# Patient Record
Sex: FEMALE | Race: BLACK OR AFRICAN AMERICAN | NOT HISPANIC OR LATINO | Employment: UNEMPLOYED | URBAN - METROPOLITAN AREA
[De-identification: names, ages, dates, MRNs, and addresses within clinical notes are randomized per-mention and may not be internally consistent; named-entity substitution may affect disease eponyms.]

---
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
## 2020-01-15 ENCOUNTER — COMMUNICATION - HEALTHEAST (OUTPATIENT)
Dept: FAMILY MEDICINE | Facility: CLINIC | Age: 20
End: 2020-01-15

## 2020-01-17 ENCOUNTER — COMMUNICATION - HEALTHEAST (OUTPATIENT)
Dept: FAMILY MEDICINE | Facility: CLINIC | Age: 20
End: 2020-01-17

## 2020-01-17 ENCOUNTER — OFFICE VISIT - HEALTHEAST (OUTPATIENT)
Dept: FAMILY MEDICINE | Facility: CLINIC | Age: 20
End: 2020-01-17

## 2020-01-17 DIAGNOSIS — Z09 HOSPITAL DISCHARGE FOLLOW-UP: ICD-10-CM

## 2020-01-17 DIAGNOSIS — R00.0 TACHYCARDIA: ICD-10-CM

## 2020-01-17 DIAGNOSIS — M53.3 SACRAL PAIN: ICD-10-CM

## 2020-01-17 DIAGNOSIS — E66.01 CLASS 3 SEVERE OBESITY DUE TO EXCESS CALORIES WITHOUT SERIOUS COMORBIDITY WITH BODY MASS INDEX (BMI) OF 40.0 TO 44.9 IN ADULT (H): ICD-10-CM

## 2020-01-17 DIAGNOSIS — K21.9 GERD (GASTROESOPHAGEAL REFLUX DISEASE): ICD-10-CM

## 2020-01-17 DIAGNOSIS — E66.813 CLASS 3 SEVERE OBESITY DUE TO EXCESS CALORIES WITHOUT SERIOUS COMORBIDITY WITH BODY MASS INDEX (BMI) OF 40.0 TO 44.9 IN ADULT (H): ICD-10-CM

## 2020-01-17 DIAGNOSIS — J02.9 SORE THROAT: ICD-10-CM

## 2020-01-17 LAB — DEPRECATED S PYO AG THROAT QL EIA: NORMAL

## 2020-01-17 ASSESSMENT — PATIENT HEALTH QUESTIONNAIRE - PHQ9: SUM OF ALL RESPONSES TO PHQ QUESTIONS 1-9: 9

## 2020-01-18 LAB — GROUP A STREP BY PCR: NORMAL

## 2020-01-19 ENCOUNTER — COMMUNICATION - HEALTHEAST (OUTPATIENT)
Dept: FAMILY MEDICINE | Facility: CLINIC | Age: 20
End: 2020-01-19

## 2020-01-19 DIAGNOSIS — K21.9 GERD (GASTROESOPHAGEAL REFLUX DISEASE): ICD-10-CM

## 2020-01-31 ENCOUNTER — OFFICE VISIT - HEALTHEAST (OUTPATIENT)
Dept: FAMILY MEDICINE | Facility: CLINIC | Age: 20
End: 2020-01-31

## 2020-01-31 ENCOUNTER — HOSPITAL ENCOUNTER (OUTPATIENT)
Dept: CT IMAGING | Facility: HOSPITAL | Age: 20
Discharge: HOME | End: 2020-01-31

## 2020-01-31 DIAGNOSIS — R00.0 TACHYCARDIA: ICD-10-CM

## 2020-01-31 DIAGNOSIS — R94.31 ELECTROCARDIOGRAM SHOWING T WAVE ABNORMALITIES: ICD-10-CM

## 2020-01-31 DIAGNOSIS — R06.02 SOB (SHORTNESS OF BREATH): ICD-10-CM

## 2020-01-31 DIAGNOSIS — F32.5 MAJOR DEPRESSIVE DISORDER WITH SINGLE EPISODE, IN FULL REMISSION (H): ICD-10-CM

## 2020-01-31 LAB
ATRIAL RATE - MUSE: 94 BPM
DIASTOLIC BLOOD PRESSURE - MUSE: NORMAL
INTERPRETATION ECG - MUSE: NORMAL
P AXIS - MUSE: 43 DEGREES
PR INTERVAL - MUSE: 122 MS
QRS DURATION - MUSE: 78 MS
QT - MUSE: 348 MS
QTC - MUSE: 435 MS
R AXIS - MUSE: 30 DEGREES
SYSTOLIC BLOOD PRESSURE - MUSE: NORMAL
T AXIS - MUSE: -2 DEGREES
TSH SERPL DL<=0.005 MIU/L-ACNC: 1.14 UIU/ML (ref 0.3–5)
VENTRICULAR RATE- MUSE: 94 BPM

## 2020-02-03 ENCOUNTER — COMMUNICATION - HEALTHEAST (OUTPATIENT)
Dept: FAMILY MEDICINE | Facility: CLINIC | Age: 20
End: 2020-02-03

## 2020-02-03 DIAGNOSIS — R00.0 TACHYCARDIA: ICD-10-CM

## 2020-02-07 ASSESSMENT — PATIENT HEALTH QUESTIONNAIRE - PHQ9: SUM OF ALL RESPONSES TO PHQ QUESTIONS 1-9: 17

## 2020-03-06 ENCOUNTER — OFFICE VISIT - HEALTHEAST (OUTPATIENT)
Dept: CARDIOLOGY | Facility: CLINIC | Age: 20
End: 2020-03-06

## 2020-03-06 DIAGNOSIS — Z72.0 TOBACCO USE: ICD-10-CM

## 2020-03-06 DIAGNOSIS — E66.01 MORBID OBESITY (H): ICD-10-CM

## 2020-03-06 DIAGNOSIS — R00.0 SINUS TACHYCARDIA: ICD-10-CM

## 2020-03-06 ASSESSMENT — MIFFLIN-ST. JEOR: SCORE: 1673.54

## 2020-03-29 NOTE — PROGRESS NOTES
[facility] Sleep Study Request  Sleep disruption and daytime sleepiness with BMI 44   No observed apneas or snoring  Extreme sinus tachyardia 140 with normal TSH    [facility] Sleep Study Request  Home Sleep Study Ordered during suspension of polysomnography  Ordering preference:  Preference for disposable WatchPAT One study - has patient watched and accepted video?   [email or web address]  Noxturnal HST if not yet available or not comfortable with WatchPAT technology  Followup by any provider    Ambrocio Nelson MD    Physician    Specialty:  Cardiology    Progress Notes    Signed    Encounter Date:  3/6/2020                Signed         Expand All  Collapse All       []Hide copied text    []Hover for details     CARDIOLOGY CLINIC CONSULT NOTE      Assessment/Plan:   1.  Sinus tachycardia.  Likely combination of physical deconditioning, mild dehydration, untreated obstructive sleep apnea.  Effexor could be contributing, consider switching to alternate agent.  2.  Suspected untreated obstructive sleep apnea.  Will schedule split-night sleep study and sleep consultation.  3.  Chest wall pain.  Reassured.  No further evaluation warranted      History of Present Illness:      It is my pleasure to see Yolanda Vee at the [facility] clinic for evaluation of sinus tachycardia.     Yolanda Vee is a 19 y.o. female with a past medical history of morbid obesity, tobacco abuse.  She has been on Effexor as an antidepressant for a couple of years.  She has chronic daytime sleepiness and frequent nocturnal awakenings, though she has not been told she snores.  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  This can bring on left chest pain.  Consider falling down some steps earlier this year, she has noticed that her heart races at times.  In the emergency room her initial heart rate was 140, slowing into the 90s.  She notes  that her heart races for a minute or 2, up to an hour once a week generally this occurs when she is walking up steps, or smoking.  She is also noticed heart racing at night.     She reports significant weight gain, though the amount is uncertain.  She believes that she has gained 100 pounds in the last year.  she also believes that she has lost 80 pounds in the last couple of months with walking.  She recalls a weight of 300 pounds earlier in the year.     She tries to keep her self hydrated by drinking Gatorade.  She has 1 cup of coffee daily and smokes up to a pack of cigarettes daily.  She has tapered her cigarette use back to 1-1/2 cigarettes daily     Past Medical History:          Patient Active Problem List   Diagnosis     Vitamin D deficiency     Mild single current episode of major depressive disorder (H)     Chronic nasal congestion     Tobacco use     Chronic idiopathic constipation     Sinus tachycardia     Morbid obesity (H)        Past Surgical History:   History reviewed. No pertinent surgical history.     Family History:            Family History   Problem Relation Age of Onset     Acute Myocardial Infarction Neg Hx       Family history reviewed and is not pertinent to the chief complaint or presenting problem     Social History:    reports that she has been smoking. She has never used smokeless tobacco. She reports that she does not drink alcohol or use drugs.     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     Sleep: Poorly restorative, frequent nocturnal awakenings, with daytime sleepiness.  No prior sleep apnea evaluation     Meds:      Current Medications          Current Outpatient Medications   Medication Sig Dispense Refill     aluminum-magnesium hydroxide 200-200 mg/5 mL suspension Take 5 mL by mouth every 6 (six) hours as needed for indigestion. 100 mL 3     omeprazole (PRILOSEC) 20 MG capsule TAKE 1 CAPSULE(20 MG) BY MOUTH DAILY BEFORE BREAKFAST 30 capsule 11      "salicylic acid-lactic acid (COMPOUND W) 17 % external solution Apply to wart every 3 days until resolved. 14 mL 3     fluticasone propionate (FLONASE ALLERGY RELIEF) 50 mcg/actuation nasal spray 2 sprays into each nostril daily. 16 g 12     venlafaxine (EFFEXOR-XR) 150 MG 24 hr capsule Take 1 capsule (150 mg total) by mouth daily. 90 capsule 3      No current facility-administered medications for this visit.            Allergies:   Vicodin [hydrocodone-acetaminophen]     Review of Systems:      General: Weight Gain  Eyes: Visual Distubance  Ears/Nose/Throat: WNL  Lungs: Shortness of Breath, Wheezing  Heart: Shortness of Breath with activity, Chest Pain, Irregular Heartbeat  Stomach: Constipation, Heartburn  Bladder: WNL  Muscle/Joints: WNL  Skin: WNL  Nervous System: Daytime Sleepiness  Mental Health: WNL     Blood: WNL           Objective:      Physical Exam  220 lb (99.8 kg)  4' 11\" (1.499 m)  Body mass index is 44.43 kg/m .  /70 (Patient Site: Left Arm, Patient Position: Sitting, Cuff Size: Adult Regular)   Pulse 98   Resp 18   Ht 4' 11\" (1.499 m)   Wt 220 lb (99.8 kg)   BMI 44.43 kg/m    Resting heart rate 84 beats a minute supine, 96 bpm standing  Blood pressure 128/64 supine, 126/72 standing        General Appearance : Awake, Alert, No acute distress  HEENT: No Scleral icterus; the mucous membranes were pink and moist.  Conjunctivae not injected  Neck:  No cervical bruits, jugular venous distention, or thyromegaly . Stout  Chest: The spine was straight. Chest wall symmetric.  Local left pectoral pressure reproduces chest pain   lungs: Respirations unlabored; the lungs are clear to auscultation.  No wheezing   Cardiovascular: Nonpalpable point of maximal impulse.  Auscultation reveals normal first and second heart sounds with no murmurs, rubs, or gallops.  Carotid, radial, and dorsalis pedal pulses are intact and symmetric.     Abdomen: Obese.  No organomegaly, masses, bruits, or tenderness. Bowels " sounds are present  Extremities: No edema  Skin: No xanthelasma. Warm, Dry.  Musculoskeletal: No tenderness.  Neurologic: Alert and oriented ×3. Speech is fluent.        EKG (personally reviewed):  31 January 2020: Sinus rhythm 94 bpm.  Borderline criteria for LVH.  Nonspecific T wave changes.     Cardiac Imaging Studies:  CTA chest PE run 1/31/2020:  HEART: Cardiac chambers within normal limits. No pericardial effusion.  1.  No evidence for pulmonary embolism. Negative CTA examination of the chest.      Lab Review         Lab Results   Component Value Date      01/14/2020     K 4.5 01/14/2020      (H) 01/14/2020     CO2 19 (L) 01/14/2020     BUN 5 (L) 01/14/2020     CREATININE 0.68 01/14/2020     CALCIUM 8.7 01/14/2020           Lab Results   Component Value Date     WBC 15.2 (H) 01/14/2020     WBC 9.0 02/24/2015     HGB 13.1 01/14/2020     HCT 42.3 01/14/2020     MCV 89 01/14/2020      01/14/2020     No results found for: CHOL, TRIG, HDL, LDLCALC  No results found for: TROPONINI  No results found for: BNP        Lab Results   Component Value Date     TSH 1.14 01/31/2020        Ambrocio Nelson MD [facility]

## 2020-04-23 ENCOUNTER — OFFICE VISIT - HEALTHEAST (OUTPATIENT)
Dept: FAMILY MEDICINE | Facility: CLINIC | Age: 20
End: 2020-04-23

## 2020-04-23 DIAGNOSIS — Z20.822 SUSPECTED COVID-19 VIRUS INFECTION: ICD-10-CM

## 2020-04-23 DIAGNOSIS — R06.2 WHEEZING: ICD-10-CM

## 2020-04-30 ENCOUNTER — OFFICE VISIT - HEALTHEAST (OUTPATIENT)
Dept: FAMILY MEDICINE | Facility: CLINIC | Age: 20
End: 2020-04-30

## 2020-04-30 ENCOUNTER — COMMUNICATION - HEALTHEAST (OUTPATIENT)
Dept: FAMILY MEDICINE | Facility: CLINIC | Age: 20
End: 2020-04-30

## 2020-04-30 DIAGNOSIS — R05.9 COUGH: ICD-10-CM

## 2020-04-30 DIAGNOSIS — R19.7 DIARRHEA, UNSPECIFIED TYPE: ICD-10-CM

## 2020-04-30 DIAGNOSIS — R11.2 NON-INTRACTABLE VOMITING WITH NAUSEA, UNSPECIFIED VOMITING TYPE: ICD-10-CM

## 2020-05-04 ENCOUNTER — OFFICE VISIT - HEALTHEAST (OUTPATIENT)
Dept: FAMILY MEDICINE | Facility: CLINIC | Age: 20
End: 2020-05-04

## 2020-05-04 ENCOUNTER — COMMUNICATION - HEALTHEAST (OUTPATIENT)
Dept: FAMILY MEDICINE | Facility: CLINIC | Age: 20
End: 2020-05-04

## 2020-05-04 DIAGNOSIS — R19.7 DIARRHEA, UNSPECIFIED TYPE: ICD-10-CM

## 2020-05-18 ENCOUNTER — COMMUNICATION - HEALTHEAST (OUTPATIENT)
Dept: FAMILY MEDICINE | Facility: CLINIC | Age: 20
End: 2020-05-18

## 2020-05-18 DIAGNOSIS — R09.81 CHRONIC NASAL CONGESTION: ICD-10-CM

## 2020-09-23 ENCOUNTER — OFFICE VISIT - HEALTHEAST (OUTPATIENT)
Dept: FAMILY MEDICINE | Facility: CLINIC | Age: 20
End: 2020-09-23

## 2020-09-23 DIAGNOSIS — R20.2 PARESTHESIA OF BOTH HANDS: ICD-10-CM

## 2020-09-23 DIAGNOSIS — M25.562 ACUTE PAIN OF LEFT KNEE: ICD-10-CM

## 2020-09-23 DIAGNOSIS — F32.0 MILD SINGLE CURRENT EPISODE OF MAJOR DEPRESSIVE DISORDER (H): ICD-10-CM

## 2020-09-23 DIAGNOSIS — E55.9 VITAMIN D DEFICIENCY: ICD-10-CM

## 2020-09-23 LAB
ERYTHROCYTE [DISTWIDTH] IN BLOOD BY AUTOMATED COUNT: 13.4 % (ref 11–14.5)
FASTING STATUS PATIENT QL REPORTED: YES
GLUCOSE BLD-MCNC: 90 MG/DL (ref 70–125)
HCT VFR BLD AUTO: 40.7 % (ref 35–47)
HGB BLD-MCNC: 13.3 G/DL (ref 12–16)
MCH RBC QN AUTO: 28.1 PG (ref 27–34)
MCHC RBC AUTO-ENTMCNC: 32.6 G/DL (ref 32–36)
MCV RBC AUTO: 86 FL (ref 80–100)
PLATELET # BLD AUTO: 323 THOU/UL (ref 140–440)
PMV BLD AUTO: 8.5 FL (ref 7–10)
RBC # BLD AUTO: 4.71 MILL/UL (ref 3.8–5.4)
TSH SERPL DL<=0.005 MIU/L-ACNC: 3.22 UIU/ML (ref 0.3–5)
VIT B12 SERPL-MCNC: 312 PG/ML (ref 213–816)
WBC: 13.7 THOU/UL (ref 4–11)

## 2020-09-23 ASSESSMENT — PATIENT HEALTH QUESTIONNAIRE - PHQ9: SUM OF ALL RESPONSES TO PHQ QUESTIONS 1-9: 12

## 2020-09-23 ASSESSMENT — MIFFLIN-ST. JEOR: SCORE: 1655.4

## 2020-09-24 LAB — 25(OH)D3 SERPL-MCNC: 13.8 NG/ML (ref 30–80)

## 2020-09-30 ENCOUNTER — COMMUNICATION - HEALTHEAST (OUTPATIENT)
Dept: FAMILY MEDICINE | Facility: CLINIC | Age: 20
End: 2020-09-30

## 2020-09-30 ENCOUNTER — RECORDS - HEALTHEAST (OUTPATIENT)
Dept: ADMINISTRATIVE | Facility: OTHER | Age: 20
End: 2020-09-30

## 2020-09-30 DIAGNOSIS — E55.9 VITAMIN D DEFICIENCY: ICD-10-CM

## 2020-10-10 ENCOUNTER — COMMUNICATION - HEALTHEAST (OUTPATIENT)
Dept: FAMILY MEDICINE | Facility: CLINIC | Age: 20
End: 2020-10-10

## 2020-10-10 DIAGNOSIS — F32.5 MAJOR DEPRESSIVE DISORDER WITH SINGLE EPISODE, IN FULL REMISSION (H): ICD-10-CM

## 2020-10-10 DIAGNOSIS — E55.9 VITAMIN D DEFICIENCY: ICD-10-CM

## 2020-10-23 ENCOUNTER — COMMUNICATION - HEALTHEAST (OUTPATIENT)
Dept: FAMILY MEDICINE | Facility: CLINIC | Age: 20
End: 2020-10-23

## 2020-10-23 ENCOUNTER — RECORDS - HEALTHEAST (OUTPATIENT)
Dept: GENERAL RADIOLOGY | Facility: CLINIC | Age: 20
End: 2020-10-23

## 2020-10-23 ENCOUNTER — OFFICE VISIT - HEALTHEAST (OUTPATIENT)
Dept: FAMILY MEDICINE | Facility: CLINIC | Age: 20
End: 2020-10-23

## 2020-10-23 DIAGNOSIS — M79.672 LEFT FOOT PAIN: ICD-10-CM

## 2020-10-23 DIAGNOSIS — J45.40 MODERATE PERSISTENT ASTHMA, UNSPECIFIED WHETHER COMPLICATED: ICD-10-CM

## 2020-10-23 DIAGNOSIS — F33.1 MODERATE EPISODE OF RECURRENT MAJOR DEPRESSIVE DISORDER (H): ICD-10-CM

## 2020-10-23 DIAGNOSIS — Z72.0 TOBACCO USE: ICD-10-CM

## 2020-10-23 DIAGNOSIS — R00.0 TACHYCARDIA: ICD-10-CM

## 2020-10-23 DIAGNOSIS — M79.672 PAIN IN LEFT FOOT: ICD-10-CM

## 2020-10-23 ASSESSMENT — PATIENT HEALTH QUESTIONNAIRE - PHQ9: SUM OF ALL RESPONSES TO PHQ QUESTIONS 1-9: 10

## 2020-10-26 ENCOUNTER — COMMUNICATION - HEALTHEAST (OUTPATIENT)
Dept: FAMILY MEDICINE | Facility: CLINIC | Age: 20
End: 2020-10-26

## 2020-10-27 ENCOUNTER — COMMUNICATION - HEALTHEAST (OUTPATIENT)
Dept: FAMILY MEDICINE | Facility: CLINIC | Age: 20
End: 2020-10-27

## 2020-10-27 ENCOUNTER — OFFICE VISIT - HEALTHEAST (OUTPATIENT)
Dept: FAMILY MEDICINE | Facility: CLINIC | Age: 20
End: 2020-10-27

## 2020-10-27 DIAGNOSIS — M79.672 LEFT FOOT PAIN: ICD-10-CM

## 2020-11-06 ENCOUNTER — OFFICE VISIT - HEALTHEAST (OUTPATIENT)
Dept: FAMILY MEDICINE | Facility: CLINIC | Age: 20
End: 2020-11-06

## 2020-11-06 DIAGNOSIS — R61 NIGHT SWEATS: ICD-10-CM

## 2020-11-06 DIAGNOSIS — E55.9 VITAMIN D DEFICIENCY: ICD-10-CM

## 2020-11-06 DIAGNOSIS — F33.1 MODERATE EPISODE OF RECURRENT MAJOR DEPRESSIVE DISORDER (H): ICD-10-CM

## 2020-11-06 DIAGNOSIS — F51.5 NIGHTMARES: ICD-10-CM

## 2020-11-06 LAB
ALBUMIN SERPL-MCNC: 3.6 G/DL (ref 3.5–5)
ALBUMIN UR-MCNC: NEGATIVE MG/DL
ALP SERPL-CCNC: 107 U/L (ref 45–120)
ALT SERPL W P-5'-P-CCNC: 16 U/L (ref 0–45)
ANION GAP SERPL CALCULATED.3IONS-SCNC: 9 MMOL/L (ref 5–18)
APPEARANCE UR: CLEAR
AST SERPL W P-5'-P-CCNC: 13 U/L (ref 0–40)
BACTERIA #/AREA URNS HPF: ABNORMAL HPF
BASOPHILS # BLD AUTO: 0.1 THOU/UL (ref 0–0.2)
BASOPHILS NFR BLD AUTO: 1 % (ref 0–2)
BILIRUB SERPL-MCNC: 0.2 MG/DL (ref 0–1)
BILIRUB UR QL STRIP: NEGATIVE
BUN SERPL-MCNC: 8 MG/DL (ref 8–22)
CALCIUM SERPL-MCNC: 9.2 MG/DL (ref 8.5–10.5)
CHLORIDE BLD-SCNC: 109 MMOL/L (ref 98–107)
CO2 SERPL-SCNC: 23 MMOL/L (ref 22–31)
COLOR UR AUTO: YELLOW
CREAT SERPL-MCNC: 0.69 MG/DL (ref 0.6–1.1)
EOSINOPHIL # BLD AUTO: 0.5 THOU/UL (ref 0–0.4)
EOSINOPHIL NFR BLD AUTO: 4 % (ref 0–6)
ERYTHROCYTE [DISTWIDTH] IN BLOOD BY AUTOMATED COUNT: 13.9 % (ref 11–14.5)
GFR SERPL CREATININE-BSD FRML MDRD: >60 ML/MIN/1.73M2
GLUCOSE BLD-MCNC: 83 MG/DL (ref 70–125)
GLUCOSE UR STRIP-MCNC: NEGATIVE MG/DL
HCT VFR BLD AUTO: 40.4 % (ref 35–47)
HGB BLD-MCNC: 13.7 G/DL (ref 12–16)
HGB UR QL STRIP: ABNORMAL
HIV 1+2 AB+HIV1 P24 AG SERPL QL IA: NEGATIVE
KETONES UR STRIP-MCNC: NEGATIVE MG/DL
LEUKOCYTE ESTERASE UR QL STRIP: ABNORMAL
LYMPHOCYTES # BLD AUTO: 3.1 THOU/UL (ref 0.8–4.4)
LYMPHOCYTES NFR BLD AUTO: 22 % (ref 20–40)
MCH RBC QN AUTO: 29.3 PG (ref 27–34)
MCHC RBC AUTO-ENTMCNC: 33.9 G/DL (ref 32–36)
MCV RBC AUTO: 86 FL (ref 80–100)
MONOCYTES # BLD AUTO: 1 THOU/UL (ref 0–0.9)
MONOCYTES NFR BLD AUTO: 7 % (ref 2–10)
MUCOUS THREADS #/AREA URNS LPF: ABNORMAL LPF
NEUTROPHILS # BLD AUTO: 9.5 THOU/UL (ref 2–7.7)
NEUTROPHILS NFR BLD AUTO: 67 % (ref 50–70)
NITRATE UR QL: NEGATIVE
PH UR STRIP: 5.5 [PH] (ref 5–8)
PLATELET # BLD AUTO: 354 THOU/UL (ref 140–440)
PMV BLD AUTO: 8.1 FL (ref 7–10)
POTASSIUM BLD-SCNC: 4.6 MMOL/L (ref 3.5–5)
PROT SERPL-MCNC: 7.1 G/DL (ref 6–8)
RBC # BLD AUTO: 4.68 MILL/UL (ref 3.8–5.4)
RBC #/AREA URNS AUTO: ABNORMAL HPF
SODIUM SERPL-SCNC: 141 MMOL/L (ref 136–145)
SP GR UR STRIP: 1.02 (ref 1–1.03)
SQUAMOUS #/AREA URNS AUTO: ABNORMAL LPF
TSH SERPL DL<=0.005 MIU/L-ACNC: 1.74 UIU/ML (ref 0.3–5)
UROBILINOGEN UR STRIP-ACNC: ABNORMAL
WBC #/AREA URNS AUTO: ABNORMAL HPF
WBC: 14.1 THOU/UL (ref 4–11)

## 2020-11-08 LAB — BACTERIA SPEC CULT: NORMAL

## 2020-11-09 LAB — 25(OH)D3 SERPL-MCNC: 12.1 NG/ML (ref 30–80)

## 2020-11-12 ENCOUNTER — COMMUNICATION - HEALTHEAST (OUTPATIENT)
Dept: FAMILY MEDICINE | Facility: CLINIC | Age: 20
End: 2020-11-12

## 2020-11-12 ENCOUNTER — TELEPHONE (OUTPATIENT)
Dept: PSYCHIATRY | Facility: CLINIC | Age: 20
End: 2020-11-12

## 2020-11-12 NOTE — TELEPHONE ENCOUNTER
PSYCHIATRY CLINIC PHONE INTAKE     SERVICES REQUESTED / INTERESTED IN          Med Management    Presenting Problem and Brief History                              What would you like to be seen for? (brief description):  Patient's PCP is prescribing venlafaxine 150mg but patient feels it is not very helpful. Primary concerns are bad nightmares and seeing things that are not there. This has been happening for about a month. She typically sees things out of the corner of her eye and when waking up from nightmares she will see something that is not really there. Patient works full time at a group home and says it is hard to be at work because of this and because one of the clients talks a lot about SI which makes the patient think about it. She sees things sometimes/daily, and sees her grandparents spirits when she thinks about them.   Have you received a mental health diagnosis? Yes   Which one (s): Depression, Anxiety  Is there any history of developmental delay?  No   Are you currently seeing a mental health provider?  Yes            Who / month last seen:  Therapy at [facility] in [place], last seen about 2 weeks ago  Do you have mental health records elsewhere?  Yes  Will you sign a release so we can obtain them?  Yes    Have you ever been hospitalized for psychiatric reasons?  No  Describe:      Do you have current thoughts of self-harm?  Yes  - no plan  Do you currently have thoughts of harming others?  No       Substance Use History     Do you have any history of alcohol / illicit drug use?  Yes  Describe:  Uses alcohol sometimes, about 5-6 shots at a time. Patient feels it is a problem and she talks to her therapist about it.  Have you ever received treatment for this?  No    Describe:       Social History     Who is the patient's a guardian?  No    Name / number:   Have you had an ACT team in last 12 months?  No  Describe:    Do you have any current or past legal issues?  No  Describe:    OK to leave a  detailed voicemail?  Yes    Medical/ Surgical History                                   Patient Active Problem List   Diagnosis     CN (constipation)          Medications             No current outpatient medications on file.         DISPOSITION      Completed phone screen with patient. Scheduled Navigate screen with Neville Childs on 11/17/20.    Delia Lira,

## 2020-11-17 ENCOUNTER — VIRTUAL VISIT (OUTPATIENT)
Dept: PSYCHIATRY | Facility: CLINIC | Age: 20
End: 2020-11-17
Payer: COMMERCIAL

## 2020-11-17 ENCOUNTER — COMMUNICATION - HEALTHEAST (OUTPATIENT)
Dept: SCHEDULING | Facility: CLINIC | Age: 20
End: 2020-11-17

## 2020-11-17 ENCOUNTER — CARE COORDINATION (OUTPATIENT)
Dept: PSYCHIATRY | Facility: CLINIC | Age: 20
End: 2020-11-17

## 2020-11-17 DIAGNOSIS — F43.10 PTSD (POST-TRAUMATIC STRESS DISORDER): ICD-10-CM

## 2020-11-17 DIAGNOSIS — F33.3 SEVERE RECURRENT MAJOR DEPRESSIVE DISORDER WITH PSYCHOTIC FEATURES (H): Primary | ICD-10-CM
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
## 2020-11-18 NOTE — PROGRESS NOTES
T/C with ABA Brar, at [facility]. Discussed NAVIGATE screening for first-episode psychosis, subsequent referral to [facility], and referral to young adult DBT waitlist. Highlighted concerns about pt's excessive ETOH consumption, hx of SIB and other impulsive behaviors, and sleep issues (w/nightmares). Writer and Maris discussed the possibility of a psychiatry referral until pt is picked up by [facility]. Maris agreed to look into referral.

## 2020-11-20 ENCOUNTER — TELEPHONE (OUTPATIENT)
Dept: PSYCHIATRY | Facility: CLINIC | Age: 20
End: 2020-11-20

## 2020-11-24 ENCOUNTER — OFFICE VISIT - HEALTHEAST (OUTPATIENT)
Dept: FAMILY MEDICINE | Facility: CLINIC | Age: 20
End: 2020-11-24

## 2020-11-24 DIAGNOSIS — F43.10 PTSD (POST-TRAUMATIC STRESS DISORDER): ICD-10-CM

## 2020-11-24 DIAGNOSIS — F51.5 NIGHTMARES: ICD-10-CM

## 2020-11-24 DIAGNOSIS — D72.829 LEUKOCYTOSIS, UNSPECIFIED TYPE: ICD-10-CM

## 2020-11-24 DIAGNOSIS — R05.9 COUGH: ICD-10-CM

## 2020-11-24 DIAGNOSIS — K21.9 GASTROESOPHAGEAL REFLUX DISEASE WITHOUT ESOPHAGITIS: ICD-10-CM

## 2020-11-24 DIAGNOSIS — R50.9 FEVER, UNSPECIFIED FEVER CAUSE: ICD-10-CM

## 2020-11-24 DIAGNOSIS — F33.1 MODERATE EPISODE OF RECURRENT MAJOR DEPRESSIVE DISORDER (H): ICD-10-CM

## 2020-11-24 ASSESSMENT — PATIENT HEALTH QUESTIONNAIRE - PHQ9: SUM OF ALL RESPONSES TO PHQ QUESTIONS 1-9: 11

## 2020-11-25 ENCOUNTER — AMBULATORY - HEALTHEAST (OUTPATIENT)
Dept: FAMILY MEDICINE | Facility: CLINIC | Age: 20
End: 2020-11-25

## 2020-11-25 DIAGNOSIS — R50.9 FEVER, UNSPECIFIED FEVER CAUSE: ICD-10-CM

## 2020-11-25 DIAGNOSIS — R05.9 COUGH: ICD-10-CM

## 2020-11-28 ENCOUNTER — COMMUNICATION - HEALTHEAST (OUTPATIENT)
Dept: SCHEDULING | Facility: CLINIC | Age: 20
End: 2020-11-28

## 2020-11-30 ENCOUNTER — AMBULATORY - HEALTHEAST (OUTPATIENT)
Dept: LAB | Facility: CLINIC | Age: 20
End: 2020-11-30

## 2020-11-30 DIAGNOSIS — D72.829 LEUKOCYTOSIS, UNSPECIFIED TYPE: ICD-10-CM

## 2020-12-01 ENCOUNTER — OFFICE VISIT - HEALTHEAST (OUTPATIENT)
Dept: FAMILY MEDICINE | Facility: CLINIC | Age: 20
End: 2020-12-01

## 2020-12-01 DIAGNOSIS — F51.5 NIGHTMARES: ICD-10-CM

## 2020-12-01 DIAGNOSIS — F41.9 ANXIETY: ICD-10-CM

## 2020-12-01 DIAGNOSIS — D72.829 LEUKOCYTOSIS, UNSPECIFIED TYPE: ICD-10-CM

## 2020-12-01 LAB
BASOPHILS # BLD AUTO: 0.1 THOU/UL (ref 0–0.2)
BASOPHILS NFR BLD AUTO: 1 % (ref 0–2)
EOSINOPHIL # BLD AUTO: 0.4 THOU/UL (ref 0–0.4)
EOSINOPHIL NFR BLD AUTO: 3 % (ref 0–6)
ERYTHROCYTE [DISTWIDTH] IN BLOOD BY AUTOMATED COUNT: 15.1 % (ref 11–14.5)
HCT VFR BLD AUTO: 40.4 % (ref 35–47)
HGB BLD-MCNC: 12.7 G/DL (ref 12–16)
IMM GRANULOCYTES # BLD: 0.1 THOU/UL
IMM GRANULOCYTES NFR BLD: 1 %
LAB AP CHARGES (HE HISTORICAL CONVERSION): NORMAL
LYMPHOCYTES # BLD AUTO: 3.7 THOU/UL (ref 0.8–4.4)
LYMPHOCYTES NFR BLD AUTO: 24 % (ref 20–40)
MCH RBC QN AUTO: 27.5 PG (ref 27–34)
MCHC RBC AUTO-ENTMCNC: 31.4 G/DL (ref 32–36)
MCV RBC AUTO: 88 FL (ref 80–100)
MONOCYTES # BLD AUTO: 1 THOU/UL (ref 0–0.9)
MONOCYTES NFR BLD AUTO: 7 % (ref 2–10)
NEUTROPHILS # BLD AUTO: 10 THOU/UL (ref 2–7.7)
NEUTROPHILS NFR BLD AUTO: 66 % (ref 50–70)
PATH REPORT.COMMENTS IMP SPEC: NORMAL
PATH REPORT.COMMENTS IMP SPEC: NORMAL
PATH REPORT.FINAL DX SPEC: NORMAL
PATH REPORT.MICROSCOPIC SPEC OTHER STN: ABNORMAL
PATH REPORT.MICROSCOPIC SPEC OTHER STN: NORMAL
PATH REPORT.RELEVANT HX SPEC: NORMAL
PLATELET # BLD AUTO: 334 THOU/UL (ref 140–440)
PMV BLD AUTO: 10.9 FL (ref 8.5–12.5)
RBC # BLD AUTO: 4.61 MILL/UL (ref 3.8–5.4)
WBC: 15.2 THOU/UL (ref 4–11)

## 2020-12-01 ASSESSMENT — ANXIETY QUESTIONNAIRES
2. NOT BEING ABLE TO STOP OR CONTROL WORRYING: NOT AT ALL
GAD7 TOTAL SCORE: 8
1. FEELING NERVOUS, ANXIOUS, OR ON EDGE: NEARLY EVERY DAY
4. TROUBLE RELAXING: NOT AT ALL
5. BEING SO RESTLESS THAT IT IS HARD TO SIT STILL: NOT AT ALL
7. FEELING AFRAID AS IF SOMETHING AWFUL MIGHT HAPPEN: NOT AT ALL
6. BECOMING EASILY ANNOYED OR IRRITABLE: NEARLY EVERY DAY
3. WORRYING TOO MUCH ABOUT DIFFERENT THINGS: MORE THAN HALF THE DAYS

## 2020-12-01 ASSESSMENT — PATIENT HEALTH QUESTIONNAIRE - PHQ9: SUM OF ALL RESPONSES TO PHQ QUESTIONS 1-9: 17

## 2020-12-03 ENCOUNTER — TELEPHONE (OUTPATIENT)
Dept: PSYCHIATRY | Facility: CLINIC | Age: 20
End: 2020-12-03
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
## 2020-12-04 ENCOUNTER — COMMUNICATION - HEALTHEAST (OUTPATIENT)
Dept: FAMILY MEDICINE | Facility: CLINIC | Age: 20
End: 2020-12-04

## 2020-12-06 ENCOUNTER — HEALTH MAINTENANCE LETTER (OUTPATIENT)
Age: 20
End: 2020-12-06

## 2020-12-08 ENCOUNTER — AMBULATORY - HEALTHEAST (OUTPATIENT)
Dept: FAMILY MEDICINE | Facility: CLINIC | Age: 20
End: 2020-12-08

## 2020-12-08 ENCOUNTER — COMMUNICATION - HEALTHEAST (OUTPATIENT)
Dept: ADMINISTRATIVE | Facility: HOSPITAL | Age: 20
End: 2020-12-08

## 2020-12-08 DIAGNOSIS — D72.828 NEUTROPHILIA: ICD-10-CM

## 2020-12-09 ENCOUNTER — COMMUNICATION - HEALTHEAST (OUTPATIENT)
Dept: ADMINISTRATIVE | Facility: HOSPITAL | Age: 20
End: 2020-12-09

## 2020-12-29 ENCOUNTER — COMMUNICATION - HEALTHEAST (OUTPATIENT)
Dept: FAMILY MEDICINE | Facility: CLINIC | Age: 20
End: 2020-12-29

## 2020-12-29 DIAGNOSIS — F51.5 NIGHTMARES: ICD-10-CM

## 2020-12-29 DIAGNOSIS — F43.10 PTSD (POST-TRAUMATIC STRESS DISORDER): ICD-10-CM

## 2021-01-04 ENCOUNTER — OFFICE VISIT - HEALTHEAST (OUTPATIENT)
Dept: ONCOLOGY | Facility: CLINIC | Age: 21
End: 2021-01-04

## 2021-01-04 DIAGNOSIS — R23.3 EASY BRUISING: ICD-10-CM

## 2021-01-04 DIAGNOSIS — Z72.0 TOBACCO USE: ICD-10-CM

## 2021-01-04 DIAGNOSIS — D72.828 NEUTROPHILIA: ICD-10-CM

## 2021-01-04 DIAGNOSIS — D50.9 HYPOCHROMIC ANEMIA: ICD-10-CM

## 2021-01-04 LAB
APTT PPP: 33 SECONDS (ref 24–37)
BASOPHILS # BLD AUTO: 0.1 THOU/UL (ref 0–0.2)
BASOPHILS NFR BLD AUTO: 1 % (ref 0–2)
EOSINOPHIL # BLD AUTO: 0.5 THOU/UL (ref 0–0.4)
EOSINOPHIL NFR BLD AUTO: 4 % (ref 0–6)
ERYTHROCYTE [DISTWIDTH] IN BLOOD BY AUTOMATED COUNT: 15.1 % (ref 11–14.5)
FERRITIN SERPL-MCNC: 36 NG/ML (ref 10–130)
FIBRINOGEN PPP-MCNC: 473 MG/DL (ref 170–410)
HCT VFR BLD AUTO: 41.5 % (ref 35–47)
HGB BLD-MCNC: 13.4 G/DL (ref 12–16)
IMM GRANULOCYTES # BLD: 0.1 THOU/UL
IMM GRANULOCYTES NFR BLD: 1 %
INR PPP: 1.1 (ref 0.9–1.1)
IRON SATN MFR SERPL: 34 % (ref 20–50)
IRON SERPL-MCNC: 110 UG/DL (ref 42–175)
LYMPHOCYTES # BLD AUTO: 3.2 THOU/UL (ref 0.8–4.4)
LYMPHOCYTES NFR BLD AUTO: 25 % (ref 20–40)
MCH RBC QN AUTO: 28.2 PG (ref 27–34)
MCHC RBC AUTO-ENTMCNC: 32.3 G/DL (ref 32–36)
MCV RBC AUTO: 87 FL (ref 80–100)
MONOCYTES # BLD AUTO: 1 THOU/UL (ref 0–0.9)
MONOCYTES NFR BLD AUTO: 8 % (ref 2–10)
NEUTROPHILS # BLD AUTO: 8 THOU/UL (ref 2–7.7)
NEUTROPHILS NFR BLD AUTO: 63 % (ref 50–70)
PLATELET # BLD AUTO: 337 THOU/UL (ref 140–440)
PMV BLD AUTO: 10.7 FL (ref 8.5–12.5)
RBC # BLD AUTO: 4.76 MILL/UL (ref 3.8–5.4)
TIBC SERPL-MCNC: 319 UG/DL (ref 313–563)
TRANSFERRIN SERPL-MCNC: 255 MG/DL (ref 212–360)
WBC: 12.8 THOU/UL (ref 4–11)

## 2021-01-08 LAB
MISCELLANEOUS TEST DEPT. - HE HISTORICAL: NORMAL
PERFORMING LAB: NORMAL
SPECIMEN STATUS: NORMAL
TEST NAME: NORMAL

## 2021-01-19 ENCOUNTER — OFFICE VISIT - HEALTHEAST (OUTPATIENT)
Dept: BEHAVIORAL HEALTH | Facility: CLINIC | Age: 21
End: 2021-01-19

## 2021-01-19 ENCOUNTER — OFFICE VISIT - HEALTHEAST (OUTPATIENT)
Dept: ONCOLOGY | Facility: CLINIC | Age: 21
End: 2021-01-19

## 2021-01-19 DIAGNOSIS — F51.5 NIGHTMARES: ICD-10-CM

## 2021-01-19 DIAGNOSIS — D50.9 HYPOCHROMIC ANEMIA: ICD-10-CM

## 2021-01-19 DIAGNOSIS — D72.828 NEUTROPHILIA: ICD-10-CM

## 2021-01-19 DIAGNOSIS — F43.10 PTSD (POST-TRAUMATIC STRESS DISORDER): ICD-10-CM

## 2021-01-19 DIAGNOSIS — F33.1 MODERATE EPISODE OF RECURRENT MAJOR DEPRESSIVE DISORDER (H): ICD-10-CM

## 2021-01-19 DIAGNOSIS — R23.3 EASY BRUISING: ICD-10-CM

## 2021-01-19 ASSESSMENT — ANXIETY QUESTIONNAIRES
GAD7 TOTAL SCORE: 10
2. NOT BEING ABLE TO STOP OR CONTROL WORRYING: MORE THAN HALF THE DAYS
3. WORRYING TOO MUCH ABOUT DIFFERENT THINGS: NEARLY EVERY DAY
IF YOU CHECKED OFF ANY PROBLEMS ON THIS QUESTIONNAIRE, HOW DIFFICULT HAVE THESE PROBLEMS MADE IT FOR YOU TO DO YOUR WORK, TAKE CARE OF THINGS AT HOME, OR GET ALONG WITH OTHER PEOPLE: SOMEWHAT DIFFICULT
1. FEELING NERVOUS, ANXIOUS, OR ON EDGE: NOT AT ALL
7. FEELING AFRAID AS IF SOMETHING AWFUL MIGHT HAPPEN: NOT AT ALL
6. BECOMING EASILY ANNOYED OR IRRITABLE: NEARLY EVERY DAY
5. BEING SO RESTLESS THAT IT IS HARD TO SIT STILL: MORE THAN HALF THE DAYS
4. TROUBLE RELAXING: NOT AT ALL

## 2021-01-19 ASSESSMENT — PATIENT HEALTH QUESTIONNAIRE - PHQ9: SUM OF ALL RESPONSES TO PHQ QUESTIONS 1-9: 17

## 2021-01-25 ENCOUNTER — VIRTUAL VISIT (OUTPATIENT)
Dept: PSYCHIATRY | Facility: CLINIC | Age: 21
End: 2021-01-25
Attending: SOCIAL WORKER
Payer: COMMERCIAL

## 2021-01-25 DIAGNOSIS — F33.3 SEVERE RECURRENT MAJOR DEPRESSIVE DISORDER WITH PSYCHOTIC FEATURES (H): Primary | ICD-10-CM

## 2021-01-25 PROCEDURE — 90791 PSYCH DIAGNOSTIC EVALUATION: CPT | Mod: HN
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
## 2021-01-28 ENCOUNTER — VIRTUAL VISIT (OUTPATIENT)
Dept: PSYCHIATRY | Facility: CLINIC | Age: 21
End: 2021-01-28
Attending: PSYCHIATRY & NEUROLOGY
Payer: COMMERCIAL

## 2021-01-28 DIAGNOSIS — F43.10 PTSD (POST-TRAUMATIC STRESS DISORDER): ICD-10-CM

## 2021-01-28 DIAGNOSIS — F33.1 MODERATE EPISODE OF RECURRENT MAJOR DEPRESSIVE DISORDER (H): Primary | ICD-10-CM

## 2021-01-28 PROCEDURE — 90792 PSYCH DIAG EVAL W/MED SRVCS: CPT | Mod: GC | Performed by: STUDENT IN AN ORGANIZED HEALTH CARE EDUCATION/TRAINING PROGRAM

## 2021-01-28 ASSESSMENT — PAIN SCALES - GENERAL: PAINLEVEL: NO PAIN (0)

## 2021-01-28 NOTE — PATIENT INSTRUCTIONS
**For crisis resources, please see the information at the end of this document**     Patient Education      Thank you for coming to the [facility] [place] [facility].    Lab Testing:  If you had lab testing today and your results are reassuring or normal they will be mailed to you or sent through [employer] within 7 days. If the lab tests need quick action we will call you with the results. The phone number we will call with results is # [phone] (home) . If this is not the best number please call our clinic and change the number.    Medication Refills:  If you need any refills please call your pharmacy and they will contact us. Our fax number for refills is [phone]. Please allow three business for refill processing. If you need to  your refill at a new pharmacy, please contact the new pharmacy directly. The new pharmacy will help you get your medications transferred.     Scheduling:  If you have any concerns about today's visit or wish to schedule another appointment please call our office during normal business hours [phone] (8-5:00 M-F)    Contact Us:  Please call [phone] during business hours (8-5:00 M-F).  If after clinic hours, or on the weekend, please call  [phone].    Financial Assistance [phone]  [facility] Billing [phone]  Central Billing Office, [facility]: [phone]  [place] Billing [phone]  Medical Records [phone]  [place] Patient Bill of Rights [email or web address]       MENTAL HEALTH CRISIS NUMBERS:  For a medical emergency please call  911 or go to the nearest ER.     [facility]:   [facility] -[phone]   Crisis Residence [facility] -[phone]   Walk-In Counseling Center [facility] -[phone]   COPE 24/7 [place] Mobile Team -[phone] (adults)/[phone] (child)  CHILD: [facility] needs assessment  team - [phone]      [facility]:   [facility] - [phone]   Walk-in counseling [facility] - [phone]   Walk-in counseling [facility] - [phone]   Crisis Residence [facility] - [phone]  Urgent Care Adult Mental [facility]-[phone] mobile unit/ 24/7 crisis line    National Crisis Numbers:   National Suicide Prevention Lifeline: 4-513-482-TALK (626-259-9775)  Poison Control Center - 2-831-427-0465  [employer]/resources for a list of additional resources (SOS)  Trans Lifeline a hotline for transgender people 1-524.799.5848  The [place] [employer] a hotline for LGBT youth 5-834-537-2388  Crisis Text Line: For any crisis 24/7   To: 080050  see www.crisistextline.org  - IF MAKING A CALL FEELS TOO HARD, send a text!         Again thank you for choosing [facility] M[facility] HE[facility] & ADDICTION [facility] and please let us know how we can best partner with you to improve you and your family's health.    You may be receiving a survey regarding this appointment. We would love to have your feedback, both positive and negative. The survey is done by an external company, so your answers are anonymous.
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
## 2021-01-28 NOTE — PROGRESS NOTES
"VIDEO VISIT  Yolanda Vee is a 20 year old patient who is being evaluated via a billable video visit.      The patient has been notified of following:   \"This video visit will be conducted via a call between you and your physician/provider. We have found that certain health care needs can be provided without the need for an in-person physical exam. This service lets us provide the care you need with a video conversation. If a prescription is necessary we can send it directly to your pharmacy. If lab work is needed we can place an order for that and you can then stop by our lab to have the test done at a later time. Insurers are generally covering virtual visits as they would in-office visits so billing should not be different than normal.  If for some reason you do get billed incorrectly, you should contact the billing office to correct it and that number is in the AVS .    Video Conference to be completed via:  Charli    Patient has given verbal consent for video visit?:  Yes    Patient would prefer that any video invitations be sent by: Text to cell phone: [phone]       How would patient like to obtain AVS?:  BookBub    AVS SmartPhrase [PsychAVS] has been placed in 'Patient Instructions':  Yes    "

## 2021-02-01 ENCOUNTER — OFFICE VISIT - HEALTHEAST (OUTPATIENT)
Dept: FAMILY MEDICINE | Facility: CLINIC | Age: 21
End: 2021-02-01

## 2021-02-01 DIAGNOSIS — K21.9 GASTROESOPHAGEAL REFLUX DISEASE WITHOUT ESOPHAGITIS: ICD-10-CM

## 2021-02-01 DIAGNOSIS — Z72.0 TOBACCO USE: ICD-10-CM

## 2021-02-01 DIAGNOSIS — F10.10 ALCOHOL ABUSE: ICD-10-CM

## 2021-02-01 DIAGNOSIS — F41.9 ANXIETY: ICD-10-CM

## 2021-02-01 DIAGNOSIS — F32.1 MODERATE MAJOR DEPRESSION (H): ICD-10-CM

## 2021-02-01 DIAGNOSIS — F43.10 PTSD (POST-TRAUMATIC STRESS DISORDER): ICD-10-CM

## 2021-02-01 DIAGNOSIS — D72.829 LEUKOCYTOSIS, UNSPECIFIED TYPE: ICD-10-CM

## 2021-02-01 ASSESSMENT — ANXIETY QUESTIONNAIRES
3. WORRYING TOO MUCH ABOUT DIFFERENT THINGS: SEVERAL DAYS
1. FEELING NERVOUS, ANXIOUS, OR ON EDGE: MORE THAN HALF THE DAYS
4. TROUBLE RELAXING: NEARLY EVERY DAY
GAD7 TOTAL SCORE: 11
IF YOU CHECKED OFF ANY PROBLEMS ON THIS QUESTIONNAIRE, HOW DIFFICULT HAVE THESE PROBLEMS MADE IT FOR YOU TO DO YOUR WORK, TAKE CARE OF THINGS AT HOME, OR GET ALONG WITH OTHER PEOPLE: SOMEWHAT DIFFICULT
5. BEING SO RESTLESS THAT IT IS HARD TO SIT STILL: SEVERAL DAYS
2. NOT BEING ABLE TO STOP OR CONTROL WORRYING: SEVERAL DAYS
7. FEELING AFRAID AS IF SOMETHING AWFUL MIGHT HAPPEN: NOT AT ALL
6. BECOMING EASILY ANNOYED OR IRRITABLE: NEARLY EVERY DAY

## 2021-02-01 ASSESSMENT — MIFFLIN-ST. JEOR: SCORE: 1733.42

## 2021-02-01 ASSESSMENT — PATIENT HEALTH QUESTIONNAIRE - PHQ9: SUM OF ALL RESPONSES TO PHQ QUESTIONS 1-9: 16

## 2021-02-16 ENCOUNTER — COMMUNICATION - HEALTHEAST (OUTPATIENT)
Dept: BEHAVIORAL HEALTH | Facility: CLINIC | Age: 21
End: 2021-02-16
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
## 2021-02-18 ENCOUNTER — TELEPHONE (OUTPATIENT)
Dept: PSYCHIATRY | Facility: CLINIC | Age: 21
End: 2021-02-18

## 2021-02-18 ENCOUNTER — VIRTUAL VISIT (OUTPATIENT)
Dept: PSYCHIATRY | Facility: CLINIC | Age: 21
End: 2021-02-18
Attending: PSYCHIATRY & NEUROLOGY
Payer: COMMERCIAL

## 2021-02-18 DIAGNOSIS — F19.10 SUBSTANCE ABUSE (H): Primary | ICD-10-CM

## 2021-02-18 DIAGNOSIS — F33.1 MODERATE EPISODE OF RECURRENT MAJOR DEPRESSIVE DISORDER (H): ICD-10-CM

## 2021-02-18 PROCEDURE — 99215 OFFICE O/P EST HI 40 MIN: CPT | Mod: GT | Performed by: STUDENT IN AN ORGANIZED HEALTH CARE EDUCATION/TRAINING PROGRAM

## 2021-02-18 NOTE — TELEPHONE ENCOUNTER
On 2/18/2021, at 1028, writer called patient at mobile to confirm Virtual Visit. Writer unable to make contact with patient. Writer left detailed voice message for callback. [phone], left as call back number. VICTOR HUGO Perez, EMT

## 2021-02-18 NOTE — PROGRESS NOTES
First Episode Psychosis   Strengths Program  Explanation of Findings Visit  [facility]    Patient Name:  Yolanda Vee  /Age:  2000 (20 year old)  Initial Diagnosis(es):  296.34 (F33.3) Major Depressive Disorder, Recurrent Episode, With psychotic features _  309.81 (F43.10) Posttraumatic Stress Disorder (includes Posttraumatic Stress Disorder for Children 6 Years and Younger)  Without dissociative symptoms  Patient Active Problem List    Diagnosis Date Noted     CN (constipation) 2009     Priority: Medium     Diagnostic Assessment Completed: 21; please see in chart review for details    Video- Visit Details  Type of service:  video visit for consultation & recommendations  Time of service:    Date:  21     Video Start Time:  12PM       Video End Time:  1PM    Reason for video visit:  Services only offered telehealth  Originating Site (patient location):  [facility]   Location- Patient's home  Distant Site (provider location):  Remote location  Mode of Communication:  Video Conference via [employer]  Consent:  Patient has given verbal consent for video visit?: Yes      Type of contact:   Explanation of Findings    Individuals Present:    Client: Yes  Prescriber(s): Quynh Smith MD and Jami Rose MD   & Family Clinician: KARTIK Almodovar  Other: Lisha Larose NP    Total number of people who participated in contact: 5, which includes the clinical team    Interventions:  >[place] announced at beginning of session  >Review of each team member's role   >Review of diagnosis, symptoms to substantiate the diagnosis, and affected level of functioning  >Interpretation and explanation of results of psychological testing  >Review of medications  >Review of goals and associated strengths, barriers, objectives, and interventions  >Review of safety risks  (ie SI and HI) and characteristics and interventions to mitigate risk  >Advice given as to how  interpersonal supports can best assist the patient's recovery  >Explored aspects of family history/dynamics  >Explored pt/family understanding of, and adjustment to psychosis / illness  >Review of frequency of appointments and anticipated length of treatment  >Illicit client/family feedback    Assessment:  The above named individuals met for the purposes of reviewing the findings of the diagnostic assessment, Psychological & Cognitive testing, and Psychiatric consultation recently completed.     Per Psychiatric consultation: Yolanda Vee is a 20 year old year old female. Today, Kem reports drinking 7 beers/day for 2 weeks. She is interested in treatment. She does not take her medications when drinking, so has not taken Zoloft recently. She thinks that it may help with anxiety and depression and has not caused her side effects. Her mood has been flip flopping. She had passive suicidal ideation on 2/11, the 11th anniversary of her grandmother's death but has not had suicidal ideation since. Kem reports auditory hallucinations of someone in her house. She endorses low energy, insomnia, nightmares every night, social anxiety, and hypervigilance. Denies paranoia and visual hallucinations. She does not think prazosin has improved her nightmares. She has been vaping marijuana daily for the past 2 weeks which is an increase for her. Informed Kem of diagnostic impressions corresponding with diagnoses of major depressive disorder with psychotic features, PTSD, alcohol use disorder, cannabis use disorder.     Psychosocial considerations: Kem has notable strengths, including empathy and kindness to others. Due to these strengths, I think Kem has the potential to find mental well-being.  Psychosocial factors impacting treatment include limited social support, mental health symptoms, occupational / vocational stress and relationship stress.  Kem  has evidence of functional impairment including difficulty with  "social-strangers, money management and self-care routines. More specifically, she socially withdraws and keeps to herself.  Goal is to increase their functioning detailed above and assist Kem to make progress towards their goals.  Kem identified the following factors that will help them succeed in recovery include motivation and work ethic. Things that may interfere with their success include lacks coping skills.     Mental Status Exam:   Alertness: alert  and oriented  Attention Span and Concentration:  Normal  Attitude:  cooperative   Appearance: adequately groomed  Behavior/Demeanor: cooperative, pleasant and calm, with good eye contact   Speech: normal and regular rate and rhythm  Language: intact and no problems. Preferred language identified as English.  Psychomotor Behavior:  no evidence of tardive dyskinesia, dystonia, or tics  Mood: \"flip flopping\"  Affect: restricted.   Associations:  no loose associations  Thought Process:  logical, linear and goal oriented  Thought Content:  no evidence of suicidal ideation or homicidal ideation  Perception:  Reports auditory hallucinations;  Denies visual hallucinations  Insight: good  Judgment: good  Cognition: does  appear grossly intact; formal cognitive testing was not done    Recommendations:  Informed Yolanda that she does seem appropriate for brief consultation with the First Episode of Psychosis, Strengths Program. She is not appropriate for ongoing care within the program as she does not have a primary psychotic disorder. Writer has provided verbal and/or written information about [facility]'s First Episode of Psychosis- Strengths Program, including review of recommended services.    For Kem, it is recommended that she have several months of consultation within the Strengths Program to assist in her recovery.  Follow-up appointments outlined below.  Additional community support recommendations include Rule 25 assessment for chemical dependency " treatment and DBT referral.  Considerations to improve cognitive functioning discussed and include CD treatment. Yolanda's response was good; she is engaged in treatment planning. The primary recommendation is for chemical dependency treatment as this impacts her mood and her ability to take medication. Will coordinate with the psychiatric NP at [facility] who Kem sees regarding recommendation to increase sertraline to target depression and PTSD. If no benefit has been achieved at maximum dose, would recommend another trial of an selective serotonin reuptake inhibitor given Kem's limited trials.     Plan:  Yolanda was agreeable to beginning the below mentioned services.  Follow-up appointments have been arranged for the appropriate providers to initiate services.    Medications: (none prescribed, managed by psychiatric NP at [facility])  -Continue sertraline 50mg daily  -Continue prazosin 5mg at bedtime     Labs: none  Rating scales: PHQ in the future    Referrals:  -Rule 25 assessment  -Referral for DBT  -Dual Diagnosis Day Treatment at [facility]    Follow-up:  -medication management visit in 2 weeks  -Follow-up with EDMOND Almodovar for coordination of referral to CD treatment.    Patient Safety Assessment:  Kem denies any suicidal ideation today. She has risk factors for suicide: recent substance abuse, poor sleep and mood disorder with psychosis/paranoia. She has the following Protective Factors: Positive coping skills and Positive social support, stable housing, denies prior suicide attempts, history of seeking help when needed. Based on these factors, it was determined that close psychiatric follow-up is appropriate and Kem was determined to not be an imminent risk to herself or others.     Treatment Risk Statement:  The patient understands the risks, benefits, adverse effects and alternatives. Agrees to treatment with the capacity to do so. No medical contraindications to treatment. Agrees to call  clinic for any problems. The patient understands to call 911 or go to the nearest ED if life threatening or urgent symptoms occur.      Please call or EPIC message for questions or concerns related to the above information.     Quynh Smith MD  Psychiatry PGY-4    Patient staffed with Dr. Rose who will sign this note. Strengths Supervisor is Dr. Rose.     Attestation:  I have reviewed this note, and was present during the entirety of this visit.  I agree with the plan as documented.  AMARIS Almodovar, [facility],  April 28, 2021

## 2021-02-18 NOTE — Clinical Note
Tommy Rider,    I apologize for the serious delay in sending this to you. Please let me know if you'd prefer for me to send this to Jami to sign. I wasn't sure if you had parts you wanted to add/change/delete first. Just let me know.    Thanks,  Quynh
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
## 2021-02-25 ENCOUNTER — OFFICE VISIT - HEALTHEAST (OUTPATIENT)
Dept: BEHAVIORAL HEALTH | Facility: CLINIC | Age: 21
End: 2021-02-25

## 2021-02-25 DIAGNOSIS — F12.90 MARIJUANA USE: ICD-10-CM

## 2021-02-25 DIAGNOSIS — F51.5 NIGHTMARES: ICD-10-CM

## 2021-02-25 DIAGNOSIS — F10.10 MILD ALCOHOL USE DISORDER: ICD-10-CM

## 2021-02-25 DIAGNOSIS — F43.10 PTSD (POST-TRAUMATIC STRESS DISORDER): ICD-10-CM

## 2021-02-25 DIAGNOSIS — F17.200 NICOTINE USE DISORDER: ICD-10-CM

## 2021-02-25 DIAGNOSIS — F33.1 MODERATE EPISODE OF RECURRENT MAJOR DEPRESSIVE DISORDER (H): ICD-10-CM

## 2021-02-25 ASSESSMENT — ANXIETY QUESTIONNAIRES
5. BEING SO RESTLESS THAT IT IS HARD TO SIT STILL: NOT AT ALL
1. FEELING NERVOUS, ANXIOUS, OR ON EDGE: NOT AT ALL
7. FEELING AFRAID AS IF SOMETHING AWFUL MIGHT HAPPEN: NOT AT ALL
GAD7 TOTAL SCORE: 8
2. NOT BEING ABLE TO STOP OR CONTROL WORRYING: NOT AT ALL
IF YOU CHECKED OFF ANY PROBLEMS ON THIS QUESTIONNAIRE, HOW DIFFICULT HAVE THESE PROBLEMS MADE IT FOR YOU TO DO YOUR WORK, TAKE CARE OF THINGS AT HOME, OR GET ALONG WITH OTHER PEOPLE: SOMEWHAT DIFFICULT
3. WORRYING TOO MUCH ABOUT DIFFERENT THINGS: MORE THAN HALF THE DAYS
4. TROUBLE RELAXING: NEARLY EVERY DAY
6. BECOMING EASILY ANNOYED OR IRRITABLE: NEARLY EVERY DAY

## 2021-02-25 ASSESSMENT — PATIENT HEALTH QUESTIONNAIRE - PHQ9: SUM OF ALL RESPONSES TO PHQ QUESTIONS 1-9: 16

## 2021-03-01 ENCOUNTER — TELEPHONE (OUTPATIENT)
Dept: PSYCHIATRY | Facility: CLINIC | Age: 21
End: 2021-03-01

## 2021-03-01 NOTE — TELEPHONE ENCOUNTER
First Episode Psychosis   Strengths Program    Incoming/Outgoing Call  [facility]    Outgoing call to: Yolanda [phone]     Reason for Call:  Pt did not log on to our scheduled visit.      Response/Plan:  Left message   Will also send a [employer] message for follow-up and request that she contact the clinic to reschedule if she'd like assistance to set up the resources we discussed.       Will route to patient's current psychiatric provider(s) as an FYI.   EPIC message with any questions or concerns.    AMARIS Almodovar, [facility] Medical CenterSW  [phone]

## 2021-03-04 ENCOUNTER — VIRTUAL VISIT (OUTPATIENT)
Dept: PSYCHIATRY | Facility: CLINIC | Age: 21
End: 2021-03-04
Attending: PSYCHIATRY & NEUROLOGY
Payer: COMMERCIAL

## 2021-03-04 ENCOUNTER — TELEPHONE (OUTPATIENT)
Dept: PSYCHIATRY | Facility: CLINIC | Age: 21
End: 2021-03-04

## 2021-03-04 DIAGNOSIS — F33.1 MODERATE EPISODE OF RECURRENT MAJOR DEPRESSIVE DISORDER (H): Primary | ICD-10-CM

## 2021-03-04 DIAGNOSIS — F43.10 PTSD (POST-TRAUMATIC STRESS DISORDER): ICD-10-CM

## 2021-03-04 PROCEDURE — 99214 OFFICE O/P EST MOD 30 MIN: CPT | Mod: GC | Performed by: STUDENT IN AN ORGANIZED HEALTH CARE EDUCATION/TRAINING PROGRAM

## 2021-03-04 ASSESSMENT — PAIN SCALES - GENERAL: PAINLEVEL: NO PAIN (0)
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
## 2021-03-04 NOTE — PROGRESS NOTES
"VIDEO VISIT  Yolanda Vee is a 20 year old patient who is being evaluated via a billable video visit.      The patient has been notified of following:   \"This video visit will be conducted via a call between you and your physician/provider. We have found that certain health care needs can be provided without the need for an in-person physical exam. This service lets us provide the care you need with a video conversation. If a prescription is necessary we can send it directly to your pharmacy. If lab work is needed we can place an order for that and you can then stop by our lab to have the test done at a later time. Insurers are generally covering virtual visits as they would in-office visits so billing should not be different than normal.  If for some reason you do get billed incorrectly, you should contact the billing office to correct it and that number is in the AVS .    Video Conference to be completed via:  Charli    Patient has given verbal consent for video visit?:  Yes    Patient would prefer that any video invitations be sent by: Send to e-mail at: [email or web address]      How would patient like to obtain AVS?:  "[employer]"    AVS SmartPhrase [PsychAVS] has been placed in 'Patient Instructions':  Yes    "

## 2021-03-04 NOTE — TELEPHONE ENCOUNTER
On 3/4/2021, at 1142, writer called patient at mobile to confirm Virtual Visit. Writer unable to make contact with patient. Writer left detailed voice message for callback. [phone], left as call back number. VICTOR HUGO Perez, EMT

## 2021-03-04 NOTE — PATIENT INSTRUCTIONS
**For crisis resources, please see the information at the end of this document**     Patient Education      Thank you for coming to the [facility].    Lab Testing:  If you had lab testing today and your results are reassuring or normal they will be mailed to you or sent through [employer] within 7 days. If the lab tests need quick action we will call you with the results. The phone number we will call with results is # [phone] (home) . If this is not the best number please call our clinic and change the number.    Medication Refills:  If you need any refills please call your pharmacy and they will contact us. Our fax number for refills is [phone]. Please allow three business for refill processing. If you need to  your refill at a new pharmacy, please contact the new pharmacy directly. The new pharmacy will help you get your medications transferred.     Scheduling:  If you have any concerns about today's visit or wish to schedule another appointment please call our office during normal business hours [phone] (8-5:00 M-F)    Contact Us:  Please call [phone] during business hours (8-5:00 M-F).  If after clinic hours, or on the weekend, please call  [phone].    Financial Assistance [phone]  [facility] Billing [phone]  Central Billing Office, [facility]: [phone]  [facility] Billing [phone]  Medical Records [phone]  [facility] Patient Bill of Rights [email or web address]       MENTAL HEALTH CRISIS NUMBERS:  For a medical emergency please call  911 or go to the nearest ER.     [facility]:   [facility] -[phone]   Crisis Residence [facility] Residence -[phone]   Walk-In Counseling Center [facility] -[phone]   COPE 24/7 [place] Mobile Team -[phone] (adults)/[phone] (child)  CHILD: [facility] needs assessment  team - [phone]      [facility]:   [facility] - [phone]   Walk-in counseling [facility] - [phone]   Walk-in counseling [facility] - [phone]   Crisis Residence [facility]idence - [phone]  Urgent Care Adult [facility]-[phone] mobile unit/ 24/7 crisis line    National Crisis Numbers:   National Suicide Prevention Lifeline: 0-305-212-TALK (123-355-6104)  Poison Control Center - 4-393-962-7312  [employer]/resources for a list of additional resources (SOS)  Trans Lifeline a hotline for transgender people 1-725.552.5300  [employer] a hotline for LGBT youth 9-534-021-2928  Crisis Text Line: For any crisis 24/7   To: 882960  see www.crisistextline.org  - IF MAKING A CALL FEELS TOO HARD, send a text!         Again thank you for choosing [facility] MENTAL HEALTH & ADDICTION [facility] and please let us know how we can best partner with you to improve you and your family's health.    You may be receiving a survey regarding this appointment. We would love to have your feedback, both positive and negative. The survey is done by an external company, so your answers are anonymous.

## 2021-03-05 ENCOUNTER — DOCUMENTATION ONLY (OUTPATIENT)
Dept: PSYCHIATRY | Facility: CLINIC | Age: 21
End: 2021-03-05

## 2021-03-05 DIAGNOSIS — F19.10 SUBSTANCE ABUSE (H): ICD-10-CM

## 2021-03-05 DIAGNOSIS — F33.3 SEVERE RECURRENT MAJOR DEPRESSIVE DISORDER WITH PSYCHOTIC FEATURES (H): Primary | ICD-10-CM

## 2021-03-05 DIAGNOSIS — F43.10 PTSD (POST-TRAUMATIC STRESS DISORDER): ICD-10-CM

## 2021-03-05 NOTE — PROGRESS NOTES
Patient forgot about our visit this morning.  We spoke briefly via phone to reschedule our visit.  She confirms that she has not heard back from  for a CD assessment. Writer will place the referral again.      We are meeting to follow up on 3/10 @ 2pm.      [facility] message with any questions or concerns.    AMARIS Almodovar, [facility]  [phone]

## 2021-03-10 ENCOUNTER — VIRTUAL VISIT (OUTPATIENT)
Dept: PSYCHIATRY | Facility: CLINIC | Age: 21
End: 2021-03-10
Attending: SOCIAL WORKER
Payer: COMMERCIAL

## 2021-03-10 DIAGNOSIS — Z71.89 COUNSELING AND COORDINATION OF CARE: Primary | ICD-10-CM

## 2021-03-10 PROCEDURE — 99207 PR NON-BILLABLE SERV PER CHARTING: CPT | Performed by: SOCIAL WORKER

## 2021-03-10 NOTE — PROGRESS NOTES
First Episode of Psychosis Program   Social Work Visit   [facility]    Patient Name:  Yolanda Vee  /Age:  2000 (20 year old)    Video- Visit Details  Type of service:  video visit for social work support  Time of service:    Date:  03/10/2021    Video Start Time:  2:13 PM        Video End Time:  2:30 PM    Reason for video visit:  Patient unable to travel due to Covid-19  Originating Site (patient location):  [facility]   Location- Patient's home  Distant Site (provider location):  Remote location  Mode of Communication:  Video Conference via JumpIn  Consent:  Patient has given verbal consent for video visit?: Yes       Presenting SW Need(s)/ Reason for visit:  Patient is in need of a chemical dependency assessment for MI/CD treatment.  Patient is motivated for additional treatment and support.      Also to confirm if they received PALOMO's in the mail and if they are able to mail those back.     Collaborated With:    -[facility] intake to schedule MI/CD assessment    Intervention:  Met with the patient today via JumpIn. She had forgotten about our visit, thought logged in once sent a text link.      Together, via JumpIn, we called [facility] intake to schedule a MI/CD assessment for treatment recommendations on Friday at 1pm.      Writer problem solved with Kem to set reminders on her phone and write the appointment down in a calendar for this appointment.  She worries that her daily drinking makes her forgetful.  She continues to be motivated to stop drinking and wanting help.      Plan:  Provided my contact information and request to message me via Shopparity if needed.    MI/CD assessment with Allen on Friday 3/12 @ 1pm.    Will route to patient's psychiatric provider(s) as an FYI.   EPIC message with any questions or concerns.    AMARIS Almodovar, LICSW  [phone]      This is a non-billable encounter as it was solely for the purposes of outreach and/or care coordination.

## 2021-03-12 ENCOUNTER — HOSPITAL ENCOUNTER (OUTPATIENT)
Dept: BEHAVIORAL HEALTH | Facility: CLINIC | Age: 21
Discharge: HOME | End: 2021-03-12
Attending: SOCIAL WORKER | Admitting: SOCIAL WORKER
Payer: COMMERCIAL

## 2021-03-12 VITALS — WEIGHT: 223 LBS | BODY MASS INDEX: 44.96 KG/M2 | HEIGHT: 59 IN

## 2021-03-12 PROCEDURE — H0001 ALCOHOL AND/OR DRUG ASSESS: HCPCS | Mod: 95 | Performed by: COUNSELOR

## 2021-03-12 RX ORDER — IPRATROPIUM BROMIDE AND ALBUTEROL SULFATE 2.5; .5 MG/3ML; MG/3ML
3 SOLUTION RESPIRATORY (INHALATION) EVERY 6 HOURS PRN
COMMUNITY
Start: 2020-10-23 | End: 2024-06-06

## 2021-03-12 RX ORDER — SERTRALINE HYDROCHLORIDE 100 MG/1
100 TABLET, FILM COATED ORAL
COMMUNITY
Start: 2021-02-25 | End: 2021-04-26

## 2021-03-12 RX ORDER — ALBUTEROL SULFATE 90 UG/1
POWDER, METERED RESPIRATORY (INHALATION) PRN
COMMUNITY
Start: 2021-03-01 | End: 2022-01-27

## 2021-03-12 RX ORDER — PRAZOSIN HYDROCHLORIDE 5 MG/1
CAPSULE ORAL AT BEDTIME
COMMUNITY
Start: 2021-02-25 | End: 2022-01-27

## 2021-03-12 ASSESSMENT — COLUMBIA-SUICIDE SEVERITY RATING SCALE - C-SSRS
TOTAL  NUMBER OF ABORTED OR SELF INTERRUPTED ATTEMPTS PAST 3 MONTHS: NO
1. IN THE PAST MONTH, HAVE YOU WISHED YOU WERE DEAD OR WISHED YOU COULD GO TO SLEEP AND NOT WAKE UP?: NO
5. HAVE YOU STARTED TO WORK OUT OR WORKED OUT THE DETAILS OF HOW TO KILL YOURSELF? DO YOU INTEND TO CARRY OUT THIS PLAN?: NO
2. HAVE YOU ACTUALLY HAD ANY THOUGHTS OF KILLING YOURSELF LIFETIME?: NO
ATTEMPT LIFETIME: NO
4. HAVE YOU HAD THESE THOUGHTS AND HAD SOME INTENTION OF ACTING ON THEM?: NO
ATTEMPT PAST THREE MONTHS: NO
1. IN THE PAST MONTH, HAVE YOU WISHED YOU WERE DEAD OR WISHED YOU COULD GO TO SLEEP AND NOT WAKE UP?: NO
TOTAL  NUMBER OF INTERRUPTED ATTEMPTS PAST 3 MONTHS: NO
2. HAVE YOU ACTUALLY HAD ANY THOUGHTS OF KILLING YOURSELF?: NO
4. HAVE YOU HAD THESE THOUGHTS AND HAD SOME INTENTION OF ACTING ON THEM?: NO
TOTAL  NUMBER OF ABORTED OR SELF INTERRUPTED ATTEMPTS PAST LIFETIME: NO
6. HAVE YOU EVER DONE ANYTHING, STARTED TO DO ANYTHING, OR PREPARED TO DO ANYTHING TO END YOUR LIFE?: NO
3. HAVE YOU BEEN THINKING ABOUT HOW YOU MIGHT KILL YOURSELF?: NO
TOTAL  NUMBER OF INTERRUPTED ATTEMPTS LIFETIME: NO
6. HAVE YOU EVER DONE ANYTHING, STARTED TO DO ANYTHING, OR PREPARED TO DO ANYTHING TO END YOUR LIFE?: NO
5. HAVE YOU STARTED TO WORK OUT OR WORKED OUT THE DETAILS OF HOW TO KILL YOURSELF? DO YOU INTEND TO CARRY OUT THIS PLAN?: NO

## 2021-03-12 ASSESSMENT — MIFFLIN-ST. JEOR: SCORE: 1687.15

## 2021-03-12 ASSESSMENT — PAIN SCALES - GENERAL: PAINLEVEL: NO PAIN (0)
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
## 2021-03-16 ENCOUNTER — TELEPHONE (OUTPATIENT)
Dept: PSYCHIATRY | Facility: CLINIC | Age: 21
End: 2021-03-16

## 2021-03-16 NOTE — TELEPHONE ENCOUNTER
On 3/10/2021 the patient signed an PAL[facility]O authorizing medical records to be released from [facility] to [facility] for the purpose of continuing care. I faxed the request to our medical records department at [phone]. I sent the original to PALOMO to scanning and held original until scanning is confirmed.  Ebonie Infante CMA    On 3/10/2021 the patient signed a PHI authorizing person to person communication with Toya Vee for medical information and  items.  I sent this document to scanning on 3/16/2021 and kept a copy in Psychiatry until scanning is confirmed. Kyra Infante CMA

## 2021-03-29 ENCOUNTER — TELEPHONE (OUTPATIENT)
Dept: PSYCHIATRY | Facility: CLINIC | Age: 21
End: 2021-03-29

## 2021-03-29 ENCOUNTER — VIRTUAL VISIT (OUTPATIENT)
Dept: PSYCHIATRY | Facility: CLINIC | Age: 21
End: 2021-03-29
Attending: PSYCHIATRY & NEUROLOGY
Payer: COMMERCIAL

## 2021-03-29 ENCOUNTER — COMMUNICATION - HEALTHEAST (OUTPATIENT)
Dept: BEHAVIORAL HEALTH | Facility: CLINIC | Age: 21
End: 2021-03-29

## 2021-03-29 DIAGNOSIS — F33.1 MODERATE EPISODE OF RECURRENT MAJOR DEPRESSIVE DISORDER (H): ICD-10-CM

## 2021-03-29 DIAGNOSIS — F43.10 PTSD (POST-TRAUMATIC STRESS DISORDER): Primary | ICD-10-CM

## 2021-03-29 DIAGNOSIS — F10.20 ALCOHOL USE DISORDER, MODERATE, DEPENDENCE (H): ICD-10-CM

## 2021-03-29 PROCEDURE — 99214 OFFICE O/P EST MOD 30 MIN: CPT | Mod: GT | Performed by: STUDENT IN AN ORGANIZED HEALTH CARE EDUCATION/TRAINING PROGRAM

## 2021-03-29 ASSESSMENT — PAIN SCALES - GENERAL: PAINLEVEL: NO PAIN (0)

## 2021-03-29 NOTE — PATIENT INSTRUCTIONS
**For crisis resources, please see the information at the end of this document**     Patient Education      Thank you for coming to the [facility] MENTAL HEALTH & ADDICTION [place] [facility].    Lab Testing:  If you had lab testing today and your results are reassuring or normal they will be mailed to you or sent through "[employer] within 7 days. If the lab tests need quick action we will call you with the results. The phone number we will call with results is # [phone] (home) . If this is not the best number please call our clinic and change the number.    Medication Refills:  If you need any refills please call your pharmacy and they will contact us. Our fax number for refills is [phone]. Please allow three business for refill processing. If you need to  your refill at a new pharmacy, please contact the new pharmacy directly. The new pharmacy will help you get your medications transferred.     Scheduling:  If you have any concerns about today's visit or wish to schedule another appointment please call our office during normal business hours [phone] (8-5:00 M-F)    Contact Us:  Please call [phone] during business hours (8-5:00 M-F).  If after clinic hours, or on the weekend, please call  [phone].    Financial Assistance [phone]  "[employer]"th Billing [phone]  Central Billing Office, [facility]: [phone]  [facility] Billing [phone]  Medical Records [phone]  [facility] Patient Bill of Rights [email or web address]       MENTAL HEALTH CRISIS NUMBERS:  For a medical emergency please call  911 or go to the nearest ER.     [facility]:   [facility] -[phone]   Crisis Residence [facility] Residence -[phone]   Walk-In Counseling Center \[facility]\"" -[phone]   COPE 24/7 [place] Mobile Team -[phone] (adults)/[phone] (child)  CHILD: [facility] needs assessment  team - [phone]      [facility]:   [facility] - [phone]   Walk-in counseling [facility] - [phone]   Walk-in counseling [facility] - [phone]   Crisis Residence [facility] Any [facility] Residence - [phone]  Urgent Care Adult Mental [facility]-[phone] mobile unit/ 24/7 crisis line    National Crisis Numbers:   National Suicide Prevention Lifeline: 8-369-967-TALK (127-152-7321)  Poison Control Center - 9-438-135-7064  [facility]/resources for a list of additional resources (SOS)  Trans Lifeline a hotline for transgender people 1-327.930.1426  The [facility] Project a hotline for LGBT youth 1-622-069-5152  Crisis Text Line: For any crisis 24/7   To: 003036  see www.crisistextline.org  - IF MAKING A CALL FEELS TOO HARD, send a text!         Again thank you for choosing [facility] MENTAL HEALTH & ADDICTION [facility] and please let us know how we can best partner with you to improve you and your family's health.    You may be receiving a survey regarding this appointment. We would love to have your feedback, both positive and negative. The survey is done by an external company, so your answers are anonymous.

## 2021-03-29 NOTE — PROGRESS NOTES
"VIDEO VISIT  Yolanda Vee is a 20 year old patient who is being evaluated via a billable video visit.      The patient has been notified of following:   \"This video visit will be conducted via a call between you and your physician/provider. We have found that certain health care needs can be provided without the need for an in-person physical exam. This service lets us provide the care you need with a video conversation. If a prescription is necessary we can send it directly to your pharmacy. If lab work is needed we can place an order for that and you can then stop by our lab to have the test done at a later time. Insurers are generally covering virtual visits as they would in-office visits so billing should not be different than normal.  If for some reason you do get billed incorrectly, you should contact the billing office to correct it and that number is in the AVS .    Video Conference to be completed via:  Charli    Patient has given verbal consent for video visit?:  Yes    Patient would prefer that any video invitations be sent by: Send to e-mail at: [email or web address]      How would patient like to obtain AVS?:  [employer]    AVS SmartPhrase [PsychAVS] has been placed in 'Patient Instructions':  Yes    "
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
## 2021-03-30 NOTE — CONFIDENTIAL NOTE
Brief Psychiatry Telephone Note    Phone call time: 12PM    S:  Called patient's psychiatric nurse practitioner at [facility], Stephanie Lynne NP. Initially called behavioral health number [phone] and was transferred to behavioral health nurse's line, [phone]. Spoke with nursing who stated that providers were working remotely, so couldn't be contacted directly, but she could send her a message. Conveyed that the FEP agrees with her treatment of an selective serotonin reuptake inhibitor for Kem as well as prazosin. Also discussed our recommendation for intensive outpatient MICD treatment which Kem is about to start. Conveyed that the FEP does not intend to follow Kem long-term.     A/P: Kem Vee is a 19yo F with PMH of MDD with psychotic features, PTSD, alcohol use disorder, and cannabis use disorder who is being followed by psychiatry at [facility] and is being seen briefly by FEP for consultation. See above recommendations relayed to nursing to convey to RAMOS Bradshaw MD  Psychiatry Resident

## 2021-04-06 ENCOUNTER — OFFICE VISIT - HEALTHEAST (OUTPATIENT)
Dept: FAMILY MEDICINE | Facility: CLINIC | Age: 21
End: 2021-04-06

## 2021-04-06 DIAGNOSIS — K92.0 HEMATEMESIS WITH NAUSEA: ICD-10-CM

## 2021-04-06 DIAGNOSIS — F10.29 ALCOHOL DEPENDENCE WITH UNSPECIFIED ALCOHOL-INDUCED DISORDER (H): ICD-10-CM

## 2021-04-06 DIAGNOSIS — R10.9 ABDOMINAL CRAMPING: ICD-10-CM

## 2021-04-06 DIAGNOSIS — K92.1 MELENA: ICD-10-CM

## 2021-04-06 LAB
ALBUMIN UR-MCNC: NEGATIVE G/DL
APPEARANCE UR: CLEAR
BACTERIA #/AREA URNS HPF: ABNORMAL /[HPF]
BILIRUB UR QL STRIP: NEGATIVE
COLOR UR AUTO: YELLOW
ERYTHROCYTE [DISTWIDTH] IN BLOOD BY AUTOMATED COUNT: 14.3 % (ref 11–14.5)
GLUCOSE UR STRIP-MCNC: NEGATIVE MG/DL
HCT VFR BLD AUTO: 42 % (ref 35–47)
HGB BLD-MCNC: 13.4 G/DL (ref 12–16)
HGB UR QL STRIP: NEGATIVE
KETONES UR STRIP-MCNC: NEGATIVE MG/DL
LEUKOCYTE ESTERASE UR QL STRIP: ABNORMAL
MCH RBC QN AUTO: 27.3 PG (ref 27–34)
MCHC RBC AUTO-ENTMCNC: 31.9 G/DL (ref 32–36)
MCV RBC AUTO: 86 FL (ref 80–100)
NITRATE UR QL: NEGATIVE
PH UR STRIP: 7 [PH] (ref 5–8)
PLATELET # BLD AUTO: 353 THOU/UL (ref 140–440)
PMV BLD AUTO: 9.7 FL (ref 7–10)
RBC # BLD AUTO: 4.9 MILL/UL (ref 3.8–5.4)
RBC #/AREA URNS AUTO: ABNORMAL HPF
SP GR UR STRIP: 1.02 (ref 1–1.03)
SQUAMOUS #/AREA URNS AUTO: ABNORMAL LPF
UROBILINOGEN UR STRIP-ACNC: ABNORMAL
WBC #/AREA URNS AUTO: ABNORMAL HPF
WBC: 14.9 THOU/UL (ref 4–11)

## 2021-04-06 ASSESSMENT — MIFFLIN-ST. JEOR: SCORE: 1721.17

## 2021-04-07 LAB — BACTERIA SPEC CULT: NORMAL

## 2021-04-14 ENCOUNTER — OFFICE VISIT - HEALTHEAST (OUTPATIENT)
Dept: ADDICTION MEDICINE | Facility: CLINIC | Age: 21
End: 2021-04-14

## 2021-04-14 DIAGNOSIS — F12.10 CANNABIS ABUSE: ICD-10-CM

## 2021-04-14 DIAGNOSIS — F17.200 NICOTINE USE DISORDER: ICD-10-CM

## 2021-04-14 DIAGNOSIS — F10.20 ALCOHOL USE DISORDER, SEVERE, DEPENDENCE (H): ICD-10-CM

## 2021-04-15 ENCOUNTER — COMMUNICATION - HEALTHEAST (OUTPATIENT)
Dept: SCHEDULING | Facility: CLINIC | Age: 21
End: 2021-04-15

## 2021-04-16 ENCOUNTER — OFFICE VISIT - HEALTHEAST (OUTPATIENT)
Dept: FAMILY MEDICINE | Facility: CLINIC | Age: 21
End: 2021-04-16

## 2021-04-16 DIAGNOSIS — B37.0 THRUSH: ICD-10-CM

## 2021-04-16 ASSESSMENT — MIFFLIN-ST. JEOR: SCORE: 1708.98

## 2021-04-20 ENCOUNTER — COMMUNICATION - HEALTHEAST (OUTPATIENT)
Dept: ADDICTION MEDICINE | Facility: CLINIC | Age: 21
End: 2021-04-20

## 2021-04-23 ENCOUNTER — TELEPHONE (OUTPATIENT)
Dept: PSYCHIATRY | Facility: CLINIC | Age: 21
End: 2021-04-23

## 2021-04-23 ENCOUNTER — COMMUNICATION - HEALTHEAST (OUTPATIENT)
Dept: BEHAVIORAL HEALTH | Facility: CLINIC | Age: 21
End: 2021-04-23

## 2021-04-23 NOTE — CONFIDENTIAL NOTE
Brief Psychiatry Telephone Note    Phone call time: 4:15PM     S:  Called patient's psychiatric nurse practitioner at [facility], Stephanie Lynne NP. Called behavioral health number [phone]. Informed staff that I would like a call back from Stephanie Lynne NP to coordinate Kem's care. Provided by contact information. Conveyed that the FEP does not intend to follow Kem long-term.      A/P: Kem Vee is a 21yo F with PMH of MDD with psychotic features, PTSD, alcohol use disorder, and cannabis use disorder who is being followed by psychiatry at [facility] and is being seen briefly by FEP for consultation.     -Requested return call  -Will have clinic send over PALOMO to [facility] psychiatry clinic at fax [phone]     Quynh Smith MD  Psychiatry Resident

## 2021-04-26 ENCOUNTER — OFFICE VISIT - HEALTHEAST (OUTPATIENT)
Dept: FAMILY MEDICINE | Facility: CLINIC | Age: 21
End: 2021-04-26

## 2021-04-26 ENCOUNTER — RECORDS - HEALTHEAST (OUTPATIENT)
Dept: GENERAL RADIOLOGY | Facility: CLINIC | Age: 21
End: 2021-04-26

## 2021-04-26 ENCOUNTER — COMMUNICATION - HEALTHEAST (OUTPATIENT)
Dept: ADDICTION MEDICINE | Facility: CLINIC | Age: 21
End: 2021-04-26

## 2021-04-26 ENCOUNTER — TELEPHONE (OUTPATIENT)
Dept: PSYCHIATRY | Facility: CLINIC | Age: 21
End: 2021-04-26

## 2021-04-26 ENCOUNTER — VIRTUAL VISIT (OUTPATIENT)
Dept: PSYCHIATRY | Facility: CLINIC | Age: 21
End: 2021-04-26
Attending: PSYCHIATRY & NEUROLOGY
Payer: COMMERCIAL

## 2021-04-26 DIAGNOSIS — K92.0 HEMATEMESIS WITH NAUSEA: ICD-10-CM

## 2021-04-26 DIAGNOSIS — S60.012A CONTUSION OF LEFT THUMB WITHOUT DAMAGE TO NAIL, INITIAL ENCOUNTER: ICD-10-CM

## 2021-04-26 DIAGNOSIS — F33.1 MODERATE EPISODE OF RECURRENT MAJOR DEPRESSIVE DISORDER (H): Primary | ICD-10-CM

## 2021-04-26 DIAGNOSIS — F43.10 PTSD (POST-TRAUMATIC STRESS DISORDER): ICD-10-CM

## 2021-04-26 DIAGNOSIS — K21.9 GASTROESOPHAGEAL REFLUX DISEASE WITHOUT ESOPHAGITIS: ICD-10-CM

## 2021-04-26 DIAGNOSIS — F10.29 ALCOHOL DEPENDENCE WITH UNSPECIFIED ALCOHOL-INDUCED DISORDER (H): ICD-10-CM

## 2021-04-26 DIAGNOSIS — B37.0 THRUSH: ICD-10-CM

## 2021-04-26 DIAGNOSIS — E66.01 MORBID OBESITY (H): ICD-10-CM

## 2021-04-26 PROCEDURE — 2014F MENTAL STATUS ASSESS: CPT | Mod: GT | Performed by: STUDENT IN AN ORGANIZED HEALTH CARE EDUCATION/TRAINING PROGRAM

## 2021-04-26 PROCEDURE — 99214 OFFICE O/P EST MOD 30 MIN: CPT | Mod: GT | Performed by: STUDENT IN AN ORGANIZED HEALTH CARE EDUCATION/TRAINING PROGRAM

## 2021-04-26 RX ORDER — FLUCONAZOLE 100 MG/1
TABLET ORAL
COMMUNITY
Start: 2021-04-26 | End: 2021-09-15

## 2021-04-26 RX ORDER — ESCITALOPRAM OXALATE 10 MG/1
10 TABLET ORAL DAILY
Qty: 30 TABLET | Refills: 1 | Status: SHIPPED | OUTPATIENT
Start: 2021-04-26 | End: 2021-11-09 | Stop reason: ALTCHOICE

## 2021-04-26 RX ORDER — ACAMPROSATE CALCIUM 333 MG/1
TABLET, DELAYED RELEASE ORAL
COMMUNITY
Start: 2021-02-25 | End: 2022-01-27

## 2021-04-26 ASSESSMENT — PATIENT HEALTH QUESTIONNAIRE - PHQ9: SUM OF ALL RESPONSES TO PHQ QUESTIONS 1-9: 14

## 2021-04-26 ASSESSMENT — PAIN SCALES - GENERAL: PAINLEVEL: NO PAIN (0)

## 2021-04-26 ASSESSMENT — MIFFLIN-ST. JEOR: SCORE: 1720.26

## 2021-04-26 NOTE — PROGRESS NOTES
"VIDEO VISIT  Yolanda Vee is a 20 year old patient who is being evaluated via a billable video visit.      The patient has been notified of following:   \"This video visit will be conducted via a call between you and your physician/provider. We have found that certain health care needs can be provided without the need for an in-person physical exam. This service lets us provide the care you need with a video conversation. If a prescription is necessary we can send it directly to your pharmacy. If lab work is needed we can place an order for that and you can then stop by our lab to have the test done at a later time. Insurers are generally covering virtual visits as they would in-office visits so billing should not be different than normal.  If for some reason you do get billed incorrectly, you should contact the billing office to correct it and that number is in the AVS .    Video Conference to be completed via:  Charli    Patient has given verbal consent for video visit?:  Yes    Patient would prefer that any video invitations be sent by: Send to e-mail at: [email or web address]      How would patient like to obtain AVS?:  [employer]    AVS SmartPhrase [PsychAVS] has been placed in 'Patient Instructions':  Yes    "

## 2021-04-26 NOTE — PATIENT INSTRUCTIONS
Kem,    It was good to work with you.     -start lexapro 10mg daily  -Call [facility] to schedule a follow-up with Stephanie Lynne NP at [phone]  -Consider [facility] for chemical dependency treatment.    Dr. Luis Villar        **For crisis resources, please see the information at the end of this document**     Patient Education      Thank you for coming to the [facility] MENTAL HEALTH & ADDICTION [place] [facility].    Lab Testing:  If you had lab testing today and your results are reassuring or normal they will be mailed to you or sent through [employer] within 7 days. If the lab tests need quick action we will call you with the results. The phone number we will call with results is # [phone] (home) . If this is not the best number please call our clinic and change the number.    Medication Refills:  If you need any refills please call your pharmacy and they will contact us. Our fax number for refills is [phone]. Please allow three business for refill processing. If you need to  your refill at a new pharmacy, please contact the new pharmacy directly. The new pharmacy will help you get your medications transferred.     Scheduling:  If you have any concerns about today's visit or wish to schedule another appointment please call our office during normal business hours [phone] (8-5:00 M-F)    Contact Us:  Please call [phone] during business hours (8-5:00 M-F).  If after clinic hours, or on the weekend, please call  [phone].    Financial Assistance [phone]  [facility] Billing [phone]  Central Billing Office, [facility]: [phone]  [facility] Billing [phone]  Medical Records [phone]  [facility] Patient Bill of Rights [email or web address]       MENTAL HEALTH CRISIS NUMBERS:  For a medical emergency please call  911 or go to the nearest ER.     [facility]:   [facility]  [facility] -[phone]   Crisis Residence [facility]idence -[phone]   Walk-In Counseling Center [facility] -[phone]   COPE 24/7 [place] Mobile Team -[phone] (adults)/[phone] (child)  CHILD: [facility] needs assessment team - [phone]      [facility]:   [facility] - [phone]   Walk-in counseling [facility] - [phone]   Walk-in counseling [facility] - [phone]   Crisis Residence [facility] Any [facility] Residence - [phone]  Urgent Care Adult Mental Offfzn-[phone] mobile unit/ 24/7 crisis line    National Crisis Numbers:   National Suicide Prevention Lifeline: 9-149-297-TALK (432-077-4610)  Poison Control Center - 1-224.518.8840  [employer]/resources for a list of additional resources (SOS)  Trans Lifeline a hotline for transgender people 1-637.598.7075  The [facility] Project a hotline for LGBT youth 1-501.387.9035  Crisis Text Line: For any crisis 24/7   To: 214518  see www.crisistextline.org  - IF MAKING A CALL FEELS TOO HARD, send a text!         Again thank you for choosing [facility] MENTAL HEALTH & ADDICTION [facility] and please let us know how we can best partner with you to improve you and your family's health.    You may be receiving a survey regarding this appointment. We would love to have your feedback, both positive and negative. The survey is done by an external company, so your answers are anonymous.
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
## 2021-04-26 NOTE — CONFIDENTIAL NOTE
Brief Psychiatry Telephone Note    Phone call time: 12:10PM    S:  Received return phone call from patient's psychiatric provider, Stephanie Lynne NP, at [facility] [place]. Discussed that today will be Kem's last visit with Strengths and discussed recommendations for CD treatment, DBT going forward, changing selective serotonin reuptake inhibitor due to her increased SI with Zoloft, considering starting Naltrexone, and support of continuing prazosin. Stephanie Lynne NP agreed with these recommendations. We discussed consideration of starting Lexapro at Kem's visit today that she could then continue prescribing at their subsequent visits. Stephanie Lynne NP asked if I could convey to Kem that she will be leaving in the middle of June for a new job as well to provide the clinic number to Kem, [phone], for Kem to schedule a follow-up with her.      A/P: Kem Vee is a 21yo F with PMH of MDD with psychotic features, PTSD, alcohol use disorder, and cannabis use disorder who is being followed by psychiatry at [facility] and is being seen briefly by FEP for consultation. Coordinated care with patient's outpatient psychiatric provider.     -Will see Kem for a final FEP visit today  -Consider starting Lexapro  -Kem to follow-up with [facility] psychiatry      Quynh Smith MD  Psychiatry Resident

## 2021-04-28 ENCOUNTER — TRANSCRIBE ORDERS (OUTPATIENT)
Dept: FAMILY MEDICINE | Facility: CLINIC | Age: 21
End: 2021-04-28

## 2021-04-28 DIAGNOSIS — K92.0 HEMATEMESIS WITH NAUSEA: Primary | ICD-10-CM

## 2021-04-30 NOTE — TELEPHONE ENCOUNTER
REFERRAL INFORMATION:    Referring Provider:  Litzy Corona     Referring Clinic:  [facility]     Reason for Visit/Diagnosis: Hematemesis, Nausea, Abdominal pain      FUTURE VISIT INFORMATION:    Appointment Date: 5/12/2021    Appointment Time: 2 PM      NOTES STATUS DETAILS   OFFICE NOTE from Referring Provider Care Everywhere 4/26/2021, 9/6/17 Office visit with Dr. Corona      OFFICE NOTE from Other Specialist Care Everywhere 4/6/2021, 4/30/2020 Office visit with Maris Bhandari CNP ([facility])     5/4/2020 Office visit with Kriss Isaacs CNP ([facility])           HOSPITAL DISCHARGE SUMMARY/  ED VISITS Internal 4/6/2021t, 7/5/2020 ([facility])    OPERATIVE REPORT N/A    MEDICATION LIST Internal         ENDOSCOPY  N/A    COLONOSCOPY N/A    ERCP N/A    EUS N/A    STOOL TESTING Care Everywhere    PERTINENT LABS Internal/ Care Everywhere    PATHOLOGY REPORTS (RELATED) N/A    IMAGING (CT, MRI, EGD, MRCP, Small Bowel Follow Through/SBT, MR/CT Enterography) Care Everywhere [facility]:  - CT Abdomen Pelvis: 4/6/2021 4/30/2021 11:17am Fax request sent to [facility] for image. -Norbert

## 2021-05-04 ENCOUNTER — COMMUNICATION - HEALTHEAST (OUTPATIENT)
Dept: BEHAVIORAL HEALTH | Facility: CLINIC | Age: 21
End: 2021-05-04

## 2021-05-06 ENCOUNTER — OFFICE VISIT - HEALTHEAST (OUTPATIENT)
Dept: BEHAVIORAL HEALTH | Facility: CLINIC | Age: 21
End: 2021-05-06

## 2021-05-06 DIAGNOSIS — F43.10 PTSD (POST-TRAUMATIC STRESS DISORDER): ICD-10-CM

## 2021-05-06 DIAGNOSIS — F51.5 NIGHTMARES: ICD-10-CM

## 2021-05-06 DIAGNOSIS — F33.1 MODERATE EPISODE OF RECURRENT MAJOR DEPRESSIVE DISORDER (H): ICD-10-CM

## 2021-05-06 DIAGNOSIS — F12.90 MARIJUANA USE: ICD-10-CM

## 2021-05-06 DIAGNOSIS — F10.20 SEVERE ALCOHOL USE DISORDER (H): ICD-10-CM

## 2021-05-06 DIAGNOSIS — F17.200 NICOTINE USE DISORDER: ICD-10-CM

## 2021-05-06 ASSESSMENT — ANXIETY QUESTIONNAIRES
IF YOU CHECKED OFF ANY PROBLEMS ON THIS QUESTIONNAIRE, HOW DIFFICULT HAVE THESE PROBLEMS MADE IT FOR YOU TO DO YOUR WORK, TAKE CARE OF THINGS AT HOME, OR GET ALONG WITH OTHER PEOPLE: SOMEWHAT DIFFICULT
GAD7 TOTAL SCORE: 3
7. FEELING AFRAID AS IF SOMETHING AWFUL MIGHT HAPPEN: NOT AT ALL
3. WORRYING TOO MUCH ABOUT DIFFERENT THINGS: NOT AT ALL
6. BECOMING EASILY ANNOYED OR IRRITABLE: NEARLY EVERY DAY
5. BEING SO RESTLESS THAT IT IS HARD TO SIT STILL: NOT AT ALL
2. NOT BEING ABLE TO STOP OR CONTROL WORRYING: NOT AT ALL
4. TROUBLE RELAXING: NOT AT ALL
1. FEELING NERVOUS, ANXIOUS, OR ON EDGE: NOT AT ALL

## 2021-05-06 ASSESSMENT — PATIENT HEALTH QUESTIONNAIRE - PHQ9: SUM OF ALL RESPONSES TO PHQ QUESTIONS 1-9: 18

## 2021-05-12 ENCOUNTER — VIRTUAL VISIT (OUTPATIENT)
Dept: GASTROENTEROLOGY | Facility: CLINIC | Age: 21
End: 2021-05-12
Attending: FAMILY MEDICINE
Payer: COMMERCIAL

## 2021-05-12 ENCOUNTER — PRE VISIT (OUTPATIENT)
Dept: GASTROENTEROLOGY | Facility: CLINIC | Age: 21
End: 2021-05-12

## 2021-05-12 VITALS — WEIGHT: 230 LBS | HEIGHT: 59 IN | BODY MASS INDEX: 46.37 KG/M2

## 2021-05-12 DIAGNOSIS — K92.0 HEMATEMESIS WITH NAUSEA: ICD-10-CM

## 2021-05-12 DIAGNOSIS — K92.1 HEMATOCHEZIA: Primary | ICD-10-CM

## 2021-05-12 PROCEDURE — 99204 OFFICE O/P NEW MOD 45 MIN: CPT | Mod: GT | Performed by: INTERNAL MEDICINE

## 2021-05-12 ASSESSMENT — ENCOUNTER SYMPTOMS
ABDOMINAL PAIN: 1
CONSTIPATION: 1
NAUSEA: 1
HEARTBURN: 1
WEIGHT LOSS: 1
VOMITING: 1
BLOOD IN STOOL: 1

## 2021-05-12 ASSESSMENT — MIFFLIN-ST. JEOR: SCORE: 1718.9

## 2021-05-12 NOTE — NURSING NOTE
"Chief Complaint   Patient presents with     New Patient       Vitals:    05/12/21 1339   Weight: 104.3 kg (230 lb)   Height: 1.499 m (4' 11\")       Body mass index is 46.45 kg/m .    Kandis Morales CMA    "
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
## 2021-05-13 ENCOUNTER — HOSPITAL ENCOUNTER (OUTPATIENT)
Facility: CLINIC | Age: 21
End: 2021-05-13
Attending: INTERNAL MEDICINE | Admitting: INTERNAL MEDICINE
Payer: COMMERCIAL

## 2021-05-13 ENCOUNTER — TELEPHONE (OUTPATIENT)
Dept: GASTROENTEROLOGY | Facility: CLINIC | Age: 21
End: 2021-05-13

## 2021-05-13 NOTE — TELEPHONE ENCOUNTER
Screening Questions  1. What insurance is in the chart?     2.  Ordering/Referring Provider: Vijay    3. BMI 44.4    4. Are you on daily home oxygen? no    5. Do you have a history of difficult airway? no    6. Have you had a heart, lung, or liver transplant? no    7. Are you currently on dialysis? no    8. Have you had a stroke or Transient ischemic atttack (TIA) within 6 months? no    9. In the past 6 months, have you had any heart related issues including cardiomyopathy or heart attack?         If yes, did it require cardiac stenting or other implantable device?no    10. Do you have any implantable devices in your body (pacemaker, defib, LVAD)? no    11. Do you take nitroglycerin? If yes, how often? no    12. Are you currently taking any blood thinners?no    13. Are you a diabetic? no    14. (Females) Are you currently pregnant? no  If yes, how many weeks?    15. Have you had a procedure in the past that was difficult to tolerate with conscious sedation? Any allergies to Fentanyl or Versed no    16. Are you taking any scheduled prescription narcotics more than once daily? YES     17. Do you have any chemical dependencies such as alcohol, street drugs, or methadone? yes    18. Do you have any history of post-traumatic stress syndrome or mental health issues? YES     19. Do you transfer independently? yes    20.  Do you have any issues with constipation? yes    21. Preferred Pharmacy for Pre Prescription on chart    Scheduling Details    Procedure Scheduled: EGD and Colon  Provider/Surgeon: Vijay   Date of Procedure: 6/3/2021  Location[place] [facility]  Caller (Please ask for phone number if not scheduled by patient): Kem Vee      Sedation Type: MAC  Conscious Sedation- Needs  for 6 hours after the procedure  MAC/General-Needs  for 24 hours after procedure    Pre-op Required at [facility] and OR for  MAC sedation:   (if yes advise patient they will need a pre-op prior to procedure)      Is patient on blood  thinners? -no (If yes- inform patient to follow up with PCP or provider for follow up instructions)     Informed patient they will need an adult  yes  Cannot take any type of public or medical transportation alone    Informed Patient of COVID Test Requirement yes and kinza    Confirmed Nurse will call to complete assessment yes    Additional comments: no

## 2021-05-16 ENCOUNTER — HOSPITAL ENCOUNTER (EMERGENCY)
Dept: EMERGENCY MEDICINE | Facility: CLINIC | Age: 21
Discharge: HOME | End: 2021-05-16
Attending: EMERGENCY MEDICINE
Payer: COMMERCIAL

## 2021-05-16 DIAGNOSIS — N93.9 VAGINAL BLEEDING: ICD-10-CM

## 2021-05-16 DIAGNOSIS — R10.84 ABDOMINAL PAIN, GENERALIZED: ICD-10-CM

## 2021-05-16 LAB
ALBUMIN SERPL-MCNC: 3.9 G/DL (ref 3.5–5)
ALP SERPL-CCNC: 94 U/L (ref 45–120)
ALT SERPL W P-5'-P-CCNC: 18 U/L (ref 0–45)
ANION GAP SERPL CALCULATED.3IONS-SCNC: 14 MMOL/L (ref 5–18)
APTT PPP: 34 SECONDS (ref 24–37)
AST SERPL W P-5'-P-CCNC: 16 U/L (ref 0–40)
BASOPHILS # BLD AUTO: 0.1 THOU/UL (ref 0–0.2)
BASOPHILS NFR BLD AUTO: 0 % (ref 0–2)
BILIRUB SERPL-MCNC: 0.2 MG/DL (ref 0–1)
BUN SERPL-MCNC: 7 MG/DL (ref 8–22)
CALCIUM SERPL-MCNC: 8.9 MG/DL (ref 8.5–10.5)
CHLORIDE BLD-SCNC: 109 MMOL/L (ref 98–107)
CO2 SERPL-SCNC: 21 MMOL/L (ref 22–31)
CREAT SERPL-MCNC: 0.81 MG/DL (ref 0.6–1.1)
EOSINOPHIL # BLD AUTO: 0.4 THOU/UL (ref 0–0.4)
EOSINOPHIL NFR BLD AUTO: 3 % (ref 0–6)
ERYTHROCYTE [DISTWIDTH] IN BLOOD BY AUTOMATED COUNT: 15.5 % (ref 11–14.5)
ETHANOL SERPL-MCNC: 124 MG/DL
GFR SERPL CREATININE-BSD FRML MDRD: >60 ML/MIN/1.73M2
GLUCOSE BLD-MCNC: 86 MG/DL (ref 70–125)
HCG UR QL: NEGATIVE
HCT VFR BLD AUTO: 44.6 % (ref 35–47)
HGB BLD-MCNC: 14.2 G/DL (ref 12–16)
IMM GRANULOCYTES # BLD: 0.1 THOU/UL
IMM GRANULOCYTES NFR BLD: 0 %
INR PPP: 1.04 (ref 0.9–1.1)
LIPASE SERPL-CCNC: 13 U/L (ref 0–52)
LYMPHOCYTES # BLD AUTO: 4.1 THOU/UL (ref 0.8–4.4)
LYMPHOCYTES NFR BLD AUTO: 25 % (ref 20–40)
MCH RBC QN AUTO: 27.2 PG (ref 27–34)
MCHC RBC AUTO-ENTMCNC: 31.8 G/DL (ref 32–36)
MCV RBC AUTO: 85 FL (ref 80–100)
MONOCYTES # BLD AUTO: 1.1 THOU/UL (ref 0–0.9)
MONOCYTES NFR BLD AUTO: 7 % (ref 2–10)
NEUTROPHILS # BLD AUTO: 10.6 THOU/UL (ref 2–7.7)
NEUTROPHILS NFR BLD AUTO: 65 % (ref 50–70)
PLATELET # BLD AUTO: 357 THOU/UL (ref 140–440)
PMV BLD AUTO: 10.3 FL (ref 8.5–12.5)
POTASSIUM BLD-SCNC: 3.5 MMOL/L (ref 3.5–5)
PROT SERPL-MCNC: 7.9 G/DL (ref 6–8)
RBC # BLD AUTO: 5.22 MILL/UL (ref 3.8–5.4)
SODIUM SERPL-SCNC: 144 MMOL/L (ref 136–145)
WBC: 16.4 THOU/UL (ref 4–11)

## 2021-05-16 ASSESSMENT — MIFFLIN-ST. JEOR: SCORE: 1673.54

## 2021-05-18 DIAGNOSIS — Z11.59 ENCOUNTER FOR SCREENING FOR OTHER VIRAL DISEASES: ICD-10-CM

## 2021-05-24 ENCOUNTER — RECORDS - HEALTHEAST (OUTPATIENT)
Dept: ADMINISTRATIVE | Facility: CLINIC | Age: 21
End: 2021-05-24

## 2021-05-26 ENCOUNTER — RECORDS - HEALTHEAST (OUTPATIENT)
Dept: ADMINISTRATIVE | Facility: CLINIC | Age: 21
End: 2021-05-26

## 2021-05-26 ASSESSMENT — PATIENT HEALTH QUESTIONNAIRE - PHQ9
SUM OF ALL RESPONSES TO PHQ QUESTIONS 1-9: 12
SUM OF ALL RESPONSES TO PHQ QUESTIONS 1-9: 17

## 2021-05-27 VITALS — BODY MASS INDEX: 44.35 KG/M2 | HEIGHT: 59 IN | WEIGHT: 220 LBS

## 2021-05-27 VITALS — TEMPERATURE: 98.1 F

## 2021-05-27 ASSESSMENT — PATIENT HEALTH QUESTIONNAIRE - PHQ9
SUM OF ALL RESPONSES TO PHQ QUESTIONS 1-9: 14
SUM OF ALL RESPONSES TO PHQ QUESTIONS 1-9: 17
SUM OF ALL RESPONSES TO PHQ QUESTIONS 1-9: 17
SUM OF ALL RESPONSES TO PHQ QUESTIONS 1-9: 16
SUM OF ALL RESPONSES TO PHQ QUESTIONS 1-9: 10
SUM OF ALL RESPONSES TO PHQ QUESTIONS 1-9: 16
SUM OF ALL RESPONSES TO PHQ QUESTIONS 1-9: 9
SUM OF ALL RESPONSES TO PHQ QUESTIONS 1-9: 18
SUM OF ALL RESPONSES TO PHQ QUESTIONS 1-9: 11

## 2021-05-27 NOTE — PROGRESS NOTES
Assessment/Plan:     1. Acute cystitis with hematuria  Symptoms resolved.  She will complete course of antibiotic.  Notify with recurrence of symptoms or onset of additional symptoms.    2. Viral upper respiratory tract infection  She will continue symptom medic treatment to monitor, she would not like this addressed today.    3. Mild single current episode of major depressive disorder (H)  She is feeling overall things are stable.  She is establishing with a new therapist and will see how things with go go with that before making any additional changes or follow-up.    Will await gonorrhea chlamydia screening today by urine.    There are no Patient Instructions on file for this visit.     Return in about 3 months (around 6/27/2019) for Depression.      Subjective:      Yolanda Vee is a 18 y.o. female presented to clinic today for follow-up of emergency room visit on 3/23/2019.  She had described 4 days of dysuria and hematuria without any fevers, chills, or back pain.  Urinalysis was positive for blood and leukocyte esterase.  She was discharged on Macrodantin twice daily for 7 days.  She is been compliant but is missed a dose yesterday and again today.  Denies any further dysuria, hematuria, or back pain.  She has had some chills but attributes that to onset of URI a couple days ago, was actually unable to attend school all week due to URI symptoms with nasal congestion and cough.  She is not interested in having this evaluated today.  History of depression, remains compliant with her medications.  Is in the process of scheduling an intake appointment with a therapist.  Please see PHQ 9 from today's visit.  Is agreeable to chlamydia screening today.  Current Outpatient Medications   Medication Sig Dispense Refill     aluminum-magnesium hydroxide 200-200 mg/5 mL suspension Take 5 mL by mouth every 6 (six) hours as needed for indigestion. 100 mL 3     nitrofurantoin, macrocrystal-monohydrate, (MACROBID)  100 MG capsule Take 1 capsule (100 mg total) by mouth 2 (two) times a day for 7 days. 13 capsule 0     omeprazole (PRILOSEC) 20 MG capsule TAKE 1 CAPSULE(20 MG) BY MOUTH DAILY BEFORE BREAKFAST 30 capsule 11     polyethylene glycol (GLYCOLAX) 17 gram/dose powder TAKE 1 CAPFUL MIXED IN 8 OUNCES OF FLUID ONCE DAILY UNTIL SOFT STOOLS 238 g 0     salicylic acid-lactic acid (COMPOUND W) 17 % external solution Apply to wart every 3 days until resolved. 14 mL 3     venlafaxine (EFFEXOR-XR) 150 MG 24 hr capsule Take 1 capsule (150 mg total) by mouth daily. 90 capsule 3     No current facility-administered medications for this visit.        Past Medical History, Family History, and Social History reviewed.  No past medical history on file.  No past surgical history on file.  Vicodin [hydrocodone-acetaminophen]  No family history on file.  Social History     Socioeconomic History     Marital status: Single     Spouse name: Not on file     Number of children: Not on file     Years of education: Not on file     Highest education level: Not on file   Occupational History     Not on file   Social Needs     Financial resource strain: Not on file     Food insecurity:     Worry: Not on file     Inability: Not on file     Transportation needs:     Medical: Not on file     Non-medical: Not on file   Tobacco Use     Smoking status: Current Every Day Smoker     Smokeless tobacco: Never Used   Substance and Sexual Activity     Alcohol use: No     Drug use: No     Sexual activity: Not on file   Lifestyle     Physical activity:     Days per week: Not on file     Minutes per session: Not on file     Stress: Not on file   Relationships     Social connections:     Talks on phone: Not on file     Gets together: Not on file     Attends [employer] service: Not on file     Active member of club or organization: Not on file     Attends meetings of clubs or organizations: Not on file     Relationship status: Not on file     Intimate partner violence:  "    Fear of current or ex partner: Not on file     Emotionally abused: Not on file     Physically abused: Not on file     Forced sexual activity: Not on file   Other Topics Concern     Not on file   Social History Narrative     Not on file         Review of systems is as stated in HPI, and the remainder of the 10 system review is otherwise negative.    Objective:     Vitals:    03/27/19 1129   BP: 102/60   Pulse: 84   Resp: 20   Temp: 98  F (36.7  C)   TempSrc: Oral   Weight: 197 lb (89.4 kg)   Height: 5' 1\" (1.549 m)    Body mass index is 37.22 kg/m .    Alert female.  Mucous memories moist.  Heart with regular rate and rhythm.  Lungs clear.  Abdomen is soft and nontender, no suprapubic tenderness.  No CVA tenderness.      This note has been dictated using voice recognition software. Any grammatical or context distortions are unintentional and inherent to the the software.   "

## 2021-05-28 ENCOUNTER — COMMUNICATION - HEALTHEAST (OUTPATIENT)
Dept: BEHAVIORAL HEALTH | Facility: CLINIC | Age: 21
End: 2021-05-28

## 2021-05-28 ENCOUNTER — TELEPHONE (OUTPATIENT)
Dept: GASTROENTEROLOGY | Facility: CLINIC | Age: 21
End: 2021-05-28

## 2021-05-28 ASSESSMENT — ASTHMA QUESTIONNAIRES
ACT_TOTALSCORE: 15
ACT_TOTALSCORE: 10
ACT_TOTALSCORE: 14
ACT_TOTALSCORE: 14

## 2021-05-28 ASSESSMENT — ANXIETY QUESTIONNAIRES
GAD7 TOTAL SCORE: 11
GAD7 TOTAL SCORE: 8
GAD7 TOTAL SCORE: 10
GAD7 TOTAL SCORE: 8
GAD7 TOTAL SCORE: 3

## 2021-05-28 NOTE — TELEPHONE ENCOUNTER
Kem needs to move her EGD and colonoscopy to the [facility] due to high BMI and other previous medical diagnosis per Kem Trinidad.     Left message for her to call back and get this scheduled.     PT WILL NEEDED A PRE-OP APT BEFORE HAND.

## 2021-05-28 NOTE — PROGRESS NOTES
"  Assessment/Plan:     1. Chronic idiopathic constipation  Due to importance of healthy diet including lots of fruits and vegetables, high-fiber, adequate hydration, and limitation of refined carbohydrates.  Encouraged daily MiraLAX adjusting dose as needed.  Notify with inadequate effect.  - polyethylene glycol (GLYCOLAX) 17 gram/dose powder; Mix 1 capful in 8 ounces of fluid once daily until soft stools, then adjust dose as needed to achieve one soft bowel movement daily.  Dispense: 238 g; Refill: 3    2. Viral upper respiratory tract infection with cough  Reviewed nature of condition.  Reviewed role of tobacco use and causing persistence of upper respiratory infections.  No signs of secondary bacterial infection.  Reviewed some somatic treatments.  Advised initiation of fluticasone nasal spray.    3. Chronic nasal congestion  Will initiate fluticasone nasal spray.  Notify with inadequate effect.  - fluticasone propionate (FLONASE ALLERGY RELIEF) 50 mcg/actuation nasal spray; 2 sprays into each nostril daily.  Dispense: 16 g; Refill: 12    4. Tobacco use  Advised cessation.  She is not interested at this time.      Patient Instructions   Quit smoking.  This is likely the reason for your lingering respiratory infection.  No signs of bacterial infection.  Let me know if you would like assistance with quitting smoking.    Begin fluticasone parens of Flonase) nasal spray, 2 sprays each nostril once daily, to help manage your ongoing nasal congestion.    Begin MiraLAX powder 1 capful mixed in fluid once daily until a daily soft bowel movement, then you may adjust dose as needed to maintain a daily soft bowel movement.  I recommend that you take this daily instead of just as needed.       Return in about 3 months (around 8/16/2019) for Depression.      Subjective:      Yolanda Vee is a 18 y.o. female presented to clinic today for evaluation of a few concerns.  First states \"I always feel sick\" in that daily she " is not able to breathe through her left nostril and for the past 2 weeks she has been ill.  Onset initially with runny nose then development of sore throat and a raspy cough that is nonproductive.  Intermittent chills, subjective fevers but normal temperatures when checked.  Appetite reduced.  More fatigued than usual.  Frequent sneezing.  Clear nasal drainage.  Some sinus pressure at the bridge of her nose, noting that chronically her left nostril is always plugged.  She is been using DayQuil and NyQuil without improvement.  Smoking, varies from 1/3 to 1/4 pack/day, reduce now that she is sick.  Not interested in quitting.    Ongoing issues with chronic constipation and abdominal discomfort.  Admits that her diet is poor, minimal fruits and vegetables, mostly meat with intermittent macaroni and cheese.  Bowel movements sometimes are occurring every 2 weeks.  No blood in her stools.  Using MiraLAX as needed, perhaps once every 2 weeks, with some effect the next day but with return of symptoms.  She had a normal colonoscopy and upper endoscopy with only mild esophagitis March 2018.    Current Outpatient Medications   Medication Sig Dispense Refill     aluminum-magnesium hydroxide 200-200 mg/5 mL suspension Take 5 mL by mouth every 6 (six) hours as needed for indigestion. 100 mL 3     omeprazole (PRILOSEC) 20 MG capsule TAKE 1 CAPSULE(20 MG) BY MOUTH DAILY BEFORE BREAKFAST 30 capsule 11     polyethylene glycol (GLYCOLAX) 17 gram/dose powder Mix 1 capful in 8 ounces of fluid once daily until soft stools, then adjust dose as needed to achieve one soft bowel movement daily. 238 g 3     salicylic acid-lactic acid (COMPOUND W) 17 % external solution Apply to wart every 3 days until resolved. 14 mL 3     venlafaxine (EFFEXOR-XR) 150 MG 24 hr capsule Take 1 capsule (150 mg total) by mouth daily. 90 capsule 3     fluticasone propionate (FLONASE ALLERGY RELIEF) 50 mcg/actuation nasal spray 2 sprays into each nostril daily. 16  g 12     No current facility-administered medications for this visit.        Past Medical History, Family History, and Social History reviewed.  History reviewed. No pertinent past medical history.  History reviewed. No pertinent surgical history.  Vicodin [hydrocodone-acetaminophen]  History reviewed. No pertinent family history.  Social History     Socioeconomic History     Marital status: Single     Spouse name: Not on file     Number of children: Not on file     Years of education: Not on file     Highest education level: Not on file   Occupational History     Not on file   Social Needs     Financial resource strain: Not on file     Food insecurity:     Worry: Not on file     Inability: Not on file     Transportation needs:     Medical: Not on file     Non-medical: Not on file   Tobacco Use     Smoking status: Current Every Day Smoker     Smokeless tobacco: Never Used   Substance and Sexual Activity     Alcohol use: No     Drug use: No     Sexual activity: Not on file   Lifestyle     Physical activity:     Days per week: Not on file     Minutes per session: Not on file     Stress: Not on file   Relationships     Social connections:     Talks on phone: Not on file     Gets together: Not on file     Attends Quaker service: Not on file     Active member of club or organization: Not on file     Attends meetings of clubs or organizations: Not on file     Relationship status: Not on file     Intimate partner violence:     Fear of current or ex partner: Not on file     Emotionally abused: Not on file     Physically abused: Not on file     Forced sexual activity: Not on file   Other Topics Concern     Not on file   Social History Narrative     Not on file         Review of systems is as stated in HPI, and the remainder of the 10 system review is otherwise negative.    Objective:     Vitals:    05/16/19 1507   BP: 118/64   Pulse: 98   Resp: 20   Temp: 98.1  F (36.7  C)   TempSrc: Oral   Weight: 198 lb (89.8 kg)  "  Height: 5' 1\" (1.549 m)   HC: 99 cm (38.98\")    Body mass index is 37.41 kg/m .    Alert female.  Pupils are equal, round, reactive to light.  Tympanic memories pearly and translucent.  No sinus tenderness.  Nasal mucosa is with mild erythema pallor and clear drainage.  Oropharynx clear.  Neck supple without lymphadenopathy.  Heart with regular rate and rhythm.  Lungs clear.  Abdomen soft.      This note has been dictated using voice recognition software. Any grammatical or context distortions are unintentional and inherent to the the software.   "

## 2021-05-28 NOTE — PATIENT INSTRUCTIONS - HE
Quit smoking.  This is likely the reason for your lingering respiratory infection.  No signs of bacterial infection.  Let me know if you would like assistance with quitting smoking.    Begin fluticasone parens of Flonase) nasal spray, 2 sprays each nostril once daily, to help manage your ongoing nasal congestion.    Begin MiraLAX powder 1 capful mixed in fluid once daily until a daily soft bowel movement, then you may adjust dose as needed to maintain a daily soft bowel movement.  I recommend that you take this daily instead of just as needed.

## 2021-05-30 VITALS — WEIGHT: 156 LBS | HEIGHT: 61 IN | BODY MASS INDEX: 29.45 KG/M2

## 2021-05-31 VITALS — WEIGHT: 155.2 LBS | HEIGHT: 60 IN | BODY MASS INDEX: 30.47 KG/M2

## 2021-05-31 VITALS — WEIGHT: 162.5 LBS | BODY MASS INDEX: 30.68 KG/M2 | HEIGHT: 61 IN

## 2021-05-31 VITALS — BODY MASS INDEX: 30.94 KG/M2 | WEIGHT: 158.4 LBS

## 2021-05-31 VITALS — WEIGHT: 161 LBS | HEIGHT: 60 IN | BODY MASS INDEX: 31.61 KG/M2

## 2021-05-31 VITALS — BODY MASS INDEX: 31.56 KG/M2 | WEIGHT: 161.6 LBS

## 2021-05-31 VITALS — WEIGHT: 163.5 LBS | BODY MASS INDEX: 32.1 KG/M2 | HEIGHT: 60 IN

## 2021-06-01 ENCOUNTER — TELEPHONE (OUTPATIENT)
Dept: GASTROENTEROLOGY | Facility: CLINIC | Age: 21
End: 2021-06-01

## 2021-06-01 DIAGNOSIS — Z11.59 ENCOUNTER FOR SCREENING FOR OTHER VIRAL DISEASES: ICD-10-CM

## 2021-06-01 DIAGNOSIS — K59.00 CONSTIPATION, UNSPECIFIED CONSTIPATION TYPE: Primary | ICD-10-CM

## 2021-06-01 LAB
SARS-COV-2 RNA RESP QL NAA+PROBE: NORMAL
SPECIMEN SOURCE: NORMAL

## 2021-06-01 PROCEDURE — U0003 INFECTIOUS AGENT DETECTION BY NUCLEIC ACID (DNA OR RNA); SEVERE ACUTE RESPIRATORY SYNDROME CORONAVIRUS 2 (SARS-COV-2) (CORONAVIRUS DISEASE [COVID-19]), AMPLIFIED PROBE TECHNIQUE, MAKING USE OF HIGH THROUGHPUT TECHNOLOGIES AS DESCRIBED BY CMS-2020-01-R: HCPCS | Performed by: INTERNAL MEDICINE

## 2021-06-01 PROCEDURE — U0005 INFEC AGEN DETEC AMPLI PROBE: HCPCS | Performed by: INTERNAL MEDICINE

## 2021-06-01 RX ORDER — BISACODYL 5 MG/1
TABLET, DELAYED RELEASE ORAL
Qty: 4 TABLET | Refills: 0 | Status: SHIPPED | OUTPATIENT
Start: 2021-06-01 | End: 2021-09-27

## 2021-06-01 NOTE — TELEPHONE ENCOUNTER
Writer reviewed pre-assessment questions with patient prior to upcoming EGD and colonoscopy on 6.3.2021.  COVID test scheduled for 6.1.2021.    Pre-op 6.2.2021 at PAC clinic.    Reviewed golytely prep instructions with patient with a clear liquid diet the full day prior to procedure d/t constipation noted.  Noting no nuts, seeds, or popcorn prior to procedure.     Designated  policy reviewed with patient.     Patient verbalized understanding.  No further questions or concerns.    Yolanda Trinidad RN

## 2021-06-01 NOTE — TELEPHONE ENCOUNTER
FUTURE VISIT INFORMATION      SURGERY INFORMATION:    Date: 6/3/21    Location:  GI    Surgeon:  Guru Gerardo Prado MD    Anesthesia Type:  MAC    Procedure: ESOPHAGOGASTRODUODENOSCOPY (EGD) COLONOSCOPY    RECORDS REQUESTED FROM:       Primary Care Provider: Litzy Corona- [facility]    Most recent EKG+ Tracin/15/20- [facility]

## 2021-06-02 ENCOUNTER — PRE VISIT (OUTPATIENT)
Dept: SURGERY | Facility: CLINIC | Age: 21
End: 2021-06-02

## 2021-06-02 ENCOUNTER — OFFICE VISIT (OUTPATIENT)
Dept: SURGERY | Facility: CLINIC | Age: 21
End: 2021-06-02
Payer: COMMERCIAL

## 2021-06-02 ENCOUNTER — RECORDS - HEALTHEAST (OUTPATIENT)
Dept: ADMINISTRATIVE | Facility: OTHER | Age: 21
End: 2021-06-02

## 2021-06-02 ENCOUNTER — ANESTHESIA EVENT (OUTPATIENT)
Dept: GASTROENTEROLOGY | Facility: CLINIC | Age: 21
End: 2021-06-02
Payer: COMMERCIAL

## 2021-06-02 VITALS — HEIGHT: 61 IN | BODY MASS INDEX: 37.38 KG/M2 | WEIGHT: 198 LBS

## 2021-06-02 VITALS
RESPIRATION RATE: 20 BRPM | HEIGHT: 59 IN | BODY MASS INDEX: 45.36 KG/M2 | WEIGHT: 225 LBS | TEMPERATURE: 98 F | HEART RATE: 78 BPM | OXYGEN SATURATION: 97 % | DIASTOLIC BLOOD PRESSURE: 81 MMHG | SYSTOLIC BLOOD PRESSURE: 115 MMHG

## 2021-06-02 VITALS — BODY MASS INDEX: 37.19 KG/M2 | WEIGHT: 197 LBS | HEIGHT: 61 IN

## 2021-06-02 DIAGNOSIS — Z01.818 PRE-OP EXAMINATION: Primary | ICD-10-CM

## 2021-06-02 LAB
LABORATORY COMMENT REPORT: NORMAL
SARS-COV-2 RNA RESP QL NAA+PROBE: NEGATIVE
SPECIMEN SOURCE: NORMAL

## 2021-06-02 PROCEDURE — 99203 OFFICE O/P NEW LOW 30 MIN: CPT | Performed by: NURSE PRACTITIONER

## 2021-06-02 RX ORDER — MULTIVIT-MIN/IRON/FOLIC ACID/K 18-600-40
1000 CAPSULE ORAL EVERY MORNING
COMMUNITY

## 2021-06-02 ASSESSMENT — PAIN SCALES - GENERAL: PAINLEVEL: EXTREME PAIN (8)

## 2021-06-02 ASSESSMENT — MIFFLIN-ST. JEOR: SCORE: 1696.22

## 2021-06-02 NOTE — H&P
Pre-Operative H & P     CC:  Preoperative exam to assess for increased cardiopulmonary risk while undergoing surgery and anesthesia.    Date of Encounter: 6/2/2021  Primary Care Physician:  Litzy Corona  Yolanda Vee is a 20 year old female who presents for pre-operative H & P in preparation for ESOPHAGOGASTRODUODENOSCOPY (EGD) and colonoscopy with Guru Gerardo Prado MD on 6/3/2021 at [facility] under MAC.       Ms. Vee was seen in gastroenterology consultation on 5/12/2021 with Dr. Linares for evaluation of hematemesis.  Ms. Vee has a history of excessive alcohol intake and recent thrush after a tongue piercing who presented to the ED on 4/6/2021 with complaints of hematemesis.  Labs and Abd CT scan were unremarkable.  Through the evaluation, she was diagnosed with acute alcoholic gastritis without hemorrhage, Caitlin-Spivey tear and unspecified nausea and vomiting.  She was started on pantoprazole and encouraged to abstain from alcohol.  She saw Dr. Linares in consultation on 5/12/2021, she reported that she did not believe the PPI was helping her symptoms.  Dr. Linares recommended the above procedure for further evaluation.   Ms. Vee presents to PAC and would like to proceed.      Diagnosis     Hematemesis with nausea      History is obtained from the patient and electronic health record.     Past Medical History  Past Medical History:   Diagnosis Date     Anxiety      Constipation      Depression      Obese      PTSD (post-traumatic stress disorder)      Uncomplicated asthma        Past Surgical History  History reviewed. No pertinent surgical history.    Hx of Blood transfusions/reactions: denies      Hx of abnormal bleeding or anti-platelet use: denies    Menstrual history: No LMP recorded. Patient has had an injection.    Steroid use in the last year: denies     Personal or FH with difficulty with Anesthesia:  denies    Prior to Admission Medications  Current  Outpatient Medications   Medication Sig Dispense Refill     escitalopram (LEXAPRO) 10 MG tablet Take 1 tablet (10 mg) by mouth daily For refills, please make an appointment with [facility] psychiatry clinic at [phone] (Patient taking differently: Take 10 mg by mouth every morning For refills, please make an appointment with [facility] psychiatry clinic at [phone]) 30 tablet 1     ipratropium - albuterol 0.5 mg/2.5 mg/3 mL (DUONEB) 0.5-2.5 (3) MG/3ML neb solution Inhale 3 mLs into the lungs every 6 hours        omeprazole (PRILOSEC) 20 MG DR capsule Take 20 mg by mouth as needed        prazosin (MINIPRESS) 5 MG capsule At Bedtime        PROAIR RESPICLICK 108 (90 Base) MCG/ACT inhaler as needed        Vitamin D, Cholecalciferol, 25 MCG (1000 UT) TABS Take by mouth every morning       acamprosate (CAMPRAL) 333 MG EC tablet        bisacodyl (DULCOLAX) 5 MG EC tablet Take 2 tablets by mouth at bedtime 2 days prior to procedure.  Take 2 tablets by mouth at 3pm day prior to procedure. 4 tablet 0     fluconazole (DIFLUCAN) 100 MG tablet        polyethylene glycol (GOLYTELY) 236 g suspension Take as directed in patient instructions.  One day before exam fill jug with powder and one gallon of water.  At 6:00pm drink 8oz glass of mixture every 15 min until the jug is half empty.  Store the rest in the refrigerator.  Day of exam: 6 hours prior to exam drink the other half of mixture. 4000 mL 0       Allergies  Allergies   Allergen Reactions     Hydrocodone-Acetaminophen Nausea       Social History  Social History     Socioeconomic History     Marital status: Single     Spouse name: Not on file     Number of children: Not on file     Years of education: Not on file     Highest education level: Not on file   Occupational History     Not on file   Social Needs     Financial resource strain: Not on file     Food insecurity     Worry: Not on file     Inability: Not on file     Transportation needs     Medical: Not on  file     Non-medical: Not on file   Tobacco Use     Smoking status: Current Every Day Smoker     Packs/day: 1.00     Years: 5.00     Pack years: 5.00     Types: Cigarettes     Smokeless tobacco: Never Used     Tobacco comment: parents smoke inside home.   Substance and Sexual Activity     Alcohol use: Yes     Drug use: Yes     Types: Marijuana     Sexual activity: Never   Lifestyle     Physical activity     Days per week: Not on file     Minutes per session: Not on file     Stress: Not on file   Relationships     Social connections     Talks on phone: Not on file     Gets together: Not on file     Attends Quaker service: Not on file     Active member of club or organization: Not on file     Attends meetings of clubs or organizations: Not on file     Relationship status: Not on file     Intimate partner violence     Fear of current or ex partner: Not on file     Emotionally abused: Not on file     Physically abused: Not on file     Forced sexual activity: Not on file   Other Topics Concern     Parent/sibling w/ CABG, MI or angioplasty before 65F 55M? Not Asked   Social History Narrative     Not on file       Family History  Family History   Problem Relation Age of Onset     Heart Disease Mother      Substance Abuse Mother      Substance Abuse Father      Bipolar Disorder Brother      Depression Brother      Anxiety Disorder Brother      Asthma Brother      Hypertension Maternal Grandmother      Arthritis Maternal Grandmother      Heart Disease Maternal Grandmother      Heart Disease Maternal Uncle      Heart Disease Maternal Uncle        ROS/MED HX  ENT/Pulmonary: Comment: Currently using her proair everytime after she has a cigarette.  She smokes a pack a day leading to 20 + Puffs from her Proair daily.  No maintenance inhaler.  Has not used her Duonebulizer for over a month.     (+) asthma Treatment: Inhaler daily,      Neurologic:     (+) migraines (Everyday - takes tylenol that just takes the edge off. ),    "  Cardiovascular:    (-) taking anticoagulants/antiplatelets   METS/Exercise Tolerance: >4 METS Comment: Lifts weights.    Hematologic:  - neg hematologic  ROS     Musculoskeletal:  - neg musculoskeletal ROS     GI/Hepatic: Comment: Hematemesis with nausea.  Last episode a couple days ago.       Renal/Genitourinary:  - neg Renal ROS     Endo:     (+) Obesity,     Psychiatric/Substance Use:     (+) psychiatric history (Follows with psychiatry once a month.  Sees therapist every Wednesday.  Reports being stable.) anxiety, depression and other (comment) (PTSD) alcohol abuse (Alcohol dependence.  typically will have three + drinks.  this week has not had a drink.  ) Recreational drug usage: Cannabis (Last used marijuana last weekend. ).    Infectious Disease:  - neg infectious disease ROS     Malignancy:  - neg malignancy ROS     Other:      (+) LMP: On Depo so does not get menses. , ,            Temp: 98  F (36.7  C) Temp src: Oral BP: 115/81 Pulse: 78   Resp: 20 SpO2: 97 %         225 lbs 0 oz  4' 11\"[PT REPORTED[   Body mass index is 45.44 kg/m .       Physical Exam  Constitutional: Awake, alert, cooperative, no apparent distress, and appears stated age.  Eyes: Pupils equal, round and reactive to light, extra ocular muscles intact, sclera clear, conjunctiva normal. Eye brow piercing.  HENT: Normocephalic, oral pharynx with moist mucus membranes, good dentition, MP III, and small mouth. No goiter appreciated. Ear piercings.   Respiratory: Clear to auscultation bilaterally, no crackles or wheezing.  Cardiovascular: Regular rate and rhythm, normal S1 and S2, and no murmur noted.  Carotids +2, no bruits. No edema. Palpable pulses to radial  DP and PT arteries.   GI: Normal bowel sounds, soft and non-tender. Unable to adequately assess for hepatosplenomegaly given obese abdomen. No superficial masses noted.   Lymph/Hematologic: No cervical lymphadenopathy and no supraclavicular lymphadenopathy.  Genitourinary:  " deferred  Skin: Warm and dry.  Multiple tattoos.     Musculoskeletal: Full ROM of neck. There is no redness, warmth, or swelling of the joints. Gross motor strength is normal.    Neurologic: Awake, alert, oriented to name, place and time. Cranial nerves II-XII are grossly intact. Gait is normal.   Neuropsychiatric: Calm, cooperative. Normal affect.     Labs: (personally reviewed)  Care Everywhere  5/16/2021  WBC 16.4  Hgb 14.2  Plts 357  Na+ 144  K+ 3.5  Cr 0.81    Procedures     ECG 7/15/2020     Normal sinus rhythm     Nonspecific T wave abnormality     Abnormal ECG     When compared with ECG of 31-JAN-2020 11:17,     No significant change was found     Outside records reviewed from: Care Everywhere    ASSESSMENT and PLAN  Yolanda Vee is a 20 year old female scheduled to undergo ESOPHAGOGASTRODUODENOSCOPY (EGD) and colonoscopy with Guru Gerardo Prado MD on 6/3/2021 at [facility] under MAC.      She has the following specific operative considerations:   - ELIO # of risks 2/8 = low  - VTE risk:  0.5%  - Risk of PONV score = 1.  If > 2, anti-emetic intervention recommended.      #  Cardiology   - Denies known coronary artery disease.  Denies cardiac symptoms. METS:  >4. RCRI : No serious cardiac risks.  0.4 % risk of major adverse cardiac event.         #  Pulmonary   - tobacco dependence smoking 1 PPD.  Encouraged smoking cessation and to not smoke within 24 hours of surgery  - Marijuana use, last smoked this past weekend.  - Asthma, using Proair consistently after having a cigarette.  Using Proair multiple times a day but not at night.  Tobacco smoke is trigger.  It not on maintenance inhaler (no inhaled steroids).  Again - encouraged smoking cessation.  Instructed to see PCP to get on a maintenance inhaler for better control of asthma.  Rarely uses duonebulizer.  LS clear on exam. Patient instructed to use inhaler as prescribed.  Consider administration of a bronchodilator in the perioperative  setting, if needed.      #  GI   - Hematemesis with nausea with above procedure planned.  H/O alcohol dependence with last drink ~one week ago.     #  Endocrine     -  Obesity, BMI 45.44     #  Neuro   - Mood disorder, follows with psychiatry monthly, therapist weekly and is on Lexapro.  Stable.  - PTSD with nightmares, take prozosin at bedtime for nightmares.     #  HEENT    - Small mouth    #  ID  - COVID-19 testing per surgeon's office    #   Anesthesia considerations   -  Refer to PAC assessment in anesthesia records      Arrival time, NPO, shower and medication instructions provided by [facility] nursing staff .    Patient is an acceptable candidate for the proposed procedure.  No further diagnostic evaluation is needed.       ABA Dixon CNP  Preoperative Assessment Center  [facility]  Phone: [phone]  Fax: [phone]

## 2021-06-02 NOTE — ANESTHESIA PREPROCEDURE EVALUATION
Anesthesia Pre-Procedure Evaluation    Patient: Yolanda Vee   MRN: [id number] : 2000        Preoperative Diagnosis: * No surgery found *   Procedure :      No past medical history on file.   No past surgical history on file.   Allergies   Allergen Reactions     Hydrocodone-Acetaminophen Nausea      Social History     Tobacco Use     Smoking status: Current Every Day Smoker     Types: Cigarettes     Smokeless tobacco: Never Used     Tobacco comment: parents smoke inside home.   Substance Use Topics     Alcohol use: No      Wt Readings from Last 1 Encounters:   21 102.1 kg (225 lb)        Anesthesia Evaluation   Pt has had prior anesthetic. Type: MAC.    No history of anesthetic complications       ROS/MED HX  ENT/Pulmonary: Comment: Currently using her proair everytime after she has a cigarette.  She smokes a pack a day leading to 20 + Puffs from her Proair daily.  No maintenance inhaler.  Has not used her Duonebulizer for over a month.     (+) asthma Treatment: Inhaler daily,      Neurologic:     (+) migraines (Everyday - takes tylenol that just takes the edge off. ),     Cardiovascular:    (-) taking anticoagulants/antiplatelets   METS/Exercise Tolerance: >4 METS Comment: Lifts weights.    Hematologic:  - neg hematologic  ROS     Musculoskeletal:  - neg musculoskeletal ROS     GI/Hepatic: Comment: Hematemesis with nausea.  Last episode a couple days ago.       Renal/Genitourinary:  - neg Renal ROS     Endo:     (+) Obesity,     Psychiatric/Substance Use:     (+) psychiatric history (Follows with psychiatry once a month.  Sees therapist every Wednesday.  Reports being stable.) anxiety, depression and other (comment) (PTSD) alcohol abuse (Alcohol dependence.  typically will have three + drinks.  this week has not had a drink.  ) Recreational drug usage: Cannabis (Last used marijuana last weekend. ).    Infectious Disease:  - neg infectious disease ROS     Malignancy:  - neg malignancy ROS      Other:      (+) LMP: On Depo so does not get menses. , ,         Physical Exam    Airway      Comment: Small mouth    Mallampati: III   TM distance: > 3 FB   Neck ROM: full   Mouth opening: < 3 cm    Respiratory Devices and Support         Dental  no notable dental history         Cardiovascular   cardiovascular exam normal       Rhythm and rate: regular and normal     Pulmonary   pulmonary exam normal        breath sounds clear to auscultation           OUTSIDE LABS:  CBC:   Lab Results   Component Value Date    WBC 9.9 06/21/2013    HGB 11.8 06/21/2013    HCT 37.1 06/21/2013     06/21/2013     BMP:   Lab Results   Component Value Date     06/21/2013    POTASSIUM 3.5 06/21/2013    CHLORIDE 108 06/21/2013    CO2 25 06/21/2013    BUN 9 06/21/2013    CR 0.66 06/21/2013     (H) 06/21/2013     COAGS: No results found for: PTT, INR, FIBR  POC:   Lab Results   Component Value Date    HCG Negative 06/21/2013     HEPATIC:   Lab Results   Component Value Date    ALBUMIN 3.7 (L) 06/21/2013    PROTTOTAL 6.8 06/21/2013    ALT 20 06/21/2013    AST 22 06/21/2013    ALKPHOS 125 06/21/2013    BILITOTAL 0.5 06/21/2013     OTHER:   Lab Results   Component Value Date    HIMANSHU 8.7 06/21/2013    LIPASE 44 06/21/2013       Anesthesia Plan    ASA Status:  2      Anesthesia Type: MAC.     - Reason for MAC: chronic cardiopulmonary disease, straight local not clinically adequate, immobility needed   Induction: Propofol.   Maintenance: TIVA.        Consents    Anesthesia Plan(s) and associated risks, benefits, and realistic alternatives discussed. Questions answered and patient/representative(s) expressed understanding.     - Discussed with:  Patient      - Extended Intubation/Ventilatory Support Discussed: No.      - Patient is DNR/DNI Status: No    Use of blood products discussed: No .     Postoperative Care       PONV prophylaxis: Ondansetron (or other 5HT-3)     Comments:              PAC Discussion and  Assessment    ASA Classification: 1  ASC OK for Surgery: [facility].  Anesthetic techniques and relevant risks discussed: MAC with GA as backup                  PAC Resident/NP Anesthesia Assessment: Yolanda Vee is a 20-year-old female scheduled for ESOPHAGOGASTRODUODENOSCOPY (EGD) and colonoscopy with Guru Gerardo Prado MD on 6/3/2021 at [facility] under MAC.           Ms. Vee was seen in gastroenterology consultation on 5/12/2021 with Dr. Linares for evaluation of hematemesis.  Ms. Vee has a history of excessive alcohol intake and recent thrush after a tongue piercing who presented to the ED on 4/6/2021 with complaints of hematemesis.  Labs and Abd CT scan were unremarkable.  Through the evaluation, she was diagnosed with acute alcoholic gastritis without hemorrhage, Caitlin-Spivey tear and unspecified nausea and vomiting.  She was started on pantoprazole and encouraged to abstain from alcohol.  She saw Dr. Linares in consultation on 5/12/2021, she reported that she did not believe the PPI was helping her symptoms.  Dr. Linares recommended the above procedure for further evaluation.   Ms. Vee presents to PAC and would like to proceed.      Diagnosis     Hematemesis with nausea     Procedures     ECG 7/15/2020     Normal sinus rhythm     Nonspecific T wave abnormality     Abnormal ECG     When compared with ECG of 31-JAN-2020 11:17,     No significant change was found     She has the following specific operative considerations:   - ELIO # of risks 2/8 = low  - VTE risk:  0.5%  - Risk of PONV score = 1.  If > 2, anti-emetic intervention recommended.      #  Cardiology   - Denies known coronary artery disease.  Denies cardiac symptoms. METS:  >4. RCRI : No serious cardiac risks.  0.4 % risk of major adverse cardiac event.           #  Pulmonary   - tobacco dependence smoking 1 PPD.  Encouraged smoking cessation and to not smoke within 24 hours of surgery  - Marijuana use, last smoked this past  weekend.  - Asthma, using Proair consistently after having a cigarette.  Using Proair multiple times a day but not at night.  Tobacco smoke is trigger.  It not on maintenance inhaler (no inhaled steroids).  Again - encouraged smoking cessation.  Instructed to see PCP to get on a maintenance inhaler for better control of asthma.  Rarely uses duonebulizer.  LS clear on exam. Patient instructed to use inhaler as prescribed.  Consider administration of a bronchodilator in the perioperative setting, if needed.        #  GI   - Hematemesis with nausea with above procedure planned.  H/O alcohol dependence with last drink ~one week ago.     #  Endocrine     -  Obesity, BMI 45.44     #  Neuro   - Mood disorder, take medications as prescribed   - PTSD with nightmares, take prozosin at bedtime for nightmares.     #  HEENT    - Small mouth    #  ID  - COVID-19 testing per surgeon's office    #   Anesthesia considerations   -  Refer to PAC assessment in anesthesia records      Arrival time, NPO, shower and medication instructions provided by nursing staff today.    Patient is an acceptable candidate for the proposed procedure.  No further diagnostic evaluation is needed.       **For further details of assessment, testing, and physical exam please see H and P completed on same date.      Reviewed and Signed by PAC Mid-Level Provider/Resident  Mid-Level Provider/Resident: Marcelina TRONCOSO CNP  Date: 6/2/2021                                 ABA Dixon CNP

## 2021-06-03 ENCOUNTER — HOSPITAL ENCOUNTER (OUTPATIENT)
Facility: CLINIC | Age: 21
Discharge: HOME | End: 2021-06-03
Attending: INTERNAL MEDICINE | Admitting: INTERNAL MEDICINE
Payer: COMMERCIAL

## 2021-06-03 ENCOUNTER — ANESTHESIA (OUTPATIENT)
Dept: GASTROENTEROLOGY | Facility: CLINIC | Age: 21
End: 2021-06-03
Payer: COMMERCIAL

## 2021-06-03 VITALS
RESPIRATION RATE: 20 BRPM | HEART RATE: 104 BPM | SYSTOLIC BLOOD PRESSURE: 110 MMHG | DIASTOLIC BLOOD PRESSURE: 75 MMHG | OXYGEN SATURATION: 100 % | TEMPERATURE: 98 F | WEIGHT: 221.56 LBS | BODY MASS INDEX: 44.67 KG/M2 | HEIGHT: 59 IN

## 2021-06-03 LAB
COLONOSCOPY: NORMAL
GLUCOSE BLDC GLUCOMTR-MCNC: 95 MG/DL (ref 70–99)
HCG UR QL: NEGATIVE
UPPER GI ENDOSCOPY: NORMAL

## 2021-06-03 PROCEDURE — 250N000013 HC RX MED GY IP 250 OP 250 PS 637: Performed by: INTERNAL MEDICINE

## 2021-06-03 PROCEDURE — 81025 URINE PREGNANCY TEST: CPT | Performed by: ANESTHESIOLOGY

## 2021-06-03 PROCEDURE — 82962 GLUCOSE BLOOD TEST: CPT

## 2021-06-03 PROCEDURE — 43235 EGD DIAGNOSTIC BRUSH WASH: CPT | Performed by: INTERNAL MEDICINE

## 2021-06-03 PROCEDURE — 250N000009 HC RX 250: Performed by: NURSE ANESTHETIST, CERTIFIED REGISTERED

## 2021-06-03 PROCEDURE — 258N000003 HC RX IP 258 OP 636: Performed by: NURSE ANESTHETIST, CERTIFIED REGISTERED

## 2021-06-03 PROCEDURE — 45378 DIAGNOSTIC COLONOSCOPY: CPT | Performed by: INTERNAL MEDICINE

## 2021-06-03 PROCEDURE — 370N000017 HC ANESTHESIA TECHNICAL FEE, PER MIN: Performed by: INTERNAL MEDICINE

## 2021-06-03 PROCEDURE — 250N000011 HC RX IP 250 OP 636: Performed by: NURSE ANESTHETIST, CERTIFIED REGISTERED

## 2021-06-03 RX ORDER — NALOXONE HYDROCHLORIDE 0.4 MG/ML
0.2 INJECTION, SOLUTION INTRAMUSCULAR; INTRAVENOUS; SUBCUTANEOUS
Status: DISCONTINUED | OUTPATIENT
Start: 2021-06-03 | End: 2021-06-03 | Stop reason: HOSPADM

## 2021-06-03 RX ORDER — SODIUM CHLORIDE 9 MG/ML
INJECTION, SOLUTION INTRAVENOUS CONTINUOUS PRN
Status: DISCONTINUED | OUTPATIENT
Start: 2021-06-03 | End: 2021-06-03

## 2021-06-03 RX ORDER — NALOXONE HYDROCHLORIDE 0.4 MG/ML
0.4 INJECTION, SOLUTION INTRAMUSCULAR; INTRAVENOUS; SUBCUTANEOUS
Status: DISCONTINUED | OUTPATIENT
Start: 2021-06-03 | End: 2021-06-03 | Stop reason: HOSPADM

## 2021-06-03 RX ORDER — PROPOFOL 10 MG/ML
INJECTION, EMULSION INTRAVENOUS PRN
Status: DISCONTINUED | OUTPATIENT
Start: 2021-06-03 | End: 2021-06-03

## 2021-06-03 RX ORDER — ONDANSETRON 4 MG/1
4 TABLET, ORALLY DISINTEGRATING ORAL EVERY 30 MIN PRN
Status: DISCONTINUED | OUTPATIENT
Start: 2021-06-03 | End: 2021-06-03 | Stop reason: HOSPADM

## 2021-06-03 RX ORDER — SIMETHICONE 40MG/0.6ML
SUSPENSION, DROPS(FINAL DOSAGE FORM)(ML) ORAL PRN
Status: COMPLETED | OUTPATIENT
Start: 2021-06-03 | End: 2021-06-03

## 2021-06-03 RX ORDER — SODIUM CHLORIDE, SODIUM LACTATE, POTASSIUM CHLORIDE, CALCIUM CHLORIDE 600; 310; 30; 20 MG/100ML; MG/100ML; MG/100ML; MG/100ML
INJECTION, SOLUTION INTRAVENOUS CONTINUOUS
Status: DISCONTINUED | OUTPATIENT
Start: 2021-06-03 | End: 2021-06-03 | Stop reason: HOSPADM

## 2021-06-03 RX ORDER — PROPOFOL 10 MG/ML
INJECTION, EMULSION INTRAVENOUS CONTINUOUS PRN
Status: DISCONTINUED | OUTPATIENT
Start: 2021-06-03 | End: 2021-06-03

## 2021-06-03 RX ORDER — MEPERIDINE HYDROCHLORIDE 25 MG/ML
12.5 INJECTION INTRAMUSCULAR; INTRAVENOUS; SUBCUTANEOUS
Status: DISCONTINUED | OUTPATIENT
Start: 2021-06-03 | End: 2021-06-03 | Stop reason: HOSPADM

## 2021-06-03 RX ORDER — ONDANSETRON 2 MG/ML
4 INJECTION INTRAMUSCULAR; INTRAVENOUS EVERY 30 MIN PRN
Status: DISCONTINUED | OUTPATIENT
Start: 2021-06-03 | End: 2021-06-03 | Stop reason: HOSPADM

## 2021-06-03 RX ORDER — LIDOCAINE 40 MG/G
CREAM TOPICAL
Status: DISCONTINUED | OUTPATIENT
Start: 2021-06-03 | End: 2021-06-03 | Stop reason: HOSPADM

## 2021-06-03 RX ORDER — LIDOCAINE HYDROCHLORIDE 20 MG/ML
INJECTION, SOLUTION INFILTRATION; PERINEURAL PRN
Status: DISCONTINUED | OUTPATIENT
Start: 2021-06-03 | End: 2021-06-03

## 2021-06-03 RX ADMIN — LIDOCAINE HYDROCHLORIDE 60 MG: 20 INJECTION, SOLUTION INFILTRATION; PERINEURAL at 14:52

## 2021-06-03 RX ADMIN — PROPOFOL 50 MG: 10 INJECTION, EMULSION INTRAVENOUS at 14:55

## 2021-06-03 RX ADMIN — PROPOFOL 100 MG: 10 INJECTION, EMULSION INTRAVENOUS at 14:52

## 2021-06-03 RX ADMIN — SODIUM CHLORIDE: 9 INJECTION, SOLUTION INTRAVENOUS at 14:44

## 2021-06-03 RX ADMIN — SIMETHICONE 133 MG: 63.3; 3.7 SOLUTION/ DROPS ORAL at 14:45

## 2021-06-03 RX ADMIN — PROPOFOL 150 MCG/KG/MIN: 10 INJECTION, EMULSION INTRAVENOUS at 14:53

## 2021-06-03 RX ADMIN — TOPICAL ANESTHETIC 1 EACH: 200 SPRAY DENTAL; PERIODONTAL at 14:50

## 2021-06-03 RX ADMIN — MIDAZOLAM 2 MG: 1 INJECTION INTRAMUSCULAR; INTRAVENOUS at 14:55

## 2021-06-03 ASSESSMENT — MIFFLIN-ST. JEOR: SCORE: 1680.88

## 2021-06-03 NOTE — OR NURSING
Procedure: EGD and colonoscopy without intervention   Sedation: Conscious sedation / Monitored anesthesia care  Specimens: NA    Patient tolerated procedure well. Patient stable on transfer to .

## 2021-06-03 NOTE — ANESTHESIA CARE TRANSFER NOTE
Patient: Yolanda Vee    Procedure(s):  ESOPHAGOGASTRODUODENOSCOPY (EGD)  COLONOSCOPY    Diagnosis: Hematemesis with nausea [K92.0]  Diagnosis Additional Information: No value filed.    Anesthesia Type:   MAC     Note:    Oropharynx: oropharynx clear of all foreign objects  Level of Consciousness: awake  Oxygen Supplementation: room air    Independent Airway: airway patency satisfactory and stable  Dentition: dentition unchanged  Vital Signs Stable: post-procedure vital signs reviewed and stable  Report to RN Given: handoff report given  Patient transferred to: PACU    Handoff Report: Identifed the Patient, Identified the Reponsible Provider, Reviewed the pertinent medical history, Discussed the surgical course, Reviewed Intra-OP anesthesia mangement and issues during anesthesia, Set expectations for post-procedure period and Allowed opportunity for questions and acknowledgement of understanding      Vitals: (Last set prior to Anesthesia Care Transfer)  CRNA VITALS  6/3/2021 1448 - 6/3/2021 1523      6/3/2021             Pulse:  94    SpO2:  100 %        Electronically Signed By: ABA Patterson CRNA  Concepcion 3, 2021  3:23 PM

## 2021-06-03 NOTE — DISCHARGE INSTRUCTIONS
[facility]   Adult Discharge Orders & Instructions     For 24 hours after surgery    1. Get plenty of rest.  A responsible adult must stay with you for at least 24 hours after you leave the hospital.   2. Do not drive or use heavy equipment.  If you have weakness or tingling, don't drive or use heavy equipment until this feeling goes away.  3. Do not drink alcohol.  4. Avoid strenuous or risky activities.  Ask for help when climbing stairs.   5. You may feel lightheaded.  IF so, sit for a few minutes before standing.  Have someone help you get up.   6. If you have nausea (feel sick to your stomach): Drink only clear liquids such as apple juice, ginger ale, broth or 7-Up.  Rest may also help.  Be sure to drink enough fluids.  Move to a regular diet as you feel able.  7. You may have a slight fever. Call the doctor if your fever is over 100 F (37.7 C) (taken under the tongue) or lasts longer than 24 hours.  8. You may have a dry mouth, a sore throat, muscle aches or trouble sleeping.  These should go away after 24 hours.  9. Do not make important or legal decisions.   Call your doctor for any of the following:  To contact a doctor, call ________________________________________

## 2021-06-04 ENCOUNTER — NURSE TRIAGE (OUTPATIENT)
Dept: NURSING | Facility: CLINIC | Age: 21
End: 2021-06-04

## 2021-06-04 ENCOUNTER — OFFICE VISIT - HEALTHEAST (OUTPATIENT)
Dept: BEHAVIORAL HEALTH | Facility: CLINIC | Age: 21
End: 2021-06-04

## 2021-06-04 VITALS
OXYGEN SATURATION: 98 % | WEIGHT: 220 LBS | HEART RATE: 130 BPM | BODY MASS INDEX: 44.43 KG/M2 | DIASTOLIC BLOOD PRESSURE: 88 MMHG | SYSTOLIC BLOOD PRESSURE: 122 MMHG

## 2021-06-04 VITALS
WEIGHT: 222 LBS | SYSTOLIC BLOOD PRESSURE: 120 MMHG | DIASTOLIC BLOOD PRESSURE: 70 MMHG | BODY MASS INDEX: 44.84 KG/M2 | HEART RATE: 101 BPM | OXYGEN SATURATION: 98 %

## 2021-06-04 VITALS
WEIGHT: 216 LBS | RESPIRATION RATE: 20 BRPM | HEIGHT: 59 IN | TEMPERATURE: 98 F | BODY MASS INDEX: 43.55 KG/M2 | DIASTOLIC BLOOD PRESSURE: 80 MMHG | SYSTOLIC BLOOD PRESSURE: 124 MMHG | HEART RATE: 88 BPM

## 2021-06-04 VITALS
OXYGEN SATURATION: 98 % | DIASTOLIC BLOOD PRESSURE: 88 MMHG | WEIGHT: 221.2 LBS | HEART RATE: 122 BPM | SYSTOLIC BLOOD PRESSURE: 130 MMHG | BODY MASS INDEX: 44.68 KG/M2

## 2021-06-04 VITALS
HEART RATE: 105 BPM | BODY MASS INDEX: 44.27 KG/M2 | WEIGHT: 219.2 LBS | SYSTOLIC BLOOD PRESSURE: 110 MMHG | DIASTOLIC BLOOD PRESSURE: 64 MMHG | OXYGEN SATURATION: 95 %

## 2021-06-04 VITALS
SYSTOLIC BLOOD PRESSURE: 110 MMHG | BODY MASS INDEX: 44.35 KG/M2 | WEIGHT: 220 LBS | DIASTOLIC BLOOD PRESSURE: 70 MMHG | HEART RATE: 98 BPM | RESPIRATION RATE: 18 BRPM | HEIGHT: 59 IN

## 2021-06-04 DIAGNOSIS — F10.20 SEVERE ALCOHOL USE DISORDER (H): ICD-10-CM

## 2021-06-04 DIAGNOSIS — F43.10 PTSD (POST-TRAUMATIC STRESS DISORDER): ICD-10-CM

## 2021-06-04 DIAGNOSIS — F33.1 MODERATE EPISODE OF RECURRENT MAJOR DEPRESSIVE DISORDER (H): ICD-10-CM

## 2021-06-04 DIAGNOSIS — F51.5 NIGHTMARES: ICD-10-CM

## 2021-06-04 ASSESSMENT — ANXIETY QUESTIONNAIRES
6. BECOMING EASILY ANNOYED OR IRRITABLE: NEARLY EVERY DAY
7. FEELING AFRAID AS IF SOMETHING AWFUL MIGHT HAPPEN: NOT AT ALL
IF YOU CHECKED OFF ANY PROBLEMS ON THIS QUESTIONNAIRE, HOW DIFFICULT HAVE THESE PROBLEMS MADE IT FOR YOU TO DO YOUR WORK, TAKE CARE OF THINGS AT HOME, OR GET ALONG WITH OTHER PEOPLE: NOT DIFFICULT AT ALL
5. BEING SO RESTLESS THAT IT IS HARD TO SIT STILL: NOT AT ALL
3. WORRYING TOO MUCH ABOUT DIFFERENT THINGS: MORE THAN HALF THE DAYS
GAD7 TOTAL SCORE: 8
2. NOT BEING ABLE TO STOP OR CONTROL WORRYING: SEVERAL DAYS
1. FEELING NERVOUS, ANXIOUS, OR ON EDGE: MORE THAN HALF THE DAYS
4. TROUBLE RELAXING: NOT AT ALL

## 2021-06-04 ASSESSMENT — PATIENT HEALTH QUESTIONNAIRE - PHQ9: SUM OF ALL RESPONSES TO PHQ QUESTIONS 1-9: 15

## 2021-06-04 NOTE — TELEPHONE ENCOUNTER
"Caller is one day post EGD; states that she  Has pain  In collarbone to neck with movement and palpation.  Has  Gurgling sounds in stomach;   States seh  Had a small ememsis after drinking a  \" Red Bull\"   Calelr states they told her she had esophagitis and to not drink alcohol any more   Caller advised that neck may be sore from position during procedure; Advised to apply ice to are for 2 days and then may use heat   Advised that  gurgling in stomach  Is due to  Empty GI tract an air that was used during procedure that will pass; expect to pass gas.   Advised that  She shouls avoid irritating foods and fluids besides alcohol and that  Red Bull may be irritating to  Her   Advised to eat  Bureau diet an for several days and  Drink extrafluids   Advised to call ba ck  for any new or or worseninsg symptoms   Caller undertands and will comply   Margaux Al RN  FNA         Reason for Disposition    [1] MILD-MODERATE pain AND [2] comes and goes (cramps)    Neck pain  or stiffness    Additional Information    Negative: Shock suspected (e.g., cold/pale/clammy skin, too weak to stand, low BP, rapid pulse)    Negative: Difficult to awaken or acting confused (e.g., disoriented, slurred speech)    Negative: Passed out (i.e., lost consciousness, collapsed and was not responding)    Negative: Sounds like a life-threatening emergency to the triager    Negative: Chest pain    Negative: Pain is mainly in upper abdomen  (if needed ask: \"is it mainly above the belly button?\")    Negative: Followed an abdomen (stomach) injury    Negative: [1] Abdominal pain AND [2] pregnant < 20 weeks    Negative: [1] Abdominal pain AND [2] pregnant > 20 weeks    Negative: [1] Abdominal pain AND [2] postpartum (from 0 to 6 weeks after delivery)    Negative: [1] SEVERE pain (e.g., excruciating) AND [2] present > 1 hour    Negative: [1] SEVERE pain AND [2] age > 60    Negative: [1] Vomiting AND [2] contains red blood or black (\"coffee ground\") " material  (Exception: few red streaks in vomit that only happened once)    Negative: Blood in bowel movements   (Exception: blood on surface of BM with constipation)    Negative: Black or tarry bowel movements  (Exception: chronic-unchanged  black-grey bowel movements AND is taking iron pills or Pepto-bismol)    Negative: Patient sounds very sick or weak to the triager    Negative: [1] MILD-MODERATE pain AND [2] constant AND [3] present > 2 hours    Negative: [1] Vomiting AND [2] abdomen looks much more swollen than usual    Negative: [1] Vomiting AND [2] contains bile (green color)    Negative: White of the eyes have turned yellow (i.e., jaundice)    Negative: Fever > 103 F (39.4 C)    Negative: [1] Fever > 101 F (38.3 C) AND [2] age > 60    Negative: [1] Fever > 100.0 F (37.8 C) AND [2] bedridden (e.g., nursing home patient, CVA, chronic illness, recovering from surgery)    Negative: [1] Fever > 100.0 F (37.8 C) AND [2] diabetes mellitus or weak immune system (e.g., HIV positive, cancer chemo, splenectomy, organ transplant, chronic steroids)    Negative: [1] SEVERE pain AND [2] present < 1 hour    Negative: [1] MODERATE pain (e.g., interferes with normal activities) AND [2] pain comes and goes (cramps) AND [3] present > 24 hours  (Exception: pain with Vomiting or Diarrhea - see that Guideline)    Negative: [1] MILD pain (e.g., does not interfere with normal activities) AND [2] pain comes and goes (cramps) AND [3] present > 48 hours    Negative: Age > 60 years    Negative: Pregnancy suspected (e.g., missed last menstrual period)    Negative: Unusual vaginal discharge (e.g., bad smelling, yellow, green, or foamy-white)    Negative: Blood in urine (red, pink, or tea-colored)    Negative: Abdominal pain is a chronic symptom (recurrent or ongoing AND present > 4 weeks)    Negative: Pain with sexual intercourse (dyspareunia)    Negative: Shock suspected (e.g., cold/pale/clammy skin, too weak to stand, low BP, rapid  "pulse)    Negative: Difficult to awaken or acting confused (e.g., disoriented, slurred speech)    Negative: [1] Similar pain previously AND [2] it was from \"heart attack\"    Negative: [1] Similar pain previously AND [2] it was from \"angina\" AND [3] not relieved by nitroglycerin    Negative: Sounds like a life-threatening emergency to the triager    Negative: Followed a neck injury (e.g., MVA, sports, impact or collision)    Negative: Chest pain    Negative: Lymph node in the neck is swollen or painful to the touch    Negative: Sore throat is main symptom    Negative: Difficulty breathing or unusual sweating (e.g., sweating without exertion)    Negative: [1] Stiff neck (can't put chin to chest) AND [2] headache    Negative: [1] Stiff neck (can't put chin to chest) AND [2] fever    Negative: Weakness of an arm or hand    Negative: Problems with bowel or bladder control    Negative: Head is twisting to one side (or ask \"is it turning against your will?\")    Negative: Patient sounds very sick or weak to the triager    Negative: [1] SEVERE neck pain (e.g., excruciating, unable to do any normal activities) AND [2] not improved after 2 hours of pain medicine    Negative: [1] Fever > 100.0 F (37.8 C) AND [2] IVDA (intravenous drug abuse)    Negative: [1] Fever > 100.0 F (37.8 C) AND [2] diabetes mellitus or weak immune system (e.g., HIV positive, cancer chemo, splenectomy, organ transplant, chronic steroids)    Negative: Numbness in an arm or hand (i.e., loss of sensation)    Negative: Tenderness or swelling of front of neck over windpipe    Negative: Rash in same area as pain (may be described as \"small blisters\")    Negative: High-risk adult (e.g., history of cancer, HIV, or IV drug abuse)    Negative: [1] MODERATE neck pain (e.g., interferes with normal activities AND [2] present > 3 days    Negative: Neck pain present > 2 weeks    Negative: Pain shoots (radiates) into arm or hand    Negative: Neck pain is a chronic " symptom (recurrent or ongoing AND present > 4 weeks)    Negative: [1] Age > 50 AND [2] no history of prior similar neck pain    Protocols used: ABDOMINAL PAIN - FEMALE-A-AH, NECK PAIN OR [id number]

## 2021-06-05 VITALS
BODY MASS INDEX: 46.07 KG/M2 | HEART RATE: 104 BPM | TEMPERATURE: 98 F | SYSTOLIC BLOOD PRESSURE: 140 MMHG | OXYGEN SATURATION: 99 % | WEIGHT: 228.1 LBS | DIASTOLIC BLOOD PRESSURE: 86 MMHG

## 2021-06-05 VITALS
OXYGEN SATURATION: 96 % | HEART RATE: 121 BPM | BODY MASS INDEX: 43.87 KG/M2 | HEIGHT: 60 IN | SYSTOLIC BLOOD PRESSURE: 127 MMHG | WEIGHT: 223.44 LBS | TEMPERATURE: 98.2 F | DIASTOLIC BLOOD PRESSURE: 74 MMHG

## 2021-06-05 VITALS
WEIGHT: 230.5 LBS | HEART RATE: 80 BPM | DIASTOLIC BLOOD PRESSURE: 82 MMHG | SYSTOLIC BLOOD PRESSURE: 136 MMHG | BODY MASS INDEX: 46.47 KG/M2 | HEIGHT: 59 IN | TEMPERATURE: 97.5 F | RESPIRATION RATE: 20 BRPM

## 2021-06-05 VITALS
WEIGHT: 226.8 LBS | SYSTOLIC BLOOD PRESSURE: 126 MMHG | DIASTOLIC BLOOD PRESSURE: 74 MMHG | HEIGHT: 60 IN | RESPIRATION RATE: 16 BRPM | TEMPERATURE: 98.2 F | BODY MASS INDEX: 44.52 KG/M2 | OXYGEN SATURATION: 98 % | HEART RATE: 94 BPM

## 2021-06-05 VITALS
RESPIRATION RATE: 16 BRPM | TEMPERATURE: 98.3 F | HEIGHT: 59 IN | DIASTOLIC BLOOD PRESSURE: 70 MMHG | BODY MASS INDEX: 47.01 KG/M2 | SYSTOLIC BLOOD PRESSURE: 124 MMHG | OXYGEN SATURATION: 99 % | HEART RATE: 99 BPM | WEIGHT: 233.2 LBS

## 2021-06-05 NOTE — TELEPHONE ENCOUNTER
Please see below message and advise  I pended a letter for the chiropractor.       Per 1/31/2020 OV note:   1. Tachycardia  2. Dyspnea  3. T wave abnormality  Patient is mildly tachycardic today at 101 bpm.  EKG was performed and is normal sinus rhythm. She does have T wave abnormality.  We discussed that patient is relatively low risk for a more serious etiology given her age.  We reviewed options including referral versus further cardiac testing.  Patient is an every day smoker. Due to recent tachycardia along with dyspnea and smoking history will obtain a CTA to rule out for pulmonary embolism.  If results are negative, will consider referral to cardiology for further work-up.  Will check thyroid cascade to rule out other potential underlying etiology of tachycardia.  - Electrocardiogram Perform and Read  - Thyroid Cascade  - Stress Test Graded; Future          Per chest PE run:   IMPRESSION:   1.  No evidence for pulmonary embolism. Negative CTA examination of the chest.

## 2021-06-05 NOTE — TELEPHONE ENCOUNTER
----- Message from Lizette Hallman CMA sent at 1/17/2020  1:07 PM CST -----    ----- Message -----  From: Maris Bhandari CNP  Sent: 1/17/2020  12:55 PM CST  To: Maris Bhandari Care Team Pool    Please notify patient that her strep test came back negative.  Please let us know if symptoms worsen or fail to improve.  I would like patient to return to clinic within 2 weeks to check heart rate given somewhat elevation in heart rate today.

## 2021-06-05 NOTE — TELEPHONE ENCOUNTER
Spoke with patients mother and notified her of the below message.    Patient will need a letter indicating she cannot participate in sports.   Patient will need a letter for chiropractor indicating she needs to avoid chiropractic care at this time.      See letters pended under the communications tab.  Patient mother will  in clinic once complete.

## 2021-06-05 NOTE — TELEPHONE ENCOUNTER
Reason contacted:  Medication question  Information relayed:  Below message  Additional questions:  No  Further follow-up needed:  No  Okay to leave a detailed message:  No

## 2021-06-05 NOTE — TELEPHONE ENCOUNTER
Patient is due for a follow-up appointment 2 days from yesterday. It does not appear she has an appointment scheduled yet.    Would you prefer to have patient follow-up in clinic and have a work note provided at the time of her visit?

## 2021-06-05 NOTE — TELEPHONE ENCOUNTER
Yes, she should schedule a follow-up visit and receive a letter at that time.  She may see 1 of my partners as I do not have availability.

## 2021-06-05 NOTE — TELEPHONE ENCOUNTER
TESHA - I spoke with patient and patients mother when they arrived to  the letters.  They wanted you to be aware that patient approximately 1 year ago was Vaping THC and wonders if this could be the cause of tachycardia etc.      They are going to discuss with cardiology.

## 2021-06-05 NOTE — PROGRESS NOTES
Assessment/Plan:    1. Tachycardia  2. Dyspnea  3. T wave abnormality  Patient is mildly tachycardic today at 101 bpm.  EKG was performed and is normal sinus rhythm. She does have T wave abnormality.  We discussed that patient is relatively low risk for a more serious etiology given her age.  We reviewed options including referral versus further cardiac testing.  Patient is an every day smoker. Due to recent tachycardia along with dyspnea and smoking history will obtain a CTA to rule out for pulmonary embolism.  If results are negative, will consider referral to cardiology for further work-up.  Will check thyroid cascade to rule out other potential underlying etiology of tachycardia.  - Electrocardiogram Perform and Read  - Thyroid Cascade  - Stress Test Graded; Future    3. Major depressive disorder with single episode, in full remission (H)  Patient has history of depression with worsening symptoms recently.  PHQ 9 score of 17 today.  She wishes to restart venlafaxine.  Recommend that she start with half a tablet (75 mg) for 1 week then increase to full tablet (150 mg) once daily.  Follow-up in 4 weeks to reassess symptoms, sooner with any new concerns.  - venlafaxine (EFFEXOR-XR) 150 MG 24 hr capsule; Take 1 capsule (150 mg total) by mouth daily.  Dispense: 90 capsule; Refill: 3    The following are part of a depression follow up plan for the patient:  mental health screening assessment, mental anne marie screening education and mental health treatment education  The following high BMI interventions were performed this visit: encouragement to exercise and lifestyle education regarding diet    Subjective:    Yolanda Vee is a 19 year old female seen today for follow up.  She is accompanied by her mom today.  Elvirae was seen in clinic couple of weeks ago for hospital discharge follow-up visit following a fall that prompted her to be evaluated in the ED.  At that time, her heart rate was elevated to 120 and she was  "advised to return to clinic to recheck heart rate.  Elevated heart rate at that time was attributed to pain as well as nervousness.  Her heart rate today has come down to 101.  She has overall been asymptomatic, denies any chest pain, feeling of heart racing or heart palpitations.  Patient does report feeling shortness of breath with exertion, particularly when going up and down the stairs.  She does acknowledge that she is overweight and this could be a likely cause of that as well.  She does not have history of cardiovascular conditions or thyroid disorder.  No thyroid history in family members either.  Denies any pain, dizziness or lightheadedness.  Patient is an every day smoker.    Patient has history of depression which has been in remission for the most part.  She is not currently on any medication but was on venlafaxine 150 mg previously.  She reports having worsening depression recently and is hesitant to see this around her mom today.  She is interested in restarting medication.  She reports feeling \"different\" while taking the venlafaxine but does recall it working well for depression and therefore would like to restart this.  Patient denies suicidal homicidal lesions currently but does recall having thoughts of hurting herself in the past.  No plan in place. Review of systems is as stated in HPI, and the remainder of the 10 system review is otherwise unremarkable.    Past Medical History, Family History, and Social History reviewed.    History reviewed. No pertinent surgical history.     No family history on file.     History reviewed. No pertinent past medical history.     Social History     Tobacco Use     Smoking status: Current Every Day Smoker     Smokeless tobacco: Never Used   Substance Use Topics     Alcohol use: No     Drug use: No        Current Outpatient Medications   Medication Sig Dispense Refill     aluminum-magnesium hydroxide 200-200 mg/5 mL suspension Take 5 mL by mouth every 6 (six) " hours as needed for indigestion. 100 mL 3     fluticasone propionate (FLONASE ALLERGY RELIEF) 50 mcg/actuation nasal spray 2 sprays into each nostril daily. 16 g 12     omeprazole (PRILOSEC) 20 MG capsule TAKE 1 CAPSULE(20 MG) BY MOUTH DAILY BEFORE BREAKFAST 30 capsule 11     polyethylene glycol (GLYCOLAX) 17 gram/dose powder Mix 1 capful in 8 ounces of fluid once daily until soft stools, then adjust dose as needed to achieve one soft bowel movement daily. 238 g 3     salicylic acid-lactic acid (COMPOUND W) 17 % external solution Apply to wart every 3 days until resolved. 14 mL 3     venlafaxine (EFFEXOR-XR) 150 MG 24 hr capsule Take 1 capsule (150 mg total) by mouth daily. 90 capsule 3     No current facility-administered medications for this visit.           Objective:    Vitals:    01/31/20 1036   BP: 120/70   Patient Site: Right Arm   Patient Position: Sitting   Cuff Size: Adult Large   Pulse: (!) 101   SpO2: 98%   Weight: 222 lb (100.7 kg)      Body mass index is 44.84 kg/m .      General Appearance:  Alert, overweight female, cooperative, no distress, appears stated age   HEENT:  Normal.  No acute findings.   Neck: Supple, symmetrical, no adenopathy.   Lungs:   Clear to auscultation bilaterally, respirations unlabored.    Heart:  Regular rate and rhythm, S1, S2 normal, no murmur, rub or gallop   Abdomen:   Soft, non-tender, positive bowel sounds, no masses, no organomegaly   Extremities: Extremities normal.  No edema   Skin: Warm, dry.  Skin color, texture, turgor normal, no rashes or lesions   Neurologic: Grossly intact.           This note has been dictated using voice recognition software. Any grammatical or context distortions are unintentional and inherent to the use of this software.

## 2021-06-05 NOTE — TELEPHONE ENCOUNTER
Reason contacted:  Appointment   Information relayed:  Appointment scheduled on 1/17/2020 at 11:20 am with Maris Bhandari CNP.   Additional questions:  No  Further follow-up needed:  No  Okay to leave a detailed message:  No

## 2021-06-05 NOTE — TELEPHONE ENCOUNTER
Question following Office Visit  When did you see your provider: 1/31/20  What is your question: Patient had a normal imaging report in regards to CTA Chest PE Run.      1)  Is patient ok to play in sports at this time?      2)  Is patient ok to go to chiropractor for back adjustments?  If so, she will need a letter for this.    3)  Patient is still having symptoms of pain behind her left breast.    What is the next step for patient?    Okay to leave a detailed message: Yes, call patient's Mom,Toya at [phone].

## 2021-06-05 NOTE — TELEPHONE ENCOUNTER
Patients mother states patient was prescribed venlafaxine at her visit with Maris.  She is wondering if patient can take this medication despite being tachycardic with t wave abnormality?

## 2021-06-05 NOTE — PROGRESS NOTES
Assessment/Plan:    1. Hospital discharge follow-up  2. Sacral pain  Hospital discharge follow-up visit completed today.  Reviewed pertinent summaries, imaging and labs with the patient today.  No evidence of sacral fracture or abnormality.  I recommend that she continue with alternating extreme Tylenol and ibuprofen along with application of ice and heat.  Will provide work note for patient to remain out of work the next 2 days.  She should be able to return to work next week.    3. Sore throat  Rapid strep is negative today.  Recommend supportive cares.  Follow-up if symptoms worsen or fail to improve.  - Rapid Strep A Screen- Throat Swab    4. GERD (gastroesophageal reflux disease)  Stable with use of omeprazole.  Refill provided for omeprazole today.  - omeprazole (PRILOSEC) 20 MG capsule; TAKE 1 CAPSULE(20 MG) BY MOUTH DAILY BEFORE BREAKFAST  Dispense: 30 capsule; Refill: 11    5. Class 3 severe obesity due to excess calories without serious comorbidity with body mass index (BMI) of 40.0 to 44.9 in adult (H)  Discussed patient's weight today and I encouraged her to make healthy lifestyle changes in regards to diet and exercise.  Recommend that she cut down on fast food intake and decrease soda use.  Also recommend that she implement exercise routine.  We will continue to monitor weight.    6. Tachycardia  Tachycardia of 122, coming down to 108.  I suspect nerves and discomfort may be contributing.  There is no concerning findings on exam or other identifiable causes today.  We will continue to monitor.  Recommend follow-up within 2 weeks to recheck heart rate.    Subjective:    Yolanda Vee is a 19 year old female seen today for follow-up to recent ED visit.  She is accompanied by her mom today.  Patient presented to [facility] emergency department on 1/14/2020 after falling.  Patient fell the evening prior at a friend's house.  She fell down several stairs.  Did not recall if she lost any  consciousness.  Patient did report well alone in the room that she was intoxicated at the time of the fall.  Therefore a head CT was obtained and was negative.  Her main concern was in regards to sacral pain.  An x-ray of her sacrum was also negative for any acute abnormality.  Patient had not urinated yet that day and therefore a urinalysis and labs were obtained.  Labs were normal.  This was most likely related to dehydration secondary to intoxication.  No sign of further issues in this regard.  She continues to have pain in the sacral region.  She is alternating ibuprofen 800 mg with extra strength Tylenol 1000 mg throughout the day.  This helps take the edge off.  She is also applying heat and ice.  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  She is requesting a work note for the next 2 days.    She has had symptoms of a sore throat for the last couple of days.  No known exposure to strep however she has been at school with several ill contacts.  She would like to be tested today.  Patient is also requesting a refill of omeprazole.    Patient's mom is concerned about her weight.  She would like to know if patient should be changing her diet/exercise routine at all and what the healthy weight is for her.  She tends to drink a lot of soda and does eat fast food routinely.  She does not feel that she snacks often.  No regular exercise regimen.  Review of systems is as stated in HPI, and the remainder of the 10 system review is otherwise unremarkable.    Past Medical History, Family History, and Social History reviewed.    No past surgical history on file.     No family history on file.     No past medical history on file.     Social History     Tobacco Use     Smoking status: Current Every Day Smoker     Smokeless tobacco: Never Used   Substance Use Topics     Alcohol use: No     Drug use: No        Current Outpatient Medications    Medication Sig Dispense Refill     aluminum-magnesium hydroxide 200-200 mg/5 mL suspension Take 5 mL by mouth every 6 (six) hours as needed for indigestion. 100 mL 3     fluticasone propionate (FLONASE ALLERGY RELIEF) 50 mcg/actuation nasal spray 2 sprays into each nostril daily. 16 g 12     omeprazole (PRILOSEC) 20 MG capsule TAKE 1 CAPSULE(20 MG) BY MOUTH DAILY BEFORE BREAKFAST 30 capsule 11     polyethylene glycol (GLYCOLAX) 17 gram/dose powder Mix 1 capful in 8 ounces of fluid once daily until soft stools, then adjust dose as needed to achieve one soft bowel movement daily. 238 g 3     salicylic acid-lactic acid (COMPOUND W) 17 % external solution Apply to wart every 3 days until resolved. 14 mL 3     venlafaxine (EFFEXOR-XR) 150 MG 24 hr capsule Take 1 capsule (150 mg total) by mouth daily. 90 capsule 3     No current facility-administered medications for this visit.           Objective:    Vitals:    01/17/20 1115   BP: 130/88   Patient Site: Right Arm   Patient Position: Sitting   Cuff Size: Adult Large   Pulse: (!) 122   SpO2: 98%   Weight: 221 lb 3.2 oz (100.3 kg)      Body mass index is 44.68 kg/m .      General Appearance:  Alert, cooperative, no distress, appears stated age   HEENT:   Posterior oropharynx with mild erythema.  HEENT exam is otherwise normal.  No acute findings.   Neck: Supple, symmetrical, no adenopathy.   Lungs:   Clear to auscultation bilaterally, respirations unlabored.     Heart:  Regular rate and rhythm, S1, S2 normal, no murmur, rub or gallop   Sacrum:  Patient reports discomfort with sitting in certain movements.   Skin: Warm, dry.  Skin color, texture, turgor normal, no rashes or lesions           This note has been dictated using voice recognition software. Any grammatical or context distortions are unintentional and inherent to the use of this software.

## 2021-06-05 NOTE — TELEPHONE ENCOUNTER
The next up is for patient to see cardiologist for further evaluation of her elevated heart rate.  In the meantime, I recommend that she avoid exercise, participation in sports, and chiropractic care until this can be evaluated further.  Order placed.  VJ

## 2021-06-05 NOTE — TELEPHONE ENCOUNTER
Who is requesting the letter?  Patient's mother   Why is the letter needed? 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 Letter needs to say that patient is excuse for Friday - Sunday. Caller stated that patient is needing this letter by Friday   How would you like this letter returned?  letter   Okay to leave a detailed message? Yes  [phone]

## 2021-06-06 NOTE — PATIENT INSTRUCTIONS - HE
1. Your thyroid tests were normal in January.  2. Quitting smoking may help to prevent your heart from racing, among many other benefits for you in the long-term.  Try nicotine gum to help you get off the last few cigarettes.  3. Make sure that you maintain adequate hydration.  Mix Gatorade on ice or add water to keep yourself well-hydrated.  4. Effexor can cause a rapid heart rate, consider discussing with your providers whether an alternate agent may be better for you.  5. Your fatigue and many symptoms could be related to obstructive sleep apnea.  We will schedule a split-night sleep study and sleep consultation.  6. Try to get your heart rate up for 20 to 30 minutes continuously with activities on a daily basis.  Pushing a wheelchair counts!  7. The chest wall pain likely relates to the heavy lifting and is muscular in origin.  It is not coming from your heart.

## 2021-06-07 NOTE — PROGRESS NOTES
"Yolanda Vee is a 19 y.o. female who is being evaluated via a billable telephone visit.      The patient has been notified of following:     \"This telephone visit will be conducted via a call between you and your physician/provider. We have found that certain health care needs can be provided without the need for a physical exam.  This service lets us provide the care you need with a short phone conversation.  If a prescription is necessary we can send it directly to your pharmacy.  If lab work is needed we can place an order for that and you can then stop by our lab to have the test done at a later time.    Telephone visits are billed at different rates depending on your insurance coverage. During this emergency period, for some insurers they may be billed the same as an in-person visit.  Please reach out to your insurance provider with any questions.    If during the course of the call the physician/provider feels a telephone visit is not appropriate, you will not be charged for this service.\"    Patient has given verbal consent to a Telephone visit? Yes    Patient would like to receive their AVS by AVS Preference: Mail a copy.    Additional provider notes:     Yolanda Vee is a 19 y.o. female who is being evaluated via a billable telephone visit. Patient verbally consented to the video service today YES.        HPI: The patient is seen today via virtual phone visit regarding illness since Sunday. She has had sore throat since Sunday. She reports it is hard to swallow but she is able to swallow. She has not had any diarrhea yet today. She had 3 episodes yesterday. She reports some wheezing and she is taking her albuterol which is helpful. She has felt febrile but hasn't had fever when she has checked it. She does have cough and drainage. No one else at home has been ill. 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 The patient has been working during her " illness this week.    I have reviewed and updated the patient's Past Medical History, Social History, Family History and Medication List.    ALLERGIES  Vicodin [hydrocodone-acetaminophen]      Assessment/Plan:  Problem List Items Addressed This Visit     None      Visit Diagnoses     Suspected Covid-19 Virus Infection    -  Primary    Wheezing        Relevant Medications    albuterol sulfate 90 mcg/actuation AePB      Discussed with patient that this may represent COVID-19 or another viral illness. At this point she does not have any alarm symptoms. Good hydration and tylenol as needed recommended. COVID-19 specific isolation recommended. I emphasized that she can't return to work until all her symptoms are cleared, it has been at least 3 days since she has had fever, and at least 7 days since the start of her illness. Letter to this effect was sent. Her mother works at the same group home. She is asymptomatic at this time. I have instructed her to call her employee health and report that her daughter is ill and ask about their current recommendations for the mother's return to work given her potential exposure.     Patient Instructions   Albuterol 2 puffs every 4 hours as needed for wheezing.    You should go into the ER for evaluation if you are having increasing shortness of breath that isn't relieved by your albuterol.    Regardless of if you have been tested or not:  Patient who have symptoms (cough, fever, or shortness of breath), need to isolate for 7 days from when symptoms started AND 72 hours after fever resolves (without fever reducing medications) AND improvement of respiratory symptoms (whichever is longer).      Isolate yourself at home (in own room/own bathroom if possible)    Do Not allow any visitors    Do Not go to work or school    Do Not go to Taoist,  centers, shopping, or other public places.    Do Not shake hands.    Avoid close and intimate contact with others (hugging,  kissing).    Follow CDC recommendations for household cleaning of frequently touched services.     After the initial 7 days, continue to isolate yourself from household members as much as possible. To continue decrease the risk of community spread and exposure, you and any members of your household should limit activities in public for 14 days after starting home isolation.     You can reference the following CDC link for helpful home isolation/care tips:  https://www.cdc.gov/coronavirus/2019-ncov/downloads/10Things.pdf    Protect Others:    Cover Your Mouth and Nose with a mask, disposable tissue or wash cloth to avoid spreading germs to others.    Wash your hands and face frequently with soap and water    Call Back If: Breathing difficulty develops or you become worse.    For more information about COVID19 and options for caring for yourself at home, please visit the CDC website at https://www.cdc.gov/coronavirus/2019-ncov/about/steps-when-sick.html  For more options for care at [facility], please visit our website at [email or web address]    For more information, please use the [place] Department of Health COVID-19 Website: https://www.health.[facility].[place]/diseases/coronavirus/index.html  [place] Department of Health ([facility]) COVID-19 Hotlines (Interpreters available):      Health questions: Phone Number: [phone] or 1-630.699.2828 and Hours: 7 a.m. to 7 p.m.    Schools and  questions: Phone Number: [phone] or 1-302.227.5282 and Hours 7 a.m. to 7 p.m.         Phone call duration:  9 minutes    Johanne Salomon MD

## 2021-06-07 NOTE — PROGRESS NOTES
"Yolanda Vee is a 19 y.o. female who is being evaluated via a billable video visit.      The patient has been notified of following:     \"This video visit will be conducted via a call between you and your physician/provider. We have found that certain health care needs can be provided without the need for an in-person physical exam.  This service lets us provide the care you need with a video conversation.  If a prescription is necessary we can send it directly to your pharmacy.  If lab work is needed we can place an order for that and you can then stop by our lab to have the test done at a later time.    Video visits are billed at different rates depending on your insurance coverage. Please reach out to your insurance provider with any questions.    If during the course of the call the physician/provider feels a video visit is not appropriate, you will not be charged for this service.\"    Patient has given verbal consent to a Video visit? Yes    Patient would like to receive their AVS by AVS Preference: Mail a copy.    Patient would like the video invitation sent by: Text to cell phone: [phone]    Will anyone else be joining your video visit? No        Video Start Time: 9:05 AM     Additional provider notes:  Assessment and Plan:     1. Diarrhea, unspecified type       Suspect patient had viral gastroenteritis.  Vomiting has improved.  She continues to experience diarrhea.  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  I provided a note excusing patient from work today and tomorrow.  Discussed symptomatic treatment including the importance of good hydration.  She is to follow-up if symptoms persist or worsen.    Subjective:     Yolanda is a 19 y.o. female presenting for a virtual visit.  Patient was seen and initially on 4/30/2020 with concerns of vomiting and diarrhea.  Patient's vomiting improved after 24 hours.  She presents today complaining of abdominal cramping and ongoing diarrhea.  She is having 3-4 " bouts of softer stool daily.  She denies any blood or mucus in the stool.  No fever has been present.  She has developed rhinorrhea, but denies postnasal drainage, cough, headache, shortness of breath, chest tightness, wheezing.  She has been eating less, but drinking well.  She denies loss of sense of smell or taste.  She has not tried any over-the-counter products for her symptoms.  She does work in a group home.    Review of Systems: A complete 14 point review of systems was obtained and is negative or as stated in the history of present illness.    Social History     Socioeconomic History     Marital status: Single     Spouse name: Not on file     Number of children: Not on file     Years of education: Not on file     Highest education level: Not on file   Occupational History     Not on file   Social Needs     Financial resource strain: Not on file     Food insecurity     Worry: Not on file     Inability: Not on file     Transportation needs     Medical: Not on file     Non-medical: Not on file   Tobacco Use     Smoking status: Current Every Day Smoker     Smokeless tobacco: Never Used   Substance and Sexual Activity     Alcohol use: No     Drug use: No     Sexual activity: Not on file   Lifestyle     Physical activity     Days per week: Not on file     Minutes per session: Not on file     Stress: Not on file   Relationships     Social connections     Talks on phone: Not on file     Gets together: Not on file     Attends Worship service: Not on file     Active member of club or organization: Not on file     Attends meetings of clubs or organizations: Not on file     Relationship status: Not on file     Intimate partner violence     Fear of current or ex partner: Not on file     Emotionally abused: Not on file     Physically abused: Not on file     Forced sexual activity: Not on file   Other Topics Concern     Not on file   Social History Narrative     Not on file       Active Ambulatory Problems     Diagnosis  Date Noted     Vitamin D deficiency 02/24/2015     Mild single current episode of major depressive disorder (H) 03/27/2019     Chronic nasal congestion 05/16/2019     Tobacco use 05/16/2019     Chronic idiopathic constipation 05/16/2019     Sinus tachycardia 03/06/2020     Morbid obesity (H) 03/06/2020     Resolved Ambulatory Problems     Diagnosis Date Noted     Anorexia 02/24/2015     Leukocytosis 02/24/2015     Fainting 02/24/2015     Sacral pain 05/05/2016     No Additional Past Medical History       Family History   Problem Relation Age of Onset     Acute Myocardial Infarction Neg Hx        Objective:     There were no vitals taken for this visit.       GENERAL: healthy, alert and no distress  EYES: Eyes grossly normal to inspection, conjunctivae and sclerae normal  HENT: normal cephalic/atraumatic.  External ears, nose and mouth without ulcers or lesions.  NECK: no asymmetry, masses or scars  RESP: no audible wheeze, cough, or visible cyanosis.  No visible retractions or increased work of breathing.  Able to speak fully in complete sentences.  MS: no gross musculoskeletal defects noted.  Normal range of motion.  NEURO: Cranial nerves grossly intact, mentation intact and speech normal  PSYCH: mentation appears normal, affect normal/bright, judgement and insight intact, normal speech and appearance well-groomed      Video-Visit Details    Type of service:  Video Visit    Video End Time (time video stopped): 9:11 AM   Originating Location (pt. Location): Home    Distant Location (provider location):  [facility]/OB     Platform used for Video Visit: Ronak Isaacs CNP

## 2021-06-07 NOTE — PATIENT INSTRUCTIONS - HE
Albuterol 2 puffs every 4 hours as needed for wheezing.    You should go into the ER for evaluation if you are having increasing shortness of breath that isn't relieved by your albuterol.    Regardless of if you have been tested or not:  Patient who have symptoms (cough, fever, or shortness of breath), need to isolate for 7 days from when symptoms started AND 72 hours after fever resolves (without fever reducing medications) AND improvement of respiratory symptoms (whichever is longer).      Isolate yourself at home (in own room/own bathroom if possible)    Do Not allow any visitors    Do Not go to work or school    Do Not go to Mu-ism,  centers, shopping, or other public places.    Do Not shake hands.    Avoid close and intimate contact with others (hugging, kissing).    Follow CDC recommendations for household cleaning of frequently touched services.     After the initial 7 days, continue to isolate yourself from household members as much as possible. To continue decrease the risk of community spread and exposure, you and any members of your household should limit activities in public for 14 days after starting home isolation.     You can reference the following CDC link for helpful home isolation/care tips:  https://www.cdc.gov/coronavirus/2019-ncov/downloads/10Things.pdf    Protect Others:    Cover Your Mouth and Nose with a mask, disposable tissue or wash cloth to avoid spreading germs to others.    Wash your hands and face frequently with soap and water    Call Back If: Breathing difficulty develops or you become worse.    For more information about COVID19 and options for caring for yourself at home, please visit the CDC website at https://www.cdc.gov/coronavirus/2019-ncov/about/steps-when-sick.html  For more options for care at [facility], please visit our website at [email or web address]    For more information, please use the [facility]  COVID-19 Website: https://[email or web address]/coronavirus/index.html  [place] Department of Health ([facility]) COVID-19 Hotlines (Interpreters available):      Health questions: Phone Number: [phone] or [phone] and Hours: 7 a.m. to 7 p.m.    Schools and  questions: Phone Number: [phone] or [phone] and Hours 7 a.m. to 7 p.m.

## 2021-06-07 NOTE — PROGRESS NOTES
"Yolanda Vee is a 19 y.o. female who is being evaluated via a billable video visit.      The patient has been notified of following:     \"This video visit will be conducted via a call between you and your physician/provider. We have found that certain health care needs can be provided without the need for an in-person physical exam.  This service lets us provide the care you need with a video conversation.  If a prescription is necessary we can send it directly to your pharmacy.  If lab work is needed we can place an order for that and you can then stop by our lab to have the test done at a later time.    Video visits are billed at different rates depending on your insurance coverage. Please reach out to your insurance provider with any questions.    If during the course of the call the physician/provider feels a video visit is not appropriate, you will not be charged for this service.\"    Patient has given verbal consent to a Video visit? Yes    Patient would like to receive their AVS by AVS Preference: Mail a copy.    Patient would like the video invitation sent by: Text to cell phone: [phone]    Will anyone else be joining your video visit? No        Video Start Time: 10:59 AM     Additional provider notes:   Assessment and Plan:     1. Non-intractable vomiting with nausea, unspecified vomiting type     2. Diarrhea, unspecified type     3. Cough       Differentials include viral gastroenteritis, food poisoning, and COVID-19.  Discussed symptomatic treatment including importance of rehydration.  When she is ready to eat, I suggest she consume a bland diet and avoid dairy. Provided note excusing patient from work today and tomorrow.  She does not work on Sunday.  I will schedule a follow-up appointment on Monday to see if she continues to have symptoms.  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  We will decide when she is able to return to work then.  She is to present to the emergency room with any concerns regarding dehydration or abdominal pain.  She is content with the plan.    Subjective:     Yolanda is a 19 y.o. female presenting for a video visit.  Patient states last night, she woke up with lower abdominal cramping.  She then developed diarrhea and vomiting.  She believes she has had 6 bouts of diarrhea and vomiting throughout the night.  She states she last vomited an hour ago.  She has had chills, but has not taken her temperature.  Patient has been laying on her left side because this provides relief.  She denies any specific abdominal pain.  She did not eat any new foods or takeout last night.  No one else around her is ill.  Last week, she was seen virtually for a sore throat and cough.  The sore throat has improved, but the cough continues to be nonproductive.  She does smoke.  She works in a group home.  She has not tried taking any over-the-counter products for symptoms.  Patient has started drinking fluids.    Review of Systems: A complete 14 point review of systems was obtained and is negative or as stated in the history of present illness.    Social History     Socioeconomic History     Marital status: Single     Spouse name: Not on file     Number of children: Not on file     Years of education: Not on file     Highest education level: Not on file   Occupational History     Not on file   Social Needs     Financial resource strain: Not on file     Food insecurity     Worry: Not on file     Inability: Not on file     Transportation needs     Medical: Not on file     Non-medical: Not on file   Tobacco Use     Smoking status: Current Every Day Smoker     Smokeless tobacco: Never Used   Substance and Sexual Activity     Alcohol use: No     Drug use: No     Sexual activity: Not on file   Lifestyle     Physical activity     Days per week: Not on file     Minutes per session:  Not on file     Stress: Not on file   Relationships     Social connections     Talks on phone: Not on file     Gets together: Not on file     Attends Confucianism service: Not on file     Active member of club or organization: Not on file     Attends meetings of clubs or organizations: Not on file     Relationship status: Not on file     Intimate partner violence     Fear of current or ex partner: Not on file     Emotionally abused: Not on file     Physically abused: Not on file     Forced sexual activity: Not on file   Other Topics Concern     Not on file   Social History Narrative     Not on file       Active Ambulatory Problems     Diagnosis Date Noted     Vitamin D deficiency 02/24/2015     Mild single current episode of major depressive disorder (H) 03/27/2019     Chronic nasal congestion 05/16/2019     Tobacco use 05/16/2019     Chronic idiopathic constipation 05/16/2019     Sinus tachycardia 03/06/2020     Morbid obesity (H) 03/06/2020     Resolved Ambulatory Problems     Diagnosis Date Noted     Anorexia 02/24/2015     Leukocytosis 02/24/2015     Fainting 02/24/2015     Sacral pain 05/05/2016     No Additional Past Medical History       Family History   Problem Relation Age of Onset     Acute Myocardial Infarction Neg Hx        Objective:     Temp 98.1  F (36.7  C) (Oral)       GENERAL: healthy, alert and no distress  EYES: Eyes grossly normal to inspection, conjunctivae and sclerae normal  HENT: normal cephalic/atraumatic.  External ears, nose and mouth without ulcers or lesions.  NECK: no asymmetry, masses or scars  RESP: no audible wheeze, cough, or visible cyanosis.  No visible retractions or increased work of breathing.  Able to speak fully in complete sentences.  MS: no gross musculoskeletal defects noted.  Normal range of motion.  SKIN: no suspicious lesions or rashes  NEURO: mentation intact and speech normal  PSYCH: mentation appears normal, affect normal/bright, judgement and insight intact, normal  speech and appearance well-groomed  ABDOMEN:  She points to her bilateral lower abdomen as an area where she is experiencing some abdominal cramping.       Video-Visit Details    Type of service:  Video Visit    Video End Time (time video stopped): 11:06  AM   Originating Location (pt. Location): Home    Distant Location (provider location):  [facility]IC[facility]/OB     Platform used for Video Visit: Ronak Isaacs CNP

## 2021-06-10 DIAGNOSIS — K21.00 GASTROESOPHAGEAL REFLUX DISEASE WITH ESOPHAGITIS WITHOUT HEMORRHAGE: Primary | ICD-10-CM

## 2021-06-10 RX ORDER — PANTOPRAZOLE SODIUM 40 MG/1
40 TABLET, DELAYED RELEASE ORAL DAILY
Qty: 31 TABLET | Refills: 3 | Status: SHIPPED | OUTPATIENT
Start: 2021-06-10 | End: 2022-01-27

## 2021-06-10 NOTE — PROGRESS NOTES
Assessment:     Yolanda was seen today for depression and plantar warts.    Diagnoses and all orders for this visit:    Severe single current episode of major depressive disorder, without psychotic features  -     venlafaxine (EFFEXOR XR) 37.5 MG 24 hr capsule; Take 1 capsule (37.5 mg total) by mouth daily.    Plantar wart of left foot        Plan:     1. Severe single current episode of major depressive disorder, without psychotic features  After reviewing various antidepressive meds we decide to use venlafaxine as this is the medicine that patient's mother responded to the best.  Other option reviewed was fluoxetine.  Both have warnings in the pediatric population and this is reviewed with patient that her mother  She will follow-up in 1 month's time for recheck.  Risks and benefits and side effects reviewed.  - venlafaxine (EFFEXOR XR) 37.5 MG 24 hr capsule; Take 1 capsule (37.5 mg total) by mouth daily.  Dispense: 30 capsule; Refill: 2    2. Plantar wart of left foot  Cryotherapy with liquid nitrogen given      This is a 25 minute visit with greater than 50% of the time spent counseling regarding nature of antidepressant and our goals for treatment.  Pediatric risks as noted reviewed also.      Subjective:      Jose Martin is a 16 y.o. female presenting to my clinic for evaluation of depression.  Yolanda comes in with her mother who is been a patient of mine through bariatric surgery.  Her PHQ 9 shows today a score of 16/27.  She says she has had depression dating back to ninth grade.  She does recall anything particular that has led to the depression.  Her mother describes her as withdrawn, not with many friends, very isolated, sometimes fidgety.  Patient states that she does not feel interested in doing things and has difficulty in school.    She lives with her mother and her parents  when she was 4 years old.  She has 2 older full siblings.  She does not see her dad or her half siblings by  her dad's 2 marriages.  She is not doing well in school and is needing to sit take summer school.    Patient states she is willing to take medicines and be responsible for medicines.  She is just hoping to start to feel better.  I believe mother brought her in to see me at this point because mother has formed considerable trust in my practice and in medicine in general as she is done so well now having lost 150 pounds with gastric bypass.    Mother was treated for depression previously and started with Zoloft and cetirizine.  Both of which caused her to have side effect reactions.  We moved onto venlafaxine and patient was on it for about a year and a half.  She has been off the medicine since the gastric bypass is done well.  Because of the genetic factors affecting anti-depression effectiveness we give lots of credence to mother side effects and the fact that venlafaxine was tolerated well.      Current Outpatient Prescriptions on File Prior to Visit   Medication Sig Dispense Refill     loratadine (CLARITIN) 10 mg tablet Take 1 tablet (10 mg total) by mouth daily. 30 tablet 2     No current facility-administered medications on file prior to visit.      No Known Allergies  History reviewed. No pertinent past medical history.  History reviewed. No pertinent surgical history.  Social History     Social History     Marital status: Single     Spouse name: N/A     Number of children: N/A     Years of education: N/A     Occupational History     Not on file.     Social History Main Topics     Smoking status: Never Smoker     Smokeless tobacco: Never Used     Alcohol use Not on file     Drug use: Not on file     Sexual activity: Not on file     Other Topics Concern     Not on file     Social History Narrative     History reviewed. No pertinent family history.    ROS:  I have performed a 10 point ROS.  All pertinent positives and negatives are found in the HPI.  All others are negative.    No suicidal ideation/a BMI in  "obesity range    Objective:     Physical Exam:  BP 98/60  Pulse 70  Ht 5' 1\" (1.549 m)  Wt 156 lb (70.8 kg)  HC 18 cm (7.09\")  Breastfeeding? No  BMI 29.48 kg/m2  General Appearance: Alert, cooperative, no distress, appears stated age  Head: Normocephalic, without obvious abnormality, atraumatic  Eyes: PERRL, conjunctiva/corneas clear, EOM's intacte  Throat: Lips, mucosa, and tongue normal; teeth and gums normal  Neck: Supple, symmetrical, trachea midline, no adenopathy;  thyroid: not enlarged, symmetric, no tenderness/mass/nodules; no carotid bruit or JVD  Lungs: Clear to auscultation bilaterally, respirations unlabored  Heart: Regular rate and rhythm, S1 and S2 normal, no murmur, rub, or gallop,     Abdomen: Soft, non-tender, bowel sounds active all four quadrants,  no masses, no organomegaly  Extremity.  Left heel plantars wart treated with cryotherapy.  Patient tolerated well  Neurologic: Intact, no focal deficits   Mental status:  Withdrawn and quiet      Has been seeing a counselor at school over the last year without improvement       "

## 2021-06-11 NOTE — PATIENT INSTRUCTIONS - HE
Restart venlafaxine.  It did not seem to make a difference in your heart rate to stop it.  Schedule an appointment with a therapist.  Follow-up with me in 1 month after starting venlafaxine to see how it is working and to recheck your heart rate.    I had like you to schedule an appointment with [facility] ([phone]) or [facility] ([phone]).  I suspect you have an injury to your medial meniscus.    The cause of the numbness in your hands is not clear.  I recommend you see a chiropractor as neck tension can sometimes contribute.  We will make sure there are no chemical changes in your body that are triggering it by checking her thyroid, vitamin B12 level, blood counts, and blood sugar.  Consider using a wrist splint for carpal tunnel, wearing it at bedtime, to see if this makes a difference.
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
## 2021-06-11 NOTE — PROGRESS NOTES
Assessment/Plan:     1. Acute pain of left knee  I am most suspicious of medial meniscus tear based on history and physical examination.  She is already completing symptomatic cares without adequate improvement.  Advised continued over-the-counter pain relievers as needed, ice as needed, use of supportive brace, avoidance of aggravating activities, and schedule visit with orthopedics for further evaluation.  I will defer necessity of further imaging to them.  No indication for x-ray imaging today.  - Ambulatory referral to Orthopedic Surgery    2. Paresthesia of both hands  We discussed broad differential diagnosis.  Location in bilateral hands most suggestive of carpal tunnel syndrome, however complaints of concurrent symptoms in both shoulders suggested that there could be a cervical spine component as well.  She already has an appointment to see her chiropractor, she will keep that appointment.  Will check thyroid cascade to assess for thyroid imbalance contributing, vitamin B12 level to assess for deficiency that may be contributing.  Will screen for diabetes with a fasting glucose, and will screen for anemia that could be contributing.  Advised use to [place] splints.  If onset of new symptoms, changing symptoms, or lack of resolution, she will let me know and we will plan further evaluation.  - HM2(CBC w/o Differential)  - Glucose  - Thyroid Cascade  - Vitamin B12    3. Vitamin D deficiency  Encouraged daily supplement, will check vitamin D level today.  - Vitamin D, Total (25-Hydroxy)    4. Mild single current episode of major depressive disorder (H)  Inadequate control, partially due to noncompliance with venlafaxine.  Advised her to resume venlafaxine as she has done.  I like her to follow-up and to reassess her heart rate to ensure venlafaxine is not leading to tachycardia, which was previously concern.  Advised her to schedule appointment for cognitive behavioral therapy.  Follow-up with me in clinic in  1 month for reevaluation, notify me sooner with further difficulties or worsening.      Patient Instructions   Restart venlafaxine.  It did not seem to make a difference in your heart rate to stop it.  Schedule an appointment with a therapist.  Follow-up with me in 1 month after starting venlafaxine to see how it is working and to recheck your heart rate.    I had like you to schedule an appointment with [facility] ([phone]) or [facility] ([phone]).  I suspect you have an injury to your medial meniscus.    The cause of the numbness in your hands is not clear.  I recommend you see a chiropractor as neck tension can sometimes contribute.  We will make sure there are no chemical changes in your body that are triggering it by checking her thyroid, vitamin B12 level, blood counts, and blood sugar.  Consider using a wrist splint for carpal tunnel, wearing it at bedtime, to see if this makes a difference.       Return in about 4 weeks (around 10/21/2020) for Depression.      Subjective:      Yolanda Vee is a 20 y.o. female presented to clinic today for evaluation of left knee symptoms and bilateral hand and arm symptoms.  In regards to her left knee has noted pain anteriorly and medially for the past 4 weeks, worse if she sits too long or is doing a lot of standing, feeling the need to stretch.  Noted some intermittent bruised appearance around her knee and just below it but nothing recent, no swelling.  Notes that popping intermittently and quite painful.  Sometimes feeling unstable or unsteady but has not buckled or given out.  No specific injury.  Trying an over-the-counter knee brace that has some metal on each side she states, does not have it with her, feels it has not been helpful thus far.  She been taking Tylenol which is somewhat helpful.  No prior history of knee difficulties.    In the past 2 weeks she is noting tingling in her hands bilaterally associated with a heavy  feeling in her arms.  The tingling tends to occur from the 2nd-4th digits, sometimes will go up towards her shoulders.  When it occurs it is always bilateral.  Comes and goes.  No triggers.  It does not awaken her from sleep.  When it occurs it usually lasts seconds, less than a minute.  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  Still able to do so but more difficult.  Denies associated neck injury.  Does have intermittent tension in her neck, has appointment scheduled with chiropractor, but has not yet been seen for these concerns.    History of depression, was taking venlafaxine XR however there were concerns about elevated heart rate and elevated blood pressure when she was seen at [facility] a few months ago.  She elected to stop the medication.  Notes that her depression has worsened a bit.  She has just refilled the venlafaxine and is restarting it on her own.    History of vitamin D deficiency, interested in having this evaluated.    Tobacco use, has cut down on her use of tobacco, not interested in quitting or assistance in quitting.    Current Outpatient Medications   Medication Sig Dispense Refill     albuterol sulfate 90 mcg/actuation AePB Inhale 180 mcg every 4 (four) hours. 1 each 3     fluticasone propionate (FLONASE) 50 mcg/actuation nasal spray SHAKE LIQUID AND USE 2 SPRAYS IN EACH NOSTRIL DAILY 16 g 12     omeprazole (PRILOSEC) 20 MG capsule Take 1 capsule (20 mg total) by mouth daily before breakfast. 14 capsule 0     venlafaxine (EFFEXOR-XR) 150 MG 24 hr capsule Take 1 capsule (150 mg total) by mouth daily. 90 capsule 3     No current facility-administered medications for this visit.        Past Medical History, Family History, and Social History reviewed.  No past medical history on file.  No past surgical history on file.  Vicodin [hydrocodone-acetaminophen]  Family History   Problem Relation Age  "of Onset     Acute Myocardial Infarction Neg Hx      Social History     Socioeconomic History     Marital status: Single     Spouse name: Not on file     Number of children: Not on file     Years of education: Not on file     Highest education level: Not on file   Occupational History     Not on file   Social Needs     Financial resource strain: Not on file     Food insecurity     Worry: Not on file     Inability: Not on file     Transportation needs     Medical: Not on file     Non-medical: Not on file   Tobacco Use     Smoking status: Current Every Day Smoker     Smokeless tobacco: Never Used   Substance and Sexual Activity     Alcohol use: No     Drug use: No     Sexual activity: Not on file   Lifestyle     Physical activity     Days per week: Not on file     Minutes per session: Not on file     Stress: Not on file   Relationships     Social connections     Talks on phone: Not on file     Gets together: Not on file     Attends Bahai service: Not on file     Active member of club or organization: Not on file     Attends meetings of clubs or organizations: Not on file     Relationship status: Not on file     Intimate partner violence     Fear of current or ex partner: Not on file     Emotionally abused: Not on file     Physically abused: Not on file     Forced sexual activity: Not on file   Other Topics Concern     Not on file   Social History Narrative     Not on file         Review of systems is as stated in HPI, and the remainder of the 10 system review is otherwise negative.    Objective:     Vitals:    09/23/20 0716   BP: 124/80   Pulse: 88   Resp: 20   Temp: 98  F (36.7  C)   TempSrc: Tympanic   Weight: 216 lb (98 kg)   Height: 4' 11\" (1.499 m)    Body mass index is 43.63 kg/m .    Alert female appearing stated age.  Affect within normal limits.  Heart with regular rate and rhythm, no tachycardia.  Lungs clear.  Extremities without edema.  She has bilateral strength throughout her upper extremities.  Good " index to thumb opposition strength.  Negative Tinel's, negative Phalen's.  Mild tension of the paraspinous muscles of the cervical region, mild spasm of the trapezius bilaterally.  Good range of motion of neck.  Symmetric deep tendon reflexes and sensation.  Left knee is with good range of motion.  Mild tenderness palpation medial joint line.  No effusion or bruising.  No crepitus with pressure on the patella with flexion and extension.  No abnormalities with stresses of the MCL, LCL, or ACL.  Kyle's is positive when medial meniscus is stressed, identifying a painful pop with extension.      This note has been dictated using voice recognition software. Any grammatical or context distortions are unintentional and inherent to the the software.

## 2021-06-11 NOTE — PROGRESS NOTES
Assessment:     Yolanda was seen today for depression and planters wart.    Diagnoses and all orders for this visit:    Severe single current episode of major depressive disorder, without psychotic features  -     venlafaxine 75 mg TR24; Take 75 mg by mouth daily.    Other orders  -     Cancel: venlafaxine (EFFEXOR XR) 37.5 MG 24 hr capsule; Take 1 capsule (37.5 mg total) by mouth daily.        Plan:     1. Severe single current episode of major depressive disorder, without psychotic features  Patient has not made much of a response yet to the venlafaxine 37.5 Exar dose however she has not had any bad side effects either.  For this reason we will continue with this medicine to 75 mg extended release.  She will return in 1 month's time.  - venlafaxine 75 mg TR24; Take 75 mg by mouth daily.  Dispense: 30 each; Refill: 2      This is a 25 minute visit with greater than 50% of the time spent counseling regarding discussion regarding ongoing use of medicine and how important it is that there is no side effects.  We also uses medicine as it has worked for her mother in the past.      Subjective:      Jose Martin is a 16 y.o. female presenting to my clinic for follow-up of severe depression.  This is a young teenager has had years of major depression symptoms and difficulties in her school.  This is the first time she is come to ask for help.  Currently we have chosen venlafaxine for treatment of anxiety and depression because it worked for her mother in the past.  Her mother is here with her today but is also been seen on urgent basis due to hypotension and presyncope.    Yolanda's said that she has not seen more improved response.  However we underlined that it is beneficial that she has not had any adverse effects.. Discussion regarding counseling again      Current Outpatient Prescriptions on File Prior to Visit   Medication Sig Dispense Refill     [DISCONTINUED] venlafaxine (EFFEXOR XR) 37.5 MG 24 hr capsule  Take 1 capsule (37.5 mg total) by mouth daily. 30 capsule 2     nicotine (NICODERM CQ) 7 mg/24 hr ANDREA 1 PA EXT TO THE SKIN QD  0     No current facility-administered medications on file prior to visit.      No Known Allergies  History reviewed. No pertinent past medical history.  History reviewed. No pertinent surgical history.  Social History     Social History     Marital status: Single     Spouse name: N/A     Number of children: N/A     Years of education: N/A     Occupational History     Not on file.     Social History Main Topics     Smoking status: Current Every Day Smoker     Smokeless tobacco: Never Used     Alcohol use No     Drug use: No     Sexual activity: Not on file     Other Topics Concern     Not on file     Social History Narrative     History reviewed. No pertinent family history.    ROS:  I have performed a 10 point ROS.  All pertinent positives and negatives are found in the HPI.  All others are negative.    No agitation no diarrhea no abdominal pain/does have plantars wart that we do not treat today as we end up focusing on squeezing her mother in for a visit.    Objective:     Physical Exam:  /60 (Patient Site: Right Arm, Patient Position: Sitting, Cuff Size: Adult Regular)  Pulse 84  Ht 5' (1.524 m)  Wt 155 lb 3.2 oz (70.4 kg)  LMP  (LMP Unknown)  BMI 30.31 kg/m2  General Appearance: Alert, cooperative, no distress, appears stated age  Head: Normocephalic, without obvious abnormality, atraumatic  Neck: Supple, symmetrical, trachea midline, no adenopathy;  thyroid: not enlarged, symmetric, no tenderness/mass/nodules; no carotid bruit or JVD  Lungs: Clear to auscultation bilaterally, respirations unlabored  Heart: Regular rate and rhythm, S1 and S2 normal, no murmur, rub, or gallop,     Neurologic: Intact, no focal deficits   Mental status:  Appropriate, Affect normal/withdrawn  PHQ 9 equals 15/27  DEYVI 7 equals 11/27

## 2021-06-12 NOTE — TELEPHONE ENCOUNTER
Question following Office Visit  When did you see your provider: Today  What is your question: Patient needs nebulizer machine order sent to her pharmacy.  Please call patient when order is sent.  Okay to leave a detailed message: Yes

## 2021-06-12 NOTE — PROGRESS NOTES
"Yolanda Vee is a 20 y.o. female who is being evaluated via a billable telephone visit.      The patient has been notified of following:     \"This telephone visit will be conducted via a call between you and your physician/provider. We have found that certain health care needs can be provided without the need for a physical exam.  This service lets us provide the care you need with a short phone conversation.  If a prescription is necessary we can send it directly to your pharmacy.  If lab work is needed we can place an order for that and you can then stop by our lab to have the test done at a later time.    Telephone visits are billed at different rates depending on your insurance coverage. During this emergency period, for some insurers they may be billed the same as an in-person visit.  Please reach out to your insurance provider with any questions.    If during the course of the call the physician/provider feels a telephone visit is not appropriate, you will not be charged for this service.\"    Patient has given verbal consent to a Telephone visit? Yes    What phone number would you like to be contacted at? [phone]    Patient would like to receive their AVS by AVS Preference: Mail a copy.  Assessment/Plan:    1. Left foot pain  Reviewed x-ray results from visit on 10/23/2020.  This was normal.  Patient symptoms are not consistent with an infection.  I recommend wearing boot for stabilization for another 2 weeks and continuing ice, elevation, Tylenol for pain management.  If pain persists or worsens in any way, will consider podiatry referral.       Subjective:    Yolanda Vee is a 20 year old female here today for follow-up on left foot pain.  Patient was seen in clinic last week for initial evaluation.  Has been having discomfort in her left foot for the last 2 weeks or so.  No known injury or previous injuries/surgeries.  Pain is located on the dorsal, outer aspect of her foot.  It is " triggered by weightbearing and walking.  X-ray was obtained at her visit last week and was normal without evidence of fracture or other abnormality to explain his symptoms.  She was recommended supportive measures.  Today, she reports that she continues to have pain, it is unchanging, does not seem to be worse but is also not improving.  She was able to receive a walking boot at her work and she has been trying to use this the days that she is working.  She feels that the stabilization helps somewhat.  She denies any change in sensation in her left foot.  No redness or swelling.  No pain in other extremities or joints.    Pressure Review of systems is as stated in HPI, and the remainder of the 10 system review is otherwise unremarkable.    Past Medical History, Family History, and Social History reviewed.    No past surgical history on file.     Family History   Problem Relation Age of Onset     Acute Myocardial Infarction Neg Hx         No past medical history on file.     Social History     Tobacco Use     Smoking status: Current Every Day Smoker     Smokeless tobacco: Never Used   Substance Use Topics     Alcohol use: No     Drug use: No        Current Outpatient Medications   Medication Sig Dispense Refill     albuterol sulfate 90 mcg/actuation AePB Inhale 180 mcg every 4 (four) hours. 1 each 3     cholecalciferol, vitamin D3, 125 mcg (5,000 unit) capsule Take 2 capsules (10,000 Units total) by mouth daily. 180 capsule 1     ipratropium-albuteroL (DUO-NEB) 0.5-2.5 mg/3 mL nebulizer Take 3 mL by nebulization every 6 (six) hours as needed. 25 vial 2     omeprazole (PRILOSEC) 20 MG capsule Take 1 capsule (20 mg total) by mouth daily before breakfast. 14 capsule 0     venlafaxine (EFFEXOR-XR) 150 MG 24 hr capsule Take 1 capsule (150 mg total) by mouth daily. 90 capsule 3     No current facility-administered medications for this visit.           Objective:    There were no vitals filed for this visit.   There is no  height or weight on file to calculate BMI.      General:  Patient is pleasant and cooperative over the phone. No signs of respiratory distress.           This note has been dictated using voice recognition software. Any grammatical or context distortions are unintentional and inherent to the use of this software.     Phone call duration:  11 minutes    Maris Bhandari, CNP
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
## 2021-06-12 NOTE — PROGRESS NOTES
Assessment/Plan:     1. Plantar wart, right foot  Treated with cryotherapy as noted.  Advised initiating at home treatments including salicylic acid and duct tape, changing every 3 days, and follow-up with me every 4 weeks for repeat cryotherapy.    2. Epigastric abdominal pain  The patient likely still contributing.  Discussed appropriate use of MiraLAX but taking a full capful mixed in fluid all at once as opposed to spreading throughout the day.  Continue omeprazole.  Avoid intake of orange juice which may be contributing.  Add Maalox as well.  If inadequate improvement or if worsening, will plan to obtain labs to assess further.  Patient and her mother are in agreement.    3. Constipation  I suspect aspirating the MiraLAX dose out throughout the day is contributing to lack of increase in frequency of bowel movements.  Advised that she take it all at once.  Notify me with inadequate effect.    I encouraged her to begin finding activities that she can take enjoyment in.  States she likes basketball, encouraged her to explore options within her community to begin playing basketball either with a friend or with leaning.        Subjective:      Yolanda Vee is a 17 y.o. female presenting to clinic today along with her mother for follow-up of epigastric pain and to have treatment of her right foot plantar wart.  She has been receiving cryotherapy intermittently from Dr. Duran for treatment of her wart.  Feels that is responding some but still is a bit uncomfortable.  No at home treatments.    Taking Prilosec once daily.  Since last visit has also added MiraLAX but oftentimes adding a capful to 30 ounces or more of fluid and sipping it throughout the day as opposed to taking all at once.  Is having no more than 1 bowel movement daily, it is not soft or loose but his usual firmness.  No change in epigastric pain since then.  Has not tried anything intermittently.    See me shares with me that she does not  like school, describes it as a prison.  She is not in any extracurricular activities.  She is uncertain what she wants to do in life.  She enjoys basketball but not currently participating in basketball except on occasion will play in her yard.    Current Outpatient Prescriptions   Medication Sig Dispense Refill     nicotine (NICODERM CQ) 7 mg/24 hr ANDREA 1 PA EXT TO THE SKIN QD  0     omeprazole (PRILOSEC) 20 MG capsule Take 1 capsule (20 mg total) by mouth daily before breakfast. 14 capsule 0     polyethylene glycol (MIRALAX) 17 gram/dose powder One capful mixed in 8 ounces of fluid once daily until soft stools, then reduce dose to achieve daily soft bowel movement 238 g 1     venlafaxine (EFFEXOR-XR) 150 MG 24 hr capsule Take 1 capsule (150 mg total) by mouth daily. 90 capsule 3     aluminum-magnesium hydroxide 200-200 mg/5 mL suspension Take 5 mL by mouth every 6 (six) hours as needed for indigestion. 100 mL 3     salicylic acid-lactic acid (COMPOUND W) 17 % external solution Apply to wart every 3 days until resolved. 14 mL 3     No current facility-administered medications for this visit.        Past Medical History, Family History, and Social History reviewed.  No past medical history on file.  No past surgical history on file.  Review of patient's allergies indicates no known allergies.  No family history on file.  Social History     Social History     Marital status: Single     Spouse name: N/A     Number of children: N/A     Years of education: N/A     Occupational History     Not on file.     Social History Main Topics     Smoking status: Current Every Day Smoker     Smokeless tobacco: Never Used     Alcohol use No     Drug use: No     Sexual activity: Not on file     Other Topics Concern     Not on file     Social History Narrative         Review of systems is as stated in HPI, and the remainder of the 10 system review is otherwise negative.    Objective:     Vitals:    09/11/17 1412   BP: 110/54   Patient  Site: Right Arm   Patient Position: Sitting   Cuff Size: Adult Regular   Pulse: 68   Resp: 20   Weight: 163 lb 8 oz (74.2 kg)   Height: 5' (1.524 m)    Body mass index is 31.93 kg/(m^2).    Alert female.  Mild tenderness to moderate palpation in the epigastric and left upper quadrant regions, more mild throughout the abdomen.    Plantar wart right heel.  I trimmed this with a 15 blade and then apply cryotherapy ×3 in a freeze thaw freeze manner which she tolerates well.      This note has been dictated using voice recognition software. Any grammatical or context distortions are unintentional and inherent to the the software.

## 2021-06-12 NOTE — TELEPHONE ENCOUNTER
Patient Returning Call  Reason for call:  Patient mother is returning call   Information relayed to patient:    Lizette Hallman, CMACertified Medical AssistantAddendum  1:21 PM                Left message to call back for: nebulizer  Information to relay to patient:  Is the patient able to come to the clinic and  a nebulizer machine? Otherwise, has she confirmed with the pharmacy they are able to dispense a nebulizer machine?                   Patient has additional questions:  No  If YES, what are your questions/concerns:  Caller patient mother stated she will  nebulizer machine form clinic today at 3 pm .   Okay to leave a detailed message?: No

## 2021-06-12 NOTE — TELEPHONE ENCOUNTER
Prior Authorization Request  Who s requesting:  Pharmacy  [facility] Location[id number]  [address] [place] [address]  Phone: [phone]  Fax: [phone]  Medication Name:   cholecalciferol, vitamin D3, 1,250 mcg (50,000 unit) capsule  Insurance Plan:   [employer]  Insurance Member ID Number:    ID# [id number]  CoverMyMeds Key: N/A  Informed patient that prior authorizations can take up to 10 business days for response:   NA  Okay to leave a detailed message: Yes

## 2021-06-12 NOTE — TELEPHONE ENCOUNTER
Left message to call back for: nebulizer  Information to relay to patient:  Is the patient able to come to the clinic and  a nebulizer machine? Otherwise, has she confirmed with the pharmacy they are able to dispense a nebulizer machine?
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
## 2021-06-12 NOTE — TELEPHONE ENCOUNTER
I am unable to find a nebulizer order that would go to her pharmacy  Could she come to the clinic and  a nebulizer machine?

## 2021-06-12 NOTE — TELEPHONE ENCOUNTER
----- Message from Lizette Hallman CMA sent at 10/26/2020  8:14 AM CDT -----    ----- Message -----  From: Maris Bhandari CNP  Sent: 10/25/2020   3:13 PM CDT  To: Maris Bhandari Care Team Pool    Please notify patient that radiology reviewed her foot x-ray and agrees that x-ray looks normal.  No fractures or other abnormality to be concerned about at this time.  Recommend supportive shoes, ice, Tylenol as needed for discomfort.  She should follow-up if pain persists after a few weeks or worsens.

## 2021-06-12 NOTE — TELEPHONE ENCOUNTER
Central PA team  [phone]  Pool: [facility] PA MED ([id number])          PA has been initiated.       PA form completed and faxed insurance via Cover My Meds     Key:  [id number]     Medication:  VENLAFAXINE 150MG XR    Insurance:  [employer]        Response will be received via fax and may take up to 5-10 business days depending on plan

## 2021-06-12 NOTE — PROGRESS NOTES
Assessment/Plan:    1. Moderate episode of recurrent major depressive disorder (H)  PHQ 9 score of 10 today.  She has had some worsening of anxiety recently.  She started seeing a therapist and had her first visit yesterday.  We discussed the option of increasing her medication however, due to tachycardia of unknown etiology and the potential of venlafaxine contributing to this, will hold off increasing dose at this time until she has sleep study.  Patient agrees to continuing with therapy for the next couple of weeks and following up with me virtually on November 17.  If anxiety is still high at that time, will consider alternate therapy.  She will notify us of worsening symptoms in the meantime.     2.  Tachycardia  Reviewed cardiology notes from March 2020.  Tachycardia thought to be in part due to mild dehydration, deconditioning and possibly sleep apnea.  Sleep study was recommended at this time.  Will place referral today.  We also discussed that smoking may be contributing as well and I recommend cessation.  -Ambulatory referral to sleep medicine    3. Moderate persistent asthma, unspecified whether complicated  Recent flareup of asthma symptoms.  She previously had benefit with DuoNeb and is requesting refill of it today.  She will try this and if symptoms remain uncontrolled, will consider starting daily maintenance therapy.  Encouraged smoking cessation.  - ipratropium-albuteroL (DUO-NEB) 0.5-2.5 mg/3 mL nebulizer; Take 3 mL by nebulization every 6 (six) hours as needed.  Dispense: 25 vial; Refill: 2    4. Tobacco use  Encourage smoking cessation and offered assistance with this today.  Patient is not interested at this time.     5. Left foot pain  Left foot pain worsening over the last couple of weeks.  X-ray obtained today,  - XR Foot Left 3 or More VWS; Future      The following are part of a depression follow up plan for the patient:  mental health screening assessment, mental anne marie screening  "education, mental health treatment assessment and mental health treatment education  I have counseled the patient for tobacco cessation and the follow up will occur  at the next visit.    Subjective:    Yolanda Vee is a 20-year-old female seen today for follow-up on depression and asthma as well as new concerns of foot pain.  Patient has history of depression, currently taking venlafaxine 150 mg daily.  Tolerating well however she feels that this could be increased.  She has had some worsening anxiety, particularly related to her job.  She had her first visit with the therapist yesterday.  PHQ-9 score is 10 today.  She denies any suicidal or homicidal ideation.      Her asthma is also flaring.  Previously just using albuterol on an as-needed basis for symptoms.  She is not needing to use this daily.  She recalls having a nebulizer at one point and is requesting a refill of this stating that it helped her a lot in the past with these symptoms.  She is a current, every day smoker.  She smokes roughly 1 pack/day.  She has tried using nicotine patches in the past but had a reaction on her skin.  She is not interested in further assistance at this time.      She has been experiencing left foot pain for the last several weeks.  No injury that she can recall.  No previous injuries or surgeries to the foot.  It is painful, primarily when walking and standing.  The pain is noted on the top outer part of her foot, occasionally radiating to her left ankle.  She feels that it constantly needs to be \"cracked\".  She has not noticed any swelling however her mom felt that it may be more swollen than usual the other day.  She is not taking any pain medication.  Ice has not helped with the discomfort.     Patient has history of tachycardia of unknown etiology.  She was seen by cardiology in March 2020.  Per cardiology note, this is thought to be possible combination of physical deconditioning, mild dehydration and untreated " sleep apnea.  She was referred to have a split-night sleep study and consultation.  Patient never had this done due to COVID-19 pandemic.  She is open to having this done now.  She denies having any new symptoms such as heart palpitations, shortness of breath, chest pain etc.  Review of systems is as stated in HPI, and the remainder of the 10 system review is otherwise unremarkable.    Past Medical History, Family History, and Social History reviewed.    History reviewed. No pertinent surgical history.     Family History   Problem Relation Age of Onset     Acute Myocardial Infarction Neg Hx         History reviewed. No pertinent past medical history.     Social History     Tobacco Use     Smoking status: Current Every Day Smoker     Smokeless tobacco: Never Used   Substance Use Topics     Alcohol use: No     Drug use: No        Current Outpatient Medications   Medication Sig Dispense Refill     albuterol sulfate 90 mcg/actuation AePB Inhale 180 mcg every 4 (four) hours. 1 each 3     cholecalciferol, vitamin D3, 125 mcg (5,000 unit) capsule Take 2 capsules (10,000 Units total) by mouth daily. 180 capsule 1     omeprazole (PRILOSEC) 20 MG capsule Take 1 capsule (20 mg total) by mouth daily before breakfast. 14 capsule 0     ipratropium-albuteroL (DUO-NEB) 0.5-2.5 mg/3 mL nebulizer Take 3 mL by nebulization every 6 (six) hours as needed. 25 vial 2     venlafaxine 225 mg TR24 Take 225 mg by mouth daily. 30 each 3     No current facility-administered medications for this visit.           Objective:    Vitals:    10/23/20 0832   BP: 122/88   Pulse: (!) 130   SpO2: 98%   Weight: 220 lb (99.8 kg)      Body mass index is 44.43 kg/m .      General Appearance:  Alert, cooperative, no distress, appears stated age   Lungs:   Clear to auscultation bilaterally, respirations unlabored.  No expiratory wheeze or inspiratory crackles noted.   Heart:  Regular rate and rhythm, S1, S2 normal, no murmur, rub or gallop   Extremities:   Patient's left foot is without any obvious deformity or swelling.  No bruising.  She elicits some mild tenderness with palpation of dorsal lateral aspect of foot.  Range of motion is intact.  All other extremities normal.  No cyanosis or edema   Skin: Warm, dry.  Skin color, texture, turgor normal, no rashes or lesions   Neurologic:  Grossly intact.           This note has been dictated using voice recognition software. Any grammatical or context distortions are unintentional and inherent to the use of this software.

## 2021-06-12 NOTE — TELEPHONE ENCOUNTER
Spoke with patients mother. The patient will need to  the nebulizer machine as she will need to sign the paperwork etc.  patient will pick this up on Monday.

## 2021-06-12 NOTE — TELEPHONE ENCOUNTER
Reason contacted:  Medication problem  Information relayed:  Below message relayed to patient. She is also in need of a refill on her venlafaxine. Please send to the pharmacy on file.   Additional questions:  No  Further follow-up needed:  No  Okay to leave a detailed message:  No

## 2021-06-12 NOTE — TELEPHONE ENCOUNTER
Prior Authorization Request  Who s requesting:  Pharmacy  Pharmacy Name and Location: [facility] #[id number]  Medication Name: venlafaxine (EFFEXOR-XR) 150 MG 24 hr capsule  Insurance Plan: [id number]   Insurance Member ID Number:  [id number]  CoverMyMeds Key: N/A  Informed patient that prior authorizations can take up to 10 business days for response:   NA  Okay to leave a detailed message: No

## 2021-06-12 NOTE — TELEPHONE ENCOUNTER
Central PA team  [phone]  Pool: HE PA MED ([id number])          PA has been initiated.       PA form completed and faxed insurance via Cover My Meds     Key:  [id number]     Medication:  Vitamin D3 1.25 MG(67382 UT) capsules    Insurance:  [employer]        Response will be received via fax and may take up to 5-10 business days depending on plan       [place] Border Text: The closure involved the vermilion border.

## 2021-06-13 NOTE — TELEPHONE ENCOUNTER
Clinic Action Needed: yes, call back requested   [facility] Triage Call  Presenting Problem:    Caller: Neville  Phone # [phone]  Department of Psychiatry - First Episode Psychosis ([place])    Would like to touch base with PCP about reason for referral. Okay to leave a detailed voicemail.      Routed to: PCP    Mali Peacock RN/[place] Nurse Advisor      Reason for Disposition    Physician call to PCP    Protocols used: PCP CALL - NO TRIAGE-A-

## 2021-06-13 NOTE — PROGRESS NOTES
"Yolanda Vee is a 20 y.o. female who is being evaluated via a billable video visit.      The patient has been notified of following:     \"This video visit will be conducted via a call between you and your physician/provider. We have found that certain health care needs can be provided without the need for an in-person physical exam.  This service lets us provide the care you need with a video conversation.  If a prescription is necessary we can send it directly to your pharmacy.  If lab work is needed we can place an order for that and you can then stop by our lab to have the test done at a later time.    Video visits are billed at different rates depending on your insurance coverage. Please reach out to your insurance provider with any questions.    If during the course of the call the physician/provider feels a video visit is not appropriate, you will not be charged for this service.\"    Patient has given verbal consent to a Video visit? Yes  How would you like to obtain your AVS? AVS Preference: Mail a copy.  If dropped by the video visit, the video invitation should be sent to: Text to cell phone: [phone]  Will anyone else be joining your video visit? No        Video Start Time: 10:32 am    Assessment/Plan:    1. Moderate episode of recurrent major depressive disorder (H)  Patient is awaiting more thorough psychiatric evaluation through [facility]. During initial assessment it was recommended that she have psychiatric consultation in the meantime. Will place referral today. Will provide refill for venlefaxine 150 mg daily.  - Ambulatory referral to Psychiatry  - venlafaxine (EFFEXOR-XR) 150 MG 24 hr capsule; Take 1 capsule (150 mg total) by mouth daily.  Dispense: 90 capsule; Refill: 3    2. Nightmares  3. PTSD (post-traumatic stress disorder)  Nightmares continuing over past few months. She has suspected PTSD. Will initiate prasosin today. Will start at 1 mg and increase to 2 mg after " 2-3 days. She should follow up in 3-4 weeks or sooner with any new concerns.   - Ambulatory referral to Psychiatry  - prazosin (MINIPRESS) 1 MG capsule; Take 1 mg at bedtime; after 2 to 3 days increase dose to 2 mg at bedtime.  Dispense: 60 capsule; Refill: 1    4. Fever, unspecified fever cause  5. Cough  She has had fever and cough for the past week. Will order COVID 19 test to be done.  I advised quarantining until we have results back.  I ecommend symptomatic cares including rest, hydration and over-the-counter medications as needed.  - Symptomatic COVID-19 Virus (CORONAVIRUS) PCR; Future    6. Gastroesophageal reflux disease without esophagitis  Refill provided for omeprazole.  - omeprazole (PRILOSEC) 20 MG capsule; Take 1 capsule (20 mg total) by mouth daily before breakfast.  Dispense: 14 capsule; Refill: 0    7. Leukocytosis, unspecified type  She has had ongoing leukocytosis with most recent CBC indicating white blood cell count of 14.  Will obtain peripheral smear for safe measures.  Follow-up with patient when results available to determine if further evaluation is indicated.  - Morphology,Smear Review (MORP); Future  - HM1(CBC and Differential); Future      Subjective:    Yolanda Vee is a 20-year-old female here today for follow-up on depression and nightmares.  Patient has been having nightmares for the last several weeks.  Initially thought it may be due to abruptly stopping her venlafaxine.  She has since restarted and takes this on a regular basis.  She feels that it does help with her depression somewhat.  She is in the process of establishing with a psychiatrist as well.  Underwent evaluation over the phone to determine best type of psychiatric referral.  There is question whether she has true hallucinations versus PTSD and trauma and bipolar disorder.  She is awaiting a referral for psychiatric evaluation through the [facility] but was told that this could take months.  She  was recommended to have a psychiatric consult in the meantime.  She continues to have nightmares most nights.  Oftentimes wakes up around 3 AM and has a difficult time falling back to sleep because of the nightmares.  She does feel anxious and has had anxiety for many years.  Her initial psychiatric assessment suspected likely PTSD and trauma.  She has had concern for possible psychosis episodes as well in the past which prompted psychiatric referral.  She reports feeling safe at this time.  Denies any thoughts of self-harm, suicide or homicide.    She reports having symptoms of fever of around 101 for the past week or so.  She has had some nasal congestion, abdominal discomfort, coughing and fatigue as well.  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    She is also here to follow-up on elevated white blood cell count.  Over the last year it appears that her white blood cells have been between 14 and 15.  This has not been worked up previously and we discussed looking into this further for safe measures.  She agrees to lab work.    She is requesting a refill on her omeprazole today.  This has been working well for reflux symptoms.  She otherwise does not have any additional concerns today. Review of systems is as stated in HPI, and the remainder of the 10 system review is otherwise unremarkable.    Past Medical History, Family History, and Social History reviewed.    History reviewed. No pertinent surgical history.     Family History   Problem Relation Age of Onset     Acute Myocardial Infarction Neg Hx         History reviewed. No pertinent past medical history.     Social History     Tobacco Use     Smoking status: Current Every Day Smoker     Smokeless tobacco: Never Used   Substance Use Topics     Alcohol use: No     Drug use: No        Current Outpatient Medications   Medication Sig Dispense Refill     albuterol sulfate 90 mcg/actuation  AePB Inhale 180 mcg every 4 (four) hours. 1 each 3     cholecalciferol, vitamin D3, 125 mcg (5,000 unit) capsule Take 2 capsules (10,000 Units total) by mouth daily. 180 capsule 1     ipratropium-albuteroL (DUO-NEB) 0.5-2.5 mg/3 mL nebulizer Take 3 mL by nebulization every 6 (six) hours as needed. 25 vial 2     omeprazole (PRILOSEC) 20 MG capsule Take 1 capsule (20 mg total) by mouth daily before breakfast. 14 capsule 0     venlafaxine (EFFEXOR-XR) 150 MG 24 hr capsule Take 1 capsule (150 mg total) by mouth daily. 90 capsule 3     prazosin (MINIPRESS) 1 MG capsule Take 1 mg at bedtime; after 2 to 3 days increase dose to 2 mg at bedtime. 60 capsule 1     No current facility-administered medications for this visit.           Objective:      General Appearance:   Patient appears alert and well.  No sign of distress.  She is cooperative over video visit.  Mood is congruent and affect appears normal.           This note has been dictated using voice recognition software. Any grammatical or context distortions are unintentional and inherent to the use of this software.     Video-Visit Details    Type of service:  Video Visit    Video End Time (time video stopped): 10:49 AM  Originating Location (pt. Location): Home    Distant Location (provider location):  [facility]     Platform used for Video Visit: Ronak Bhandari, ARNIE

## 2021-06-13 NOTE — TELEPHONE ENCOUNTER
Test Results  Who is calling?:  Yolanda and her Mother, Toya  Who ordered the test:  Maris Bhandari  Type of test: Lab  Date of test:  11/30/20  Where was the test performed:  Clinic  What are your questions/concerns?:  What is the result of blood work?  Okay to leave a detailed message?:  Yes

## 2021-06-13 NOTE — PROGRESS NOTES
"Yolanda Vee is a 20 y.o. female who is being evaluated via a billable telephone visit.      The patient has been notified of following:     \"This telephone visit will be conducted via a call between you and your physician/provider. We have found that certain health care needs can be provided without the need for a physical exam.  This service lets us provide the care you need with a short phone conversation.  If a prescription is necessary we can send it directly to your pharmacy.  If lab work is needed we can place an order for that and you can then stop by our lab to have the test done at a later time.    Telephone visits are billed at different rates depending on your insurance coverage. During this emergency period, for some insurers they may be billed the same as an in-person visit.  Please reach out to your insurance provider with any questions.    If during the course of the call the physician/provider feels a telephone visit is not appropriate, you will not be charged for this service.\"    Patient has given verbal consent to a Telephone visit? Yes    What phone number would you like to be contacted at? [phone]    Patient would like to receive their AVS by AVS Preference: Fransisca.    Assessment/Plan:    1. Anxiety  Patient has psychiatric consultation scheduled on January 19th. She is tolerating venlefaxie well and will continue.    2. Nightmares  Started prazosin two days ago. Tolerating well but no significant benefit noted at this point. She will increase to 2 mg and follow up with us in 3-4 weeks.     3. Leukocytosis, unspecified type  Awaiting results of peripheral smear to further evaluate elevated white blood cells.       Subjective:    Yolanda Vee is a 20 year old female here today for follow up. She has anxiety, possible bipolar, awaiting psychiatric consultation next month. She has been on venlafaxine for several months and is tolerating well. She has been complaining of " nightmares which have been going for many weeks, possibly months. Initially thought is may have been related to abruptly stopping the venlafaxine.  She doses have possible trauma/PTSD which is likely contributing.  Prazosin was started last week at her visit. She just recently started it and has only taken two doses. Tolerating well but hasn't noticed benefit from it yet.     She had lab work completed last week to evaluate persistent leukocytosis. Currently still awaiting results of peripheral smear/morphology. Review of systems is as stated in HPI, and the remainder of the 10 system review is otherwise unremarkable.    Past Medical History, Family History, and Social History reviewed.    No past surgical history on file.     Family History   Problem Relation Age of Onset     Acute Myocardial Infarction Neg Hx         No past medical history on file.     Social History     Tobacco Use     Smoking status: Current Every Day Smoker     Smokeless tobacco: Never Used   Substance Use Topics     Alcohol use: No     Drug use: No        Current Outpatient Medications   Medication Sig Dispense Refill     albuterol sulfate 90 mcg/actuation AePB Inhale 180 mcg every 4 (four) hours. 1 each 3     cholecalciferol, vitamin D3, 125 mcg (5,000 unit) capsule Take 2 capsules (10,000 Units total) by mouth daily. 180 capsule 1     ipratropium-albuteroL (DUO-NEB) 0.5-2.5 mg/3 mL nebulizer Take 3 mL by nebulization every 6 (six) hours as needed. 25 vial 2     omeprazole (PRILOSEC) 20 MG capsule Take 1 capsule (20 mg total) by mouth daily before breakfast. 14 capsule 0     prazosin (MINIPRESS) 1 MG capsule Take 1 mg at bedtime; after 2 to 3 days increase dose to 2 mg at bedtime. 60 capsule 1     venlafaxine (EFFEXOR-XR) 150 MG 24 hr capsule Take 1 capsule (150 mg total) by mouth daily. 90 capsule 3     No current facility-administered medications for this visit.           Objective:      General:  Patient pleasant over the phone. No sign  of distress. Exam is limited based on nature of phone visit.           This note has been dictated using voice recognition software. Any grammatical or context distortions are unintentional and inherent to the use of this software.     Phone call duration:  7 minutes    Maris Bhandari, CNP

## 2021-06-13 NOTE — TELEPHONE ENCOUNTER
Spoke to Yolanda, she started Vitamin D gummies 3 week's ago, I told her with gummies you need to make sure that you chew them well she said she has been. Also patient is still waiting for a call from mental health

## 2021-06-13 NOTE — TELEPHONE ENCOUNTER
Please call patient to give her the phone number, and she may call to schedule with [facility] [phone].  VJ

## 2021-06-13 NOTE — TELEPHONE ENCOUNTER
Called and spoke with patient regarding her blood work. Will be in touch with Dr. Corona regarding the plan going forward.

## 2021-06-13 NOTE — TELEPHONE ENCOUNTER
----- Message from Candice Vela RN sent at 11/11/2020  9:54 AM CST -----    ----- Message -----  From: Litzy Corona MD  Sent: 11/11/2020   7:00 AM CST  To: Litzy Corona Care Team Pool    Please call patient.  Vitamin D level remains significantly low.  Has she started her vitamin D supplement yet?

## 2021-06-13 NOTE — TELEPHONE ENCOUNTER
Left message to call back for: status update  Information to relay to patient:  I am uncertain who contacted the patient but anticipate hematology reached out to arrange an appointment.

## 2021-06-14 NOTE — CONSULTS
[facility] Consult Note    Patient: Yolanda Vee  MRN: [id number]  Date of Service: 01/04/2021        Reason for Visit    I was consulted by Maris Bhandari CNP regarding neutrophilia.    Assessment/Plan    Ms. Yolanda Vee is a 20 y.o. woman who was referred to me for a mild neutrophilic leukocytosis.  In the peripheral blood smear from 11/30/2020 she was also found to have a mild hypochromic anemia.  The patient herself is mostly concerned about some easy bruising that she noted on her shins in the last month.  She has also been smoking for the last 5 years.    I have gone through her past medical records in detail.  I have reviewed multiple lab results personally.  I have also reviewed her imaging studies.  Most importantly the CT scan from 1/31/2020 did not show any lymphadenopathy or hepatosplenomegaly.    I also reviewed her outside medical records from elsewhere in the [facility] system, [facility] and [facility] through [facility].    1.  Neutrophilia: She has been found to have a mild but persistent neutrophilia in her blood counts going back to 2015.  However it is notable that most of the time she would have had the blood counts checked when she was coming in with some sort of simple complaint or inflammation.  I suspect that the neutrophilia is only reactive.  The rest of the blood counts have been essentially normal.  No abnormal cells or immature cells were noted in the peripheral blood smear.  However given her family history, the most important thing is to rule out an underlying hematological disorder.  The suspicion would be for something like CML in a person who is essentially asymptomatic.  We will check the following labs: A CBC differential and platelets, BCR/ABL qualitative PCR.    2.  She has a nearly normal hemoglobin the blood counts done recently but her peripheral blood smear did show hypochromic anemia.  I suspect she is mildly iron  deficient.  We will check a ferritin and TIBC panel.  Iron deficiency anemia that is mild will not be a surprise in a person who is 20 years old and menstruating.    3.  Easy bruising: This does not appear to be a severe problem since she does not report blood loss elsewhere.  Mostly on her shins.  I suspect she is having mild trauma that she herself did not notice.  My main suspicion is that it is the venlafaxine that is causing the easy bruising.  We will rule out an underlying hematological problem by checking a prothrombin time, PTT and fibrinogen level.    4.  She is smoking about half pack per day for the last 5 years.  This could be part of the reason why her neutrophil count was elevated.  I discussed with her about the need for tobacco cessation.  Pointed out to her that quitting smoking may make her feel much better overall with respect to her weight issues, constipation and even her mood.  This is of great importance for her overall health long-term.  She agreed to a referral to the quit plan program.  We will send the referral.  Additional time spent: 5 minutes.    5.  Follow-up: We will have the above labs drawn today.  I asked her to make a follow-up appointment with me in around 2 weeks time to discuss the lab results.  I think this can be an E visit.    Time spend >45 minutes total time for the patient.           ECOG Performance   ECOG Performance Status: 0  Distress Assessment  Distress Assessment Score: 7        Problem List    1. Neutrophilia  HM1(CBC and Differential)    BCR/ABL qualitative PCR. - Misc. Lab Test   2. Hypochromic anemia  Ferritin    Iron and Transferrin Iron Binding Capacity   3. Easy bruising  INR    APTT(PTT)    Fibrinogen   4. Tobacco use  Ambulatory referral to Tobacco Cessation Program           CC: Litzy Corona MD      ______________________________________________________________________________      History    Ms. Yolanda Vee is a 20-year-old woman who is  here for the consultation alone.  Her mother Toya also took part in the encounter by being on the speaker phone and contributed history.    She was referred because she was found to have a slightly elevated white blood cell count persistently.  In the peripheral blood smear that was done on 11/30/2020 she was found to have a mild neutrophilic leukocytosis and also a mild hypochromic anemia.    She states that she feels tired all the time.  She states that she wakes up tired.  She has some feeling of sleepiness in the afternoons.    She does not give a history of any recent infections or fevers.  She states that it has been a long time since she has had anything like that.  However she does have some night sweats.  She is gaining weight and not losing weight.    She states that she is gaining weight even though she does not eat too much.  She says that if she does not eat in time she feels slightly nauseated but she does not throw up.  Occasionally she has some abdominal pain.  She does have a longstanding history of constipation.  She has a bowel movement only about once a week.  She has not noticed any blood in stool or black stools.  Sometimes she has some numbness and tingling in her hands.    She does not think that her menstruation is anything abnormal and she does not think that she is losing blood.  She feels that she has a normal diet.    Other issue that she is concerned about is easy bruising.  She states that she has seen some bruises on her shins in the last month.  She thinks that she has seen about 3 bruises.  She does not remember bumping into anything.  On the other hand she has not had any bleeding from any other site.    No lumps or bumps anywhere.  No loss of weight.  No drenching night sweats.  No unusual headaches.  Breathing is fine.  No chest pain or cough.  No difficulty with urination.  No skin rashes.    Please see below.  A 14 point review of system is otherwise completely  negative.    Past History  Past Medical History:   Diagnosis Date     Chronic constipation      Depression      Morbid obesity (H) 2020     Plantar warts      PTSD (post-traumatic stress disorder)      No past surgical history on file.  Family History   Problem Relation Age of Onset     Asthma Brother      Hodgkin's lymphoma Maternal Grandfather 26     Esophageal cancer Maternal Grandfather          at 54.     No Medical Problems Father         out of contact.     No Medical Problems Sister      Acute Myocardial Infarction Neg Hx      Social History     Socioeconomic History     Marital status: Single     Spouse name: None     Number of children: None     Years of education: None     Highest education level: None   Occupational History     Occupation: work as a PCA   Social Needs     Financial resource strain: None     Food insecurity     Worry: None     Inability: None     Transportation needs     Medical: None     Non-medical: None   Tobacco Use     Smoking status: Current Every Day Smoker     Packs/day: 0.50     Years: 5.00     Pack years: 2.50     Types: Cigarettes     Smokeless tobacco: Never Used   Substance and Sexual Activity     Alcohol use: Yes     Drug use: Yes     Types: Marijuana     Sexual activity: None   Lifestyle     Physical activity     Days per week: None     Minutes per session: None     Stress: None   Relationships     Social connections     Talks on phone: None     Gets together: None     Attends Rastafari service: None     Active member of club or organization: None     Attends meetings of clubs or organizations: None     Relationship status: None     Intimate partner violence     Fear of current or ex partner: None     Emotionally abused: None     Physically abused: None     Forced sexual activity: None   Other Topics Concern     None   Social History Narrative     None     Allergies    Allergies   Allergen Reactions     Vicodin [Hydrocodone-Acetaminophen] Nausea Only       Review  of Systems    General  General (WDL): Exceptions to WDL  Fatigue: Yes - Recent (Less than 3 months)  Fever: Yes, Recent (Less than 3 months)  Generalized Muscle Weakness: Yes - Recent (Less than 3 months)  ENT  ENT (WDL): Exceptions to WDL  Vertigo (Dizziness): Yes, Recent (Less than 3 months)  Sore Throat: Yes - Recent (Less than 3 months)  Respiratory  Respiratory (WDL): Exceptions to WDL  Dyspnea: Yes - Chronic (Greater than 3 months)  Cough: Yes - Recent (Less than 3 months)  Cardiovascular  Cardiovascular (WDL): Exceptions to WDL  Palpitations: Yes - Chronic (Greater than 3 months)  Edema Limbs: Yes - Recent (Less than 3 months)  Chest Pain: Yes - Recent (Less than 3 months)  Lightheadedness: Yes - Recent (Less than 3 months)  Endocrine  Endocrine (WDL): Exceptions to WDL  Cold Intolerance: Yes - Recent (Less than 3 months)  Hotflashes: Yes- Chronic (Greater than 3 months)  Gastrointestinal  Gastrointestinal (WDL): Exceptions to WDL  Difficulty Swallowing: Yes - Chronic (Greater than 3 months)  Heartburn: Yes - Chronic (Greater than 3 months)  Yellowish skin and/or eyes: Yes - Recent (Less than 3 months)  Nausea and Vomiting: Yes - Recent (Less than 3 months)  Abdominal Pain: Yes - Recent (Less than 3 months)  Diarrhea: Yes - Recent (Less than 3 months)  Poor Appetite: Yes- Chronic (Greater than 3 months)  Musculoskeletal  Musculoskeletal (WDL): Exceptions to WDL  Back Pain: Yes - Recent (Less than 3 months)  Muscle pain or stiffness: Yes - Recent (Less than 3 months)  Neurological  Neurological (WDL): Exceptions to WDL  Headaches: Yes - Chronic (Greater than 3 months)  Difficulty with memory: Yes - Recent (Less than 3 months)  Vertigo (Dizziness): Yes, Recent (Less than 3 months)  Dominant Hand: Right  Numbness and/or tingling: Yes - Chronic (Greater than 3 months)  Psychological/Emotional  Psychological/Emotional (WDL): Exceptions to WDL  Depression: Yes - Chronic (Greater than 3 months)  Insomnia: Yes -  Chronic (Greater than 3 months)  Panic attacks: Yes - Recent (Less than 3 months)  Anxiety: Yes - Chronic (Greater than 3 months)  Daytime sleepiness: Yes - Chronic (Greater than 3 months)  Hallucinations: Yes - Recent (Less than 3 months)  Psychosocial needs or not coping well: Yes - Recent (Less than 3 months)  Hematological/Lymphatic  Hematological/Lymphatic (WDL): Exceptions to WDL  Swollen glands: Yes - Recent (Less than 3 months)  Dermatological  Dermatologic (WDL): Exceptions to WDL  Rash : Yes - Recent (Less than 3 months)  Hair Loss: Yes - Recent (Less than 3 months)  Genitourinary/Reproductive  Genitourinary/Reproductive (WDL): All genitourinary/reproductive elements are within defined limits  Reproductive (Females only)  Menstrual irritation or increase in discharge: No  Patient Pregnant?: No  Is the patient trying to get pregnant?: No  Is the patient on birth control: Yes  Pain  Currently in Pain: No/denies     Current Outpatient Medications   Medication Sig Dispense Refill     cholecalciferol, vitamin D3, 125 mcg (5,000 unit) capsule Take 2 capsules (10,000 Units total) by mouth daily. 180 capsule 1     omeprazole (PRILOSEC) 20 MG capsule Take 1 capsule (20 mg total) by mouth daily before breakfast. 14 capsule 0     prazosin (MINIPRESS) 1 MG capsule Take 1 mg at bedtime; after 2 to 3 days increase dose to 2 mg at bedtime. 180 capsule 3     venlafaxine (EFFEXOR-XR) 150 MG 24 hr capsule Take 1 capsule (150 mg total) by mouth daily. 90 capsule 3     albuterol sulfate 90 mcg/actuation AePB Inhale 180 mcg every 4 (four) hours. 1 each 3     ipratropium-albuteroL (DUO-NEB) 0.5-2.5 mg/3 mL nebulizer Take 3 mL by nebulization every 6 (six) hours as needed. 25 vial 2     No current facility-administered medications for this visit.          Physical Exam    Recent Vitals 1/4/2021   Height -   Weight 228 lbs 2 oz   BSA (m2) 2.08 m2   /86   Pulse 104   Temp 98   Temp src 1   SpO2 99   Some recent data might be  hidden       GENERAL: Alert and oriented to time place and person. Seated comfortably. In no distress.  Heavyset.  Looks well.  Slightly anxious.    HEAD: Atraumatic and normocephalic.    EYES: RANJANA, EOMI.  No pallor.  No icterus.    Oral cavity: no mucosal lesion or tonsillar enlargement.    NECK: supple. JVP normal.  No thyroid enlargement.    LYMPH NODES: No palpable, cervical, axillary or inguinal lymphadenopathy.    CHEST: clear to auscultation bilaterally.  Resonant to percussion throughout bilaterally.  Symmetrical breath movements bilaterally.    CVS: S1 and S2 are heard. Regular rate and rhythm.  No murmur or gallop or rub heard.  No peripheral edema.    ABDOMEN: Soft. Not tender. Not distended.  Some abdominal obesity.  No palpable hepatomegaly or splenomegaly.  No other mass palpable.  Bowel sounds heard.    EXTREMITIES: Warm.    SKIN: no rash, or bruising or purpura.  Has a full head of hair.      Lab Results    Lab on 11/30/2020   Component Date Value Ref Range Status     Pathology, Smear Review 11/30/2020 See Separate Pathology Report* (none) Final     WBC 11/30/2020 15.2* 4.0 - 11.0 thou/uL Final     RBC 11/30/2020 4.61  3.80 - 5.40 mill/uL Final     Hemoglobin 11/30/2020 12.7  12.0 - 16.0 g/dL Final     Hematocrit 11/30/2020 40.4  35.0 - 47.0 % Final     MCV 11/30/2020 88  80 - 100 fL Final     MCH 11/30/2020 27.5  27.0 - 34.0 pg Final     MCHC 11/30/2020 31.4* 32.0 - 36.0 g/dL Final     RDW 11/30/2020 15.1* 11.0 - 14.5 % Final     Platelets 11/30/2020 334  140 - 440 thou/uL Final     MPV 11/30/2020 10.9  8.5 - 12.5 fL Final     Neutrophils % 11/30/2020 66  50 - 70 % Final     Lymphocytes % 11/30/2020 24  20 - 40 % Final     Monocytes % 11/30/2020 7  2 - 10 % Final     Eosinophils % 11/30/2020 3  0 - 6 % Final     Basophils % 11/30/2020 1  0 - 2 % Final     Immature Granulocyte % 11/30/2020 1* <=0 % Final     Neutrophils Absolute 11/30/2020 10.0* 2.0 - 7.7 thou/uL Final     Lymphocytes Absolute  11/30/2020 3.7  0.8 - 4.4 thou/uL Final     Monocytes Absolute 11/30/2020 1.0* 0.0 - 0.9 thou/uL Final     Eosinophils Absolute 11/30/2020 0.4  0.0 - 0.4 thou/uL Final     Basophils Absolute 11/30/2020 0.1  0.0 - 0.2 thou/uL Final     Immature Granulocyte Absolute 11/30/2020 0.1* <=0.0 thou/uL Final     Case Report 11/30/2020    Final                    Value:Peripheral Blood Morphology Report                Case: [id number]                                   Authorizing Provider:  Maris Bhandari CNP        Collected:           11/30/2020 0958              Ordering Location:     [facility]   Received:            12/01/2020 [address] [place] [address] Laboratory                                                           Pathologist:           Jian Zhong MD                                                        Specimen:    Peripheral Blood                                                                            Final Diagnosis 11/30/2020    Final                    Value:PERIPHERAL BLOOD SMEAR:    - MIXED LEUKOCYTOSIS (PRIMARILY NEUTROPHILIA)     - BORDERLINE HYPOCHROMIC RED BLOOD CELLS     Comment 11/30/2020    Final                    Value:The clinical history has been reviewed. Although other causes of ineffective erythropoiesis should be ruled out, the features are suggestive of an underlying disorder of globin synthesis versus anemia of chronic disease, and hemoglobin studies can be performed on the current specimen, if requested. Please correlate with family history and iron studies, if appropriate.     The causes of reactive neutrophilia are numerous and range from infection to metabolic defects. Bacterial infections are the predominant cause of neutrophilia; other causes include therapeutic or endogenous drugs/hormones, acute stress, acute tissue necrosis and other infectious/inflammatory processes, including collagen vascular disorders and autoimmune disease.  Absolute neutrophilia is seen in a variety of hematopoietic neoplasms, especially myeloproliferative disorders. Recommend clinical correlation; consider repeat CBC after resolution of any possible infection. If a diagnosis of CML is suspected, evaluation of                           the peripheral blood for the presence of the [place] chromosome and/or the BCR/ABL fusion gene is suggested.     Reactive monocytosis can be seen in chronic infections, collagen vascular diseases, immune-mediated gastrointestinal disorders, sarcoidosis, hemolytic anemia, and recovery phase of agranulocytosis, among others.     Clinical Information 11/30/2020    Final                    Value:D72.829     Peripheral Smear 11/30/2020    Final                    Value:Red blood cells are normal in number and overall normochromic and normocytic/borderline hypochromic. Anisopoikilocytosis and polychromasia are slightly increased, but rouleaux formation does not appear prominent.     The white blood cell count and differential appear as reported on the CBC. Leukocytes are increased in number and demonstrate an absolute neutrophilia and mild monocytosis. No blasts or dysplastic changes are identified.    Platelets are normal in number and appearance.     Charges 11/30/2020    Final                    Value:CPT: 94744  ICD-10: D72.829   Lab on 11/25/2020   Component Date Value Ref Range Status     COVID-19 VIRUS SPECIMEN SOURCE 11/25/2020 Nasopharyngeal   Final     2019-nCOV 11/25/2020 Not Detected   Final   Office Visit on 11/06/2020   Component Date Value Ref Range Status     Sodium 11/06/2020 141  136 - 145 mmol/L Final     Potassium 11/06/2020 4.6  3.5 - 5.0 mmol/L Final     Chloride 11/06/2020 109* 98 - 107 mmol/L Final     CO2 11/06/2020 23  22 - 31 mmol/L Final     Anion Gap, Calculation 11/06/2020 9  5 - 18 mmol/L Final     Glucose 11/06/2020 83  70 - 125 mg/dL Final     BUN 11/06/2020 8  8 - 22 mg/dL Final     Creatinine 11/06/2020  0.69  0.60 - 1.10 mg/dL Final     GFR MDRD Af Amer 11/06/2020 >60  >60 mL/min/1.73m2 Final     GFR MDRD Non Af Amer 11/06/2020 >60  >60 mL/min/1.73m2 Final     Bilirubin, Total 11/06/2020 0.2  0.0 - 1.0 mg/dL Final     Calcium 11/06/2020 9.2  8.5 - 10.5 mg/dL Final     Protein, Total 11/06/2020 7.1  6.0 - 8.0 g/dL Final     Albumin 11/06/2020 3.6  3.5 - 5.0 g/dL Final     Alkaline Phosphatase 11/06/2020 107  45 - 120 U/L Final     AST 11/06/2020 13  0 - 40 U/L Final     ALT 11/06/2020 16  0 - 45 U/L Final     TSH 11/06/2020 1.74  0.30 - 5.00 uIU/mL Final     HIV Antigen / Antibody 11/06/2020 Negative  Negative Final     WBC 11/06/2020 14.1* 4.0 - 11.0 thou/uL Final     RBC 11/06/2020 4.68  3.80 - 5.40 mill/uL Final     Hemoglobin 11/06/2020 13.7  12.0 - 16.0 g/dL Final     Hematocrit 11/06/2020 40.4  35.0 - 47.0 % Final     MCV 11/06/2020 86  80 - 100 fL Final     MCH 11/06/2020 29.3  27.0 - 34.0 pg Final     MCHC 11/06/2020 33.9  32.0 - 36.0 g/dL Final     RDW 11/06/2020 13.9  11.0 - 14.5 % Final     Platelets 11/06/2020 354  140 - 440 thou/uL Final     MPV 11/06/2020 8.1  7.0 - 10.0 fL Final     Neutrophils % 11/06/2020 67  50 - 70 % Final     Lymphocytes % 11/06/2020 22  20 - 40 % Final     Monocytes % 11/06/2020 7  2 - 10 % Final     Eosinophils % 11/06/2020 4  0 - 6 % Final     Basophils % 11/06/2020 1  0 - 2 % Final     Neutrophils Absolute 11/06/2020 9.5* 2.0 - 7.7 thou/uL Final     Lymphocytes Absolute 11/06/2020 3.1  0.8 - 4.4 thou/uL Final     Monocytes Absolute 11/06/2020 1.0* 0.0 - 0.9 thou/uL Final     Eosinophils Absolute 11/06/2020 0.5* 0.0 - 0.4 thou/uL Final     Basophils Absolute 11/06/2020 0.1  0.0 - 0.2 thou/uL Final     Vitamin D, Total (25-Hydroxy) 11/06/2020 12.1* 30.0 - 80.0 ng/mL Final     Color, UA 11/06/2020 Yellow  Colorless, Yellow, Straw, Light Yellow Final     Clarity, UA 11/06/2020 Clear  Clear Final     Glucose, UA 11/06/2020 Negative  Negative Final     Bilirubin, UA 11/06/2020  Negative  Negative Final     Ketones, UA 11/06/2020 Negative  Negative Final     Specific Gravity, UA 11/06/2020 1.025  1.005 - 1.030 Final     Blood, UA 11/06/2020 Trace* Negative Final     pH, UA 11/06/2020 5.5  5.0 - 8.0 Final     Protein, UA 11/06/2020 Negative  Negative mg/dL Final     Urobilinogen, UA 11/06/2020 0.2 E.U./dL  0.2 E.U./dL, 1.0 E.U./dL Final     Nitrite, UA 11/06/2020 Negative  Negative Final     Leukocytes, UA 11/06/2020 Small* Negative Final     Bacteria, UA 11/06/2020 Many* None Seen hpf Final     RBC, UA 11/06/2020 0-2  None Seen, 0-2 hpf Final     WBC, UA 11/06/2020 * None Seen, 0-5 hpf Final     Squam Epithel, UA 11/06/2020 25-50* None Seen, 0-5 lpf Final     Mucus, UA 11/06/2020 Few* None Seen lpf Final     Culture 11/06/2020 Mixture of urogenital organisms   Final         Imaging Results    EXAM: CTA CHEST PE RUN  LOCATION: [facility]  DATE/TIME: 1/31/2020 6:48 PM     INDICATION: Shortness of breath  COMPARISON: None.  TECHNIQUE: CT angiogram chest during arterial phase injection IV contrast. 2D and 3D MIP reconstructions were performed by the CT technologist. Dose reduction techniques were used.   CONTRAST: Iohexol (Omni) 100 mL     FINDINGS:  ANGIOGRAM CHEST: No evidence for pulmonary embolism. Pulmonary arteries normal in caliber. Thoracic aorta normal in caliber. No aortic dissection or other acute abnormality.     HEART: Cardiac chambers within normal limits. No pericardial effusion.     LUNGS AND PLEURA: No pulmonary mass, consolidation, or suspicious pulmonary nodule. No pleural effusion or pneumothorax.     MEDIASTINUM: No adenopathy or mass.     LIMITED UPPER ABDOMEN: Negative.     MUSCULOSKELETAL: Negative.     IMPRESSION:   1.  No evidence for pulmonary embolism. Negative CTA examination of the chest.       Signed by: Sadie Maria MD

## 2021-06-14 NOTE — PATIENT INSTRUCTIONS - HE
We have not found anything problematic in all the work-up that we have done.  The slightly elevated white blood cell count that you have had is likely just reacting to the other medical problems.  The blood clotting test also came back normal.    The blood tests that we did showed that you have low normal iron.  This is very common in young women.  Recommend that you take a prenatal vitamin tablet daily indefinitely.

## 2021-06-14 NOTE — PROGRESS NOTES
Yolanda Vee is a 20 y.o. female who is being evaluated via a billable video visit.      How would you like to obtain your AVS? MyChart.  If dropped from the video visit, the video invitation should be resent by: Other e-mail: [phone]  Will anyone else be joining your video visit? No      Video Start Time: 2:55 PM      Video-Visit Details    Type of service:  Video Visit    Video End Time (time video stopped): 3.15 PM  Originating Location (pt. Location): Home    Distant Location (provider location):  [facility]     Platform used for Video Visit: [employer]     [facility] Progress Note    Patient: Yolanda Vee  MRN: [id number]  Date of Service: 01/19/2021        Reason for Visit    Chief Complaint   Patient presents with     Benign Hematology       Assessment and Plan      ECOG Performance   ECOG Performance Status: 0    Distress Assessment  Distress Assessment Score: 2    Pain   : No pain.    Ms. Yolanda Vee is a 20 y.o. woman who was referred to me for a mild neutrophilic leukocytosis.  In the peripheral blood smear from 11/30/2020, she was also found to have a mild hypochromic anemia.  The patient is also concerned about easy bruising noted on her shins from the previous month.    1.  Neutrophilia: I discussed the results of the work-up so far with her.  In fact the blood counts from 1/4/2021 showed that her total white count had improved to 12.8 which is only slightly above normal.  This was again due to neutrophilia.  No abnormal or dysplastic cells were seen in the differential.  We have done a BCR/ABL quantitative PCR which came back negative.  Thus I do not think we have to worry about any underlying hematological malignancy.  Clearly this is reactive to her other medical problems.  No further work-up is recommended.    2.  Hypochromic anemia.  This is very mild.  Hemoglobin on 1/4/2021 was practically normal at 13.4.  The iron  panel and ferritin showed that she was not iron deficient but the iron stores were at the lower end of normal.  This is quite common in young women.  I recommended to her that she should take a prenatal vitamin tablet indefinitely.    3.  Easy bruising: We have obtained a prothrombin time, PTT and fibrinogen level.  All of them were normal.  Her platelet count was also normal.  I do not think this is a very significant issue.  She is on sertraline and SSRIs can cause a mild platelet inhibition.  I think that is what is going on here.  I do not think any specific treatment is needed at this time.    Follow-up: I discussed with her that this point I do not think she needs any ongoing hematology follow-up.  Recommended continued follow-up with primary care.    Problem List    1. Neutrophilia     2. Easy bruising     3. Hypochromic anemia  prenatal vit no.130-iron-folic (PRENATAL VITAMIN) 27 mg iron- 800 mcg Tab tablet        CC: Litzy Corona MD    ______________________________________________________________________________    History of Present Illness    Ms. Yolanda Vee was evaluated in a video visit.    She states that she is doing well.  She really has no complaints.  She is just anxious to hear the results of the work-up that has been done so far.    Please see below.  A 14 point review of system is otherwise completely negative.    Pain Status  Currently in Pain: No/denies    Review of Systems    Constitutional  Constitutional (WDL): Exceptions to WDL  Fatigue: None  Fever: None  Chills: Mild sensation of cold, shivering, chattering of teeth  Weight Gain: 5 - <10% from baseline  Neurosensory  Neurosensory (WDL): Exceptions to WDL  Peripheral Motor Neuropathy: None  Ataxia: None  Peripheral Sensory Neuropathy: Asymptomatic, loss of deep tendon reflexes or paresthesia(fingers)  Cardiovascular  Cardiovascular (WDL): Exceptions to WDL  Palpitations: Definition: A disorder characterized by  inflammation of the muscle tissue of the heart.(occ)  Edema: No  Pulmonary  Respiratory (WDL): Exceptions to WDL(hx asthma)  Cough: None  Dyspnea: Shortness of breath with moderate exertion  Gastrointestinal  Gastrointestinal (WDL): Exceptions to WDL  Constipation: Occasional or intermittent symptoms, occasional use of stool softeners, laxatives, dietary modification, or enema(varies)  Diarrhea: Increase of <4 stools per day over baseline, mild increase in ostomy output compared to baseline(varies)  Genitourinary  Genitourinary (WDL): All genitourinary elements are within defined limits  Integumentary  Integumentary (WDL): All integumentary elements are within defined limits  Patient Coping  Patient Coping: Open/discussion  Distress Assessment  Distress Assessment Score: 2  Accompanied by       Past History  Past Medical History:   Diagnosis Date     Chronic constipation      Depression      Morbid obesity (H) 03/06/2020     Plantar warts      PTSD (post-traumatic stress disorder)        No past surgical history on file.    Physical Exam    Recent Vitals 1/4/2021   Height -   Weight 228 lbs 2 oz   BSA (m2) 2.08 m2   /86   Pulse 104   Temp 98   Temp src 1   SpO2 99   Some recent data might be hidden       GENERAL: Alert and oriented to time place and person. Seated comfortably. In no distress.  Looks well overall.  Heavyset.  Speaking in full sentences.  Breathing easily.    HEAD: Atraumatic and normocephalic.    EYES: RANJANA, EOMI.  No pallor.  No icterus.      NECK: No pulsations.  No thyroid enlargement.    LYMPH NODES: No visible lymphadenopathy.    CHEST: Symmetrical breath movements bilaterally.    SKIN: no rash, or bruising or purpura.  Has a full head of hair.      Lab Results    Office Visit on 01/04/2021   Component Date Value Ref Range Status     Ferritin 01/04/2021 36  10 - 130 ng/mL Final     Iron 01/04/2021 110  42 - 175 ug/dL Final     Transferrin 01/04/2021 255  212 - 360 mg/dL Final      Transferrin Saturation, Calculated 01/04/2021 34  20 - 50 % Final     Transferrin IBC, Calculated 01/04/2021 319  313 - 563 ug/dL Final     Miscellanous Test Dept. 01/04/2021 Chemistry Reference Test   Final     Test Name 01/04/2021 BCR-ABL1, Qual with Quant Reflex    Final     Performing Lab 01/04/2021 [facility]  [address] [place] [id number]     Final     Scan Result 01/04/2021 See Scanned Report   Final     INR 01/04/2021 1.10  0.90 - 1.10 Final     PTT 01/04/2021 33  24 - 37 seconds Final     Fibrinogen 01/04/2021 473* 170 - 410 mg/dL Final     WBC 01/04/2021 12.8* 4.0 - 11.0 thou/uL Final     RBC 01/04/2021 4.76  3.80 - 5.40 mill/uL Final     Hemoglobin 01/04/2021 13.4  12.0 - 16.0 g/dL Final     Hematocrit 01/04/2021 41.5  35.0 - 47.0 % Final     MCV 01/04/2021 87  80 - 100 fL Final     MCH 01/04/2021 28.2  27.0 - 34.0 pg Final     MCHC 01/04/2021 32.3  32.0 - 36.0 g/dL Final     RDW 01/04/2021 15.1* 11.0 - 14.5 % Final     Platelets 01/04/2021 337  140 - 440 thou/uL Final     MPV 01/04/2021 10.7  8.5 - 12.5 fL Final     Neutrophils % 01/04/2021 63  50 - 70 % Final     Lymphocytes % 01/04/2021 25  20 - 40 % Final     Monocytes % 01/04/2021 8  2 - 10 % Final     Eosinophils % 01/04/2021 4  0 - 6 % Final     Basophils % 01/04/2021 1  0 - 2 % Final     Immature Granulocyte % 01/04/2021 1* <=0 % Final     Neutrophils Absolute 01/04/2021 8.0* 2.0 - 7.7 thou/uL Final     Lymphocytes Absolute 01/04/2021 3.2  0.8 - 4.4 thou/uL Final     Monocytes Absolute 01/04/2021 1.0* 0.0 - 0.9 thou/uL Final     Eosinophils Absolute 01/04/2021 0.5* 0.0 - 0.4 thou/uL Final     Basophils Absolute 01/04/2021 0.1  0.0 - 0.2 thou/uL Final     Immature Granulocyte Absolute 01/04/2021 0.1* <=0.0 thou/uL Final           Imaging    No results found.      Signed by: Sadie Maria MD

## 2021-06-14 NOTE — PATIENT INSTRUCTIONS - HE
"1. START taking sertraline 25mg for 1 week and then increase to 50mg daily   2. START taking prazosin 5mg at bedtime for nightmares  3. Continue medications as prescribed  4. Have your pharmacy contact us for a refill if you are running low on medications (We may ask you to come into clinic to get a refill from the nurse)  5. No alcohol or drug use  6. No driving if sedated  7. Contact the clinic with any questions or concerns   a. Phone: [phone]  b. Fax: [phone]  8. Reach out for help if you feel like hurting yourself or others:   a. [facility]: [address] [place] [address], [address] (phone: [phone])  b. [facility] Suicide Hotline: [phone]   c. Crisis Texting Line: Text \"[place]\" to [phone]  d. Call 911 or go to nearest Emergency room   9. Follow up as directed in 4 weeks by video for your appointment    "

## 2021-06-14 NOTE — PROGRESS NOTES
________________________________________  Medications Phoned  to Pharmacy [] yes [x]no  Name of Pharmacist:  List Medications, including dose, quantity and instructions    Medications ordered this visit were e-scribed.  Verified by order class [x] yes  [] no  Prazosin 5 mg; Zoloft 25 mg   Medication changes or discontinuations were communicated to patient's pharmacy: [x] yes  [] no   Dc'd Effexor- mg; Zoloft 50 mg prazosin 1 mg   Dictation completed at time of chart check: [] yes  [x] no    I have checked the documentation for today s encounters and the above information has been reviewed and completed.

## 2021-06-14 NOTE — TELEPHONE ENCOUNTER
Refill Approved    Rx renewed per Medication Renewal Policy. Medication was last renewed on 11/25/20.    Miri Smith, [facility] Triage/Med Refill 12/30/2020     Requested Prescriptions   Pending Prescriptions Disp Refills     prazosin (MINIPRESS) 1 MG capsule 60 capsule 1     Sig: Take 1 mg at bedtime; after 2 to 3 days increase dose to 2 mg at bedtime.       Alpha Blockers Refill Protocol  Passed - 12/29/2020 10:54 AM        Passed - PCP or prescribing provider visit in past 12 months       Last office visit with prescriber/PCP: 9/23/2020 Litzy Corona MD OR same dept: 11/6/2020 Maris Bhandari CNP OR same specialty: 11/6/2020 Maris Bhandari CNP  Last physical: Visit date not found Last MTM visit: Visit date not found   Next visit within 3 mo: Visit date not found  Next physical within 3 mo: Visit date not found  Prescriber OR PCP: Litzy Corona MD  Last diagnosis associated with med order: 1. Nightmares  - prazosin (MINIPRESS) 1 MG capsule; Take 1 mg at bedtime; after 2 to 3 days increase dose to 2 mg at bedtime.  Dispense: 60 capsule; Refill: 1    2. PTSD (post-traumatic stress disorder)  - prazosin (MINIPRESS) 1 MG capsule; Take 1 mg at bedtime; after 2 to 3 days increase dose to 2 mg at bedtime.  Dispense: 60 capsule; Refill: 1    If protocol passes may refill for 12 months if within 3 months of last provider visit (or a total of 15 months).             Passed - Blood pressure filed in past 12 months     BP Readings from Last 1 Encounters:   11/06/20 110/64

## 2021-06-14 NOTE — PROGRESS NOTES
Assessment/Plan:     1. Mild single current episode of major depressive disorder  Improving, no active suicidal ideation, but at risk.  She continues to work with her counselor twice weekly, I encouraged her in this effort.  She has done quite well with venlafaxine, we like to maintain same dose.  I had like to see her back in clinic in about a month to reassess.  Would consider psychiatry referral given intermittent suicidal ideation in the past should her symptoms persist and be inadequately controlled.  I encouraged her in regard to regular eating habits, regular exercise, and adequate sleep.    2. GERD (gastroesophageal reflux disease)  She will continue omeprazole daily.  - omeprazole (PRILOSEC) 20 MG capsule; TAKE 1 CAPSULE(20 MG) BY MOUTH DAILY BEFORE BREAKFAST  Dispense: 30 capsule; Refill: 2    3. Epigastric pain  She will continue omeprazole daily.  Given negative evaluation at prior visits and lack of improvement with omeprazole and MiraLAX, we discussed options.  Referral is made to gastroenterology for further evaluation and assistance in management.  It is not clear whether this may be related to her anxiety and depression symptoms.  - Ambulatory referral to Gastroenterology    4. Chronic tension headaches  After discontinuing venlafaxine for several days, the headaches did not get better, so it is unclear whether this is related.  It is possible that it is related to her prior concussion.  We discussed options.  I advised that they try Excedrin to see if this helps to reduce headaches better than plain Tylenol.  Advised healthy lifestyle habits including regular meals, adequate sleep, and adequate hydration to manage headaches as well.    5. Plantar wart of left foot  Treated with cryotherapy and debridement as noted.  Follow-up in 1 month.  Continue home treatments with salicylic acid and duct tape if it will stay.      Subjective:      Yolanda SPARROW Francisconew is a 17 y.o. female brought to clinic  today by her mother for follow-up of multiple concerns.  Mother is concerned about her depression, tolerating venlafaxine well and historically has had significant improvement with venlafaxine, but mother was also told the patient is intermittently expressing suicidal ideation.  She had a journal that she was doing at school under the direction of her counselor, patient's mother was able to read it at conferences, and expressed desire to not be here.  No plan, patient denies plan.  She states suicidal ideation is resolved as her friend is now discharged from [facility] with severe depression.  Will actually be having Thanksgiving celebration together.  Patient otherwise tolerates venlafaxine well.  Long-standing family history of recurring headaches, patient is described daily headaches that oscillate as high as 10 out of 10 in severity, especially in the afternoon, but this is been going on for several months if not longer.  Mother is concerned that it may be related to venlafaxine, the patient was noncompliant with medication for many days in a row without a change in headaches.  Intermittently will have some nausea.  Continues to have discomfort in the epigastric region, inadequate improvement despite intake of omeprazole and daily intake and MiraLAX with daily soft bowel movements.  Wondering about next steps.  Daily headaches are described as sharp as though she is being hit in the frontal region, sometimes in the back of the head as well.  No associated nausea for the most part, perhaps occasionally will have some nausea.  She is taking Tylenol, minimal effect with this.    Using salicylic acid at home for treatment of warts on the sole of her left foot.  Has tried duct tape but it does not stay stuck very well.  Concerned that it is not getting better.    Current Outpatient Prescriptions   Medication Sig Dispense Refill     omeprazole (PRILOSEC) 20 MG capsule TAKE 1 CAPSULE(20 MG) BY MOUTH DAILY BEFORE  "BREAKFAST 30 capsule 2     polyethylene glycol (MIRALAX) 17 gram/dose powder One capful mixed in 8 ounces of fluid once daily until soft stools, then reduce dose to achieve daily soft bowel movement 238 g 1     salicylic acid-lactic acid (COMPOUND W) 17 % external solution Apply to wart every 3 days until resolved. 14 mL 3     venlafaxine (EFFEXOR-XR) 150 MG 24 hr capsule Take 1 capsule (150 mg total) by mouth daily. 90 capsule 3     aluminum-magnesium hydroxide 200-200 mg/5 mL suspension Take 5 mL by mouth every 6 (six) hours as needed for indigestion. 100 mL 3     nicotine (NICODERM CQ) 7 mg/24 hr ANDREA 1 PA EXT TO THE SKIN QD  0     No current facility-administered medications for this visit.        Past Medical History, Family History, and Social History reviewed.  No past medical history on file.  No past surgical history on file.  Review of patient's allergies indicates no known allergies.  No family history on file.  Social History     Social History     Marital status: Single     Spouse name: N/A     Number of children: N/A     Years of education: N/A     Occupational History     Not on file.     Social History Main Topics     Smoking status: Current Every Day Smoker     Smokeless tobacco: Never Used     Alcohol use No     Drug use: No     Sexual activity: Not on file     Other Topics Concern     Not on file     Social History Narrative         Review of systems is as stated in HPI, and the remainder of the 10 system review is otherwise negative.    Objective:     Vitals:    11/20/17 0940   BP: 104/74   Pulse: 88   Weight: 162 lb 8 oz (73.7 kg)   Height: 5' 1\" (1.549 m)    Body mass index is 30.7 kg/(m^2).    Alert female.  Reserved.  Mildly flattened affect.  Plantar warts about 6 mm in greatest diameter, minimally elevated.  I debride the callus with a 15 blade with good effect.  We treated with cryotherapy in a freeze thaw freeze manner ×3 which she tolerates well.      This note has been dictated using " voice recognition software. Any grammatical or context distortions are unintentional and inherent to the the software.
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
## 2021-06-15 NOTE — PROGRESS NOTES
" Telemedicine Visit: The patient's condition can be safely assessed and treated via synchronous audio and visual telemedicine encounter.      Reason for Telemedicine Visit: Patient unable to travel    Originating Site (Patient Location): Patient's home    Distant Site (Provider Location): Provider Remote Setting- Home Office    Consent:  The patient/guardian has verbally consented to: the potential risks and benefits of telemedicine (video visit) versus in person care; bill my insurance or make self-payment for services provided; and responsibility for payment of non-covered services.     Mode of Communication:  Video Conference via [employer]    As the provider I attest to compliance with applicable laws and regulations related to telemedicine.    Outpatient Psychiatric Follow Up    Date of Service: 2/25/2021    --  Chief Complaint: \"I've been drinking for the past 4 weeks\"     --  History of Present Illness/Client Impression of Mental Health Consult:    Yolanda Vee is a 20 y.o. female who presents for follow up appointment. Last visit occurred 1/19/21 which was initial appointment. At that time titrated prazosin and initiated sertraline.     Reports increasing drinking 4-5 cans of margaritas night on her own for last 4 weeks. Feeling more tired and depressed since then. smokes more when drinking and now up to 3 packs daily. Has nicotine patches as home and planning to start using today after her shower. Denies nightmares since increased prazosin dose. Started taking sertraline but not really noticing a change in mood or anxiety symptoms. Spoke a Rule 25 recommendation and educated on what referral entailed.    States mood is \"depressed\". Rates depression and anxiety worsened. No new sleep or energy maintenance concerns. No appetite or weight concerns. No suicidal and homicidal ideation. No overt psychosis. Denies all other psychiatric symptoms. No new physical concerns.     Medication adherence: " Reviewed risk/benefits of medication , Patient able to verbalize understanding of side effects  and Patient verbally consents to taking medications  Medication side effects: none  The patient was given information on medications: currently prescribed    --  [place] Prescription Monitoring Program  No worrisome pharmacy activity.     --  Clinical Outcomes Measures:  CAGE: 3 (alcohol)   PHQ-9 Total Score: 16  DEYVI-7: 8    --  Current Medications:  Current Outpatient Medications   Medication Sig Dispense Refill     albuterol sulfate 90 mcg/actuation AePB Inhale 180 mcg every 4 (four) hours. 1 each 3     cholecalciferol, vitamin D3, 125 mcg (5,000 unit) capsule Take 2 capsules (10,000 Units total) by mouth daily. 180 capsule 1     ferrous gluconate (FERGON) 324 MG tablet Take 1 tablet (324 mg total) by mouth daily with breakfast. 90 tablet 1     ipratropium-albuteroL (DUO-NEB) 0.5-2.5 mg/3 mL nebulizer Take 3 mL by nebulization every 6 (six) hours as needed. 25 vial 2     nicotine (NICODERM CQ) 14 mg/24 hr Place 1 patch on the skin daily. For 6 weeks, then reduce to 7 mg patch. 42 patch 0     omeprazole (PRILOSEC) 20 MG capsule Take 1 capsule (20 mg total) by mouth daily before breakfast. 90 capsule 3     prazosin (MINIPRESS) 5 MG capsule Take 1 capsule (5 mg total) by mouth at bedtime. 30 capsule 0     prenatal vit no.130-iron-folic (PRENATAL VITAMIN) 27 mg iron- 800 mcg Tab tablet Take 1 tablet by mouth daily. 90 tablet 3     sertraline (ZOLOFT) 25 MG tablet Take 1 tablet (25 mg total) by mouth daily. 7 tablet 0     No current facility-administered medications for this visit.        --  Allergies  Allergies   Allergen Reactions     Vicodin [Hydrocodone-Acetaminophen] Nausea Only     --  Summary of Diagnostic Studies  No visits with results within 30 Day(s) from this visit.   Latest known visit with results is:   Office Visit on 01/04/2021   Component Date Value Ref Range Status     Ferritin 01/04/2021 36  10 - 130  ng/mL Final     Iron 01/04/2021 110  42 - 175 ug/dL Final     Transferrin 01/04/2021 255  212 - 360 mg/dL Final     Transferrin Saturation, Calculated 01/04/2021 34  20 - 50 % Final     Transferrin IBC, Calculated 01/04/2021 319  313 - 563 ug/dL Final     Miscellanous Test Dept. 01/04/2021 Chemistry Reference Test   Final     Test Name 01/04/2021 [id number], Qual with Quant Reflex    Final     Performing Lab 01/04/2021 [email or web address]  [address] [place] [id number]     Final     Scan Result 01/04/2021 See Scanned Report   Final     INR 01/04/2021 1.10  0.90 - 1.10 Final     PTT 01/04/2021 33  24 - 37 seconds Final     Fibrinogen 01/04/2021 473* 170 - 410 mg/dL Final     WBC 01/04/2021 12.8* 4.0 - 11.0 thou/uL Final     RBC 01/04/2021 4.76  3.80 - 5.40 mill/uL Final     Hemoglobin 01/04/2021 13.4  12.0 - 16.0 g/dL Final     Hematocrit 01/04/2021 41.5  35.0 - 47.0 % Final     MCV 01/04/2021 87  80 - 100 fL Final     MCH 01/04/2021 28.2  27.0 - 34.0 pg Final     MCHC 01/04/2021 32.3  32.0 - 36.0 g/dL Final     RDW 01/04/2021 15.1* 11.0 - 14.5 % Final     Platelets 01/04/2021 337  140 - 440 thou/uL Final     MPV 01/04/2021 10.7  8.5 - 12.5 fL Final     Neutrophils % 01/04/2021 63  50 - 70 % Final     Lymphocytes % 01/04/2021 25  20 - 40 % Final     Monocytes % 01/04/2021 8  2 - 10 % Final     Eosinophils % 01/04/2021 4  0 - 6 % Final     Basophils % 01/04/2021 1  0 - 2 % Final     Immature Granulocyte % 01/04/2021 1* <=0 % Final     Neutrophils Absolute 01/04/2021 8.0* 2.0 - 7.7 thou/uL Final     Lymphocytes Absolute 01/04/2021 3.2  0.8 - 4.4 thou/uL Final     Monocytes Absolute 01/04/2021 1.0* 0.0 - 0.9 thou/uL Final     Eosinophils Absolute 01/04/2021 0.5* 0.0 - 0.4 thou/uL Final     Basophils Absolute 01/04/2021 0.1  0.0 - 0.2 thou/uL Final     Immature Granulocyte Absolute 01/04/2021 0.1* <=0.0 thou/uL Final       --  Review of Systems  Wt Readings from Last 3 Encounters:   02/01/21 (!) 233 lb 3.2  "oz (105.8 kg)   01/04/21 (!) 228 lb 1.6 oz (103.5 kg)   11/06/20 219 lb 3.2 oz (99.4 kg)     Temp Readings from Last 3 Encounters:   02/01/21 98.3  F (36.8  C) (Oral)   01/04/21 98  F (36.7  C) (Oral)   09/23/20 98  F (36.7  C) (Tympanic)     BP Readings from Last 3 Encounters:   02/01/21 124/70   01/04/21 140/86   11/06/20 110/64     Pulse Readings from Last 3 Encounters:   02/01/21 99   01/04/21 (!) 104   11/06/20 (!) 105      There were no vitals taken for this visit. unable to assess today Pain Score: n/a  Pain Location: n/a     As noted in the subjective section above, otherwise a 10 point review of systems is negative. Limited ability to assess given virtual nature of visit. Review of symptoms based entirely on patient's verbal report and what writer is able to assess via camera if used during appointment.    --  Mental Status Examination    Appearance: appears stated age, clean, well groomed, casually dressed appropriate for weather   Orientation: Patient alert and oriented to person, place, time, and situation  Reliability:  Patient appears to be an adequate historian.   Behavior: Patient makes good eye contact and engages with normal rapport in the interview.   There is no evidence of responding to hallucinations or flashbacks.  Speech: Speech is spontaneous and coherent, with a normal rate, rhythm, and tone.    Language: There are no difficulties with expressive or receptive language as observed throughout the interview.    Mood: Described as \"depressed\".    Affect: blunted   Judgement: Able to make basic decision regarding safety.  Insight: Good awareness of physical and mental health conditions and aware of needs around care for these.  Gait and station: unable to assess   Thought process: Logical   Thought content: No evidence of delusions or paranoia.  No thoughts of self-harm or suicide. No thoughts of harming others.  Associations: Connected  Fund of knowledge: Average  Attention / Concentration: " Able to remain focused during the interview with minimal distractibility or need for redirection.  Short Term Memory: Grossly intact as evidence by client recalling themes and ideas discussed.  Long Term Memory: Intact  Motor Status: No recent apparent change.  No current tremor.    --  Diagnostic Impression:  1. Nightmares  - prazosin (MINIPRESS) 5 MG capsule; Take 1 capsule (5 mg total) by mouth at bedtime.  Dispense: 30 capsule; Refill: 2    2. Moderate episode of recurrent major depressive disorder (H)  - sertraline (ZOLOFT) 100 MG tablet; Take 1 tablet (100 mg total) by mouth daily.  Dispense: 30 tablet; Refill: 0  - acamprosate (CAMPRAL) 333 mg tablet; Take 1 tablet (333 mg total) by mouth 3 (three) times a day for 7 days, THEN 2 tablets (666 mg total) 3 (three) times a day for 23 days.  Dispense: 159 tablet; Refill: 0  - AMB REFERRAL TO MENTAL HEALTH AND ADDICTION  - Adult (18+); Assessment and Testing; Chemical Health Assessment;  Mental [facility] [phone]; Patient call to schedule    3. PTSD (post-traumatic stress disorder)  - sertraline (ZOLOFT) 100 MG tablet; Take 1 tablet (100 mg total) by mouth daily.  Dispense: 30 tablet; Refill: 0  - acamprosate (CAMPRAL) 333 mg tablet; Take 1 tablet (333 mg total) by mouth 3 (three) times a day for 7 days, THEN 2 tablets (666 mg total) 3 (three) times a day for 23 days.  Dispense: 159 tablet; Refill: 0  - AMB REFERRAL TO MENTAL HEALTH AND ADDICTION  - Adult (18+); Assessment and Testing; Chemical Health Assessment;  Mental [facility] [phone]; Patient call to schedule    4. Mild alcohol use disorder  - acamprosate (CAMPRAL) 333 mg tablet; Take 1 tablet (333 mg total) by mouth 3 (three) times a day for 7 days, THEN 2 tablets (666 mg total) 3 (three) times a day for 23 days.  Dispense: 159 tablet; Refill: 0  - AMB REFERRAL TO MENTAL HEALTH AND ADDICTION  - Adult (18+); Assessment and Testing; Chemical Health Assessment; [facility] [phone]; Patient call to schedule    5. Marijuana use    6. Nicotine use disorder    --  Medical Decision-Making   Yolanda Vee is a 20 y.o. female who presents for ongoing outpatient psychiatric care. Information today obtained from patient's verbal report and review of records in EHR. Referred by Litzy Corona for evaluation of mental health. Medically complex with current diagnoses of: has Vitamin D deficiency; Neutrophilia; Chronic nasal congestion; Tobacco use; Chronic idiopathic constipation; Sinus tachycardia; Morbid obesity (H); Anxiety; Nightmares; Easy bruising; Hypochromic anemia; Moderate major depression (H); and PTSD (post-traumatic stress disorder) on their problem list. Past medication trials include, but are not limited to: unknown.    Discussed in depth symptoms and indicated course of treatment. Titrate sertraline to target mood and anxiety symptoms. Positive effect from prazosin. Initiate Campral to target alcohol use. Discussed risks and benefits of medication including if taken when pregnant. Reviewed recent lab work. No new labs indicated at this time. Counseled on alcohol and substance use. Encouraged cessation due to risk with use and mental health decompensation. Placed referral for Rule 25 today. Encouraged to continue outpatient treatment program and individual therapy for nonpharmacologic management of symptoms.    Patient denies suicidal and homicidal ideation. Not at imminent risk this visit. Educated on need to seek emergent services should they become a risk to themselves or others. Yolanda Vee verbalized understanding and agreement with this safety plan.    --  Plan  1. Continue to monitor for safety  2. Current Medications  1. INITIATE Campral 333mg three times a day for 7 days and then increase to 666mg three times a day for alcohol use  2. TITRATE sertraline 50mg to 100mg daily for depression and anxiety  3. Continue prazosin 5mg at bedtime  for nightmares  4. REFILLS: see above  1. Labs ordered this visit: n/a   2. INITIATE referral to Rule 25 for alcohol, cannabis, and tobacco use. Continue outpatient programing and individual psychotherapy appointments for mood stabilization and nonpharmacologic coping. Collaborate with interdisciplinary care team as needed.  3. Patient will continue abstinence from drugs and alcohol  4. Patient to return to clinic in 4 weeks for evaluation of medication trials and continued assessment. Ongoing patient psychoeducation regarding chronic illness and treatment.   1. Patient educated that they may schedule sooner appointment or contact writer for any worsening or lack of improvement in symptoms.   5. I reviewed the potential risks, side effects, and benefits of all medications with the patient. Patient verbalized understanding and was encouraged to call clinic with further questions or concerns.    --  START TIME: 9:30 AM  END TIME: 10:00 AM     Appointment duration: 30 minutes with > 75% spent on coordination of care and psycho-education regarding illness, symptoms, neurobiological basis of disease, substance use, alternative interventions, sleep hygiene, safety planning, care planning, and pharmacology.    Dr. Stephanie Jack, DNP, APRN, PMHNP-BC  Nurse Practitioner - Psychiatry    This medical report was made using Dragon Dictation. Spelling and grammatical errors with Dragon exist and are not intentional.

## 2021-06-15 NOTE — PROGRESS NOTES
________________________________________  Medications Phoned  to Pharmacy [] yes [x]no  Name of Pharmacist:  List Medications, including dose, quantity and instructions    Medications ordered this visit were e-scribed.  Verified by order class [x] yes  [] no  Campral 333 mg; prazosin 5 mg; Zoloft 100 mg   Medication changes or discontinuations were communicated to patient's pharmacy: [] yes  [x] no    Dictation completed at time of chart check: [x] yes  [] no    I have checked the documentation for today s encounters and the above information has been reviewed and completed.

## 2021-06-15 NOTE — PATIENT INSTRUCTIONS - HE
"1. Schedule Rule 25 assessment. Call Ana Rosa to schedule at [phone].   2. START taking sertraline 75mg for 3 days then increase to 100mg daily   3. START taking Campral 333mg three times a day for first week. THEN increase to 666mg three times a day for alcohol use  4. Continue medications as prescribed  5. Have your pharmacy contact us for a refill if you are running low on medications (We may ask you to come into clinic to get a refill from the nurse)  6. No alcohol or drug use  7. No driving if sedated  8. Contact the clinic with any questions or concerns   a. Phone: [phone]  b. Fax: [phone]  9. Reach out for help if you feel like hurting yourself or others:   a. [facility]: [address] [place] [address], [address] (phone: [phone])  b. [facility] Suicide Hotline: [phone]   c. Crisis Texting Line: Text \"[place]\" to [address]1004  d. Call 911 or go to nearest Emergency room   10. Follow up as directed in 4-6 weeks by video for your appointment    "

## 2021-06-15 NOTE — PROGRESS NOTES
Assessment/Plan:     1. Anxiety  2. Moderate major depression (H)  3. PTSD (post-traumatic stress disorder)  Not yet at goal.  Encourage follow-up with psychiatry as has been established.  Discussed importance of healthy sleep habits.  Strongly advised efforts to reduce and discontinue alcohol use.  I am in support of an emotional support animal and would be willing to complete paperwork reflecting this if needed.    4. Alcohol abuse  Strongly advised efforts to reduce and discontinue alcohol use.  Encourage exploration of Alcoholics Anonymous group as she has been doing.  I did offer my support.    5. Gastroesophageal reflux disease without esophagitis  Currently inadequate control.  Advised that she take omeprazole on a daily basis given daily symptoms.  Because she is having symptoms most frequently in the afternoon, advised that she take it in the morning.  Discussed role that both alcohol intake and tobacco use can have an perpetuating reflux symptoms.  - omeprazole (PRILOSEC) 20 MG capsule; Take 1 capsule (20 mg total) by mouth daily before breakfast.  Dispense: 90 capsule; Refill: 3    6. Tobacco use  Advised cessation both of cigarettes and vaping.  Discussed options.  I given the amount of nicotine intake, I estimate that the 14 mg patch will be appropriate, advised that she use this for 6 weeks, then 7 mm milligrams patch for 2 weeks, then stop.  Notify me with side effects or intolerance.  - nicotine (NICODERM CQ) 14 mg/24 hr; Place 1 patch on the skin daily. For 6 weeks, then reduce to 7 mg patch.  Dispense: 42 patch; Refill: 0  - nicotine (NICODERM CQ) 7 mg/24 hr; Place 1 patch on the skin daily for 14 days. Then stop.  Dispense: 14 patch; Refill: 0    7. Leukocytosis, unspecified type  We reviewed recommendations from hematology, prescription provided for an iron supplement as recommended by hematologist.  - ferrous gluconate (FERGON) 324 MG tablet; Take 1 tablet (324 mg total) by mouth daily with  breakfast.  Dispense: 90 tablet; Refill: 1    For today's visit, I reviewed notes from her psychiatry team on 1/19/2021, I review most recent, oncology visits and from 1/4/2021 and 1/19/2029.  I review labs from hematology from January 4, 2021 and peripheral blood smear results from November 30, 2020.    There are no Patient Instructions on file for this visit.     Return in about 4 months (around 6/1/2021) for Annual Preventive Care Visit.  Sooner if needed.  Of note, she has follow-up care already established with psychiatry in regards to her anxiety, depression, and alcohol abuse.      Subjective:      Yolanda Vee is a 20 y.o. female presenting to clinic today for follow-up of multiple concerns.  She first is here for follow-up of her anxiety, depression, and PTSD.  She saw a psychiatrist Stephanie Jack NP on January 19, 2021, her prazosin dose was increased and she was transition from venlafaxine to sertraline.  Continue disruption of sleep despite this.  Much of the focus has been on alcohol and worsening depression here.  Patient admits that she has been drinking 5 jagruti cans along with vodka most days.  Denies any withdrawal symptoms.  Reports having eye-opener drink most days.  She is working with her therapist in regards to exploring Alcoholics Anonymous group and also in exploring anxiety with entering stores.  She also has been enrolled in a brain activity research study, she shares.  Thus far has felt supported by her psychiatry team.  Continues to smoke, usually smoking 3 cigarettes and vaping about 100 puffs/day of nicotine.  Interested in quitting.  Does not tolerate the taste of gum.  In the past has done well with the patch the one brand she believes may have caused a rash, has tolerated others.  She is also noting an increase in her reflux symptoms, primarily with irritation of her throat when it occurs.  Almost always occurs in the afternoons.  We reviewed notes from hematology  noting elevation in white blood cell count is likely benign in nature but that her iron level is a bit low and therefore it was recommended that she begin an iron supplement.  She has not yet done so.    She is currently living with her mother, her brother is sometimes there, notes that he has a negative influence to her overall wellbeing.  Admits that she is not currently active.  She is using a broadcasting ryann which she finds both supportive and motivational.  She also shares her art work and social media which has been a very positive impact for her.  She is exploring opportunities to have an emotional support animal.    Current Outpatient Medications   Medication Sig Dispense Refill     albuterol sulfate 90 mcg/actuation AePB Inhale 180 mcg every 4 (four) hours. 1 each 3     cholecalciferol, vitamin D3, 125 mcg (5,000 unit) capsule Take 2 capsules (10,000 Units total) by mouth daily. 180 capsule 1     ipratropium-albuteroL (DUO-NEB) 0.5-2.5 mg/3 mL nebulizer Take 3 mL by nebulization every 6 (six) hours as needed. 25 vial 2     omeprazole (PRILOSEC) 20 MG capsule Take 1 capsule (20 mg total) by mouth daily before breakfast. 90 capsule 3     prazosin (MINIPRESS) 5 MG capsule Take 1 capsule (5 mg total) by mouth at bedtime. 30 capsule 0     prenatal vit no.130-iron-folic (PRENATAL VITAMIN) 27 mg iron- 800 mcg Tab tablet Take 1 tablet by mouth daily. 90 tablet 3     sertraline (ZOLOFT) 25 MG tablet Take 1 tablet (25 mg total) by mouth daily. 7 tablet 0     ferrous gluconate (FERGON) 324 MG tablet Take 1 tablet (324 mg total) by mouth daily with breakfast. 90 tablet 1     nicotine (NICODERM CQ) 14 mg/24 hr Place 1 patch on the skin daily. For 6 weeks, then reduce to 7 mg patch. 42 patch 0     nicotine (NICODERM CQ) 7 mg/24 hr Place 1 patch on the skin daily for 14 days. Then stop. 14 patch 0     No current facility-administered medications for this visit.        Past Medical History, Family History, and Social  History reviewed.  Past Medical History:   Diagnosis Date     Chronic constipation      Depression      Morbid obesity (H) 2020     Plantar warts      PTSD (post-traumatic stress disorder)      History reviewed. No pertinent surgical history.  Vicodin [hydrocodone-acetaminophen]  Family History   Problem Relation Age of Onset     Asthma Brother      Drug abuse Brother         meth     Alcohol abuse Brother      Hodgkin's lymphoma Maternal Grandfather 26     Esophageal cancer Maternal Grandfather          at 54.     Drug abuse Mother         in remission     Alcohol abuse Father      No Medical Problems Sister      Acute Myocardial Infarction Neg Hx      Social History     Socioeconomic History     Marital status: Single     Spouse name: Not on file     Number of children: Not on file     Years of education: Not on file     Highest education level: Not on file   Occupational History     Occupation: work as a PCA   Social Needs     Financial resource strain: Not on file     Food insecurity     Worry: Not on file     Inability: Not on file     Transportation needs     Medical: Not on file     Non-medical: Not on file   Tobacco Use     Smoking status: Current Every Day Smoker     Packs/day: 0.50     Years: 5.00     Pack years: 2.50     Types: Cigarettes     Smokeless tobacco: Never Used   Substance and Sexual Activity     Alcohol use: Yes     Drug use: Yes     Types: Marijuana     Sexual activity: Not on file   Lifestyle     Physical activity     Days per week: Not on file     Minutes per session: Not on file     Stress: Not on file   Relationships     Social connections     Talks on phone: Not on file     Gets together: Not on file     Attends [employer] service: Not on file     Active member of club or organization: Not on file     Attends meetings of clubs or organizations: Not on file     Relationship status: Not on file     Intimate partner violence     Fear of current or ex partner: Not on file      "Emotionally abused: Not on file     Physically abused: Not on file     Forced sexual activity: Not on file   Other Topics Concern     Not on file   Social History Narrative     Not on file         Review of systems is as stated in HPI, and the remainder of the 10 system review is otherwise negative.    Objective:     Vitals:    02/01/21 1616   BP: 124/70   Pulse: 99   Resp: 16   Temp: 98.3  F (36.8  C)   TempSrc: Oral   SpO2: 99%   Weight: (!) 233 lb 3.2 oz (105.8 kg)   Height: 4' 11\" (1.499 m)    Body mass index is 47.1 kg/m .    Alert female.  Affect is mildly flattened but she is more animated than at previous visits.  Able to speak freely and honestly with me today.  Normal psychomotor activities.      This note has been dictated using voice recognition software. Any grammatical or context distortions are unintentional and inherent to the the software.     "

## 2021-06-15 NOTE — PROGRESS NOTES
This video/telephone visit will be conducted via a call between you and your physician/provider. We have found that certain health care needs can be provided without the need for an in-person physical exam. This service lets us provide the care you need with a video /telephone conversation. If a prescription is necessary we can send it directly to your pharmacy. If lab work is needed we can place an order for that and you can then stop by our lab to have the test done at a later time.    Just as we bill insurance for in-person visits, we also bill insurance for video/telephone visits. If you have questions about your insurance coverage, we recommend that you speak with your insurance company.    Patient has given verbal consent for video/Telephone visit?   Patient would like the video visit invitation sent by [facility] / [phone]  TIRSO/MITZI POLK CMA     Patient verified allergies, medications and pharmacy via phone. PHQ: 16  and DEYVI: 8 done verbally with writer.   Patient states she is ready for visit.

## 2021-06-16 NOTE — PROGRESS NOTES
"    Assessment & Plan     Yolanda was seen today for infection on tongue after piercing and stool is green.    Diagnoses and all orders for this visit:    Thrush  -     nystatin (MYCOSTATIN) 100,000 unit/mL suspension; Take 5 mL (500,000 Units total) by mouth 4 (four) times a day for 10 days.    Discussed with patient that the white coating on her tongue is consistent with thrush.  I do not see any evidence of acute bacterial infection in association with the piercing.  We will treat with nystatin suspension for the thrush.  Prescription for this was sent to pharmacy.  She was counseled on use of this medication and side effects.  Recommend that she notify us if symptoms are not improving with this treatment or if new concerning symptoms develop.  Encouraged her to schedule emergency department follow-up with PCP sometime next week.             BMI:   Estimated body mass index is 43.28 kg/m  as calculated from the following:    Height as of this encounter: 5' 0.25\" (1.53 m).    Weight as of this encounter: 223 lb 7 oz (101.4 kg).       No follow-ups on file.    Liberty Roberts MD  [facility]   Yolanda Vee is 20 y.o. and presents today for the following health issues   HPI   She is seen today for concern of an infection on her tongue after a piercing.  She had her tongue pierced professionally on April 11 which is about 5 days ago.  She reports that on Monday and Tuesday she noticed that her tongue appeared to be green she has noticed discomfort in her throat and neck area and pain on her tongue.  She has noticed a bump underneath the tongue at the site of the piercing.  She did remove the piercing and the bump is decreasing in size.  She has not had any purulent discharge from the piercing site.  She states that she had a temp yesterday of 101.  She checks her temp every other hour at work.  She has not been febrile since then.  She did have some bleeding from her tongue " "last evening after she removed the piercing.  She has been using a nonalcohol mouthwash.  She also reports that her bowel movements have been green.  She has not had any diarrhea.  She was recently seen in the emergency room for abdominal pain.  She is planning to schedule an emergency department follow-up visit with her PCP.  She has no additional concerns or questions.  Medications and allergies are reviewed and updated.          Review of Systems   All other systems reviewed and are negative.          Objective    /74 (Patient Site: Right Arm, Patient Position: Sitting, Cuff Size: Adult Large)   Pulse (!) 121   Temp 98.2  F (36.8  C) (Temporal)   Ht 5' 0.25\" (1.53 m)   Wt (!) 223 lb 7 oz (101.4 kg)   LMP  (LMP Unknown)   SpO2 96%   BMI 43.28 kg/m    Body mass index is 43.28 kg/m .  Physical Exam  General: Awake and alert.  No acute distress  Mouth: There is thick white coating on surface of tongue consistent with thrush.  Examination underneath the tongue at the piercing site shows no significant erythema, edema or purulent discharge.  Neck: Mild tenderness with palpation of the anterior neck.  No lymphadenopathy              "

## 2021-06-16 NOTE — PROGRESS NOTES
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 activity, availability of alcohol and drugs, supportive or antagonistic to the person making changes.      It's fine    9. Criminal justice history: Gather current/recent history and any significant history related to substance use--Arrests? Convictions? Circumstances? Alcohol or drug involvement? Sentences? Still on probation or parole? Expectations of the court? Current court order? Any sex offenses - lifetime? What level? (DSM)    None    10. What obstacles exist to participating in treatment? (Time off work, childcare, funding, transportation, pending retirement time, living situation)    No obstacles    Dimension VI Ratings   Recovery environment - The placing authority must use the criteria in Dimension VI to determine a client s recovery environment.   RISK DESCRIPTIONS - Severity ratin  Client is engaged in structured, meaningful activity, but peers, family, significant other, living environment are unsupportive, or there is criminal justice involvement by the client or among the client's peers, significatn others, or in the client's environment.    REASONS SEVERITY WAS ASSIGNED - (What support does the person have for making  changes? What structure/stability does the person have in his or her daily life that willincrease the likelihood that changes can be sustained? What problems exist in the person s environment that will jeopardize getting/staying clean and sober?) Patient reports she lives with her mother, who supports her becoming sober, along w/her SO and best friend. She reports her other friends drink and use. 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 (Indications in medical record that Patient's alcohol use has had negative effects on her work performance). Patient reports no sober, enjoyable recreation or leisure activities, no connections to a sober network. She reports no current or past legal involvements.         Client Choice/Exceptions     Would you like services specific to language, age, gender, culture, Quaker preference, race, ethnicity, sexual orientation or disability?  yes    If yes, specify:  LGBT    What particular treatment choices and options would you like to have?  I've been told OP    Do you have a preference for a particular treatment program?  No, don't know what there is        DSM-V Criteria for Substance Abuse  Instructions  Determine whether the client currently meets the criteria for a Substance Use Disorder using the diagnostic criteria in the DSM-V, pp. 481-589. Current means during the most recent 12 months outside a facility that controls access to substances.    Category of substance Severity ICD-10 Code/DSM V Code   [x]  Alcohol Use Disorder [] Mild  [] Moderate  [x] Severe [] (F10.10) (305.00)  [] (F10.20) (303.90)  [x] (F10.20) (303.90)   [x]  Cannabis Use Disorder [x] Mild  [] Moderate  [] Severe [x] (F12.10) (305.20)  [] (F12.20) (304.30)  [] (F12.20) (304.30)   [] Hallucinogen Use Disorder [] Mild  [] Moderate  [] Severe [] (F16.10) (305.30)  [] (F16.20) (304.50)  [] (F16.20) (304.50)   []  Inhalant Use Disorder [] Mild  [] Moderate  [] Severe [] (F18.10)  (305.90)  [] (F18.20) (304.60)  [] (F18.20) (304.60)   []  Opioid Use Disorder [] Mild  [] Moderate  [] Severe [] (F11.10) (305.50)  [] (F11.20) (304.00)  [] (F11.20) (304.00)   []  Sedative, Hypnotic, or Anxiolytic Use Disorder [] Mild  [] Moderate  [] Severe [] (F13.10) (305.40)   [] (F13.20) (304.10)  [] (F13.20) (304.10)   []  Stimulant Related Disorders [] Mild  [] Moderate  [] Severe [] (F15.10) (305.70) Amphetamine type substance  [] (F14.10) (305.60) Cocaine  [] (F15.10) (305.70) Other or unspecified stimulant  [] (F15.20) (304.40) Amphetamine type substance  [] (F14.20) (304.20) Cocaine  [] (F15.20) (304.40) Other or unspecified stimulant  [] (F15.20) (304.40) Amphetamine type substance  [] (F14.20) (304.20) Cocaine  [] (F15.20) (304.40) Other or unspecified stimulant   [x]  Nicotine Use Disorder [] Mild  [x] Moderate  [] Severe [] (Z72.0) (305.1)  [x] (F17.200) (305.1)  [] (F17.200) (305.1)   []  Other (or unknown) Substance Use Disorder [] Mild  [] Moderate  [] Severe [] (F19.10) (305.90)  [] (F19.20) (304.90)  [] (F19.20) (304.90)       Suggested Level of Care Necessary for Recovery  []  Inpatient  []  Extended Care      [x] Residential    [] Outpatient  []  None        Collateral Contact Summary   Number of contacts made:  1 attempt + medical record  Contact with referring person:  Yes, through medical record     If court related records were reviewed, summarize here:  NA     [x]   Information from collateral contacts supported/largely agreed with information from the client and associated risk ratings.   []   Information from collateral contacts was significantly different from information from the client and lead to different risk ratings.      Summarize new information here: Patient has reported varying substance use patterns to other health professionals      Rule 25 Assessment Summary and Plan   's Recommendation  Based on the information provided by the client for this assessment, and from  collateral information, the client meets DSM-V criteria for Alcohol Use Disorder, severe (F10.20); Cannabis Abuse (F12.10); Nicotine Use Disorder (F17.200)   Recommendations:  -Complete a residential treatment program that addresses both RASHAUN and MH concerns  -Follow recommendations of residential treatment program for aftercare/OP programming  -Abstain from use of mood-altering substances not prescribed  -Withdrawal management/detoxification services may be required if alcohol use is resumed/continued.  -Continue established mental health care services to address co-occurring disorders.  -Remain law abiding.     This assessment can be updated with additional information within a 6 month period,    Collateral Contacts      Name    Quynh Smith Relationship    therapist Phone Number    [phone] Releases    yes       Information Provided:      4/16/21  12:53 Called [facility] - message being routed to Dr Smith and/or nurse    Collateral Contacts     Name    Epic Chart Review   Relationship    NA Phone Number    NA Releases    NA       Information Provided:      Epic chart reviewed.        Diagnostic criteria:  A problematic pattern of alcohol/drug use leading to clinically significant impairment or distress, as manifested by at least two of the following, occurring within a 12-month period:  1.  Substance is often taken in larger amounts and/or over a longer period than the patient intended.  Met for alcohol, tobacco/nicotine  2.  Persistent attempts or one or more unsuccessful efforts made to cut down or control substance use.   Met for alcohol,  tobacco/nicotine     3.  A great deal of time is spent in activities necessary to obtain the substance, use the substance, or recover from effects.  Met for alcohol, marijuana, tobacco/nicotine    4.  Craving or strong desire or urge to use the substance   Met for alcohol, marijuana, tobacco/nicotine  5.  Recurrent substance use resulting in a failure to  fulfill major role obligations at work, school, or home.  Met for alcohol  6.  Continued substance use despite having persistent or recurrent social or interpersonal problem caused or exacerbated   by the effects of the substance.    Met for alcohol          7.  Important social, occupational or recreational activities given up or reduced because of substance use.     8.  Recurrent substance use in situations in which it is physically hazardous.      9.  Substance use is continued despite knowledge of having a persistent or recurrent physical or psychological problem that is likely to have been caused or exacerbated by the substance.  Met for alcohol, tobacco/nicotine    10. Tolerance, as defined by either of the following:  a. Markedly increased amounts of the substance in order to achieve intoxication or desired effect; b. Markedly diminished effect with continued use of the same amount. Met for alcohol, marijuana, tobacco/nicotine  11. Withdrawal, as manifested by either of the following: a. The characteristic withdrawal syndrome for the substance; b. The same (or a closely related) substance is taken to relieve or avoid withdrawal symptoms. Met for alcohol    Specify if: In early remission:  After full criteria for alcohol/drug use disorder were previously met, none of the criteria for alcohol/drug use disorder have been met for at least 3 months but for less than 12 months (with the exception that Criterion A4,  Craving or a strong desire or urge to use alcohol/drug  may be met).     In sustained remission:   After full criteria for alcohol use disorder were previously met, none of the criteria for alcohol/drug use disorder have been met at any time during a period of 12 months or longer (with the exception that Criterion A4,  Craving or strong desire or urge to use alcohol/drug  may be met).   Specify if:   This additional specifier is used if the individual is in an environment where access to alcohol is  restricted.    Mild: Presence of 2-3 symptoms  Met for cannabis/marijuana  Moderate: Presence of 4-5 symptoms  Met for tobacco/nicotine  Severe: Presence of 6 or more symptoms  Met for alcohol          Staff Name and Title: Evy Jones [facility]  Date:  4/14/2021  Time:  1:34 PM

## 2021-06-16 NOTE — TELEPHONE ENCOUNTER
Called Pt to discuss Rule 25 assessment results and recommendation for residential treatment, and suggested referral to [facility] program ([place]). Pt wants some time to think about this.  P) I will call Pt on Mon 4/26 to discuss possible next steps.

## 2021-06-16 NOTE — TELEPHONE ENCOUNTER
Received call from Dr. Quynh Vee. She would like to connect with Stephanie BEASLEY. She stated that she (they) agree with the present care Stephanie BEASLEY has in placed. The only thing she is thinking of adding is Naltrexone and wanted to also know if Stephanie NP has questions. If none, she said they can sign off. Phone number to reach her today is [phone]    Informed her that she can also connect with Stephanie through Teams, which she agreed to try.

## 2021-06-16 NOTE — TELEPHONE ENCOUNTER
Attempted to call phone number listed and unable to connect with caller. However, I am not able to find this provider in teams or patient's chart. If she is out of system we will need PALOMO for this person before we can collaborate care. Will ask nursing to clarify provider details and notify writer.

## 2021-06-16 NOTE — TELEPHONE ENCOUNTER
Returned call to Dr. Quynh Ceja. She wants to speak with Stephanie GAITAN. She is working remote-[phone]. Informed her that Stephanie GAITAN reached out to her 3/31/2021 and was unable to get her. Dr. Vee will make sure her voice mail is not full and she hopes Stephanie GAITAN can leave a message with her phone number in order to connect. She said she (her team) will be completed their work time with the patient and she wanted to connect and gave recommendations.

## 2021-06-16 NOTE — PROGRESS NOTES
"Individual Phone Session    Yolanda Vee is a 20 y.o. female who is being evaluated via telephone visit.      The patient has been notified of the following:      \"We have found that certain health care needs can be provided without the need for a face to face visit.  This service lets us provide the care you need with a phone conversation. I will have full access to your [facility] medical record during this entire phone call. I will be taking notes for your medical record. Since this is like an office visit, we will bill your insurance company for this service. There are potential benefits and risks of telephone visits (e.g. limits to patient confidentiality) that differ from in-person visits.?  Confidentiality still applies for telephone services, and nobody will record the visit.  It is important to be in a quiet, private space that is free of distractions (including cell phone or other devices) during the visit.?If during the course of the call I believe a telephone visit is not appropriate, you will not be charged for this service\"    Counselor and patient spoke via phone for 49 minutes to complete drug/alcohol evaluation.    Call Notes: Counselor contacted patient for continuity of care during suspension of in person services.     Patient was actively and directly involved in the assessment interview and treatment planning. I have reviewed the note as documented above.  This accurately captures the substance of my conversation with the patient.    Provider location: remote  Patient location: home  Mode of Transmission: phone    Call Started at: 1:30 PM  Call Ended at: 2:19 PM    Patient's engagement in the telephone visit: Evy Childers LAD  4/17/2021, 2:10 PM     "

## 2021-06-16 NOTE — PROGRESS NOTES
"    Assessment & Plan     Hematemesis with nausea  Melena  Abdominal cramping  Patient describes hematemesis and melena for the past 2 days.  Reviewed history of alcohol abuse and discussed possibility of ulcer, varices or esophageal tear.  I recommend ER for more prompt imaging/evaluation.  Patient agrees and will be traveling by private vehicle to [facility].    Alcohol dependence with unspecified alcohol-induced disorder (H)  Patient consuming alcohol daily averaging between 5-10 drinks per day.  Last drink was 2 days ago.  She describes some mild tremors, otherwise denies withdrawal symptoms.  Discussed importance of alcohol cessation.  Patient agrees to referral to chemical dependency for treatment.  - Ambulatory referral to Chemical Dependency      BMI:   Estimated body mass index is 46.56 kg/m  as calculated from the following:    Height as of this encounter: 4' 11\" (1.499 m).    Weight as of this encounter: 230 lb 8 oz (104.6 kg).     No follow-ups on file.    Maris Bhandari, CNP  [facility]   Yolanda Vee is 20 y.o. and presents today for the following health issues   HPI   Patient is here today with concerns of vomiting, diarrhea and lower abdominal cramping.  She describes symptoms starting roughly 2 days ago.  The last 2 days she has noticed blood in her vomit which she describes as fairly large amount but cannot say exact.  She also has had black stools but cannot describe this in detail.  She describes generalized abdominal pain.  She has alcoholism, drinks between 5 shots and half a handle daily.  Her last drink was 2 nights ago.  She is having some mild tremors, otherwise denies withdrawal symptoms.  She ports that vaping and smoking marijuana daily.  Smoking roughly half a pack of cigarettes a day.  She has had some coughing that started this past weekend.  Denies any respiratory concerns otherwise.  No shortness of breath or chest pain.  No " "fevers, chills, body aches or other systemic symptoms.  No exposure to COVID-19 that she is aware of.    Review of Systems  Review of Systems - pertinent positives noted in HPI, otherwise ROS is negative.        Objective    /82   Pulse 80   Temp 97.5  F (36.4  C) (Temporal)   Resp 20   Ht 4' 11\" (1.499 m)   Wt (!) 230 lb 8 oz (104.6 kg)   BMI 46.56 kg/m    Body mass index is 46.56 kg/m .  Physical Exam    General Appearance:  Alert, cooperative, no distress, appears stated age   Lungs:   Clear to auscultation bilaterally, respirations unlabored.     Heart:  Regular rate and rhythm, S1, S2 normal, no murmur, rub or gallop   Abdomen:   Soft, generalized tenderness noted on palpation, positive bowel sounds, no masses, no organomegaly   Extremities: Extremities normal.  No cyanosis or edema   Skin: Warm, dry.     Neurologic:  Grossly intact.             "

## 2021-06-16 NOTE — TELEPHONE ENCOUNTER
TESHA. Phone call to Kem.  The provider is Dr. Quynh Smith.  Kem saw her at [facility]. For an e-visit on March 29,2021.

## 2021-06-16 NOTE — TELEPHONE ENCOUNTER
"\"I got my tongue pierced last Sunday 4/4 and now my tongue is swollen, painful and my tongue is green. I called the tattoo place and they told me to go to the hospital.\"  Denies fever  Pt states it hurts to eat and drink  Denies breathing concerns  Triaged and advised to be seen within 4 hours  COVID 19 Nurse Triage Plan/Patient Instructions    Please be aware that novel coronavirus (COVID-19) may be circulating in the community. If you develop symptoms such as fever, cough, or SOB or if you have concerns about the presence of another infection including coronavirus (COVID-19), please contact your health care provider or visit  [email or web address].    Disposition/Instructions    In-Person Visit with provider recommended. Reference Visit Selection Guide.    Thank you for taking steps to prevent the spread of this virus.  o Limit your contact with others.  o Wear a simple mask to cover your cough.  o Wash your hands well and often.    Resources    [facility]: About COVID-19: [email or web address]    CDC: What to Do If You're Sick: www.cdc.gov/coronavirus/2019-ncov/about/steps-when-sick.html    CDC: Ending Home Isolation: www.cdc.gov/coronavirus/2019-ncov/hcp/disposition-in-home-patients.html     CDC: Caring for Someone: www.cdc.gov/coronavirus/2019-ncov/if-you-are-sick/care-for-someone.html     [facility]: Interim Guidance for Hospital Discharge to Home: www.health.[facility].[place]/diseases/coronavirus/hcp/hospdischarge.pdf    [facility] clinical trials (COVID-19 research studies): [email or web address]-[email or web address]     Below are the COVID-19 hotlines at the [facility] ([facility]). Interpreters are available.   o For health questions: Call 165-356-3755 or 1-252.467.8057 (7 a.m. to 7 p.m.)  o For questions about schools and childcare: Call 561-198-5556 or 1-736.351.2599 (7 a.m. to 7 p.m.)     Additional Information    Negative: Piercing jewelry tore completely " through lip or nostril    Negative: Skin is split open or gaping (if unsure, refer in if cut length > 1/4 inch or 6 mm on the face; length > 1/2 inch or 12 mm elsewhere)    Negative: [1] Bleeding at piercing site (e.g., after minor injury) AND [2] won't stop after 10 minutes of direct pressure (using correct technique)    Negative: [1] Pierced tongue becomes very swollen and painful AND [2] causes drooling or difficulty breathing    Negative: Child sounds very sick or weak to the triager    Negative: [1] Looks infected (spreading redness, red streak, pus) AND [2] fever    Negative: [1] Red streak runs from the wound AND [2] longer than 1 inch (2.5 cm)    Negative: [1] Skin around the piercing site has become red AND [2] larger than 2 inches (5 cm)    Pierced tongue becomes very swollen and painful    Protocols used: BODY PIERCING [id number]

## 2021-06-16 NOTE — TELEPHONE ENCOUNTER
The patient's [facility] psychiatrist is reaching out to you to coordinate care and is requesting a callback    [facility] First Episode Program - Psychiatrist #: [phone] (cell phone) please do not share

## 2021-06-17 ENCOUNTER — COMMUNICATION - HEALTHEAST (OUTPATIENT)
Dept: FAMILY MEDICINE | Facility: CLINIC | Age: 21
End: 2021-06-17

## 2021-06-17 DIAGNOSIS — J30.2 SEASONAL ALLERGIC RHINITIS, UNSPECIFIED TRIGGER: ICD-10-CM
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
## 2021-06-17 NOTE — PATIENT INSTRUCTIONS - HE
"1. Schedule first naltrexone (Vivitrol) injection appointment   2. START taking Lexapro 10mg daily   3. Continue medications as prescribed  4. Have your pharmacy contact us for a refill if you are running low on medications (We may ask you to come into clinic to get a refill from the nurse)  5. No alcohol or drug use  6. No driving if sedated  7. Contact the clinic with any questions or concerns   a. Phone: [phone]  b. Fax: [phone]  8. Reach out for help if you feel like hurting yourself or others:   a. [facility]: [address] [place] [address], [address] (phone: [phone])  b. [facility] Suicide Hotline: 885.533.3192   c. Crisis Texting Line: Text \"[place]\" to 815159  d. Call 911 or go to nearest Emergency room   9. Follow up as directed in 4 weeks by video for your appointment    "

## 2021-06-17 NOTE — PROGRESS NOTES
This video/telephone visit will be conducted via a call between you and your physician/provider. We have found that certain health care needs can be provided without the need for an in-person physical exam. This service lets us provide the care you need with a video /telephone conversation. If a prescription is necessary we can send it directly to your pharmacy. If lab work is needed we can place an order for that and you can then stop by a lab to have the test done at a later time.    Just as we bill insurance for in-person visits, we also bill insurance for video/telephone visits. If you have questions about your insurance coverage, we recommend that you speak with your insurance company.    Patient has given verbal consent for video/Telephone visit? yes  Patient would like the video visit invitation sent by: Text to cell phone: [phone] Send to email: MAHESH or RAFAEL CALL to:  MAHESH CHAHAL/MITZI : Jeremy MCNAMARA LPN    Patient verified allergies, medications and pharmacy via phone. PHQ : 18 and DEYVI: 3 done verbally with writer. Patient states she  is ready for visit.

## 2021-06-17 NOTE — TELEPHONE ENCOUNTER
[facility] was able to get Kem scheduled with [facility]QU[facility] for 5/4/2021.

## 2021-06-17 NOTE — ED TRIAGE NOTES
Pt walked into the ED on her own power with a c/o one clot in her urine about 30 minutes prior to arrival. Pt states she is scheduled for EGD and colonoscopy on June 3rd for rectal bleeding. Pt states blood was from urine, not from her rectum. Pt admits to drinking tonight but is alert, orient and appropriate. Her skin is warm and dry.

## 2021-06-17 NOTE — PATIENT INSTRUCTIONS - HE
Begin omeprazole 20 mg once daily.  This can be helpful in the setting of stomach irritation or ulcer.  It is very important that we look for ulcer with a scope test and that you follow-up with gastroenterology to determine the cause of the bleeding and the cause of your ongoing stomach pain.    I believe alcohol is contributing to your stomach pain.  It is vitally important that you discontinue your use of alcohol.  I strongly recommend that you pursue treatment.    Your thumb looks okay, I suspect you have bruising.  If the pain is not improving, function not improving, could consider seeing an orthopedic specialist.

## 2021-06-17 NOTE — TELEPHONE ENCOUNTER
Telephone Encounter by Spring Ames at 10/13/2020  8:34 AM     Author: Spring Ames Service: -- Author Type: --    Filed: 10/13/2020  8:35 AM Encounter Date: 10/10/2020 Status: Signed    : Spring Ames       PRIOR AUTHORIZATION DENIED    Denial Rational: Insurance does not cover OTC products          Appeal Information: If provider would like to appeal please provide a letter of medical necessity and route back to the PA team.

## 2021-06-17 NOTE — TELEPHONE ENCOUNTER
Telephone Encounter by Pat Carson at 10/29/2020 10:19 AM     Author: Pat Carson Service: -- Author Type: --    Filed: 10/29/2020 10:20 AM Encounter Date: 10/27/2020 Status: Signed    : Pat Carson       No PA needed, medication is covered by plan.  Called pharmacy and made them aware to call the help desk if they have further issues

## 2021-06-17 NOTE — TELEPHONE ENCOUNTER
Telephone Encounter by Darby Eubanks LPN at 12/8/2020 10:35 AM     Author: Darby Eubanks LPN Service: -- Author Type: Licensed Nurse    Filed: 12/8/2020 10:37 AM Encounter Date: 12/4/2020 Status: Signed    : Darby Eubanks LPN (Licensed Nurse)       Patient Returning Call  Reason for call:  Patient returning call   Information relayed to patient:  Maris Bhandari, CNP  to Maris Bhandari Care Team [place]         2:35 PM  Note     Called and spoke with patient regarding her blood work. Will be in touch with Dr. Corona regarding the plan going forward.       Patient has additional questions:  Yes  If YES, what are your questions/concerns:  Patient states she received a call from clinic at 9 am today and did not leave message reason for call . Patient is wondering reason for calling her .   Okay to leave a detailed message?: No

## 2021-06-17 NOTE — TELEPHONE ENCOUNTER
Spoke with Quynh Smith, psychiatry resident for collaboration of care. Kem not candidate for first episode of psychosis program and instead referred to [facility] treatment program. Not taking medication consistently and spoke about reported side effects. Encouraged cross tapering from sertraline to Lexapro during last visit with resident today. Consider naltrexone conversation next visit. Will ask nursing to reach out to patient for rescheduling with writer.

## 2021-06-17 NOTE — TELEPHONE ENCOUNTER
"Called Pt to discuss the recommendation for IP/residential treatment that we discussed last week, w/possible referral to [facility].  Pt reports that she had a psychiatrist appt today and that they want her to try OP treatment and gave her a referral to a program that works with young people (she thought the name is \"[place] [facility]\").  Pt was encouraged to call us back if she decides to instead go with the recommendation for residential treatment.   "

## 2021-06-17 NOTE — ED PROVIDER NOTES
EMERGENCY DEPARTMENT ENCOUNTER      NAME: Yolanda Vee  AGE: 20 y.o. female  YOB: 2000  MRN: [id number]  EVALUATION DATE & TIME: 5/16/2021 12:39 AM    PCP: Litzy Corona MD    ED PROVIDER: Jong Limon M.D.      Chief Complaint   Patient presents with     Hematuria         FINAL IMPRESSION:  1. Vaginal bleeding    2. Abdominal pain, generalized          ED COURSE & MEDICAL DECISION MAKING:    Pertinent Labs & Imaging studies reviewed. (See chart for details)  PPE: N95 mask, surgical mask, protective eyewear, and gloves  12:48 AM I met with the patient for the initial interview and physical examination. Discussed plan for treatment and workup in the ED.   2:30 AM Re-assessed the patient and updated her on results. Then we discussed the plan for discharge; patient is agreeable.     20 y.o. female presents to the Emergency Department for evaluation of vaginal bleeding.  Patient thinks she had a single clot come from her vagina.  States she has not had a period for some time.  She is not pregnant here.  Hemoglobin is normal.  White count still elevated 16.4.  Is intoxicated.  Coags are normal.  Is being worked up for GI bleeding.  No obvious GI bleeding here.  Again hemoglobin is normal.  Patient has a follow-up with GI coming up with EGD and colonoscopy.  Patient feeling well otherwise.  I do think she safe for discharge home.  Will return for any worsening symptoms.  Unclear what caused a single episode of vaginal bleeding.  I do not think further work-up is necessary here in the ER.  Will return for any worsening symptoms.    At the conclusion of the encounter I discussed the results of all of the tests and the disposition. The questions were answered. The patient or family acknowledged understanding and was agreeable with the care plan.     MEDICATIONS GIVEN IN THE EMERGENCY:  Medications   sodium chloride flush 10 mL (NS) (has no administration in time range)   sodium chloride 0.9%  1,000 mL (0 mL Intravenous Stopped 5/16/21 0236)   ondansetron injection 4 mg (ZOFRAN) (4 mg Intravenous Given 5/16/21 0144)       NEW PRESCRIPTIONS STARTED AT TODAY'S ER VISIT  Discharge Medication List as of 5/16/2021  2:38 AM      CONTINUE these medications which have NOT CHANGED    Details   albuterol sulfate 90 mcg/actuation AePB Inhale 180 mcg every 4 (four) hours., Starting Thu 4/23/2020, Normal      cholecalciferol, vitamin D3, 125 mcg (5,000 unit) capsule Take 2 capsules (10,000 Units total) by mouth daily., Starting Tue 10/13/2020, Normal      escitalopram oxalate (LEXAPRO) 10 MG tablet Take 1 tablet (10 mg total) by mouth daily., Starting Thu 5/6/2021, Normal      ferrous gluconate (FERGON) 324 MG tablet Take 1 tablet (324 mg total) by mouth daily with breakfast., Starting Mon 2/1/2021, Normal      fluconazole (DIFLUCAN) 100 MG tablet Take 2 tabs by mouth on day 1, then 1 tab daily for the next 9 days., Normal      ipratropium-albuteroL (DUO-NEB) 0.5-2.5 mg/3 mL nebulizer Take 3 mL by nebulization every 6 (six) hours as needed., Starting Fri 10/23/2020, Normal      naltrexone microspheres (VIVITROL) 380 mg SERR injection Inject 380 mg into the gluteal muscle every 28 days., Starting Thu 5/6/2021, No Print      omeprazole (PRILOSEC) 20 MG capsule Take 1 capsule (20 mg total) by mouth daily before breakfast., Starting Mon 4/26/2021, Normal      ondansetron (ZOFRAN) 4 MG tablet Take 1 tablet (4 mg total) by mouth every 6 (six) hours., Starting Tue 4/6/2021, Normal      prazosin (MINIPRESS) 5 MG capsule Take 1 capsule (5 mg total) by mouth at bedtime., Starting Thu 2/25/2021, Normal      prenatal vit no.130-iron-folic (PRENATAL VITAMIN) 27 mg iron- 800 mcg Tab tablet Take 1 tablet by mouth daily., Starting Tue 1/19/2021, OTC                =================================================================    [facility]  Patient information was obtained from: Patient  Use of : N/A      Yolanda Vee  "is a 20 y.o. female with a history of alcohol dependence, marijuana use, GERD, depression, and anxiety, who presents to this ED by walk in solo for evaluation of vaginal bleeding.     The patient reports she noted a single \"chu-sized\" vaginal blot tonight while using the bathroom 30 minutes PTA. This concerned her as she has not had her menses in the past three years due to regularly receiving Depo injections; last injection was one month ago.     Patient also mentions that she has had bloody stools and painful bowel movements for the past month and is scheduled to have an EGD and colonoscopy on 06/03. Denies melena or diarrhea at present. She also notes not having a BM for the past week and when she does, it is just blood. She also has chronic abdominal pain. She is not anticoagulated.     Additionally, patient is feeling lightheaded and has mild chest pain and a cough.     Patient drank a few alcohol shots tonight and feels \"buzzed\" but otherwise did not consume alcohol in the five days prior. She reports having alcohol withdrawal in the past. Patient is trying to remain sober and has been hanging out with sober friends in addition to attending therapy sessions with her psychiatrist.     Patient otherwise denies additional medical concerns or complaints at this time. Denies concerns for pregnancy.     REVIEW OF SYSTEMS   Review of Systems   Constitutional: Negative for chills and fever.   Respiratory: Positive for cough. Negative for shortness of breath.    Cardiovascular: Positive for chest pain (mild).   Gastrointestinal: Positive for abdominal pain (chronic), blood in stool (x 1 month) and rectal pain (w/ BM, x 1 month). Negative for diarrhea.   Genitourinary: Positive for vaginal bleeding (single \"chu-sized\" clot).   Neurological: Positive for light-headedness.   Psychiatric/Behavioral:        Positive for alcohol consumption.   All other systems reviewed and are negative.       PAST MEDICAL HISTORY:  Past " Medical History:   Diagnosis Date     Chronic constipation      Depression      Morbid obesity (H) 2020     Plantar warts      PTSD (post-traumatic stress disorder)        PAST SURGICAL HISTORY:  No past surgical history on file.        CURRENT MEDICATIONS:    No current facility-administered medications on file prior to encounter.      Current Outpatient Medications on File Prior to Encounter   Medication Sig     albuterol sulfate 90 mcg/actuation AePB Inhale 180 mcg every 4 (four) hours.     cholecalciferol, vitamin D3, 125 mcg (5,000 unit) capsule Take 2 capsules (10,000 Units total) by mouth daily.     escitalopram oxalate (LEXAPRO) 10 MG tablet Take 1 tablet (10 mg total) by mouth daily.     ferrous gluconate (FERGON) 324 MG tablet Take 1 tablet (324 mg total) by mouth daily with breakfast.     fluconazole (DIFLUCAN) 100 MG tablet Take 2 tabs by mouth on day 1, then 1 tab daily for the next 9 days.     ipratropium-albuteroL (DUO-NEB) 0.5-2.5 mg/3 mL nebulizer Take 3 mL by nebulization every 6 (six) hours as needed.     naltrexone microspheres (VIVITROL) 380 mg SERR injection Inject 380 mg into the gluteal muscle every 28 days.     omeprazole (PRILOSEC) 20 MG capsule Take 1 capsule (20 mg total) by mouth daily before breakfast.     ondansetron (ZOFRAN) 4 MG tablet Take 1 tablet (4 mg total) by mouth every 6 (six) hours.     prazosin (MINIPRESS) 5 MG capsule Take 1 capsule (5 mg total) by mouth at bedtime.     prenatal vit no.130-iron-folic (PRENATAL VITAMIN) 27 mg iron- 800 mcg Tab tablet Take 1 tablet by mouth daily.       ALLERGIES:  Allergies   Allergen Reactions     Vicodin [Hydrocodone-Acetaminophen] Nausea Only       FAMILY HISTORY:  Family History   Problem Relation Age of Onset     Asthma Brother      Drug abuse Brother         meth     Alcohol abuse Brother      Hodgkin's lymphoma Maternal Grandfather 26     Esophageal cancer Maternal Grandfather          at 54.     Drug abuse Mother          in remission     Alcohol abuse Father      No Medical Problems Sister      Acute Myocardial Infarction Neg Hx        SOCIAL HISTORY:   Social History     Socioeconomic History     Marital status: Single     Spouse name: Not on file     Number of children: Not on file     Years of education: Not on file     Highest education level: Not on file   Occupational History     Occupation: work as a PCA   Social Needs     Financial resource strain: Not on file     Food insecurity     Worry: Not on file     Inability: Not on file     Transportation needs     Medical: Not on file     Non-medical: Not on file   Tobacco Use     Smoking status: Current Every Day Smoker     Packs/day: 1.00     Years: 5.00     Pack years: 5.00     Types: Cigarettes     Smokeless tobacco: Never Used     Tobacco comment: 2 packs daily and vapes   Substance and Sexual Activity     Alcohol use: Yes     Alcohol/week: 6.0 - 8.0 standard drinks     Types: 5 - 6 Cans of beer, 1 - 2 Standard drinks or equivalent per week     Frequency: 4 or more times a week     Drinks per session: 5 or 6     Drug use: Yes     Types: Marijuana     Comment: every night     Sexual activity: Not on file   Lifestyle     Physical activity     Days per week: Not on file     Minutes per session: Not on file     Stress: Not on file   Relationships     Social connections     Talks on phone: Not on file     Gets together: Not on file     Attends Bahai service: Not on file     Active member of club or organization: Not on file     Attends meetings of clubs or organizations: Not on file     Relationship status: Not on file     Intimate partner violence     Fear of current or ex partner: Not on file     Emotionally abused: Not on file     Physically abused: Not on file     Forced sexual activity: Not on file   Other Topics Concern     Not on file   Social History Narrative     Not on file       VITALS:  Patient Vitals for the past 24 hrs:   BP Temp Temp src Pulse Resp SpO2 Height  "Weight   05/16/21 0230 110/59 -- -- 96 -- 99 % -- --   05/16/21 0215 -- -- -- 94 -- 100 % -- --   05/16/21 0200 124/74 -- -- 93 -- 100 % -- --   05/16/21 0147 119/77 -- -- 89 -- 100 % -- --   05/16/21 0025 165/87 98.1  F (36.7  C) Oral (!) 112 20 96 % 4' 11\" (1.499 m) 220 lb (99.8 kg)       PHYSICAL EXAM    Constitutional:  Well developed, well nourished, no acute distress   EYES: Conjunctivae clear  HENT:  Atraumatic, normocephalic Bilateral external ears normal, nose normal, oropharynx moist. Neck- supple   Respiratory:  No respiratory distress, normal breath sounds, no rales, no wheezing, no cough, no stridor   Cardiovascular:  Normal rate, normal rhythm, no murmurs, capillary refill normal.  No chest wall tenderness  GI:  Soft, nondistended, nontender, no palpable masses, no rebound, no guarding   : No CVA tenderness.    Musculoskeletal:  No edema.  No cyanosis.  Range of motion major extremities intact. No tenderness to palpation or major deformities noted.    Integument: Warm, Dry, No erythema, No rash.   Neurologic:  Alert & oriented, no focal deficits noted, ambulatory  Psych: Affect normal, Mood normal.       LAB:  All pertinent labs reviewed and interpreted.  Results for orders placed or performed during the hospital encounter of 05/16/21   Urinalysis   Result Value Ref Range    Color, UA Colorless Light Yellow, Yellow    Clarity, UA Clear Clear    Glucose, UA Negative Negative    Protein, UA Negative Negative    Bilirubin, UA Negative Negative    Urobilinogen, UA <2.0 mg/dL <2.0 mg/dL    pH, UA 5.0 5.0 - 8.0    Blood, UA Negative Negative    Ketones, UA Negative Negative    Nitrite, UA Negative Negative    Leukocytes,  Salvador/uL (!) Negative    Specific Gravity, UA 1.004 1.001 - 1.030    RBC, UA 1 <=2 hpf    WBC UA 3 <=5 hpf    Bacteria, UA Few (!) None Seen    Squamous Epithel, UA <1 <=5 /HPF   UPT (lab test)   Result Value Ref Range    Pregnancy Test, Urine Negative Negative   Alcohol, Ethyl, Blood "   Result Value Ref Range    Alcohol, Blood 124 (H) None detected mg/dL   Comprehensive Metabolic Panel   Result Value Ref Range    Sodium 144 136 - 145 mmol/L    Potassium 3.5 3.5 - 5.0 mmol/L    Chloride 109 (H) 98 - 107 mmol/L    CO2 21 (L) 22 - 31 mmol/L    Anion Gap, Calculation 14 5 - 18 mmol/L    Glucose 86 70 - 125 mg/dL    BUN 7 (L) 8 - 22 mg/dL    Creatinine 0.81 0.60 - 1.10 mg/dL    GFR MDRD Af Amer >60 >60 mL/min/1.73m2    GFR MDRD Non Af Amer >60 >60 mL/min/1.73m2    Bilirubin, Total 0.2 0.0 - 1.0 mg/dL    Calcium 8.9 8.5 - 10.5 mg/dL    Protein, Total 7.9 6.0 - 8.0 g/dL    Albumin 3.9 3.5 - 5.0 g/dL    Alkaline Phosphatase 94 45 - 120 U/L    AST 16 0 - 40 U/L    ALT 18 0 - 45 U/L   Lipase   Result Value Ref Range    Lipase 13 0 - 52 U/L   INR   Result Value Ref Range    INR 1.04 0.90 - 1.10   APTT(PTT)   Result Value Ref Range    PTT 34 24 - 37 seconds   HM1 (CBC with Diff)   Result Value Ref Range    WBC 16.4 (H) 4.0 - 11.0 thou/uL    RBC 5.22 3.80 - 5.40 mill/uL    Hemoglobin 14.2 12.0 - 16.0 g/dL    Hematocrit 44.6 35.0 - 47.0 %    MCV 85 80 - 100 fL    MCH 27.2 27.0 - 34.0 pg    MCHC 31.8 (L) 32.0 - 36.0 g/dL    RDW 15.5 (H) 11.0 - 14.5 %    Platelets 357 140 - 440 thou/uL    MPV 10.3 8.5 - 12.5 fL    Neutrophils % 65 50 - 70 %    Lymphocytes % 25 20 - 40 %    Monocytes % 7 2 - 10 %    Eosinophils % 3 0 - 6 %    Basophils % 0 0 - 2 %    Immature Granulocyte % 0 <=0 %    Neutrophils Absolute 10.6 (H) 2.0 - 7.7 thou/uL    Lymphocytes Absolute 4.1 0.8 - 4.4 thou/uL    Monocytes Absolute 1.1 (H) 0.0 - 0.9 thou/uL    Eosinophils Absolute 0.4 0.0 - 0.4 thou/uL    Basophils Absolute 0.1 0.0 - 0.2 thou/uL    Immature Granulocyte Absolute 0.1 (H) <=0.0 thou/uL         I, Rylee Yakymi, am serving as a scribe to document services personally performed by Dr. Jong Limon based on my observation and the provider's statements to me. I, Jong Limon MD attest that Rylee Yakymi is acting in a scribe capacity,  has observed my performance of the services and has documented them in accordance with my direction.    Jong Limon M.D.  Emergency Medicine  [facility] EMERGENCY ROOM  [address] [address]  Dept: [phone]  Loc: [phone]       Jong Limon MD  05/16/21 0542

## 2021-06-17 NOTE — TELEPHONE ENCOUNTER
Called pt to get her checked in for appt but she asked to reschedule - having emergency. Pt said she is safe and does not need any help from this writer, declined to pass any messages to provider and r/s'd for 5/6/21.

## 2021-06-17 NOTE — PROGRESS NOTES
________________________________________  Medications Phoned  to Pharmacy [] yes [x]no  Name of Pharmacist:  List Medications, including dose, quantity and instructions    Medications ordered this visit were e-scribed.  Verified by order class [x] yes  [] no  Lexpro 10 mg; Vivitrol 380 mg IM to be started in clinic.  Medication changes or discontinuations were communicated to patient's pharmacy: [] yes  [x] no    Dictation completed at time of chart check: [x] yes  [] no    I have checked the documentation for today s encounters and the above information has been reviewed and completed.

## 2021-06-19 NOTE — LETTER
Letter by Litzy Corona MD at      Author: Litzy Corona MD Service: -- Author Type: --    Filed:  Encounter Date: 5/16/2019 Status: (Other)         May 16, 2019     Patient: Yolanda Vee   YOB: 2000   Date of Visit: 5/16/2019       To Whom it May Concern:    Yolanda Vee was seen in my clinic on 5/16/2019.  Please excuse her from school 5/13/2019 through 5/16/2019 due to illness.    If you have any questions or concerns, please don't hesitate to call.    Sincerely,         Electronically signed by Litzy Corona MD

## 2021-06-20 NOTE — LETTER
Letter by Kriss Isaacs CNP at      Author: Kriss Isaacs CNP Service: -- Author Type: --    Filed:  Encounter Date: 4/30/2020 Status: (Other)         April 30, 2020     Patient: Yolanda Vee   YOB: 2000   Date of Visit: 4/30/2020       To Whom it May Concern:    Yolanda Vee was seen virtually on 4/30/2020. Please excuse patient from work on 4/30/2020 and 5/1/2020 due to an illness.      If you have any questions or concerns, please don't hesitate to call.    Sincerely,         Electronically signed by Kriss Isaacs CNP

## 2021-06-20 NOTE — LETTER
Letter by Litzy Corona MD at      Author: Litzy Corona MD Service: -- Author Type: --    Filed:  Encounter Date: 9/30/2020 Status: (Other)         Yolanda Vee  [address] [place] [address]             September 30, 2020        Dear Ms. Vee,    Below are the results from your recent visit:    Resulted Orders   HM2(CBC w/o Differential)   Result Value Ref Range    WBC 13.7 (H) 4.0 - 11.0 thou/uL    RBC 4.71 3.80 - 5.40 mill/uL    Hemoglobin 13.3 12.0 - 16.0 g/dL    Hematocrit 40.7 35.0 - 47.0 %    MCV 86 80 - 100 fL    MCH 28.1 27.0 - 34.0 pg    MCHC 32.6 32.0 - 36.0 g/dL    RDW 13.4 11.0 - 14.5 %    Platelets 323 140 - 440 thou/uL    MPV 8.5 7.0 - 10.0 fL   Glucose   Result Value Ref Range    Glucose 90 70 - 125 mg/dL    Patient Fasting > 8hrs? Yes     Narrative    Fasting Glucose reference range is 70-99 mg/dL per  American Diabetes Association (ADA) guidelines.   Thyroid Cascade   Result Value Ref Range    TSH 3.22 0.30 - 5.00 uIU/mL   Vitamin B12   Result Value Ref Range    Vitamin B-12 312 213 - 816 pg/mL   Vitamin D, Total (25-Hydroxy)   Result Value Ref Range    Vitamin D, Total (25-Hydroxy) 13.8 (L) 30.0 - 80.0 ng/mL    Narrative    Deficiency <10.0 ng/mL  Insufficiency 10.0-29.9 ng/mL  Sufficiency 30.0-80.0 ng/mL  Toxicity (possible) >100.0 ng/mL       Your white blood cell count is a little bit high.  It looks like it has been this way the last few checks.  We will follow this.  The rest of your blood counts are normal.    Normal fasting blood sugar.    Normal thyroid balance.    Normal vitamin B12 level.    Your vitamin D level is low.  This can cause you to feel tired, can worsen your immune system's function, and can worsen your bone density.  Let's bring your level up quickly with high dose vitamin D 50,000 units TWICE WEEKLY for 8 weeks.  We should then recheck your level (a lab only visit).       Please call with questions or contact us using  [employer].    Sincerely,        Electronically signed by Litzy Corona MD

## 2021-06-20 NOTE — LETTER
Letter by Litzy Corona MD at      Author: Litzy Corona MD Service: -- Author Type: --    Filed:  Encounter Date: 2/3/2020 Status: (Other)         February 3, 2020     Patient: Yolanda Vee   YOB: 2000           To Whom It May Concern:    Yolanda Vee should avoid exercise and participation in sports due to medical condition until evaluated and cleared by cardiology.     If you have any questions or concerns, please don't hesitate to call.    Sincerely,        Electronically signed by Litzy Corona MD

## 2021-06-20 NOTE — LETTER
Letter by Litzy Corona MD at      Author: Litzy Corona MD Service: -- Author Type: --    Filed:  Encounter Date: 2/3/2020 Status: (Other)         February 3, 2020     Patient: Yolanda Vee   YOB: 2000           To Whom It May Concern:    It is in my medical opinion Yolanda Vee may proceed with chiropractor appointments as needed without restrictions.     If you have any questions or concerns, please don't hesitate to call.    Sincerely,        Electronically signed by Litzy Corona MD

## 2021-06-20 NOTE — LETTER
Letter by Litzy Corona MD at      Author: Litzy Corona MD Service: -- Author Type: --    Filed:  Encounter Date: 2/3/2020 Status: (Other)         February 3, 2020     Patient: Yolanda Vee   YOB: 2000           To Whom It May Concern:    It is in my medical opinion Yolanda Vee avoid chiropractic care due to elevated heart rate until this is evaluated further by cardiology.        If you have any questions or concerns, please don't hesitate to call.    Sincerely,        Electronically signed by Litzy Corona MD

## 2021-06-20 NOTE — LETTER
Letter by Maris Bhandari CNP at      Author: Maris Bhandari CNP Service: -- Author Type: --    Filed:  Encounter Date: 1/17/2020 Status: Signed         January 17, 2020     Patient: Yolanda Vee   YOB: 2000   Date of Visit: 1/17/2020       To Whom It May Concern:    It is my medical opinion that Yolanda Vee should remain out of work until 1/24/2020.  She was seen in my clinic on 1/17/2020 for evaluation of an injury.    If you have any questions or concerns, please don't hesitate to call.    Sincerely,        Electronically signed by Maris Bhandari CNP

## 2021-06-20 NOTE — LETTER
Letter by Kriss Isaacs CNP at      Author: Kriss Isaacs CNP Service: -- Author Type: --    Filed:  Encounter Date: 5/4/2020 Status: (Other)         May 4, 2020     Patient: Yolanda Vee   YOB: 2000   Date of Visit: 5/4/2020       To Whom it May Concern:    Yolanda Vee was seen virtually on 5/4/2020. Please excuse patient from work on 5/4/2020 and 5/5/2020 due to an illness. She is able to return to work without restrictions on 5/6/2020.      If you have any questions or concerns, please don't hesitate to call.    Sincerely,         Electronically signed by Kriss Isaacs CNP

## 2021-06-20 NOTE — LETTER
Letter by Maris Bhandari CNP at      Author: Mrais Bhandari CNP Service: -- Author Type: --    Filed:  Encounter Date: 2/3/2020 Status: (Other)         Yolanda Vee  [address] [place] [address]             February 3, 2020         Dear Ms. Francisconew,    Below are the results from your recent visit:    Resulted Orders   Thyroid Cascade   Result Value Ref Range    TSH 1.14 0.30 - 5.00 uIU/mL       Your thyroid is normal.    Please call with questions or contact us using [employer].    Sincerely,        Electronically signed by Maris Bhandari CNP

## 2021-06-20 NOTE — LETTER
Letter by Johanne Salomon MD at      Author: Johanne Salomon MD Service: -- Author Type: --    Filed:  Encounter Date: 4/23/2020 Status: (Other)         April 23, 2020     Patient: Yolanda Vee   YOB: 2000   Date of Visit: 4/23/2020       To Whom It May Concern:    Yolanda Vee has been evaluated and needs to remain out of work to isolate for 7 days from when symptoms started AND 72 hours after fever resolves (without fever reducing medications) AND improvement of respiratory symptoms (whichever is longer). Yolanda's symptoms started on 4/19/2020 so the soonest she would be able to return to work is 4/27/2020 if her symptoms have resolved.    If you have any questions or concerns, please don't hesitate to call.    Sincerely,        Electronically signed by Johnane Salomon MD

## 2021-06-21 NOTE — LETTER
Letter by Liberty Roberts MD at      Author: Liberty Roberts MD Service: -- Author Type: --    Filed:  Encounter Date: 4/16/2021 Status: (Other)          April 16, 2021     Patient: Yolanda Vee   YOB: 2000   Date of Visit: 4/16/2021       To Whom It May Concern:    It is my medical opinion that Yolanda Vee should remain off work on 4/16/21.  She may return to work on 4/19/21.    If you have any questions or concerns, please don't hesitate to call.    Sincerely,        Electronically signed by Liberty Roberts MD

## 2021-06-21 NOTE — LETTER
Letter by Maris Bhandari CNP at      Author: Maris Bhandari CNP Service: -- Author Type: --    Filed:  Encounter Date: 11/24/2020 Status: (Other)         November 24, 2020     Patient: Yolanda Vee   YOB: 2000   Date of Visit: 11/24/2020       To Whom It May Concern:    It is my medical opinion that Yolanda Vee should remain out of work until She has a negative COVID 19 test.   If you have any questions or concerns, please don't hesitate to call.    Sincerely,        Electronically signed by Maris Bhandari CNP

## 2021-06-21 NOTE — LETTER
Letter by Sadie Maria MD at      Author: Sadie Maria MD Service: -- Author Type: --    Filed:  Encounter Date: 12/9/2020 Status: (Other)       Dear Yolanda Vee    Thank you for choosing [facility] for your care.  We are committed to providing you with the highest quality and compassionate healthcare services.  The following information pertains to your first appointment with our clinic.    Date/Time of appointment:  1/4/2021 12:45pm      Note: Please arrive 30 minutes prior to your appointment time.  This allows time to complete forms, possible labs and nursing assessment.    Name of your Physician:  Sadie Maria MD    What to bring to your appointment:    Completed Patient History/Initial Nursing Assessment and Medication/Allergy List (these forms were sent to you).    Any paperwork or films from your physician that we have asked you to bring.    Your current insurance card(s).    Parking:    Please refer to the map included to direct you to [facility].    You can park in any visitor/patient parking area you choose. There is no charge for parking at [facility].     Enter the hospital at the front/main entrance.      Please check in with our  representatives who will escort you to the clinic located in [address] of the [facility].    We hope these instructions are helpful to you.  If you have any questions or concerns, please call us at [phone].  It is our pleasure to assist you.    Warm Regards,    [phone]

## 2021-06-23 NOTE — TELEPHONE ENCOUNTER
RN cannot approve Refill Request    RN can NOT refill this medication PCP messaged that patient is overdue for Office Visit.       Miri Smith, [facility] Connection Triage/Med Refill 1/22/2019    Requested Prescriptions   Pending Prescriptions Disp Refills     omeprazole (PRILOSEC) 20 MG capsule [Pharmacy Med Name: OMEPRAZOLE 20MG CAPSULES] 30 capsule 0     Sig: TAKE 1 CAPSULE(20 MG) BY MOUTH DAILY BEFORE BREAKFAST    GI Medications Refill Protocol Failed - 1/20/2019 11:57 AM       Failed - PCP or prescribing provider visit in last 12 or next 3 months.    Last office visit with prescriber/PCP: 11/20/2017 Litzy Corona MD OR same dept: Visit date not found OR same specialty: 11/20/2017 Litzy Corona MD  Last physical: Visit date not found Last MTM visit: Visit date not found   Next visit within 3 mo: Visit date not found  Next physical within 3 mo: Visit date not found  Prescriber OR PCP: Litzy Corona MD  Last diagnosis associated with med order: 1. GERD (gastroesophageal reflux disease)  - omeprazole (PRILOSEC) 20 MG capsule [Pharmacy Med Name: OMEPRAZOLE 20MG CAPSULES]; TAKE 1 CAPSULE(20 MG) BY MOUTH DAILY BEFORE BREAKFAST  Dispense: 30 capsule; Refill: 0    If protocol passes may refill for 12 months if within 3 months of last provider visit (or a total of 15 months).

## 2021-06-25 NOTE — TELEPHONE ENCOUNTER
Patient was a No Show for injection appt today, she states that there are too many side effects. Encouraged her to write her questions down so that she can get her concerns addressed when she has her next appt with provider.

## 2021-06-26 NOTE — TELEPHONE ENCOUNTER
RN cannot approve Refill Request    RN can NOT refill this medication historical medication requested. Last office visit: 4/26/2021 Litzy Corona MD Last Physical: Visit date not found Last MTM visit: Visit date not found Last visit same specialty: 4/26/2021 Litzy Corona MD.  Next visit within 3 mo: Visit date not found  Next physical within 3 mo: Visit date not found      Darwin BANG Storm, [facility] Connection Triage/Med Refill 6/17/2021    Requested Prescriptions   Pending Prescriptions Disp Refills     fluticasone propionate (FLONASE) 50 mcg/actuation nasal spray 16 g 0     Sig: Apply 1 spray into each nostril daily.       Nasal Steroid Refill Protocol Passed - 6/17/2021  1:26 PM        Passed - Patient has had office visit/physical in last 2 years     Last office visit with prescriber/PCP: 4/26/2021 OR same dept: 4/26/2021 Litzy Corona MD OR same specialty: 4/26/2021 Litzy Corona MD Last physical: Visit date not found Last MTM visit: Visit date not found    Next appt within 3 mo: Visit date not found  Next physical within 3 mo: Visit date not found  Prescriber OR PCP: Litzy Corona MD  Last diagnosis associated with med order: There are no diagnoses linked to this encounter.   If protocol passes may refill for 12 months if within 3 months of last provider visit (or a total of 15 months).               Signed Prescriptions Disp Refills    fluticasone propionate (FLONASE) 50 mcg/actuation nasal spray       Sig: Apply 1 spray into each nostril daily.       There is no refill protocol information for this order

## 2021-06-26 NOTE — PROGRESS NOTES
"Yolanda Vee is a 20 y.o. female who is being evaluated via a billable telephone visit.      The patient has been notified of following:     \"This telephone visit will be conducted via a call between you and your physician/provider. We have found that certain health care needs can be provided without the need for a physical exam.  This service lets us provide the care you need with a short phone conversation.  If a prescription is necessary we can send it directly to your pharmacy.  If lab work is needed we can place an order for that and you can then stop by our lab to have the test done at a later time.    Telephone visits are billed at different rates depending on your insurance coverage. During this emergency period, for some insurers they may be billed the same as an in-person visit.  Please reach out to your insurance provider with any questions.    If during the course of the call the physician/provider feels a telephone visit is not appropriate, you will not be charged for this service.\"    Patient has given verbal consent to a Telephone visit? Yes    What phone number would you like to be contacted at? [phone]      Patient would like to receive their AVS by AVS Preference: Fransisca.    Additional provider notes:      Outpatient Psychiatric Follow Up    Date of Service: 6/4/2021    --  Chief Complaint: \"anxiety and depression\"     --  History of Present Illness/Client Impression of Mental Health Consult:    Yolanda Vee is a 20 y.o. female who presents for follow up appointment. Last visit occurred 5/6/21. At that time discontinued sertraline, started Vivitrol injection, and started Lexapro.    Did not start Vivitrol due to concerns of side effects read on medication listed online. Open to trying oral formulation instead. Has been sober for last week which she is proud of. Anxiety still high and tells writer she has trouble going into crowded places. 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 Admitted to drinking after even in means to cope. Feeling less depressed outside of that situation.     States mood is \"anxious\". Rates depression and anxiety unchanged. No new sleep or energy maintenance concerns. No appetite or weight concerns. No other suicidal and homicidal ideation. No overt psychosis. Denies all other psychiatric symptoms. No new physical concerns.     Medication adherence: Reviewed risk/benefits of medication , Patient able to verbalize understanding of side effects  and Patient verbally consents to taking medications  Medication side effects: none  The patient was given information on medications: currently prescribed    --  [place] Prescription Monitoring Program  No worrisome pharmacy activity.     --  Clinical Outcomes Measures:  CAGE: 3   PHQ-9 Total Score: 15  DEYVI-7: 8    --  Current Medications:  Current Outpatient Medications   Medication Sig Dispense Refill     albuterol sulfate 90 mcg/actuation AePB Inhale 180 mcg every 4 (four) hours. 1 each 3     cholecalciferol, vitamin D3, 125 mcg (5,000 unit) capsule Take 2 capsules (10,000 Units total) by mouth daily. 180 capsule 1     escitalopram oxalate (LEXAPRO) 10 MG tablet Take 1 tablet (10 mg total) by mouth daily. 30 tablet 0     ferrous gluconate (FERGON) 324 MG tablet Take 1 tablet (324 mg total) by mouth daily with breakfast. 90 tablet 1     fluconazole (DIFLUCAN) 100 MG tablet Take 2 tabs by mouth on day 1, then 1 tab daily for the next 9 days. 11 tablet 0     ipratropium-albuteroL (DUO-NEB) 0.5-2.5 mg/3 mL nebulizer Take 3 mL by nebulization every 6 (six) hours as needed. 25 vial 2     naltrexone microspheres (VIVITROL) 380 mg SERR injection Inject 380 mg into the gluteal muscle every 28 days.  0     omeprazole (PRILOSEC) 20 MG capsule Take 1 capsule (20 mg total) by mouth daily before breakfast. 90 capsule 3     " ondansetron (ZOFRAN) 4 MG tablet Take 1 tablet (4 mg total) by mouth every 6 (six) hours. 12 tablet 0     prazosin (MINIPRESS) 5 MG capsule Take 1 capsule (5 mg total) by mouth at bedtime. 30 capsule 2     prenatal vit no.130-iron-folic (PRENATAL VITAMIN) 27 mg iron- 800 mcg Tab tablet Take 1 tablet by mouth daily. 90 tablet 3     No current facility-administered medications for this visit.        --  Allergies  Allergies   Allergen Reactions     Vicodin [Hydrocodone-Acetaminophen] Nausea Only     --  Summary of Diagnostic Studies  Admission on 05/16/2021, Discharged on 05/16/2021   Component Date Value Ref Range Status     Color, UA 05/16/2021 Colorless  Light Yellow, Yellow Final     Clarity, UA 05/16/2021 Clear  Clear Final     Glucose, UA 05/16/2021 Negative  Negative Final     Protein, UA 05/16/2021 Negative  Negative Final     Bilirubin, UA 05/16/2021 Negative  Negative Final     Urobilinogen, UA 05/16/2021 <2.0 mg/dL  <2.0 mg/dL Final     pH, UA 05/16/2021 5.0  5.0 - 8.0 Final     Blood, UA 05/16/2021 Negative  Negative Final     Ketones, UA 05/16/2021 Negative  Negative Final     Nitrite, UA 05/16/2021 Negative  Negative Final     Leukocytes, UA 05/16/2021 250 Salvador/uL* Negative Final     Specific Gravity, UA 05/16/2021 1.004  1.001 - 1.030 Final     RBC, UA 05/16/2021 1  <=2 hpf Final     WBC UA 05/16/2021 3  <=5 hpf Final     Bacteria, UA 05/16/2021 Few* None Seen Final     Squamous Epithel, UA 05/16/2021 <1  <=5 /HPF Final     Pregnancy Test, Urine 05/16/2021 Negative  Negative Final     Alcohol, Blood 05/16/2021 124* None detected mg/dL Final     Sodium 05/16/2021 144  136 - 145 mmol/L Final     Potassium 05/16/2021 3.5  3.5 - 5.0 mmol/L Final     Chloride 05/16/2021 109* 98 - 107 mmol/L Final     CO2 05/16/2021 21* 22 - 31 mmol/L Final     Anion Gap, Calculation 05/16/2021 14  5 - 18 mmol/L Final     Glucose 05/16/2021 86  70 - 125 mg/dL Final     BUN 05/16/2021 7* 8 - 22 mg/dL Final     Creatinine  05/16/2021 0.81  0.60 - 1.10 mg/dL Final     GFR MDRD Af Amer 05/16/2021 >60  >60 mL/min/1.73m2 Final     GFR MDRD Non Af Amer 05/16/2021 >60  >60 mL/min/1.73m2 Final     Bilirubin, Total 05/16/2021 0.2  0.0 - 1.0 mg/dL Final     Calcium 05/16/2021 8.9  8.5 - 10.5 mg/dL Final     Protein, Total 05/16/2021 7.9  6.0 - 8.0 g/dL Final     Albumin 05/16/2021 3.9  3.5 - 5.0 g/dL Final     Alkaline Phosphatase 05/16/2021 94  45 - 120 U/L Final     AST 05/16/2021 16  0 - 40 U/L Final     ALT 05/16/2021 18  0 - 45 U/L Final     Lipase 05/16/2021 13  0 - 52 U/L Final     INR 05/16/2021 1.04  0.90 - 1.10 Final     PTT 05/16/2021 34  24 - 37 seconds Final     WBC 05/16/2021 16.4* 4.0 - 11.0 thou/uL Final     RBC 05/16/2021 5.22  3.80 - 5.40 mill/uL Final     Hemoglobin 05/16/2021 14.2  12.0 - 16.0 g/dL Final     Hematocrit 05/16/2021 44.6  35.0 - 47.0 % Final     MCV 05/16/2021 85  80 - 100 fL Final     MCH 05/16/2021 27.2  27.0 - 34.0 pg Final     MCHC 05/16/2021 31.8* 32.0 - 36.0 g/dL Final     RDW 05/16/2021 15.5* 11.0 - 14.5 % Final     Platelets 05/16/2021 357  140 - 440 thou/uL Final     MPV 05/16/2021 10.3  8.5 - 12.5 fL Final     Neutrophils % 05/16/2021 65  50 - 70 % Final     Lymphocytes % 05/16/2021 25  20 - 40 % Final     Monocytes % 05/16/2021 7  2 - 10 % Final     Eosinophils % 05/16/2021 3  0 - 6 % Final     Basophils % 05/16/2021 0  0 - 2 % Final     Immature Granulocyte % 05/16/2021 0  <=0 % Final     Neutrophils Absolute 05/16/2021 10.6* 2.0 - 7.7 thou/uL Final     Lymphocytes Absolute 05/16/2021 4.1  0.8 - 4.4 thou/uL Final     Monocytes Absolute 05/16/2021 1.1* 0.0 - 0.9 thou/uL Final     Eosinophils Absolute 05/16/2021 0.4  0.0 - 0.4 thou/uL Final     Basophils Absolute 05/16/2021 0.1  0.0 - 0.2 thou/uL Final     Immature Granulocyte Absolute 05/16/2021 0.1* <=0.0 thou/uL Final       --  Review of Systems  Wt Readings from Last 3 Encounters:   05/16/21 220 lb (99.8 kg)   04/26/21 (!) 226 lb 12.8 oz (102.9  "kg)   04/16/21 (!) 223 lb 7 oz (101.4 kg)     Temp Readings from Last 3 Encounters:   05/16/21 98.1  F (36.7  C) (Oral)   04/26/21 98.2  F (36.8  C) (Oral)   04/16/21 98.2  F (36.8  C) (Temporal)     BP Readings from Last 3 Encounters:   05/16/21 110/59   04/26/21 126/74   04/16/21 127/74     Pulse Readings from Last 3 Encounters:   05/16/21 96   04/26/21 94   04/16/21 (!) 121      There were no vitals taken for this visit. unable to assess today Pain Score: n/a  Pain Location: n/a     As noted in the subjective section above, otherwise a 10 point review of systems is negative. Limited ability to assess given virtual nature of visit. Review of symptoms based entirely on patient's verbal report and what writer is able to assess via camera if used during appointment.    --  Mental Status Examination    Appearance: unable to assess  Orientation: Patient alert and oriented to person, place, time, and situation  Reliability:  Patient appears to be an adequate historian.    Behavior: unable to assess  Speech: Speech is spontaneous and coherent, with a normal rate, rhythm and tone.    Language:There are no difficulties with expressive or receptive language as observed throughout the interview.    Mood: Described as \"anxious\".    Affect: unable to assess  Judgement: Able to make basic decision regarding safety.  Insight: Good awareness of physical and mental health conditions and aware of needs around care for these.  Gait and station: unable to assess  Thought process: Logical   Thought content: No evidence of delusions or paranoia.  No thoughts of self harm or suicide. No thoughts of harming others.  Associations: Connected  Fund of knowledge: Average  Attention / Concentration: Able to remain focused during the interview with minimal distractibility or need for redirection.  Short Term Memory: Grossly intact as evidence by client recalling themes and ideas discussed.  Long Term Memory: Intact  Motor Status: unable to " assess    --  Diagnostic Impression:  1. Nightmares  - prazosin (MINIPRESS) 5 MG capsule; Take 1 capsule (5 mg total) by mouth at bedtime.  Dispense: 30 capsule; Refill: 2  - escitalopram oxalate (LEXAPRO) 20 MG tablet; Take 1 tablet (20 mg total) by mouth daily.  Dispense: 30 tablet; Refill: 2  - naltrexone (DEPADE) 50 mg tablet; Take 1 tablet (50 mg total) by mouth daily.  Dispense: 30 tablet; Refill: 2    2. Moderate episode of recurrent major depressive disorder (H)  - escitalopram oxalate (LEXAPRO) 20 MG tablet; Take 1 tablet (20 mg total) by mouth daily.  Dispense: 30 tablet; Refill: 2  - naltrexone (DEPADE) 50 mg tablet; Take 1 tablet (50 mg total) by mouth daily.  Dispense: 30 tablet; Refill: 2    3. PTSD (post-traumatic stress disorder)  - escitalopram oxalate (LEXAPRO) 20 MG tablet; Take 1 tablet (20 mg total) by mouth daily.  Dispense: 30 tablet; Refill: 2  - naltrexone (DEPADE) 50 mg tablet; Take 1 tablet (50 mg total) by mouth daily.  Dispense: 30 tablet; Refill: 2    4. Severe alcohol use disorder (H)  - naltrexone (DEPADE) 50 mg tablet; Take 1 tablet (50 mg total) by mouth daily.  Dispense: 30 tablet; Refill: 2    --  Medical Decision-Making   Yolanda Vee is a 20 y.o. female who presents for ongoing outpatient psychiatric care. Information today obtained from patient's verbal report and review of records in EHR. Referred by Litzy Corona for evaluation of mental health. Medically complex with current diagnoses of: has Vitamin D deficiency; Neutrophilia; Chronic nasal congestion; Tobacco use; Chronic idiopathic constipation; Sinus tachycardia; Morbid obesity (H); Anxiety; Nightmares; Hypochromic anemia; PTSD (post-traumatic stress disorder); Moderate alcohol use disorder (H); Moderate episode of recurrent major depressive disorder (H); Nicotine use disorder; and Marijuana use on their problem list. Past medication trials include, but are not limited to: sertraline, Campral, and unknown.      Overt mood, anxiety, and RASHAUN symptoms. Discussed in depth symptoms and indicated course of treatment. Initiate Lexapro for mood and anxiety symptoms. Positive effect from prazosin. Initiate Vivitrol injections to target alcohol use. Discontinue Vivitrol as never initiated and initiate instead PO naltrexone. Discussed risks and benefits of medication including if taken when pregnant. Reviewed recent lab work. No new labs indicated at this time. Counseled on alcohol and substance use. Encouraged cessation due to risk with use and mental health decompensation. Encouraged to pursue MICD outpatient treatment program and individual therapy for nonpharmacologic management of symptoms but declined at this time. Discussed need for psychiatric care transition planning as writer leaving this location on 6/17/21. Recommended patient continue with ongoing psychiatric care through [facility] if unable to schedule sooner at current clinic.    Patient denies suicidal and homicidal ideation. Not at imminent risk this visit. Educated on need to seek emergent services should they become a risk to themselves or others. Yolanda Vee verbalized understanding and agreement with this safety plan.    --  Plan  1. Continue to monitor for safety  2. Current Medications  1. DISCONTINUE Vivitrol 380mg injection monthly for alcohol use  2. INITIATE naltrexone 50mg daily for alcohol use  3. Continue Lexapro 10mg daily for depression and anxiety  4. Continue prazosin 5mg at bedtime for nightmares  5. REFILLS: see above  1. Labs ordered this visit: n/a   2. RECOMMEND outpatient MICD treatment program for SUDs treatment. Consider individual psychotherapy appointments for mood stabilization and nonpharmacologic coping. Collaborate with interdisciplinary care team as needed.  3. Patient will continue abstinence from drugs and alcohol  4. Patient to return to clinic as needed prior to 6/17/21 for evaluation of medication trials  and continued assessment. Ongoing patient psychoeducation regarding chronic illness and treatment.   1. Patient educated that they may schedule sooner appointment or contact writer for any worsening or lack of improvement in symptoms.   5. I reviewed the potential risks, side effects, and benefits of all medications with the patient. Patient verbalized understanding and was encouraged to call clinic with further questions or concerns.    --  START TIME: 10:30 AM  END TIME: 11:00 AM    Appointment duration: 30 minutes with > 75% spent on coordination of care and psycho-education regarding illness, symptoms, neurobiological basis of disease, substance use, alternative interventions, sleep hygiene, safety planning, care planning, and pharmacology.    Stephanie Lynne, DNP, APRN, PMHNP-BC  Nurse Practitioner - Psychiatry    This medical report was made using Dragon Dictation. Spelling and grammatical errors with Dragon exist and are not intentional.

## 2021-06-26 NOTE — PROGRESS NOTES
________________________________________  Medications Phoned  to Pharmacy [] yes [x]no  Name of Pharmacist:  List Medications, including dose, quantity and instructions    Medications ordered this visit were e-scribed.  Verified by order class [x] yes  [] no  Lexapro 20 mg; naltrexone 50 mg; prazosin 5 mg  Medication changes or discontinuations were communicated to patient's pharmacy: [] yes  [x] no    Dictation completed at time of chart check: [] yes  [x] no    I have checked the documentation for today s encounters and the above information has been reviewed and completed.

## 2021-06-26 NOTE — PATIENT INSTRUCTIONS - HE
"1. Continue medications as prescribed  2. Have your pharmacy contact us for a refill if you are running low on medications (We may ask you to come into clinic to get a refill from the nurse)  3. No alcohol or drug use  4. No driving if sedated  5. Contact the clinic with any questions or concerns   a. Phone: [phone]  b. Fax: [phone]  6. Reach out for help if you feel like hurting yourself or others:   a. [facility]: [address] [place] [address], [address] (phone: [phone])  b. [facility] Suicide Hotline: 381.778.1976   c. Crisis Texting Line: Text \"[place]\" to 180192  d. Call 911 or go to nearest Emergency room   7. Follow up as directed with new psychiatric care provider for your appointment    "

## 2021-06-26 NOTE — PROGRESS NOTES
This video/telephone visit will be conducted via a call between you and your physician/provider. We have found that certain health care needs can be provided without the need for an in-person physical exam. This service lets us provide the care you need with a video /telephone conversation. If a prescription is necessary we can send it directly to your pharmacy. If lab work is needed we can place an order for that and you can then stop by a lab to have the test done at a later time.    Just as we bill insurance for in-person visits, we also bill insurance for video/telephone visits. If you have questions about your insurance coverage, we recommend that you speak with your insurance company.    Patient has given verbal consent for telephone visit? Yes   Patient would like telephone visit please call: [phone]   Pt requested change to telephone.     TIRSO/MITZI POLK CMA   Medication refill is pended for review and approval by provider.    Patient verified allergies, medications and pharmacy via phone. PHQ: 15 somewhat and DEYVI: 08 not done verbally with writer. Patient states she is ready for visit. [place]  to be reviewed by provider.

## 2021-06-28 NOTE — PROGRESS NOTES
Progress Notes by Ambrocio Nelson MD at 3/6/2020  9:10 AM     Author: Ambrocio Nelson MD Service: -- Author Type: Physician    Filed: 3/6/2020  9:52 AM Encounter Date: 3/6/2020 Status: Signed    : Ambrocio Nelson MD (Physician)         CARDIOLOGY CLINIC CONSULT NOTE     Assessment/Plan:   1.  Sinus tachycardia.  Likely combination of physical deconditioning, mild dehydration, untreated obstructive sleep apnea.  Effexor could be contributing, consider switching to alternate agent.  2.  Suspected untreated obstructive sleep apnea.  Will schedule split-night sleep study and sleep consultation.  3.  Chest wall pain.  Reassured.  No further evaluation warranted     History of Present Illness:     It is my pleasure to see Yolanda Vee at the [facility] clinic for evaluation of sinus tachycardia.    Yolanda Vee is a 19 y.o. female with a past medical history of morbid obesity, tobacco abuse.  She has been on Effexor as an antidepressant for a couple of years.  She has chronic daytime sleepiness and frequent nocturnal awakenings, though she has not been told she snores.  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  This can bring on left chest pain.  Consider falling down some steps earlier this year, she has noticed that her heart races at times.  In the emergency room her initial heart rate was 140, slowing into the 90s.  She notes that her heart races for a minute or 2, up to an hour once a week generally this occurs when she is walking up steps, or smoking.  She is also noticed heart racing at night.    She reports significant weight gain, though the amount is uncertain.  She believes that she has gained 100 pounds in the last year.  she also believes that she has lost 80 pounds in the last couple of months with walking.  She recalls a weight of 300 pounds earlier in the year.    She tries to keep her self hydrated by drinking Gatorade.  She has 1  cup of coffee daily and smokes up to a pack of cigarettes daily.  She has tapered her cigarette use back to 1-1/2 cigarettes daily    Past Medical History:     Patient Active Problem List   Diagnosis   ? Vitamin D deficiency   ? Mild single current episode of major depressive disorder (H)   ? Chronic nasal congestion   ? Tobacco use   ? Chronic idiopathic constipation   ? Sinus tachycardia   ? Morbid obesity (H)       Past Surgical History:   History reviewed. No pertinent surgical history.    Family History:     Family History   Problem Relation Age of Onset   ? Acute Myocardial Infarction Neg Hx      Family history reviewed and is not pertinent to the chief complaint or presenting problem    Social History:    reports that she has been smoking. She has never used smokeless tobacco. She reports that she does not drink alcohol or use drugs.    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    Sleep: Poorly restorative, frequent nocturnal awakenings, with daytime sleepiness.  No prior sleep apnea evaluation    Meds:     Current Outpatient Medications   Medication Sig Dispense Refill   ? aluminum-magnesium hydroxide 200-200 mg/5 mL suspension Take 5 mL by mouth every 6 (six) hours as needed for indigestion. 100 mL 3   ? omeprazole (PRILOSEC) 20 MG capsule TAKE 1 CAPSULE(20 MG) BY MOUTH DAILY BEFORE BREAKFAST 30 capsule 11   ? salicylic acid-lactic acid (COMPOUND W) 17 % external solution Apply to wart every 3 days until resolved. 14 mL 3   ? fluticasone propionate (FLONASE ALLERGY RELIEF) 50 mcg/actuation nasal spray 2 sprays into each nostril daily. 16 g 12   ? venlafaxine (EFFEXOR-XR) 150 MG 24 hr capsule Take 1 capsule (150 mg total) by mouth daily. 90 capsule 3     No current facility-administered medications for this visit.        Allergies:   Vicodin [hydrocodone-acetaminophen]    Review of Systems:     General: Weight Gain  Eyes: Visual Distubance  Ears/Nose/Throat: WNL  Lungs: Shortness of  "Breath, Wheezing  Heart: Shortness of Breath with activity, Chest Pain, Irregular Heartbeat  Stomach: Constipation, Heartburn  Bladder: WNL  Muscle/Joints: WNL  Skin: WNL  Nervous System: Daytime Sleepiness  Mental Health: WNL     Blood: WNL        Objective:      Physical Exam  220 lb (99.8 kg)  4' 11\" (1.499 m)  Body mass index is 44.43 kg/m .  /70 (Patient Site: Left Arm, Patient Position: Sitting, Cuff Size: Adult Regular)   Pulse 98   Resp 18   Ht 4' 11\" (1.499 m)   Wt 220 lb (99.8 kg)   BMI 44.43 kg/m    Resting heart rate 84 beats a minute supine, 96 bpm standing  Blood pressure 128/64 supine, 126/72 standing      General Appearance : Awake, Alert, No acute distress  HEENT: No Scleral icterus; the mucous membranes were pink and moist.  Conjunctivae not injected  Neck:  No cervical bruits, jugular venous distention, or thyromegaly . Stout  Chest: The spine was straight. Chest wall symmetric.  Local left pectoral pressure reproduces chest pain   lungs: Respirations unlabored; the lungs are clear to auscultation.  No wheezing   Cardiovascular: Nonpalpable point of maximal impulse.  Auscultation reveals normal first and second heart sounds with no murmurs, rubs, or gallops.  Carotid, radial, and dorsalis pedal pulses are intact and symmetric.    Abdomen: Obese.  No organomegaly, masses, bruits, or tenderness. Bowels sounds are present  Extremities: No edema  Skin: No xanthelasma. Warm, Dry.  Musculoskeletal: No tenderness.  Neurologic: Alert and oriented ×3. Speech is fluent.      EKG (personally reviewed):  31 January 2020: Sinus rhythm 94 bpm.  Borderline criteria for LVH.  Nonspecific T wave changes.    Cardiac Imaging Studies:  CTA chest PE run 1/31/2020:  HEART: Cardiac chambers within normal limits. No pericardial effusion.  1.  No evidence for pulmonary embolism. Negative CTA examination of the chest.     Lab Review   Lab Results   Component Value Date     01/14/2020    K 4.5 01/14/2020    "  (H) 01/14/2020    CO2 19 (L) 01/14/2020    BUN 5 (L) 01/14/2020    CREATININE 0.68 01/14/2020    CALCIUM 8.7 01/14/2020     Lab Results   Component Value Date    WBC 15.2 (H) 01/14/2020    WBC 9.0 02/24/2015    HGB 13.1 01/14/2020    HCT 42.3 01/14/2020    MCV 89 01/14/2020     01/14/2020     No results found for: CHOL, TRIG, HDL, LDLCALC  No results found for: TROPONINI  No results found for: BNP  Lab Results   Component Value Date    TSH 1.14 01/31/2020       Ambrocio Nelson MD [facility]      This note created using Dragon voice recognition software. Sound alike errors may have escaped editing.

## 2021-06-30 NOTE — PROGRESS NOTES
Progress Notes by Patricia Glez CMA at 1/19/2021  1:00 PM     Author: Patricia Glez CMA Service: -- Author Type: Certified Medical Assistant    Filed: 1/21/2021  5:03 PM Encounter Date: 1/19/2021 Status: Signed    : Patricia Glez CMA (Certified Medical Assistant)       This video/telephone visit will be conducted via a call between you and your physician/provider. We have found that certain health care needs can be provided without the need for an in-person physical exam. This service lets us provide the care you need with a video /telephone conversation. If a prescription is necessary we can send it directly to your pharmacy. If lab work is needed we can place an order for that and you can then stop by our lab to have the test done at a later time.   Just as we bill insurance for in-person visits, we also bill insurance for video/telephone visits. If you have questions about your insurance coverage, we recommend that you speak with your insurance company.   Patient has given verbal consent for video/Telephone visit? Yes   Patient would like the video visit invitation sent by: [employer] if dropped call [phone]  TIRSO/MITZI POLK CMA   Referred by Maris Bhandari CNP   Pt does have therapist sees weekly Wednesday at 5:30 pm      Patient verified allergies, medications and pharmacy via phone. PHQ: 17 and DEYVI: 10 done verbally with writer.   Patient states she is ready for visit.  [place]  was reviewed prior to seeing patient today. See below for embedded report.

## 2021-07-03 NOTE — ADDENDUM NOTE
Addendum Note by Lizette Rene CMA at 10/13/2020 11:53 AM     Author: Lizette Rene CMA Service: -- Author Type: Certified Medical Assistant    Filed: 10/13/2020 11:53 AM Encounter Date: 10/10/2020 Status: Signed    : Lizette Rene CMA (Certified Medical Assistant)    Addended by: LIZETTE RENE on: 10/13/2020 11:53 AM        Modules accepted: Orders

## 2021-07-03 NOTE — ADDENDUM NOTE
Addendum Note by Dax Bhandari CNP at 10/23/2020  8:30 AM     Author: Dax Bhandari CNP Service: -- Author Type: Nurse Practitioner    Filed: 10/23/2020  2:19 PM Encounter Date: 10/23/2020 Status: Signed    : Dax Bhandari CNP (Nurse Practitioner)    Addended by: DAX BHANDARI on: 10/23/2020 02:19 PM        Modules accepted: Orders

## 2021-07-03 NOTE — ADDENDUM NOTE
Addendum Note by Dax Bhandari CNP at 1/31/2020 10:50 AM     Author: Dax Bhandari CNP Service: -- Author Type: Nurse Practitioner    Filed: 1/31/2020  2:09 PM Encounter Date: 1/31/2020 Status: Signed    : Dax Bhandari CNP (Nurse Practitioner)    Addended by: DAX BHANDARI on: 1/31/2020 02:09 PM        Modules accepted: Orders

## 2021-07-06 ASSESSMENT — PATIENT HEALTH QUESTIONNAIRE - PHQ9: SUM OF ALL RESPONSES TO PHQ QUESTIONS 1-9: 15

## 2021-07-08 ENCOUNTER — COMMUNICATION - HEALTHEAST (OUTPATIENT)
Dept: SURGERY | Facility: CLINIC | Age: 21
End: 2021-07-08

## 2021-07-08 ASSESSMENT — ANXIETY QUESTIONNAIRES: GAD7 TOTAL SCORE: 8

## 2021-07-14 ENCOUNTER — VIRTUAL VISIT (OUTPATIENT)
Dept: GASTROENTEROLOGY | Facility: CLINIC | Age: 21
End: 2021-07-14
Payer: COMMERCIAL

## 2021-07-14 VITALS — WEIGHT: 226 LBS | HEIGHT: 59 IN | BODY MASS INDEX: 45.56 KG/M2

## 2021-07-14 DIAGNOSIS — F10.20 ALCOHOL USE DISORDER, MODERATE, IN CONTROLLED ENVIRONMENT (H): ICD-10-CM

## 2021-07-14 DIAGNOSIS — R79.89 LOW VITAMIN B12 LEVEL: ICD-10-CM

## 2021-07-14 DIAGNOSIS — R10.30 LOWER ABDOMINAL PAIN: ICD-10-CM

## 2021-07-14 DIAGNOSIS — K21.00 GASTROESOPHAGEAL REFLUX DISEASE WITH ESOPHAGITIS WITHOUT HEMORRHAGE: Primary | ICD-10-CM

## 2021-07-14 DIAGNOSIS — R19.7 DIARRHEA, UNSPECIFIED TYPE: ICD-10-CM

## 2021-07-14 DIAGNOSIS — D72.829 LEUKOCYTOSIS, UNSPECIFIED TYPE: ICD-10-CM

## 2021-07-14 DIAGNOSIS — R11.2 NON-INTRACTABLE VOMITING WITH NAUSEA, UNSPECIFIED VOMITING TYPE: ICD-10-CM

## 2021-07-14 PROBLEM — F32.0 MILD SINGLE CURRENT EPISODE OF MAJOR DEPRESSIVE DISORDER (H): Status: RESOLVED | Noted: 2019-03-27 | Resolved: 2020-10-23

## 2021-07-14 PROBLEM — F32.1 MODERATE MAJOR DEPRESSION (H): Status: RESOLVED | Noted: 2021-02-07 | Resolved: 2021-05-06

## 2021-07-14 PROCEDURE — 99214 OFFICE O/P EST MOD 30 MIN: CPT | Mod: GT | Performed by: INTERNAL MEDICINE

## 2021-07-14 RX ORDER — SUCRALFATE 1 G/1
1 TABLET ORAL
Qty: 180 TABLET | Refills: 3 | Status: SHIPPED | OUTPATIENT
Start: 2021-07-14 | End: 2024-06-06

## 2021-07-14 ASSESSMENT — PAIN SCALES - GENERAL: PAINLEVEL: EXTREME PAIN (9)

## 2021-07-14 ASSESSMENT — MIFFLIN-ST. JEOR: SCORE: 1695.76

## 2021-07-14 NOTE — LETTER
"    7/14/2021         RE: Yolanda Vee  [address]  [facility] [address]        Dear Colleague,    Thank you for referring your patient, Yolanda Vee, to the [facility] [place]. Please see a copy of my visit note below.    Yolanda Vee is a 21 year old female who is being evaluated via a billable video visit.      The patient has been notified of following:     \"This video visit will be conducted via a call between you and your physician/provider. We have found that certain health care needs can be provided without the need for an in-person physical exam.  This service lets us provide the care you need with a video conversation.  If a prescription is necessary we can send it directly to your pharmacy.  If lab work is needed we can place an order for that and you can then stop by our lab to have the test done at a later time.    If during the course of the call the physician/provider feels a video visit is not appropriate, you will not be charged for this service.\"     Patient confirmed that they are in [place] for today's visit Yes    Video-Visit Details  Type of service:  Video Visit    Video Start Time: 9:08  Video End Time:  9:30    Originating Location (pt. Location): Home    Distant Location (provider location):  [facility]     Platform used: [employer]      GASTROENTEROLOGY Video Follow up visit    CC/REFERRING MD:    Litzy Corona    HISTORY OF PRESENT ILLNESS:    Yolanda Vee is a 21 year old female who is being evaluated via a billable video visit.      Last GI clinic appt 5/12/2021.    egd and colonoscopy 6/3/21.  Colonoscopy wnl. egd with grade C esophagitis.    Patient states PPI bid is not helpful.    y'day had: nausea, vomiting acid, diarrhea, lower abdo pain.  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    Meds/  Campral  for etoh use d/o    fam hx/  No DM    ALLERGIES  Hydrocodone-acetaminophen    PE/  Gen: pleasant NAD  HEENT: hearing intact  Psych: AOx3, normal mood and affect    PERTINENT STUDIES/consults have been reviewed.  TSH wnl 11/2020 1/19/2021 Hematology consult: elevated WCC likely reactive. Normal PT/PTT/fibrinogen.    Impression/Recommendations:    Yolanda Vee is a 21 year old female who presents for follow up of GI symptoms.  Initial consult for hematochezia and hematemesis.  Endoscopic evaluation revealed normal colon, grade C esophagitis.  Patient states she throws up a bit of blood after she coughs a lot, and still sees a little bit of blood in her stool.  She also states she has new symptoms of vomiting and diarrhea and abdominal pain.  CT scan abdo pelvis completed 4/2021.   -- further labs.  ddx includes gastroparesis (check A1C), celiac (check serologies).  Elevated WCC - alc hep on ddx however liver tests have been wnl.  Recheck.   -- stool tests for infectious cause diarrhea  -- gastric emptying study  -- breath test to eval for sibo   -- trial carafate.  Patient states she tried it when she was 9 yrs old and it led to diarrhea.  Typically this med s/e is constipation.  She will try it again to see if it soothes her stomach  RTC 1-2 mo earlier prn    Appointment duration: 22 minutes      Thank you for this consultation.  It was a pleasure to participate in the care of this patient; please contact us with any further questions.                Again, thank you for allowing me to participate in the care of your patient.        Sincerely,        Amanda Linares MD

## 2021-07-14 NOTE — PROGRESS NOTES
"Yolanda Vee is a 21 year old female who is being evaluated via a billable video visit.      The patient has been notified of following:     \"This video visit will be conducted via a call between you and your physician/provider. We have found that certain health care needs can be provided without the need for an in-person physical exam.  This service lets us provide the care you need with a video conversation.  If a prescription is necessary we can send it directly to your pharmacy.  If lab work is needed we can place an order for that and you can then stop by our lab to have the test done at a later time.    If during the course of the call the physician/provider feels a video visit is not appropriate, you will not be charged for this service.\"     Patient confirmed that they are in [place] for today's visit Yes    Video-Visit Details  Type of service:  Video Visit    Video Start Time: 9:08  Video End Time:  9:30    Originating Location (pt. Location): [place]    Distant Location (provider location):  [facility] [place]     Platform used: [employer]      GASTROENTEROLOGY Video Follow up visit    CC/REFERRING MD:    Litzy Corona    HISTORY OF PRESENT ILLNESS:    Yolanda Vee is a 21 year old female who is being evaluated via a billable video visit.      Last GI clinic appt 5/12/2021.    egd and colonoscopy 6/3/21.  Colonoscopy wnl. egd with grade C esophagitis.    Patient states PPI bid is not helpful.    y'day had: nausea, vomiting acid, diarrhea, lower abdo pain.  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    Meds/  Campral  for etoh use d/o    fam hx/  No DM    ALLERGIES  Hydrocodone-acetaminophen    PE/  Gen: pleasant NAD  HEENT: hearing intact  Psych: AOx3, normal mood and affect    PERTINENT " STUDIES/consults have been reviewed.  TSH wnl 11/2020 1/19/2021 Hematology consult: elevated WCC likely reactive. Normal PT/PTT/fibrinogen.    Impression/Recommendations:    Yolanda Vee is a 21 year old female who presents for follow up of GI symptoms.  Initial consult for hematochezia and hematemesis.  Endoscopic evaluation revealed normal colon, grade C esophagitis.  Patient states she throws up a bit of blood after she coughs a lot, and still sees a little bit of blood in her stool.  She also states she has new symptoms of vomiting and diarrhea and abdominal pain.  CT scan abdo pelvis completed 4/2021.   -- further labs.  ddx includes gastroparesis (check A1C), celiac (check serologies).  Elevated WCC - alc hep on ddx however liver tests have been wnl.  Recheck.   -- stool tests for infectious cause diarrhea  -- gastric emptying study  -- breath test to eval for sibo   -- trial carafate.  Patient states she tried it when she was 9 yrs old and it led to diarrhea.  Typically this med s/e is constipation.  She will try it again to see if it soothes her stomach  RTC 1-2 mo earlier prn    Appointment duration: 22 minutes      Thank you for this consultation.  It was a pleasure to participate in the care of this patient; please contact us with any further questions.

## 2021-07-14 NOTE — NURSING NOTE
"Chief Complaint   Patient presents with     Video Visit     follow up after procedure       Vitals:    07/14/21 0844   Weight: 226 lb   Height: 4' 11\"       Body mass index is 45.65 kg/m .    Alicia Haynes CMA    "

## 2021-07-19 ENCOUNTER — TELEPHONE (OUTPATIENT)
Dept: FAMILY MEDICINE | Facility: CLINIC | Age: 21
End: 2021-07-19
Payer: COMMERCIAL

## 2021-08-03 PROBLEM — F10.20 ALCOHOL DEPENDENCE (H): Status: RESOLVED | Noted: 2021-04-06 | Resolved: 2021-05-06

## 2021-09-15 ENCOUNTER — OFFICE VISIT (OUTPATIENT)
Dept: FAMILY MEDICINE | Facility: CLINIC | Age: 21
End: 2021-09-15
Payer: COMMERCIAL

## 2021-09-15 VITALS
BODY MASS INDEX: 46.54 KG/M2 | HEART RATE: 85 BPM | OXYGEN SATURATION: 98 % | WEIGHT: 230.4 LBS | DIASTOLIC BLOOD PRESSURE: 70 MMHG | SYSTOLIC BLOOD PRESSURE: 110 MMHG

## 2021-09-15 DIAGNOSIS — M25.512 ACUTE PAIN OF LEFT SHOULDER: Primary | ICD-10-CM

## 2021-09-15 DIAGNOSIS — M54.2 NECK PAIN ON LEFT SIDE: ICD-10-CM

## 2021-09-15 PROBLEM — E66.01 MORBID OBESITY (H): Status: ACTIVE | Noted: 2021-09-15

## 2021-09-15 PROCEDURE — 99214 OFFICE O/P EST MOD 30 MIN: CPT | Mod: 25 | Performed by: FAMILY MEDICINE

## 2021-09-15 PROCEDURE — 91301 PR COVID VAC MODERNA 100 MCG/0.5 ML IM: CPT | Performed by: FAMILY MEDICINE

## 2021-09-15 PROCEDURE — 0011A PR COVID VAC MODERNA 100 MCG/0.5 ML IM: CPT | Performed by: FAMILY MEDICINE

## 2021-09-15 RX ORDER — CYCLOBENZAPRINE HCL 5 MG
5 TABLET ORAL 3 TIMES DAILY PRN
Qty: 30 TABLET | Refills: 0 | Status: SHIPPED | OUTPATIENT
Start: 2021-09-15 | End: 2022-01-27

## 2021-09-15 RX ORDER — NAPROXEN 500 MG/1
TABLET ORAL
Qty: 30 TABLET | Refills: 0 | Status: SHIPPED | OUTPATIENT
Start: 2021-09-15 | End: 2022-01-27

## 2021-09-15 NOTE — PATIENT INSTRUCTIONS
Take naproxen twice daily for 5-7 days    Try muscle relaxer    Referral placed for PT    Continue topical ice and heat    If not improving, see Ortho

## 2021-09-15 NOTE — PROGRESS NOTES
"    Assessment & Plan     Acute pain of left shoulder    - XR Cervical Spine 2/3 Views  - XR Shoulder Left 2 Views  - Physical Therapy Referral  - cyclobenzaprine (FLEXERIL) 5 MG tablet  Dispense: 30 tablet; Refill: 0  - naproxen (NAPROSYN) 500 MG tablet  Dispense: 30 tablet; Refill: 0    Neck pain on left side    - XR Cervical Spine 2/3 Views  - XR Shoulder Left 2 Views  - Physical Therapy Referral  - cyclobenzaprine (FLEXERIL) 5 MG tablet  Dispense: 30 tablet; Refill: 0  - naproxen (NAPROSYN) 500 MG tablet  Dispense: 30 tablet; Refill: 0      Discussed x-ray findings with patient.  X-ray of cervical spine shows evidence of muscle spasm.  Recommend short course of anti-inflammatory medication given history of GERD and esophagitis.  Will treat with naproxen 500 mg twice daily for 5 to 7 days.  Counseled her to take with food to minimize GI upset.  We will also treat with cyclobenzaprine to help with muscle spasm.  She was counseled on use of this medication.  Recommended physical therapy as well and referral is placed.  Discussed that if symptoms do not improve with this treatment plan she should see orthopedic surgeon for further evaluation.  She was comfortable with this plan.               Tobacco Cessation:   reports that she has been smoking cigarettes. She has a 5.00 pack-year smoking history. She has never used smokeless tobacco.      BMI:   Estimated body mass index is 46.54 kg/m  as calculated from the following:    Height as of 7/14/21: 1.499 m (4' 11\").    Weight as of this encounter: 104.5 kg (230 lb 6.4 oz).           No follow-ups on file.    Liberty Roberts MD  [facility] is a 21 year old who presents for the following health issues     HPI   She is seen today for concern of left shoulder and neck pain.  She reports that this has been bothering her since mid July.  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  She stopped working at this job in June.  Pain initially occurred intermittently.  Over the past couple of weeks it has been more constant and more severe.  [place] and started experiencing sharp pain in the left shoulder shooting up to her neck along with some numbness.  Pain has been constant since then.  She reports that it did seem to be swollen.  She has been icing and her mom has been massaging the area.  She has not been taking any pain medication.  She has pain with certain movements such as reaching above her head or behind her back.  Left arm feels a little bit weak.  She has had prior injury to the left shoulder.  Reports that several years ago when she was around age she fell out of the window.  She dislocated her left shoulder.  She would like the COVID-19 vaccine today.  We reviewed and updated her medications.  She has no other concerns or questions        Review of Systems         Objective    /70 (BP Location: Left arm, Cuff Size: Adult Regular)   Pulse 85   Wt 104.5 kg (230 lb 6.4 oz)   SpO2 98%   BMI 46.54 kg/m    Body mass index is 46.54 kg/m .  Physical Exam   GENERAL: healthy, alert and no distress  Musculoskeletal:: There is some tenderness over the cervical spine and in the left trapezius.  There is tenderness over the left acromioclavicular joint.  There is discomfort with abduction and internal range of motion in the left shoulder.  Increased discomfort also noted with impingement testing.  She complains of crackling and popping sensation in the left shoulder and neck area with range of motion testing.  Reflexes are normal in bilateral upper extremities    Xray - Reviewed and interpreted by me.  X-ray of left shoulder was performed in clinic.  This was personally reviewed.  Per my read it was negative.  X-ray of cervical spine was performed and personally reviewed.  Per my read  this showed loss of the normal cervical lordosis.

## 2021-09-19 ENCOUNTER — HOSPITAL ENCOUNTER (EMERGENCY)
Facility: CLINIC | Age: 21
Discharge: HOME | End: 2021-09-19
Attending: EMERGENCY MEDICINE | Admitting: EMERGENCY MEDICINE
Payer: COMMERCIAL

## 2021-09-19 VITALS
TEMPERATURE: 98.6 F | OXYGEN SATURATION: 98 % | WEIGHT: 230 LBS | DIASTOLIC BLOOD PRESSURE: 80 MMHG | HEART RATE: 98 BPM | HEIGHT: 59 IN | BODY MASS INDEX: 46.37 KG/M2 | SYSTOLIC BLOOD PRESSURE: 127 MMHG | RESPIRATION RATE: 18 BRPM

## 2021-09-19 DIAGNOSIS — N30.01 ACUTE CYSTITIS WITH HEMATURIA: ICD-10-CM

## 2021-09-19 LAB
ALBUMIN UR-MCNC: 30 MG/DL
APPEARANCE UR: ABNORMAL
BACTERIA #/AREA URNS HPF: ABNORMAL /HPF
BILIRUB UR QL STRIP: NEGATIVE
CLUE CELLS: ABNORMAL
COLOR UR AUTO: YELLOW
GLUCOSE UR STRIP-MCNC: NEGATIVE MG/DL
HCG UR QL: NEGATIVE
HGB UR QL STRIP: ABNORMAL
INTERNAL QC OK POCT: NORMAL
KETONES UR STRIP-MCNC: NEGATIVE MG/DL
LEUKOCYTE ESTERASE UR QL STRIP: ABNORMAL
MUCOUS THREADS #/AREA URNS LPF: PRESENT /LPF
NITRATE UR QL: NEGATIVE
PH UR STRIP: 6 [PH] (ref 5–7)
RBC URINE: 40 /HPF
SP GR UR STRIP: 1.03 (ref 1–1.03)
SQUAMOUS EPITHELIAL: 5 /HPF
TRICHOMONAS, WET PREP: ABNORMAL
UROBILINOGEN UR STRIP-MCNC: 3 MG/DL
WBC URINE: 19 /HPF
WBC'S/HIGH POWER FIELD, WET PREP: ABNORMAL
YEAST, WET PREP: ABNORMAL

## 2021-09-19 PROCEDURE — 87086 URINE CULTURE/COLONY COUNT: CPT | Performed by: EMERGENCY MEDICINE

## 2021-09-19 PROCEDURE — 81001 URINALYSIS AUTO W/SCOPE: CPT | Performed by: EMERGENCY MEDICINE

## 2021-09-19 PROCEDURE — 250N000013 HC RX MED GY IP 250 OP 250 PS 637: Performed by: EMERGENCY MEDICINE

## 2021-09-19 PROCEDURE — 99284 EMERGENCY DEPT VISIT MOD MDM: CPT

## 2021-09-19 PROCEDURE — 81025 URINE PREGNANCY TEST: CPT | Performed by: EMERGENCY MEDICINE

## 2021-09-19 PROCEDURE — 87491 CHLMYD TRACH DNA AMP PROBE: CPT | Performed by: EMERGENCY MEDICINE

## 2021-09-19 PROCEDURE — 87210 SMEAR WET MOUNT SALINE/INK: CPT | Performed by: EMERGENCY MEDICINE

## 2021-09-19 PROCEDURE — 87591 N.GONORRHOEAE DNA AMP PROB: CPT | Performed by: EMERGENCY MEDICINE

## 2021-09-19 RX ORDER — CEPHALEXIN 500 MG/1
500 CAPSULE ORAL 3 TIMES DAILY
Qty: 21 CAPSULE | Refills: 0 | Status: SHIPPED | OUTPATIENT
Start: 2021-09-19 | End: 2021-09-26

## 2021-09-19 RX ORDER — ACETAMINOPHEN 325 MG/1
975 TABLET ORAL ONCE
Status: COMPLETED | OUTPATIENT
Start: 2021-09-19 | End: 2021-09-19

## 2021-09-19 RX ORDER — IBUPROFEN 600 MG/1
600 TABLET, FILM COATED ORAL ONCE
Status: DISCONTINUED | OUTPATIENT
Start: 2021-09-19 | End: 2021-09-19 | Stop reason: HOSPADM

## 2021-09-19 RX ADMIN — ACETAMINOPHEN 975 MG: 325 TABLET ORAL at 04:09

## 2021-09-19 ASSESSMENT — ENCOUNTER SYMPTOMS
FEVER: 1
DIARRHEA: 1
VOMITING: 0
NAUSEA: 1
ABDOMINAL PAIN: 1
FREQUENCY: 1
BACK PAIN: 1
DYSURIA: 1

## 2021-09-19 ASSESSMENT — MIFFLIN-ST. JEOR: SCORE: 1713.9

## 2021-09-19 NOTE — ED NOTES
Pt states allergy to ibuprofen. Notified dr. Limon.    Pt requesting something to drink to help void. OK per dr. limon

## 2021-09-19 NOTE — ED PROVIDER NOTES
EMERGENCY DEPARTMENT ENCOUNTER      NAME: Yolanda Vee  AGE: 21 year old female  YOB: 2000  MRN: [id number]  EVALUATION DATE & TIME: 9/19/2021  3:27 AM    PCP: Litzy Corona    ED PROVIDER: Jong Limon M.D.      Chief Complaint   Patient presents with     Dysuria     Vaginal Discharge         FINAL IMPRESSION:  1. Acute cystitis with hematuria          ED COURSE & MEDICAL DECISION MAKING:    Pertinent Labs & Imaging studies reviewed. (See chart for details)  21 year old female presents to the Emergency Department for evaluation of dysuria and vaginal discharge. Does have a UTI on urinalysis. Wet prep is negative. Did do GC committee of the she denies sexual activity. We will not treat her at this time. Abdomen nontender. No signs of other pathology. Do not think this is PID. We will treat her for UTI. She will follow up with her primary. Return for worsening symptoms.    3:35 AM I met with the patient to gather history and to perform my initial exam. I discussed the plan for care while in the Emergency Department. PPE: Mask and eye protection.    At the conclusion of the encounter I discussed the results of all of the tests and the disposition. The questions were answered. The patient or family acknowledged understanding and was agreeable with the care plan.           MEDICATIONS GIVEN IN THE EMERGENCY:  Medications   ibuprofen (ADVIL/MOTRIN) tablet 600 mg (600 mg Oral Not Given 9/19/21 0350)   acetaminophen (TYLENOL) tablet 975 mg (975 mg Oral Given 9/19/21 0409)       NEW PRESCRIPTIONS STARTED AT TODAY'S ER VISIT  Discharge Medication List as of 9/19/2021  5:40 AM      START taking these medications    Details   cephALEXin (KEFLEX) 500 MG capsule Take 1 capsule (500 mg) by mouth 3 times daily for 7 days, Disp-21 capsule, R-0, Local Print                =================================================================    HPI    Patient information was obtained from: Patient    Use of  : N/A       Yolanda Vee is a 21 year old female with a pertinent history of anxiety who presents to this ED via walk in for evaluation of vaginal discharge and abdominal pain.    The patient reports multiple complaints starting over the last few hours PTA.  She reports abdominal pain, lower back pain, vaginal bleeding, dark brown vaginal discharge, dysuria, urinary frequency, and nausea.  She also reports diarrhea over the last couple of days and a fever for the last day.  She is on Depo.  She denies any chance at being pregnant currently.  She is not sexually active currently.  She denies any previous STD.  She has not taken any medication at home for her symptoms.  She denies any vomiting or other complaints at this time.     REVIEW OF SYSTEMS   Review of Systems   Constitutional: Positive for fever.   Gastrointestinal: Positive for abdominal pain, diarrhea and nausea. Negative for vomiting.   Genitourinary: Positive for dysuria, frequency, vaginal bleeding and vaginal discharge.   Musculoskeletal: Positive for back pain (lower).   All other systems reviewed and are negative.     PAST MEDICAL HISTORY:  Past Medical History:   Diagnosis Date     Anxiety      Chronic constipation      Constipation      Depression      Depression      Morbid obesity (H) 03/06/2020     Obese      Plantar warts      PTSD (post-traumatic stress disorder)      PTSD (post-traumatic stress disorder)      Uncomplicated asthma        PAST SURGICAL HISTORY:  Past Surgical History:   Procedure Laterality Date     COLONOSCOPY N/A 6/3/2021    Procedure: COLONOSCOPY;  Surgeon: Guru Gerardo Prado MD;  Location:  GI     ESOPHAGOSCOPY, GASTROSCOPY, DUODENOSCOPY (EGD), COMBINED N/A 6/3/2021    Procedure: ESOPHAGOGASTRODUODENOSCOPY (EGD);  Surgeon: Guru Gerardo Prado MD;  Location:  GI       CURRENT MEDICATIONS:    Current Facility-Administered Medications   Medication     ibuprofen  (ADVIL/MOTRIN) tablet 600 mg     Current Outpatient Medications   Medication     cephALEXin (KEFLEX) 500 MG capsule     acamprosate (CAMPRAL) 333 MG EC tablet     bisacodyl (DULCOLAX) 5 MG EC tablet     cyclobenzaprine (FLEXERIL) 5 MG tablet     escitalopram (LEXAPRO) 10 MG tablet     ipratropium - albuterol 0.5 mg/2.5 mg/3 mL (DUONEB) 0.5-2.5 (3) MG/3ML neb solution     naproxen (NAPROSYN) 500 MG tablet     pantoprazole (PROTONIX) 40 MG EC tablet     prazosin (MINIPRESS) 5 MG capsule     PROAIR RESPICLICK 108 (90 Base) MCG/ACT inhaler     sucralfate (CARAFATE) 1 GM tablet     Vitamin D, Cholecalciferol, 25 MCG (1000 UT) TABS         ALLERGIES:  Allergies   Allergen Reactions     Ibuprofen Other (See Comments) and Hives     Throat gets itchy and sore     Hydrocodone-Acetaminophen Nausea       FAMILY HISTORY:  Family History   Problem Relation Age of Onset     Heart Disease Mother      Substance Abuse Mother         in remission     Substance Abuse Father      Bipolar Disorder Brother      Depression Brother      Anxiety Disorder Brother      Asthma Brother      Hypertension Maternal Grandmother      Arthritis Maternal Grandmother      Heart Disease Maternal Grandmother      Heart Disease Maternal Uncle      Heart Disease Maternal Uncle      Asthma Brother      Substance Abuse Brother         meth     Alcoholism Brother      Hodgkin's lymphoma Maternal Grandfather 26.00     Esophageal Cancer Maternal Grandfather          at 54.     Alcoholism Father      No Known Problems Sister      Acute Myocardial Infarction No family hx of        SOCIAL HISTORY:   Social History     Socioeconomic History     Marital status: Single     Spouse name: Not on file     Number of children: Not on file     Years of education: Not on file     Highest education level: Not on file   Occupational History     Not on file   Tobacco Use     Smoking status: Current Some Day Smoker     Packs/day: 1.00     Years: 5.00     Pack years: 5.00      "Types: Cigarettes     Smokeless tobacco: Never Used     Tobacco comment: 2 packs daily and vapes   Substance and Sexual Activity     Alcohol use: Yes     Alcohol/week: 6.0 - 8.0 standard drinks     Drug use: Yes     Types: Marijuana     Comment: Drug use: every night     Sexual activity: Not on file   Other Topics Concern     Parent/sibling w/ CABG, MI or angioplasty before 65F 55M? Not Asked   Social History Narrative     Not on file     Social Determinants of Health     Financial Resource Strain:      Difficulty of Paying Living Expenses:    Food Insecurity:      Worried About Running Out of Food in the Last Year:      Ran Out of Food in the Last Year:    Transportation Needs:      Lack of Transportation (Medical):      Lack of Transportation (Non-Medical):    Physical Activity:      Days of Exercise per Week:      Minutes of Exercise per Session:    Stress:      Feeling of Stress :    Social Connections:      Frequency of Communication with Friends and Family:      Frequency of Social Gatherings with Friends and Family:      Attends Zoroastrianism Services:      Active Member of Clubs or Organizations:      Attends Club or Organization Meetings:      Marital Status:    Intimate Partner Violence:      Fear of Current or Ex-Partner:      Emotionally Abused:      Physically Abused:      Sexually Abused:        VITALS:  /80   Pulse 98   Temp 98.6  F (37  C) (Oral)   Resp 18   Ht 1.499 m (4' 11\")   Wt 104.3 kg (230 lb)   SpO2 98%   BMI 46.45 kg/m      PHYSICAL EXAM    Physical Exam  Constitutional:       General: She is not in acute distress.     Appearance: She is not diaphoretic.   HENT:      Head: Atraumatic.      Mouth/Throat:      Pharynx: No oropharyngeal exudate.   Eyes:      General: No scleral icterus.     Pupils: Pupils are equal, round, and reactive to light.   Cardiovascular:      Heart sounds: Normal heart sounds.   Pulmonary:      Effort: No respiratory distress.      Breath sounds: Normal breath " sounds.   Abdominal:      Palpations: Abdomen is soft.      Tenderness: There is no abdominal tenderness.   Musculoskeletal:         General: No tenderness.   Skin:     General: Skin is warm.      Findings: No rash.           LAB:  All pertinent labs reviewed and interpreted.  Labs Ordered and Resulted from Time of ED Arrival Up to the Time of Departure from the ED   ROUTINE UA WITH MICROSCOPIC REFLEX TO CULTURE - Abnormal; Notable for the following components:       Result Value    Appearance Urine Turbid (*)     Specific Gravity Urine 1.033 (*)     Blood Urine >1.0 mg/dL (*)     Protein Albumin Urine 30  (*)     Urobilinogen Urine 3.0 (*)     Leukocyte Esterase Urine 75 Salvador/uL (*)     Bacteria Urine Few (*)     Mucus Urine Present (*)     RBC Urine 40 (*)     WBC Urine 19 (*)     Squamous Epithelials Urine 5 (*)     All other components within normal limits    Narrative:     Urine Culture ordered based on laboratory criteria   WET PREPARATION - Abnormal; Notable for the following components:    WBCs/high power field 2+ (*)     All other components within normal limits   HCG QUALITATIVE URINE POCT - Normal   URINE CULTURE   CHLAMYDIA TRACHOMATIS PCR   NEISSERIA GONORRHOEAE PCR       RADIOLOGY:  Reviewed all pertinent imaging. Please see official radiology report.  No orders to display     PROCEDURES:   None      I, Cleve Mann, am serving as a scribe to document services personally performed by Dr. Jong Limon, based on my observation and the provider's statements to me. I, Jong Limon MD attest that Cleve Mann is acting in a scribe capacity, has observed my performance of the services and has documented them in accordance with my direction.    Jong Limon M.D.  Emergency Medicine  [facility] EMERGENCY ROOM  [address] [id number] [phone]  Dept: [phone]      Jong Limon MD  09/19/21 0618

## 2021-09-19 NOTE — ED TRIAGE NOTES
Urine cloudy and blood and brown discharge with wiping, pt reports fever all day.  Pt reports vaginal pain.  Pt also reports bilateral low back pain, hurts to walk

## 2021-09-20 ENCOUNTER — TELEPHONE (OUTPATIENT)
Dept: SURGERY | Facility: CLINIC | Age: 21
End: 2021-09-20

## 2021-09-20 LAB
BACTERIA UR CULT: NORMAL
C TRACH DNA SPEC QL NAA+PROBE: NEGATIVE
N GONORRHOEA DNA SPEC QL NAA+PROBE: NEGATIVE

## 2021-09-20 NOTE — TELEPHONE ENCOUNTER
Pt states she was told to schedule meet and greet with surgeon. She would like a call back to discuss     [phone]

## 2021-09-20 NOTE — TELEPHONE ENCOUNTER
Patient just needed to be scheduled for an intro visit so we got her on the schedule with Dr. Amezquita and the dietitian at the earliest available at the beginning of November.  Stacy Torres RN

## 2021-09-21 ENCOUNTER — HOSPITAL ENCOUNTER (EMERGENCY)
Facility: CLINIC | Age: 21
Discharge: HOME | End: 2021-09-21
Attending: EMERGENCY MEDICINE | Admitting: EMERGENCY MEDICINE
Payer: COMMERCIAL

## 2021-09-21 ENCOUNTER — APPOINTMENT (OUTPATIENT)
Dept: CT IMAGING | Facility: CLINIC | Age: 21
End: 2021-09-21
Attending: EMERGENCY MEDICINE
Payer: COMMERCIAL

## 2021-09-21 VITALS
RESPIRATION RATE: 16 BRPM | TEMPERATURE: 97.7 F | OXYGEN SATURATION: 98 % | WEIGHT: 230 LBS | DIASTOLIC BLOOD PRESSURE: 75 MMHG | SYSTOLIC BLOOD PRESSURE: 117 MMHG | HEART RATE: 80 BPM | HEIGHT: 59 IN | BODY MASS INDEX: 46.37 KG/M2

## 2021-09-21 DIAGNOSIS — N30.91 HEMORRHAGIC CYSTITIS: ICD-10-CM

## 2021-09-21 LAB
ANION GAP SERPL CALCULATED.3IONS-SCNC: 9 MMOL/L (ref 5–18)
BUN SERPL-MCNC: 6 MG/DL (ref 8–22)
CALCIUM SERPL-MCNC: 8.7 MG/DL (ref 8.5–10.5)
CHLORIDE BLD-SCNC: 109 MMOL/L (ref 98–107)
CO2 SERPL-SCNC: 25 MMOL/L (ref 22–31)
CREAT SERPL-MCNC: 0.76 MG/DL (ref 0.6–1.1)
ERYTHROCYTE [DISTWIDTH] IN BLOOD BY AUTOMATED COUNT: 14.1 % (ref 10–15)
GFR SERPL CREATININE-BSD FRML MDRD: >90 ML/MIN/1.73M2
GLUCOSE BLD-MCNC: 96 MG/DL (ref 70–125)
HCT VFR BLD AUTO: 43 % (ref 35–47)
HGB BLD-MCNC: 13.5 G/DL (ref 11.7–15.7)
LACTATE SERPL-SCNC: 0.7 MMOL/L (ref 0.7–2)
MCH RBC QN AUTO: 28.2 PG (ref 26.5–33)
MCHC RBC AUTO-ENTMCNC: 31.4 G/DL (ref 31.5–36.5)
MCV RBC AUTO: 90 FL (ref 78–100)
PLATELET # BLD AUTO: 336 10E3/UL (ref 150–450)
POTASSIUM BLD-SCNC: 3.5 MMOL/L (ref 3.5–5)
RBC # BLD AUTO: 4.78 10E6/UL (ref 3.8–5.2)
SODIUM SERPL-SCNC: 143 MMOL/L (ref 136–145)
WBC # BLD AUTO: 14 10E3/UL (ref 4–11)

## 2021-09-21 PROCEDURE — 83605 ASSAY OF LACTIC ACID: CPT | Performed by: EMERGENCY MEDICINE

## 2021-09-21 PROCEDURE — 250N000013 HC RX MED GY IP 250 OP 250 PS 637: Performed by: EMERGENCY MEDICINE

## 2021-09-21 PROCEDURE — 250N000011 HC RX IP 250 OP 636: Performed by: EMERGENCY MEDICINE

## 2021-09-21 PROCEDURE — 96375 TX/PRO/DX INJ NEW DRUG ADDON: CPT

## 2021-09-21 PROCEDURE — 96361 HYDRATE IV INFUSION ADD-ON: CPT

## 2021-09-21 PROCEDURE — 36415 COLL VENOUS BLD VENIPUNCTURE: CPT | Performed by: EMERGENCY MEDICINE

## 2021-09-21 PROCEDURE — 80048 BASIC METABOLIC PNL TOTAL CA: CPT | Performed by: EMERGENCY MEDICINE

## 2021-09-21 PROCEDURE — 74177 CT ABD & PELVIS W/CONTRAST: CPT

## 2021-09-21 PROCEDURE — 258N000003 HC RX IP 258 OP 636: Performed by: EMERGENCY MEDICINE

## 2021-09-21 PROCEDURE — 96374 THER/PROPH/DIAG INJ IV PUSH: CPT | Mod: 59

## 2021-09-21 PROCEDURE — 99285 EMERGENCY DEPT VISIT HI MDM: CPT | Mod: 25

## 2021-09-21 PROCEDURE — 85018 HEMOGLOBIN: CPT | Performed by: EMERGENCY MEDICINE

## 2021-09-21 RX ORDER — PHENAZOPYRIDINE HYDROCHLORIDE 100 MG/1
100 TABLET, FILM COATED ORAL ONCE
Status: COMPLETED | OUTPATIENT
Start: 2021-09-21 | End: 2021-09-21

## 2021-09-21 RX ORDER — ONDANSETRON 8 MG/1
8 TABLET, ORALLY DISINTEGRATING ORAL EVERY 8 HOURS PRN
Qty: 12 TABLET | Refills: 0 | Status: SHIPPED | OUTPATIENT
Start: 2021-09-21 | End: 2022-01-27

## 2021-09-21 RX ORDER — NAPROXEN 500 MG/1
500 TABLET ORAL 2 TIMES DAILY WITH MEALS
Qty: 24 TABLET | Refills: 0 | Status: SHIPPED | OUTPATIENT
Start: 2021-09-21 | End: 2021-09-27

## 2021-09-21 RX ORDER — ACETAMINOPHEN 325 MG/1
650 TABLET ORAL ONCE
Status: COMPLETED | OUTPATIENT
Start: 2021-09-21 | End: 2021-09-21

## 2021-09-21 RX ORDER — KETOROLAC TROMETHAMINE 30 MG/ML
30 INJECTION, SOLUTION INTRAMUSCULAR; INTRAVENOUS ONCE
Status: COMPLETED | OUTPATIENT
Start: 2021-09-21 | End: 2021-09-21

## 2021-09-21 RX ORDER — ONDANSETRON 2 MG/ML
4 INJECTION INTRAMUSCULAR; INTRAVENOUS ONCE
Status: COMPLETED | OUTPATIENT
Start: 2021-09-21 | End: 2021-09-21

## 2021-09-21 RX ORDER — IOPAMIDOL 755 MG/ML
100 INJECTION, SOLUTION INTRAVASCULAR ONCE
Status: COMPLETED | OUTPATIENT
Start: 2021-09-21 | End: 2021-09-21

## 2021-09-21 RX ORDER — PHENAZOPYRIDINE HYDROCHLORIDE 200 MG/1
200 TABLET, FILM COATED ORAL 3 TIMES DAILY PRN
Qty: 9 TABLET | Refills: 0 | Status: SHIPPED | OUTPATIENT
Start: 2021-09-21 | End: 2021-09-24

## 2021-09-21 RX ADMIN — IOPAMIDOL 100 ML: 755 INJECTION, SOLUTION INTRAVENOUS at 05:35

## 2021-09-21 RX ADMIN — ONDANSETRON 4 MG: 2 INJECTION INTRAMUSCULAR; INTRAVENOUS at 04:23

## 2021-09-21 RX ADMIN — KETOROLAC TROMETHAMINE 30 MG: 30 INJECTION, SOLUTION INTRAMUSCULAR at 04:26

## 2021-09-21 RX ADMIN — PHENAZOPYRIDINE 100 MG: 100 TABLET ORAL at 06:25

## 2021-09-21 RX ADMIN — SODIUM CHLORIDE 1000 ML: 9 INJECTION, SOLUTION INTRAVENOUS at 04:28

## 2021-09-21 RX ADMIN — ACETAMINOPHEN 650 MG: 325 TABLET ORAL at 06:25

## 2021-09-21 ASSESSMENT — ENCOUNTER SYMPTOMS
ABDOMINAL PAIN: 1
FEVER: 1
NAUSEA: 1
BACK PAIN: 1
VOMITING: 1

## 2021-09-21 ASSESSMENT — MIFFLIN-ST. JEOR: SCORE: 1713.9

## 2021-09-21 NOTE — DISCHARGE INSTRUCTIONS
Thankfully here your CAT scan looks normal.  You should continue to take your antibiotics.  I did prescribe you some medications for pain and nausea.  Follow-up with primary care doctor in 2 days.

## 2021-09-21 NOTE — ED TRIAGE NOTES
Pt here with complaints of vaginal bleeding,abdominal pain and fever. Pt states she was seen at [facility] on Saturday and dx with acute cystitis and hematuria pt was given prescriptions for pain medication and antibiotics with little relief. Pt is rating pain 10/10 on verbal pain scale.

## 2021-09-21 NOTE — ED PROVIDER NOTES
EMERGENCY DEPARTMENT ENCOUNTER      NAME: Yolanda Vee  AGE: 21 year old female  YOB: 2000  MRN: [id number]  EVALUATION DATE & TIME: 9/21/2021  3:44 AM    PCP: Litzy Corona    ED PROVIDER: Tiffany Umana M.D.      Chief Complaint   Patient presents with     Urinary Frequency         FINAL IMPRESSION:  1. Hemorrhagic cystitis        MEDICAL DECISION MAKING:  Pertinent Labs & Imaging studies reviewed. (See chart for details)  ED Course as of Sep 21 2205   Tue Sep 21, 2021   0401 Afebrile.  Vital signs here with heart rate 99, blood pressure slightly elevated but otherwise unremarkable.  Patient here for evaluation of some lower abdominal pain, nausea.  She was seen here recently for vaginal bleeding.  She states she still having vaginal bleeding, she is on the Depo-Provera shot.  She states she normally does not get her periods.  She was seen here, evaluated and found to have cystitis.           Patient feeling much better after pain medication and fluids.  No longer feeling nauseated.  She did get a CT scan to does not show any acute process.  She was fully worked up few days ago as well and had pelvic exam but was unremarkable.  Again when speaking with patient sounds like she is having her normal.  But she was just concerned that she is having vaginal bleeding in the setting of the fact that she is on the Provera.  Was told that you can get breakthrough bleeding with fact, she is comfortable with this.  Happy with results of her CT scan.  Comfortable with discharge to home with medications, told to continue antibiotics as we do not have culture results back yet.  Discussed return precautions.  She was in agreement with plan.    Critical care: 0 minutes excluding separately billable procedures.  Includes bedside management, time reviewing test results, review of records, discussing the case with staff, documenting the medical record and time spent with family members (or surrogate decision  makers) discussing specific treatment issues.      ED Course:  3:53 AM I met with the patient, obtained history, performed an initial exam, and discussed options and plan for diagnostics and treatment here in the ED.     The importance of close follow up was discussed. We reviewed warning signs and symptoms, and I instructed Ms. Vee to return to the emergency department immediately if she develops any new or worsening symptoms. I provided additional verbal discharge instructions. Ms. Vee expressed understanding and agreement with this plan of care, her questions were answered, and she was discharged in stable condition.     MEDICATIONS GIVEN IN THE EMERGENCY:  Medications   0.9% sodium chloride BOLUS (0 mLs Intravenous Stopped 9/21/21 0614)   ketorolac (TORADOL) injection 30 mg (30 mg Intravenous Given 9/21/21 0426)   ondansetron (ZOFRAN) injection 4 mg (4 mg Intravenous Given 9/21/21 0423)   iopamidol (ISOVUE-370) solution 100 mL (100 mLs Intravenous Given 9/21/21 0535)   acetaminophen (TYLENOL) tablet 650 mg (650 mg Oral Given 9/21/21 0625)   phenazopyridine (PYRIDIUM) tablet 100 mg (100 mg Oral Given 9/21/21 0625)       NEW PRESCRIPTIONS STARTED AT TODAY'S ER VISIT:  Discharge Medication List as of 9/21/2021  6:14 AM      START taking these medications    Details   !! naproxen (NAPROSYN) 500 MG tablet Take 1 tablet (500 mg) by mouth 2 times daily (with meals) for 12 days, Disp-24 tablet, R-0, Local Print      ondansetron (ZOFRAN-ODT) 8 MG ODT tab Take 1 tablet (8 mg) by mouth every 8 hours as needed for nausea, Disp-12 tablet, R-0, Local Print      phenazopyridine (PYRIDIUM) 200 MG tablet Take 1 tablet (200 mg) by mouth 3 times daily as needed for irritation, Disp-9 tablet, R-0, Local Print       !! - Potential duplicate medications found. Please discuss with provider.             =================================================================    HPI    Patient information was obtained from:  Patient    Use of : N/A         Yolanda Vee is a 21 year old female with a history of anxiety, who presents for evaluation of vaginal bleeding.    Per chart review, the patient was seen here on 9/19/21 for urinary symptoms and vaginal bleeding.  She was found to have a UTI and started on keflex.      The patient reports continuing vaginal bleeding, lower abdominal and back pain, and fever.  She reports none of these symptoms have improved since she was seen here two days ago.  She does note developing nausea and vomiting over the last two days.  She has taken the Keflex as prescribed.  She has tried applying heat to her abdomen and back with minimal relief.  She denies taking any pain medication.  Of note, she is on the Depo shot.  She denies any other complaints at this time.      REVIEW OF SYSTEMS   Review of Systems   Constitutional: Positive for fever.   Gastrointestinal: Positive for abdominal pain (lower), nausea and vomiting.   Genitourinary: Positive for vaginal bleeding.   Musculoskeletal: Positive for back pain (lower).   All other systems reviewed and are negative.        PAST MEDICAL HISTORY:  Past Medical History:   Diagnosis Date     Anxiety      Chronic constipation      Constipation      Depression      Depression      Morbid obesity (H) 03/06/2020     Obese      Plantar warts      PTSD (post-traumatic stress disorder)      PTSD (post-traumatic stress disorder)      Uncomplicated asthma        PAST SURGICAL HISTORY:  Past Surgical History:   Procedure Laterality Date     COLONOSCOPY N/A 6/3/2021    Procedure: COLONOSCOPY;  Surgeon: Guru Gerardo Prado MD;  Location:  GI     ESOPHAGOSCOPY, GASTROSCOPY, DUODENOSCOPY (EGD), COMBINED N/A 6/3/2021    Procedure: ESOPHAGOGASTRODUODENOSCOPY (EGD);  Surgeon: Guru Gerardo Prado MD;  Location:  GI       CURRENT MEDICATIONS:    No current facility-administered medications for this encounter.    Current  Outpatient Medications:      naproxen (NAPROSYN) 500 MG tablet, Take 1 tablet (500 mg) by mouth 2 times daily (with meals) for 12 days, Disp: 24 tablet, Rfl: 0     ondansetron (ZOFRAN-ODT) 8 MG ODT tab, Take 1 tablet (8 mg) by mouth every 8 hours as needed for nausea, Disp: 12 tablet, Rfl: 0     phenazopyridine (PYRIDIUM) 200 MG tablet, Take 1 tablet (200 mg) by mouth 3 times daily as needed for irritation, Disp: 9 tablet, Rfl: 0     acamprosate (CAMPRAL) 333 MG EC tablet, , Disp: , Rfl:      bisacodyl (DULCOLAX) 5 MG EC tablet, Take 2 tablets by mouth at bedtime 2 days prior to procedure.  Take 2 tablets by mouth at 3pm day prior to procedure., Disp: 4 tablet, Rfl: 0     cephALEXin (KEFLEX) 500 MG capsule, Take 1 capsule (500 mg) by mouth 3 times daily for 7 days, Disp: 21 capsule, Rfl: 0     cyclobenzaprine (FLEXERIL) 5 MG tablet, Take 1 tablet (5 mg) by mouth 3 times daily as needed for muscle spasms, Disp: 30 tablet, Rfl: 0     escitalopram (LEXAPRO) 10 MG tablet, Take 1 tablet (10 mg) by mouth daily For refills, please make an appointment with Health system psychiatry clinic at [phone] (Patient taking differently: Take 10 mg by mouth every morning For refills, please make an appointment with Health system psychiatry clinic at [phone]), Disp: 30 tablet, Rfl: 1     ipratropium - albuterol 0.5 mg/2.5 mg/3 mL (DUONEB) 0.5-2.5 (3) MG/3ML neb solution, Inhale 3 mLs into the lungs every 6 hours , Disp: , Rfl:      naproxen (NAPROSYN) 500 MG tablet, Take 1 tablet by mouth twice daily for 7 days and then twice daily as needed for pain, Disp: 30 tablet, Rfl: 0     pantoprazole (PROTONIX) 40 MG EC tablet, Take 1 tablet (40 mg) by mouth daily, Disp: 31 tablet, Rfl: 3     prazosin (MINIPRESS) 5 MG capsule, At Bedtime , Disp: , Rfl:      PROAIR RESPICLICK 108 (90 Base) MCG/ACT inhaler, as needed , Disp: , Rfl:      sucralfate (CARAFATE) 1 GM tablet, Take 1 tablet (1 g) by mouth 4 times daily (before meals and  nightly), Disp: 180 tablet, Rfl: 3     Vitamin D, Cholecalciferol, 25 MCG (1000 UT) TABS, Take by mouth every morning, Disp: , Rfl:     ALLERGIES:  Allergies   Allergen Reactions     Ibuprofen Other (See Comments) and Hives     Throat gets itchy and sore     Hydrocodone-Acetaminophen Nausea       FAMILY HISTORY:  Family History   Problem Relation Age of Onset     Heart Disease Mother      Substance Abuse Mother         in remission     Substance Abuse Father      Bipolar Disorder Brother      Depression Brother      Anxiety Disorder Brother      Asthma Brother      Hypertension Maternal Grandmother      Arthritis Maternal Grandmother      Heart Disease Maternal Grandmother      Heart Disease Maternal Uncle      Heart Disease Maternal Uncle      Asthma Brother      Substance Abuse Brother         meth     Alcoholism Brother      Hodgkin's lymphoma Maternal Grandfather 26.00     Esophageal Cancer Maternal Grandfather          at 54.     Alcoholism Father      No Known Problems Sister      Acute Myocardial Infarction No family hx of        SOCIAL HISTORY:   Social History     Socioeconomic History     Marital status: Single     Spouse name: Not on file     Number of children: Not on file     Years of education: Not on file     Highest education level: Not on file   Occupational History     Not on file   Tobacco Use     Smoking status: Current Some Day Smoker     Packs/day: 1.00     Years: 5.00     Pack years: 5.00     Types: Cigarettes     Smokeless tobacco: Never Used     Tobacco comment: 2 packs daily and vapes   Substance and Sexual Activity     Alcohol use: Yes     Alcohol/week: 6.0 - 8.0 standard drinks     Drug use: Yes     Types: Marijuana     Comment: Drug use: every night     Sexual activity: Not on file   Other Topics Concern     Parent/sibling w/ CABG, MI or angioplasty before 65F 55M? Not Asked   Social History Narrative     Not on file     Social Determinants of Health     Financial Resource Strain:   "    Difficulty of Paying Living Expenses:    Food Insecurity:      Worried About Running Out of Food in the Last Year:      Ran Out of Food in the Last Year:    Transportation Needs:      Lack of Transportation (Medical):      Lack of Transportation (Non-Medical):    Physical Activity:      Days of Exercise per Week:      Minutes of Exercise per Session:    Stress:      Feeling of Stress :    Social Connections:      Frequency of Communication with Friends and Family:      Frequency of Social Gatherings with Friends and Family:      Attends Mu-ism Services:      Active Member of Clubs or Organizations:      Attends Club or Organization Meetings:      Marital Status:    Intimate Partner Violence:      Fear of Current or Ex-Partner:      Emotionally Abused:      Physically Abused:      Sexually Abused:        PHYSICAL EXAM:    Vitals: /75   Pulse 80   Temp 97.7  F (36.5  C) (Oral)   Resp 16   Ht 1.499 m (4' 11\")   Wt 104.3 kg (230 lb)   SpO2 98%   BMI 46.45 kg/m     General:. Alert and interactive, comfortable appearing.  HENT: Oropharynx without erythema or exudates. MMM.  TMs clear bilaterally.  Eyes: Pupils mid-sized and equally reactive.   Neck: Full AROM.  No midline tenderness to palpation.  Cardiovascular: Regular rate and rhythm. Peripheral pulses 2+ bilaterally.  Chest/Pulmonary: Normal work of breathing. Lung sounds clear and equal throughout, no wheezes or crackles. No chest wall tenderness or deformities.  Abdomen: Soft, nondistended. Nontender without guarding or rebound.  Back/Spine: No CVA or midline tenderness.  Extremities: Normal ROM of all major joints. No lower extremity edema.   Skin: Warm and dry. Normal skin color.   Neuro: Speech clear. CNs grossly intact. Moves all extremities appropriately. Strength and sensation grossly intact to all extremities.   Psych: Normal affect/mood, cooperative, memory appropriate.     LAB:  All pertinent labs reviewed and interpreted.  Labs Ordered " and Resulted from Time of ED Arrival Up to the Time of Departure from the ED   CBC WITH PLATELETS - Abnormal; Notable for the following components:       Result Value    WBC Count 14.0 (*)     MCHC 31.4 (*)     All other components within normal limits   BASIC METABOLIC PANEL - Abnormal; Notable for the following components:    Chloride 109 (*)     Urea Nitrogen 6 (*)     All other components within normal limits   LACTIC ACID WHOLE BLOOD - Normal       RADIOLOGY:  CT Abdomen Pelvis w Contrast   Final Result   IMPRESSION:    1.  Negative abdomen and pelvis CT. No acute abnormalities or CT findings to explain the patient's symptoms.                           EKG:  See MDM    PROCEDURES:   None    I, Cleve Mann, am serving as a scribe to document services personally performed by Dr. Tiffany Umana  based on my observation and the provider's statements to me. I, Tiffany Umana MD attest that Cleve Mann is acting in a scribe capacity, has observed my performance of the services and has documented them in accordance with my direction.      Tiffany Umana M.D.  Emergency Medicine  [facility] EMERGENCY ROOM  [address] [id number]  [phone]  Dept: [phone]      Rosey Umana MD  09/21/21 9553

## 2021-09-21 NOTE — ED NOTES
Patient returns from radiology.  Aware of approximate wait time for results.  Will continue to monitor. Lights turned down for comfort.

## 2021-09-21 NOTE — ED NOTES
AIDET performed, white board updated for rounding. Patient updated on plan of care. Patient's pain assessed. Call light within reach, bed in low position, side rails up.

## 2021-09-25 ENCOUNTER — HEALTH MAINTENANCE LETTER (OUTPATIENT)
Age: 21
End: 2021-09-25

## 2021-09-27 ENCOUNTER — OFFICE VISIT (OUTPATIENT)
Dept: FAMILY MEDICINE | Facility: CLINIC | Age: 21
End: 2021-09-27
Payer: COMMERCIAL

## 2021-09-27 VITALS
HEART RATE: 95 BPM | BODY MASS INDEX: 46.39 KG/M2 | WEIGHT: 230.1 LBS | RESPIRATION RATE: 20 BRPM | TEMPERATURE: 98.3 F | DIASTOLIC BLOOD PRESSURE: 72 MMHG | OXYGEN SATURATION: 97 % | SYSTOLIC BLOOD PRESSURE: 110 MMHG | HEIGHT: 59 IN

## 2021-09-27 DIAGNOSIS — G89.29 CHRONIC ABDOMINAL PAIN: ICD-10-CM

## 2021-09-27 DIAGNOSIS — R30.0 DYSURIA: ICD-10-CM

## 2021-09-27 DIAGNOSIS — N93.9 VAGINAL BLEEDING: Primary | ICD-10-CM

## 2021-09-27 DIAGNOSIS — B37.31 YEAST VAGINITIS: ICD-10-CM

## 2021-09-27 DIAGNOSIS — R10.9 CHRONIC ABDOMINAL PAIN: ICD-10-CM

## 2021-09-27 DIAGNOSIS — N94.6 DYSMENORRHEA: ICD-10-CM

## 2021-09-27 DIAGNOSIS — R10.2 PELVIC PAIN IN FEMALE: ICD-10-CM

## 2021-09-27 LAB
ALBUMIN UR-MCNC: NEGATIVE MG/DL
APPEARANCE UR: CLEAR
BACTERIA #/AREA URNS HPF: NORMAL /HPF
BILIRUB UR QL STRIP: NEGATIVE
CLUE CELLS: ABNORMAL
COLOR UR AUTO: YELLOW
GLUCOSE UR STRIP-MCNC: NEGATIVE MG/DL
HGB UR QL STRIP: NEGATIVE
KETONES UR STRIP-MCNC: NEGATIVE MG/DL
LEUKOCYTE ESTERASE UR QL STRIP: NEGATIVE
NITRATE UR QL: NEGATIVE
PH UR STRIP: 8.5 [PH] (ref 5–8)
RBC #/AREA URNS AUTO: NORMAL /HPF
SP GR UR STRIP: 1.02 (ref 1–1.03)
SQUAMOUS #/AREA URNS AUTO: NORMAL /LPF
TRICHOMONAS, WET PREP: ABNORMAL
UROBILINOGEN UR STRIP-ACNC: 1 E.U./DL
WBC #/AREA URNS AUTO: NORMAL /HPF
WBC'S/HIGH POWER FIELD, WET PREP: ABNORMAL
YEAST, WET PREP: PRESENT

## 2021-09-27 PROCEDURE — 87086 URINE CULTURE/COLONY COUNT: CPT | Performed by: FAMILY MEDICINE

## 2021-09-27 PROCEDURE — 99214 OFFICE O/P EST MOD 30 MIN: CPT | Performed by: FAMILY MEDICINE

## 2021-09-27 PROCEDURE — 87210 SMEAR WET MOUNT SALINE/INK: CPT | Performed by: FAMILY MEDICINE

## 2021-09-27 PROCEDURE — 81001 URINALYSIS AUTO W/SCOPE: CPT | Performed by: FAMILY MEDICINE

## 2021-09-27 PROCEDURE — 87591 N.GONORRHOEAE DNA AMP PROB: CPT | Performed by: FAMILY MEDICINE

## 2021-09-27 PROCEDURE — 87491 CHLMYD TRACH DNA AMP PROBE: CPT | Performed by: FAMILY MEDICINE

## 2021-09-27 RX ORDER — MEDROXYPROGESTERONE ACETATE 150 MG/ML
150 INJECTION, SUSPENSION INTRAMUSCULAR
Status: DISCONTINUED | OUTPATIENT
Start: 2021-09-27 | End: 2022-01-27

## 2021-09-27 RX ORDER — FLUCONAZOLE 150 MG/1
TABLET ORAL
Qty: 2 TABLET | Refills: 0 | Status: SHIPPED | OUTPATIENT
Start: 2021-09-27 | End: 2021-11-08

## 2021-09-27 RX ADMIN — MEDROXYPROGESTERONE ACETATE 150 MG: 150 INJECTION, SUSPENSION INTRAMUSCULAR at 16:45

## 2021-09-27 ASSESSMENT — ASTHMA QUESTIONNAIRES
QUESTION_3 LAST FOUR WEEKS HOW OFTEN DID YOUR ASTHMA SYMPTOMS (WHEEZING, COUGHING, SHORTNESS OF BREATH, CHEST TIGHTNESS OR PAIN) WAKE YOU UP AT NIGHT OR EARLIER THAN USUAL IN THE MORNING: TWO OR THREE NIGHTS A WEEK
QUESTION_5 LAST FOUR WEEKS HOW WOULD YOU RATE YOUR ASTHMA CONTROL: SOMEWHAT CONTROLLED
QUESTION_1 LAST FOUR WEEKS HOW MUCH OF THE TIME DID YOUR ASTHMA KEEP YOU FROM GETTING AS MUCH DONE AT WORK, SCHOOL OR AT HOME: A LITTLE OF THE TIME
QUESTION_4 LAST FOUR WEEKS HOW OFTEN HAVE YOU USED YOUR RESCUE INHALER OR NEBULIZER MEDICATION (SUCH AS ALBUTEROL): ONCE A WEEK OR LESS
QUESTION_2 LAST FOUR WEEKS HOW OFTEN HAVE YOU HAD SHORTNESS OF BREATH: ONCE A DAY
ACT_TOTALSCORE: 15

## 2021-09-27 ASSESSMENT — MIFFLIN-ST. JEOR: SCORE: 1714.36

## 2021-09-27 NOTE — PROGRESS NOTES
Assessment/Plan:     Vaginal bleeding  It still difficult to know whether her bleeding is vaginal or from her urinary tract.  Examination today without evidence of bleeding from either.  Wet prep suggestive of yeast infection which is likely causing current dysuria.  Will await gonorrhea and chlamydia testing.  Unsuccessful in attempt to obtain Pap smear.  Will monitor.  - Wet prep - Clinic Collect  - NEISSERIA GONORRHOEA PCR  - CHLAMYDIA TRACHOMATIS PCR    Dysuria  Most likely related to external irritation currently.  Will await urine culture to ensure were not missing a true infection as well.  - UA with Microscopic reflex to Culture - Clinic Collect  - Urine Culture Aerobic Bacterial - lab collect; Future  - Urine Microscopic  - Urine Culture Aerobic Bacterial - lab collect    Chronic abdominal pain  This complicates ability to know what is acute versus what is chronic.  Patient feels that her symptoms are mostly chronic, perhaps a little bit worse.  I had like to start by treating yeast infection first.  Her abdomen is with mild tenderness, she is not had any fevers or chills, and many of her complaints are more related to vaginal bleeding and external dysuria, therefore will focus on treatment there.  Notify us with persistence of symptoms.    Pelvic pain in female  As above.  Unable to tell what is chronic versus acute.  Will await gonorrhea chlamydia screening.  - Wet prep - Clinic Collect  - NEISSERIA GONORRHOEA PCR  - CHLAMYDIA TRACHOMATIS PCR    Yeast vaginitis  We will treat with fluconazole repeating in 3 days.  Discussed that this likely was triggered by cephalexin course.  - fluconazole (DIFLUCAN) 150 MG tablet; Take one tablet by mouth now.  Repeat in 3 days.    Dysmenorrhea  She will be started on Depo-Provera series here and will plan to follow-up here.  - medroxyPROGESTERone (DEPO-PROVERA) injection 150 mg      There are no Patient Instructions on file for this visit.     Return in about 3  "months (around 12/27/2021) for Annual Preventive Care Visit with Pap.      Subjective:      Yolanda Vee is a 21 year old female presented to clinic today for follow-up of recent difficulties with bleeding.  States that on September 19 she was not feeling well with a \"hangover\" after drinking excess alcohol the night before, had significant dysuria and passed a large amount of bright red blood and so she went to the emergency room.  Evaluation there notable for blood in her urinalysis.  She self administered a wet prep and gonorrhea chlamydia screening however states that she attempted to do so there was quite a bit of blood and so she is not certain that she used the swabs correctly.  Urine pregnancy test was negative.  Diagnosed with cystitis with hemorrhage and started on cephalexin.  2 days later she had ongoing vaginal bleeding with low back pain and lower abdominal pain associated with some nausea and vomiting.  In the emergency room her lactic acid level was normal, white blood cell count was mildly elevated at 14 which is her baseline, and basic metabolic panel was unremarkable.  She had a CT scan of the abdomen and pelvis that was normal.  She was treated with Pyridium, IV fluids, and pain medication.  Since then has had ongoing bleeding.  States she is having some bleeding in her clothing needing to wear a pad at times, has not noticed any blood in the last 2 days though she tends to wear dark clothing.  She also notes dysuria but now it is more of an external burning when she urinates.  She has not seen any further blood in the toilet.  She is not noting blood on her toilet tissue.  She is uncertain if her bleeding was from her bladder or her vagina.  She continues to have some lower abdominal pain and reduced appetite though she states she has some chronic abdominal pain so this is not entirely abnormal.  She has some external burning and itching of the vulva.  On her last dose of Pyridium was " "about 2 days ago.  She receives Depo-Provera from [facility]\" she is needing to transfer care soon.  Her last Depo shot was late June, does not believe she is overdue for her Depo shot.  Has not had any vaginal bleeding when she has missed Depo in the past and has not previously had any breakthrough spotting or bleeding.  Continues to drink alcohol regularly but stopped about 2 weeks ago with this illness.    Current Outpatient Medications   Medication Sig Dispense Refill     acamprosate (CAMPRAL) 333 MG EC tablet        cyclobenzaprine (FLEXERIL) 5 MG tablet Take 1 tablet (5 mg) by mouth 3 times daily as needed for muscle spasms 30 tablet 0     escitalopram (LEXAPRO) 10 MG tablet Take 1 tablet (10 mg) by mouth daily For refills, please make an appointment with [facility] psychiatry clinic at [phone] (Patient taking differently: Take 10 mg by mouth every morning For refills, please make an appointment with [facility] psychiatry clinic at [phone]) 30 tablet 1     fluconazole (DIFLUCAN) 150 MG tablet Take one tablet by mouth now.  Repeat in 3 days. 2 tablet 0     ipratropium - albuterol 0.5 mg/2.5 mg/3 mL (DUONEB) 0.5-2.5 (3) MG/3ML neb solution Inhale 3 mLs into the lungs every 6 hours        naproxen (NAPROSYN) 500 MG tablet Take 1 tablet by mouth twice daily for 7 days and then twice daily as needed for pain 30 tablet 0     ondansetron (ZOFRAN-ODT) 8 MG ODT tab Take 1 tablet (8 mg) by mouth every 8 hours as needed for nausea 12 tablet 0     pantoprazole (PROTONIX) 40 MG EC tablet Take 1 tablet (40 mg) by mouth daily 31 tablet 3     prazosin (MINIPRESS) 5 MG capsule At Bedtime        PROAIR RESPICLICK 108 (90 Base) MCG/ACT inhaler as needed        sucralfate (CARAFATE) 1 GM tablet Take 1 tablet (1 g) by mouth 4 times daily (before meals and nightly) 180 tablet 3     Vitamin D, Cholecalciferol, 25 MCG (1000 UT) TABS Take by mouth every morning         Past Medical History, Family History, and " Social History reviewed.  Past Medical History:   Diagnosis Date     Anxiety      Chronic constipation      Constipation      Depression      Depression      Morbid obesity (H) 2020     Obese      Plantar warts      PTSD (post-traumatic stress disorder)      PTSD (post-traumatic stress disorder)      Uncomplicated asthma      Past Surgical History:   Procedure Laterality Date     COLONOSCOPY N/A 6/3/2021    Procedure: COLONOSCOPY;  Surgeon: Guru Gerardo Prado MD;  Location: [facility]     ESOPHAGOSCOPY, GASTROSCOPY, DUODENOSCOPY (EGD), COMBINED N/A 6/3/2021    Procedure: ESOPHAGOGASTRODUODENOSCOPY (EGD);  Surgeon: Guru Gerardo Prado MD;  Location: [facility]     Ibuprofen and Hydrocodone-acetaminophen  Family History   Problem Relation Age of Onset     Heart Disease Mother      Substance Abuse Mother         in remission     Substance Abuse Father      Bipolar Disorder Brother      Depression Brother      Anxiety Disorder Brother      Asthma Brother      Hypertension Maternal Grandmother      Arthritis Maternal Grandmother      Heart Disease Maternal Grandmother      Heart Disease Maternal Uncle      Heart Disease Maternal Uncle      Asthma Brother      Substance Abuse Brother         meth     Alcoholism Brother      Hodgkin's lymphoma Maternal Grandfather 26.00     Esophageal Cancer Maternal Grandfather          at 54.     Alcoholism Father      No Known Problems Sister      Acute Myocardial Infarction No family hx of      Social History     Socioeconomic History     Marital status: Single     Spouse name: Not on file     Number of children: Not on file     Years of education: Not on file     Highest education level: Not on file   Occupational History     Not on file   Tobacco Use     Smoking status: Current Some Day Smoker     Packs/day: 1.00     Years: 5.00     Pack years: 5.00     Types: Cigarettes     Smokeless tobacco: Never Used     Tobacco comment: 2 packs daily and vapes  "  Substance and Sexual Activity     Alcohol use: Yes     Alcohol/week: 6.0 - 8.0 standard drinks     Drug use: Yes     Types: Marijuana     Comment: Drug use: every night     Sexual activity: Not on file   Other Topics Concern     Parent/sibling w/ CABG, MI or angioplasty before 65F 55M? Not Asked   Social History Narrative     Not on file     Social Determinants of Health     Financial Resource Strain:      Difficulty of Paying Living Expenses:    Food Insecurity:      Worried About Running Out of Food in the Last Year:      Ran Out of Food in the Last Year:    Transportation Needs:      Lack of Transportation (Medical):      Lack of Transportation (Non-Medical):    Physical Activity:      Days of Exercise per Week:      Minutes of Exercise per Session:    Stress:      Feeling of Stress :    Social Connections:      Frequency of Communication with Friends and Family:      Frequency of Social Gatherings with Friends and Family:      Attends Yazidism Services:      Active Member of Clubs or Organizations:      Attends Club or Organization Meetings:      Marital Status:    Intimate Partner Violence:      Fear of Current or Ex-Partner:      Emotionally Abused:      Physically Abused:      Sexually Abused:          Review of systems is as stated in HPI, and the remainder of the 10 system review is otherwise negative.    Objective:     Vitals:    09/27/21 1528   BP: 110/72   Pulse: 95   Resp: 20   Temp: 98.3  F (36.8  C)   TempSrc: Oral   SpO2: 97%   Weight: 104.4 kg (230 lb 1.6 oz)   Height: 1.499 m (4' 11\")    Body mass index is 46.47 kg/m .    Alert female in no acute distress.  No CVA tenderness.  Mild tenderness palpation throughout abdomen but greatest in the suprapubic and bilateral lower quadrants, no rebound, guarding, or masses.  She tolerates deep palpation.  External female genitalia with a moderate amount of adherent white thick material consistent with yeast.  There is mild erythema throughout her vulva as " well.  On speculum exam she does not tolerate deeper speculum and I am not able to visualize her cervix but there is a moderate amount of erythema with thick adherent white discharge.  Mild cervical motion tenderness on exam is nonspecific.    Office Visit on 09/27/2021   Component Date Value Ref Range Status     Color Urine 09/27/2021 Yellow  Colorless, Straw, Light Yellow, Yellow Final     Appearance Urine 09/27/2021 Clear  Clear Final     Glucose Urine 09/27/2021 Negative  Negative mg/dL Final     Bilirubin Urine 09/27/2021 Negative  Negative Final     Ketones Urine 09/27/2021 Negative  Negative mg/dL Final     Specific Gravity Urine 09/27/2021 1.020  1.005 - 1.030 Final     Blood Urine 09/27/2021 Negative  Negative Final     pH Urine 09/27/2021 8.5* 5.0 - 8.0 Final     Protein Albumin Urine 09/27/2021 Negative  Negative mg/dL Final     Urobilinogen Urine 09/27/2021 1.0  0.2, 1.0 E.U./dL Final     Nitrite Urine 09/27/2021 Negative  Negative Final     Leukocyte Esterase Urine 09/27/2021 Negative  Negative Final     Trichomonas 09/27/2021 Absent  Absent Final     Yeast 09/27/2021 Present* Absent Final     Clue Cells 09/27/2021 Absent  Absent Final     WBCs/high power field 09/27/2021 1+* None Final     Bacteria Urine 09/27/2021 None Seen  None Seen /HPF Final    QNS for urine micro     RBC Urine 09/27/2021 None Seen  0-2 /HPF /HPF Final    QNS for urine micro     WBC Urine 09/27/2021 None Seen  0-5 /HPF /HPF Final    QNS for urine micro     Squamous Epithelials Urine 09/27/2021 None Seen  None Seen /LPF Final    QNS for urine micro            This note has been dictated using voice recognition software. Any grammatical or context distortions are unintentional and inherent to the the software.

## 2021-09-28 LAB — BACTERIA UR CULT: NORMAL

## 2021-09-28 ASSESSMENT — ASTHMA QUESTIONNAIRES: ACT_TOTALSCORE: 15

## 2021-09-29 LAB
C TRACH DNA SPEC QL NAA+PROBE: NEGATIVE
N GONORRHOEA DNA SPEC QL NAA+PROBE: NEGATIVE

## 2021-10-06 ENCOUNTER — TELEPHONE (OUTPATIENT)
Dept: LAB | Facility: CLINIC | Age: 21
End: 2021-10-06

## 2021-10-06 ENCOUNTER — PATIENT OUTREACH (OUTPATIENT)
Dept: GASTROENTEROLOGY | Facility: CLINIC | Age: 21
End: 2021-10-06

## 2021-10-06 NOTE — TELEPHONE ENCOUNTER
Left message to call clinic, please see below message.   Does she need a pre-op? What is the surgery, date, and surgeon.      The surgeon is typically responsible for ordering the Covid test.  It does not look like patient has had a preop.  She also appears to be having a procedure in November.  Please call patient to find out what surgery she is having, when, and by which provider to determine if it is appropriate to obtain Covid test now or if it is something anticipated in the future.  Thank you.  GABI

## 2021-10-06 NOTE — TELEPHONE ENCOUNTER
The surgeon is typically responsible for ordering the Covid test.  It does not look like patient has had a preop.  She also appears to be having a procedure in November.  Please call patient to find out what surgery she is having, when, and by which provider to determine if it is appropriate to obtain Covid test now or if it is something anticipated in the future.  Thank you.  GABI

## 2021-10-06 NOTE — PROGRESS NOTES
Dr. Corona, your patient Kem has an upcoming appointment for a pre-procedure COVID test. Please place orders in the chart. Thank you.

## 2021-10-09 NOTE — PROGRESS NOTES
Assessment/Plan:    1. Night sweats  2. Nightmares  I suspect night sweats and nightmares are most likely withdrawal symptoms from abruptly discontinuing venlafaxine.  Patient feels that she was in a better place when she was consistently taking the venlafaxine and prefers to restart it rather than switch to an alternative antidepressant.  She still would like lab work done today to rule out for any other potential causes of night sweats.  Will obtain the following labs as noted below and follow-up with results when available.  We discussed the importance of following through with sleep study as previously ordered. She should notify us if she develops any new or worsening symptoms.  - HM1(CBC and Differential)  - Comprehensive Metabolic Panel  - Thyroid Stimulating Hormone (TSH)  - Urinalysis-UC if Indicated  - HIV Antigen/Antibody Screening [place]    3. Moderate episode of recurrent major depressive disorder (H)  As above, will have her restart venlafaxine 150 mg daily.  Recommend taking medication consistently.  Will place referral to psychiatry as well to help rule out possible bipolar disorder diagnosis.  She will follow-up with me in 3 to 4 weeks to reassess symptoms of anxiety and depression.  I recommend continuing with regular therapy sessions.    - venlafaxine (EFFEXOR-XR) 150 MG 24 hr capsule; Take 1 capsule (150 mg total) by mouth daily.  Dispense: 90 capsule; Refill: 3  - AMB REFERRAL TO MENTAL HEALTH AND ADDICTION  - Adult (18+); Psychiatry, ECT and Medication Management; Psychiatry; [facility]: Psychiatry Clinic [phone]; We will contact you to schedule the appointment or please call with any questions; External R...        Subjective:    Yolanda Vee is a 20 year old female seen today for evaluation of nightmares, and night sweats.  Patient has history of depression anxiety.  She has been on venlafaxine for the last couple of months.  She felt that she was doing well however her sister felt  "that medication made her seemed \"bipolar\" which led patient to stop taking the medication abruptly.  After doing so, she started having severe nightmares, waking up at 3 AM with night sweats and having a difficult time falling asleep.  She noticed that some of the symptoms were present even when she was taking the venlafaxine but has gotten significantly worse.  She felt that she was doing better when she was on the venlafaxine.  She is worried that there could be something else going on.  She has been off of venlafaxine for about 2 weeks.  She feels that the nightmares continue to get worse and she is more anxious.  She has history of tachycardia of unknown cause.  She has been referred for a sleep study to further evaluate for sleep apnea as a possible contributor.  She smokes marijuana on occasion.  States that she stopped smoking this about a month ago so does not feel that this is necessarily contributing.  She drinks alcohol occasionally on weekends.  When she does drink it is roughly 5 shots of vodka.  She has started seeing a therapist for her anxiety but is open to a psychiatric referral.  She denies any thoughts of self-harm.  She reports feeling well otherwise.  She has not had any fevers, body aches etc.  No recent symptoms of illness.  No abdominal pain or weight loss.  Review of systems is as stated in HPI, and the remainder of the 10 system review is otherwise unremarkable.    Past Medical History, Family History, and Social History reviewed.    No past surgical history on file.     Family History   Problem Relation Age of Onset     Acute Myocardial Infarction Neg Hx         No past medical history on file.     Social History     Tobacco Use     Smoking status: Current Every Day Smoker     Smokeless tobacco: Never Used   Substance Use Topics     Alcohol use: No     Drug use: No        Current Outpatient Medications   Medication Sig Dispense Refill     albuterol sulfate 90 mcg/actuation AePB Inhale 180 " mcg every 4 (four) hours. 1 each 3     cholecalciferol, vitamin D3, 125 mcg (5,000 unit) capsule Take 2 capsules (10,000 Units total) by mouth daily. 180 capsule 1     ipratropium-albuteroL (DUO-NEB) 0.5-2.5 mg/3 mL nebulizer Take 3 mL by nebulization every 6 (six) hours as needed. 25 vial 2     omeprazole (PRILOSEC) 20 MG capsule Take 1 capsule (20 mg total) by mouth daily before breakfast. 14 capsule 0     venlafaxine (EFFEXOR-XR) 150 MG 24 hr capsule Take 1 capsule (150 mg total) by mouth daily. 90 capsule 3     No current facility-administered medications for this visit.           Objective:    Vitals:    11/06/20 1138   BP: 110/64   Patient Site: Right Arm   Patient Position: Sitting   Cuff Size: Adult Large   Pulse: (!) 105   SpO2: 95%   Weight: 219 lb 3.2 oz (99.4 kg)      Body mass index is 44.27 kg/m .      General Appearance:  Alert, overweight female.  Cooperative, no distress, appears stated age   HEENT:  Normal.  No acute findings.   Neck: Supple, symmetrical, no adenopathy.   Lungs:   Clear to auscultation bilaterally, respirations unlabored.  No expiratory wheeze or inspiratory crackles noted.   Heart:  Regular rate and rhythm, S1, S2 normal, no murmur, rub or gallop   Abdomen:   Soft, non-tender, positive bowel sounds, no masses, no organomegaly   Extremities: Extremities normal.  No cyanosis or edema   Skin: Warm, dry.  Skin color, texture, turgor normal, no rashes or lesions   Neurologic:  Grossly intact.           This note has been dictated using voice recognition software. Any grammatical or context distortions are unintentional and inherent to the use of this software.      Hospitals/Psychiatric Facilities

## 2021-10-11 ENCOUNTER — LAB (OUTPATIENT)
Dept: LAB | Facility: CLINIC | Age: 21
End: 2021-10-11
Payer: COMMERCIAL

## 2021-10-11 DIAGNOSIS — Z20.822 ENCOUNTER FOR LABORATORY TESTING FOR COVID-19 VIRUS: Primary | ICD-10-CM

## 2021-10-11 DIAGNOSIS — Z20.822 ENCOUNTER FOR LABORATORY TESTING FOR COVID-19 VIRUS: ICD-10-CM

## 2021-10-11 PROCEDURE — U0005 INFEC AGEN DETEC AMPLI PROBE: HCPCS

## 2021-10-11 PROCEDURE — U0003 INFECTIOUS AGENT DETECTION BY NUCLEIC ACID (DNA OR RNA); SEVERE ACUTE RESPIRATORY SYNDROME CORONAVIRUS 2 (SARS-COV-2) (CORONAVIRUS DISEASE [COVID-19]), AMPLIFIED PROBE TECHNIQUE, MAKING USE OF HIGH THROUGHPUT TECHNOLOGIES AS DESCRIBED BY CMS-2020-01-R: HCPCS

## 2021-10-12 LAB — SARS-COV-2 RNA RESP QL NAA+PROBE: NEGATIVE

## 2021-10-15 ENCOUNTER — OFFICE VISIT (OUTPATIENT)
Dept: GASTROENTEROLOGY | Facility: CLINIC | Age: 21
End: 2021-10-15
Payer: COMMERCIAL

## 2021-10-15 VITALS
SYSTOLIC BLOOD PRESSURE: 118 MMHG | WEIGHT: 230 LBS | BODY MASS INDEX: 46.37 KG/M2 | HEART RATE: 75 BPM | RESPIRATION RATE: 17 BRPM | OXYGEN SATURATION: 99 % | DIASTOLIC BLOOD PRESSURE: 59 MMHG | HEIGHT: 59 IN

## 2021-10-15 DIAGNOSIS — R19.7 DIARRHEA, UNSPECIFIED TYPE: ICD-10-CM

## 2021-10-15 DIAGNOSIS — R11.2 NON-INTRACTABLE VOMITING WITH NAUSEA, UNSPECIFIED VOMITING TYPE: ICD-10-CM

## 2021-10-15 PROCEDURE — 91065 BREATH HYDROGEN/METHANE TEST: CPT

## 2021-10-15 ASSESSMENT — PAIN SCALES - GENERAL: PAINLEVEL: MODERATE PAIN (4)

## 2021-10-15 ASSESSMENT — MIFFLIN-ST. JEOR: SCORE: 1713.9

## 2021-10-15 NOTE — PROGRESS NOTES
Non-Invasive GI Procedure Visit    Yolanda Vee is a 21 year old female with history of    Non-intractable vomiting with nausea, unspecified vomiting type  Diarrhea, unspecified type.   Patient stated reason for procedure: Abdominal Pain and abdominal cramping, vomitting  COVID-19 Test Performed within 48-72hrs of the procedure:  Yes. COVID-19 result was NEGATIVE.    COVID-19 PCR Results    COVID-19 PCR Results 8/2/20 6/1/21 6/1/21 10/11/21     1402 1402    COVID-19 Virus PCR to [facility] - Result  Test received-See reflex to IDDL test SARS CoV2 (COVID-19) Virus RT-PCR     COVID-19 Virus PCR to [facility] - Source  Nasopharyngeal     COVID-19 Virus by PCR (External Result) Not Detected      SARS-CoV-2 Virus Specimen Source   Nasopharyngeal    SARS-CoV-2 PCR Result   NEGATIVE    SARS CoV2 PCR    Negative      Comments are available for some flowsheets but are not being displayed.         COVID-19 Antibody Results, Testing for Immunity    COVID-19 Antibody Results, Testing for Immunity   No data to display.             Pre-Procedure Assessment  Patient presents to clinic today for Hydrogen Breath Test    Referring Provider: Dr. Amanda Linares    Previous HBT: No  Is patient a smoker: No    Does patient report having a colonoscopy, barium study, barium enema or taking antibiotics within the last 2 weeks? Yes / No: No.  Does patient report taking a stool softener, fiber supplement, laxative or anti-diarrheal in the last 3 - 4 days? Yes / No: No.  Does the patient report taking a probiotic in the last 1 - 2 days? Yes / No: No.  Does the patient report taking any medications today? No    Does the patient report following the pre-procedure bland diet? Yes  Does patient report being NPO for a minimum of 12 hours before the test? Yes.     Patient reported symptoms:Abdominal Pain, Vomiting and abdominal cramping  How long has patient had these symptoms? months        .    Patient Hx  Patient's history, medications and  "allergies were reviewed.     Height: 4' 11\"   Weight: 230 lbs 0 oz    Patient Active Problem List    Diagnosis Date Noted     Morbid obesity (H) 09/15/2021     Priority: Medium     CN (constipation) 11/24/2009     Priority: Medium      Prior to Admission medications    Medication Sig Start Date End Date Taking? Authorizing Provider   acamprosate (CAMPRAL) 333 MG EC tablet  2/25/21   Reported, Patient   cyclobenzaprine (FLEXERIL) 5 MG tablet Take 1 tablet (5 mg) by mouth 3 times daily as needed for muscle spasms 9/15/21   Liberty Roberts MD   escitalopram (LEXAPRO) 10 MG tablet Take 1 tablet (10 mg) by mouth daily For refills, please make an appointment with [facility] psychiatry clinic at [phone]  Patient taking differently: Take 10 mg by mouth every morning For refills, please make an appointment with [facility] psychiatry clinic at [phone] 4/26/21   Quynh Smith MD   fluconazole (DIFLUCAN) 150 MG tablet Take one tablet by mouth now.  Repeat in 3 days. 9/27/21   Litzy Corona MD   ipratropium - albuterol 0.5 mg/2.5 mg/3 mL (DUONEB) 0.5-2.5 (3) MG/3ML neb solution Inhale 3 mLs into the lungs every 6 hours  10/23/20   Reported, Patient   naproxen (NAPROSYN) 500 MG tablet Take 1 tablet by mouth twice daily for 7 days and then twice daily as needed for pain 9/15/21   Liberty Roberts MD   ondansetron (ZOFRAN-ODT) 8 MG ODT tab Take 1 tablet (8 mg) by mouth every 8 hours as needed for nausea 9/21/21   Rosey Umana MD   pantoprazole (PROTONIX) 40 MG EC tablet Take 1 tablet (40 mg) by mouth daily 6/10/21   Amanda Linares MD   prazosin (MINIPRESS) 5 MG capsule At Bedtime  2/25/21   Reported, Patient   PROAIR RESPICLICK 108 (90 Base) MCG/ACT inhaler as needed  3/1/21   Reported, Patient   sucralfate (CARAFATE) 1 GM tablet Take 1 tablet (1 g) by mouth 4 times daily (before meals and nightly) 7/14/21   Amanda Linares MD   Vitamin D, Cholecalciferol, 25 MCG (1000 UT) TABS Take by mouth every " morning    Reported, Patient     Allergies   Allergen Reactions     Ibuprofen Other (See Comments) and Hives     Throat gets itchy and sore     Hydrocodone-Acetaminophen Nausea     Past Medical History:   Diagnosis Date     Anxiety      Chronic constipation      Constipation      Depression      Depression      Morbid obesity (H) 2020     Obese      Plantar warts      PTSD (post-traumatic stress disorder)      PTSD (post-traumatic stress disorder)      Uncomplicated asthma      Past Surgical History:   Procedure Laterality Date     COLONOSCOPY N/A 6/3/2021    Procedure: COLONOSCOPY;  Surgeon: Guru Gerardo Prado MD;  Location:  GI     ESOPHAGOSCOPY, GASTROSCOPY, DUODENOSCOPY (EGD), COMBINED N/A 6/3/2021    Procedure: ESOPHAGOGASTRODUODENOSCOPY (EGD);  Surgeon: Guru Gerardo Prado MD;  Location:  GI     Family History   Problem Relation Age of Onset     Heart Disease Mother      Substance Abuse Mother         in remission     Substance Abuse Father      Bipolar Disorder Brother      Depression Brother      Anxiety Disorder Brother      Asthma Brother      Hypertension Maternal Grandmother      Arthritis Maternal Grandmother      Heart Disease Maternal Grandmother      Heart Disease Maternal Uncle      Heart Disease Maternal Uncle      Asthma Brother      Substance Abuse Brother         meth     Alcoholism Brother      Hodgkin's lymphoma Maternal Grandfather 26.00     Esophageal Cancer Maternal Grandfather          at 54.     Alcoholism Father      No Known Problems Sister      Acute Myocardial Infarction No family hx of      Social History     Tobacco Use     Smoking status: Current Some Day Smoker     Packs/day: 1.00     Years: 5.00     Pack years: 5.00     Types: Cigarettes     Smokeless tobacco: Never Used     Tobacco comment: 2 packs daily and vapes   Substance Use Topics     Alcohol use: Yes     Alcohol/week: 6.0 - 8.0 standard drinks        Pre-Procedure Education  & Consent  Procedure education was provided to: Patient  Teaching method: Explanation  Barriers to learning: No Barrier    Patient indicated understanding of pre-procedure instruction and appropriate consent was obtained and documented.    ____________________________________________________________________    Post-Procedure Documentation: Hydrogen Breath Test     Baseline breath obtained prior to patient drinking Lactulose.     Patient tolerated test with no complaints.    HBT Results    Sample Clock Times Ppm H2 Ppm CH4 CO2% Comments   Baseline 1008 33 41 4.4     Challenge Dose Given 1010 - - - -   #1 1030 36 43 4.3     #2 1050 25 37 4.6     #3 1110 26 35 4.6     #4 1130 36 40 4.8     #5 1150 71 45 4.9     #6 1210 74 35 4.7     #7 1230 107 43 4.8     #8 1250 97 42 4.3     #9 1310 106 46 4.4       #  B   Patient given discharge instructions.    Notification of pending test results sent to provider for interpretation. Please reference scanned document for final interpretation of results. Patient will follow up with referring provider for test results.    Sonia Tabares RN on 10/15/2021 at 9:59 AM

## 2021-10-15 NOTE — PATIENT INSTRUCTIONS
Hydrogen Breath Test  1. You may experience diarrhea for the next 12-24 hours.  2. Resume a regular diet.  3. Follow-up with your referring doctor in clinic.  4. If you have questions call [phone] from 7:00am-5:00pm.

## 2021-10-19 PROBLEM — F32.9 MAJOR DEPRESSION: Status: RESOLVED | Noted: 2021-02-07 | Resolved: 2021-05-06

## 2021-10-26 ENCOUNTER — OFFICE VISIT (OUTPATIENT)
Dept: FAMILY MEDICINE | Facility: CLINIC | Age: 21
End: 2021-10-26
Payer: COMMERCIAL

## 2021-10-26 VITALS
BODY MASS INDEX: 47.16 KG/M2 | HEART RATE: 104 BPM | RESPIRATION RATE: 20 BRPM | SYSTOLIC BLOOD PRESSURE: 118 MMHG | TEMPERATURE: 97.9 F | HEIGHT: 59 IN | OXYGEN SATURATION: 98 % | WEIGHT: 233.9 LBS | DIASTOLIC BLOOD PRESSURE: 70 MMHG

## 2021-10-26 DIAGNOSIS — M54.2 NECK PAIN ON LEFT SIDE: Primary | ICD-10-CM

## 2021-10-26 DIAGNOSIS — M25.512 ACUTE PAIN OF LEFT SHOULDER: ICD-10-CM

## 2021-10-26 PROCEDURE — 99213 OFFICE O/P EST LOW 20 MIN: CPT | Mod: 25 | Performed by: NURSE PRACTITIONER

## 2021-10-26 PROCEDURE — 90686 IIV4 VACC NO PRSV 0.5 ML IM: CPT | Performed by: NURSE PRACTITIONER

## 2021-10-26 PROCEDURE — 90471 IMMUNIZATION ADMIN: CPT | Performed by: NURSE PRACTITIONER

## 2021-10-26 ASSESSMENT — ASTHMA QUESTIONNAIRES
QUESTION_3 LAST FOUR WEEKS HOW OFTEN DID YOUR ASTHMA SYMPTOMS (WHEEZING, COUGHING, SHORTNESS OF BREATH, CHEST TIGHTNESS OR PAIN) WAKE YOU UP AT NIGHT OR EARLIER THAN USUAL IN THE MORNING: TWO OR THREE NIGHTS A WEEK
ACT_TOTALSCORE: 15
QUESTION_1 LAST FOUR WEEKS HOW MUCH OF THE TIME DID YOUR ASTHMA KEEP YOU FROM GETTING AS MUCH DONE AT WORK, SCHOOL OR AT HOME: A LITTLE OF THE TIME
QUESTION_2 LAST FOUR WEEKS HOW OFTEN HAVE YOU HAD SHORTNESS OF BREATH: MORE THAN ONCE A DAY
QUESTION_5 LAST FOUR WEEKS HOW WOULD YOU RATE YOUR ASTHMA CONTROL: SOMEWHAT CONTROLLED
QUESTION_4 LAST FOUR WEEKS HOW OFTEN HAVE YOU USED YOUR RESCUE INHALER OR NEBULIZER MEDICATION (SUCH AS ALBUTEROL): NOT AT ALL

## 2021-10-26 ASSESSMENT — MIFFLIN-ST. JEOR: SCORE: 1731.59

## 2021-10-26 NOTE — PROGRESS NOTES
"  Assessment & Plan     Neck pain on left side  Acute pain of left shoulder  Reviewed summary from visit on 9/15/2021 when patient was seen for these concerns initially.  Imaging of cervical spine and left shoulder at that time was unremarkable.  She was advised physical therapy but states that she was unable to get scheduled for physical therapy, she would like an additional referral.  I recommend that she start with conservative treatment including therapy, rest, ice and heat and muscle relaxers.  If she continues to have symptoms or develops any new symptoms despite therapy, will consider referral to orthopedics.  She is content with this plan.  - Physical Therapy Referral       Tobacco Cessation:   reports that she has been smoking cigarettes. She has a 5.00 pack-year smoking history. She has never used smokeless tobacco.    BMI:   Estimated body mass index is 47.24 kg/m  as calculated from the following:    Height as of this encounter: 1.499 m (4' 11\").    Weight as of this encounter: 106.1 kg (233 lb 14.4 oz).         No follow-ups on file.    ABA Jovel CNP  [facility]    Shahriar Brownlee is a 21 year old who presents for the following health issues     HPI   Patient is here today for follow-up on left sided neck and shoulder pain.  Has been going on for the last 2 months or so.  She was seen in clinic on 9/15/2021 with initial concerns of left-sided neck pain and left shoulder pain.  No injury that she can recall.  She had x-ray of cervical spine and left shoulder, this was without any significant findings to explain her symptoms.  Suspected to have muscle etiology and was recommended NSAIDs, Flexeril and physical therapy.  She has not started therapy yet stating that she missed the phone call and was unable to schedule this.  She feels that the cyclobenzaprine is helping control discomfort for short amount of time.  She has not found that any conservative treatment at this " point is helping.  She occasionally has some numbness and tingling in her left fingertips.  She is worried about cervical spine issue as her mother has had surgery on her spine in the past.    Review of Systems   Review of Systems - pertinent positives noted in HPI, otherwise ROS is negative.        Objective    There were no vitals taken for this visit.  There is no height or weight on file to calculate BMI.  Physical Exam     General Appearance:  Alert, cooperative, no distress, appears stated age   Neck: Supple, symmetrical, no adenopathy.  No tenderness on palpation of cervical spine.  Some tenderness noted with palpation along trapezius musculature.  Range of motion and strength intact.   Lungs:   Clear to auscultation bilaterally, respirations unlabored.  No expiratory wheeze or inspiratory crackles noted.   Heart:  Regular rate and rhythm, S1, S2 normal, no murmur, rub or gallop   Extremities:  Left shoulder without any obvious deformity.  Range of motion and strength intact.  Some discomfort noted when reaching arm above head.  Otherwise no pain on range of motion exercises.  Extremities normal.  No cyanosis or edema   Skin: Warm, dry.  Skin color, texture, turgor normal, no rashes or lesions   Neurologic:  No focal deficits noted.           Diagnostic test results:  EXAM: XR CERVICAL SPINE 2/3 VWS  LOCATION: [facility]  DATE/TIME: 9/15/2021 3:55 PM     INDICATION:  Acute pain of left shoulder, Neck pain on left side  COMPARISON: None.  TECHNIQUE: CR Cervical Spine.                                                                      IMPRESSION:      Vertebral body heights are unremarkable.     There is reversal of the normal cervical lordosis with apex at C2-C3. No significant spondylolisthesis is demonstrated.     Intervertebral disc spaces are preserved.     Prevertebral soft tissues are unremarkable.    EXAM: XR SHOULDER 2 VIEW LEFT  LOCATION: [facility]  [place]  DATE/TIME: 9/15/2021 3:55 PM     INDICATION:  Acute pain of left shoulder, Neck pain on left side.  COMPARISON: None.     FINDINGS: Note that if there is clinical concern for glenohumeral subluxation, an axillary view would be considered the view of choice in this regard.     Otherwise unremarkable.                                                                      IMPRESSION: No fracture identified.

## 2021-10-27 ASSESSMENT — ASTHMA QUESTIONNAIRES: ACT_TOTALSCORE: 15

## 2021-11-04 ENCOUNTER — TELEPHONE (OUTPATIENT)
Dept: GASTROENTEROLOGY | Facility: CLINIC | Age: 21
End: 2021-11-04

## 2021-11-04 ENCOUNTER — LAB (OUTPATIENT)
Dept: FAMILY MEDICINE | Facility: CLINIC | Age: 21
End: 2021-11-04
Attending: PHYSICIAN ASSISTANT
Payer: COMMERCIAL

## 2021-11-04 DIAGNOSIS — J02.9 SORE THROAT: ICD-10-CM

## 2021-11-04 DIAGNOSIS — Z20.822 SUSPECTED COVID-19 VIRUS INFECTION: ICD-10-CM

## 2021-11-04 LAB
DEPRECATED S PYO AG THROAT QL EIA: NEGATIVE
GROUP A STREP BY PCR: NOT DETECTED
SARS-COV-2 RNA RESP QL NAA+PROBE: NEGATIVE

## 2021-11-04 PROCEDURE — U0005 INFEC AGEN DETEC AMPLI PROBE: HCPCS

## 2021-11-04 PROCEDURE — 87651 STREP A DNA AMP PROBE: CPT

## 2021-11-04 PROCEDURE — U0003 INFECTIOUS AGENT DETECTION BY NUCLEIC ACID (DNA OR RNA); SEVERE ACUTE RESPIRATORY SYNDROME CORONAVIRUS 2 (SARS-COV-2) (CORONAVIRUS DISEASE [COVID-19]), AMPLIFIED PROBE TECHNIQUE, MAKING USE OF HIGH THROUGHPUT TECHNOLOGIES AS DESCRIBED BY CMS-2020-01-R: HCPCS

## 2021-11-04 NOTE — TELEPHONE ENCOUNTER
[facility] Call Center    Phone Message    May a detailed message be left on voicemail: yes     Reason for Call: Other: Kem calling to request a call back. She would like someone to discuss her breath test results with her. She had test done on 10/15. Please call her back to discuss.      Action Taken: Message routed to:  [facility]     Travel Screening: Not Applicable

## 2021-11-08 ENCOUNTER — OFFICE VISIT (OUTPATIENT)
Dept: SURGERY | Facility: CLINIC | Age: 21
End: 2021-11-08
Payer: COMMERCIAL

## 2021-11-08 ENCOUNTER — LAB (OUTPATIENT)
Dept: LAB | Facility: CLINIC | Age: 21
End: 2021-11-08
Payer: COMMERCIAL

## 2021-11-08 VITALS
DIASTOLIC BLOOD PRESSURE: 84 MMHG | WEIGHT: 227 LBS | HEIGHT: 60 IN | SYSTOLIC BLOOD PRESSURE: 132 MMHG | BODY MASS INDEX: 44.57 KG/M2

## 2021-11-08 DIAGNOSIS — E66.01 OBESITY, CLASS III, BMI 40-49.9 (MORBID OBESITY) (H): Primary | ICD-10-CM

## 2021-11-08 DIAGNOSIS — E66.01 OBESITY, CLASS III, BMI 40-49.9 (MORBID OBESITY) (H): ICD-10-CM

## 2021-11-08 DIAGNOSIS — K58.0 IRRITABLE BOWEL SYNDROME WITH DIARRHEA: ICD-10-CM

## 2021-11-08 DIAGNOSIS — Z86.59 HX OF ANXIETY DISORDER: ICD-10-CM

## 2021-11-08 DIAGNOSIS — Z72.0 CURRENT NICOTINE USE: ICD-10-CM

## 2021-11-08 DIAGNOSIS — F10.11 HISTORY OF ALCOHOL ABUSE: ICD-10-CM

## 2021-11-08 DIAGNOSIS — T50.905A WEIGHT GAIN DUE TO MEDICATION: ICD-10-CM

## 2021-11-08 DIAGNOSIS — Z86.59 HISTORY OF POSTTRAUMATIC STRESS DISORDER (PTSD): ICD-10-CM

## 2021-11-08 DIAGNOSIS — R63.5 WEIGHT GAIN DUE TO MEDICATION: ICD-10-CM

## 2021-11-08 DIAGNOSIS — K63.8219 SMALL INTESTINAL BACTERIAL OVERGROWTH: Primary | ICD-10-CM

## 2021-11-08 PROBLEM — R10.33 PERIUMBILICAL PAIN: Status: ACTIVE | Noted: 2017-12-07

## 2021-11-08 PROBLEM — F33.1 MODERATE EPISODE OF RECURRENT MAJOR DEPRESSIVE DISORDER (H): Status: ACTIVE | Noted: 2021-02-25

## 2021-11-08 PROBLEM — D50.9 HYPOCHROMIC ANEMIA: Status: ACTIVE | Noted: 2021-01-04

## 2021-11-08 PROBLEM — F41.9 ANXIETY: Status: ACTIVE | Noted: 2020-12-01

## 2021-11-08 PROBLEM — R00.0 SINUS TACHYCARDIA: Status: ACTIVE | Noted: 2020-03-06

## 2021-11-08 PROBLEM — F12.90 MARIJUANA USE: Status: ACTIVE | Noted: 2021-02-25

## 2021-11-08 PROBLEM — F17.200 NICOTINE USE DISORDER: Status: ACTIVE | Noted: 2021-02-25

## 2021-11-08 PROBLEM — R09.81 CHRONIC NASAL CONGESTION: Status: ACTIVE | Noted: 2019-05-16

## 2021-11-08 LAB
ALBUMIN SERPL-MCNC: 3.3 G/DL (ref 3.5–5)
ALP SERPL-CCNC: 85 U/L (ref 45–120)
ALT SERPL W P-5'-P-CCNC: 22 U/L (ref 0–45)
ANION GAP SERPL CALCULATED.3IONS-SCNC: 12 MMOL/L (ref 5–18)
AST SERPL W P-5'-P-CCNC: 15 U/L (ref 0–40)
BILIRUB SERPL-MCNC: 0.4 MG/DL (ref 0–1)
BUN SERPL-MCNC: 6 MG/DL (ref 8–22)
CALCIUM SERPL-MCNC: 9.5 MG/DL (ref 8.5–10.5)
CANNABINOIDS UR QL SCN: ABNORMAL
CHLORIDE BLD-SCNC: 107 MMOL/L (ref 98–107)
CO2 SERPL-SCNC: 21 MMOL/L (ref 22–31)
CREAT SERPL-MCNC: 0.69 MG/DL (ref 0.6–1.1)
CREAT UR-MCNC: 403 MG/DL
ERYTHROCYTE [DISTWIDTH] IN BLOOD BY AUTOMATED COUNT: 13.6 % (ref 10–15)
FERRITIN SERPL-MCNC: 131 NG/ML (ref 10–130)
FOLATE SERPL-MCNC: 5.7 NG/ML
GFR SERPL CREATININE-BSD FRML MDRD: >90 ML/MIN/1.73M2
GLUCOSE BLD-MCNC: 84 MG/DL (ref 70–125)
HBA1C MFR BLD: 5.5 % (ref 0–5.6)
HCT VFR BLD AUTO: 40.8 % (ref 35–47)
HGB BLD-MCNC: 13.1 G/DL (ref 11.7–15.7)
IRON SATN MFR SERPL: 13 % (ref 15–46)
IRON SERPL-MCNC: 37 UG/DL (ref 35–180)
MAGNESIUM SERPL-MCNC: 2.1 MG/DL (ref 1.8–2.6)
MCH RBC QN AUTO: 28.1 PG (ref 26.5–33)
MCHC RBC AUTO-ENTMCNC: 32.1 G/DL (ref 31.5–36.5)
MCV RBC AUTO: 87 FL (ref 78–100)
PLATELET # BLD AUTO: 397 10E3/UL (ref 150–450)
POTASSIUM BLD-SCNC: 4.2 MMOL/L (ref 3.5–5)
PROT SERPL-MCNC: 7.3 G/DL (ref 6–8)
PTH-INTACT SERPL-MCNC: 68 PG/ML (ref 10–86)
RBC # BLD AUTO: 4.67 10E6/UL (ref 3.8–5.2)
SODIUM SERPL-SCNC: 140 MMOL/L (ref 136–145)
TIBC SERPL-MCNC: 295 UG/DL (ref 240–430)
TSH SERPL DL<=0.005 MIU/L-ACNC: 1.36 UIU/ML (ref 0.3–5)
WBC # BLD AUTO: 16.9 10E3/UL (ref 4–11)

## 2021-11-08 PROCEDURE — 83036 HEMOGLOBIN GLYCOSYLATED A1C: CPT

## 2021-11-08 PROCEDURE — 82746 ASSAY OF FOLIC ACID SERUM: CPT

## 2021-11-08 PROCEDURE — 80050 GENERAL HEALTH PANEL: CPT

## 2021-11-08 PROCEDURE — 83735 ASSAY OF MAGNESIUM: CPT

## 2021-11-08 PROCEDURE — 84630 ASSAY OF ZINC: CPT | Mod: 90

## 2021-11-08 PROCEDURE — 99000 SPECIMEN HANDLING OFFICE-LAB: CPT

## 2021-11-08 PROCEDURE — 36415 COLL VENOUS BLD VENIPUNCTURE: CPT

## 2021-11-08 PROCEDURE — 84590 ASSAY OF VITAMIN A: CPT | Mod: 90

## 2021-11-08 PROCEDURE — 80307 DRUG TEST PRSMV CHEM ANLYZR: CPT

## 2021-11-08 PROCEDURE — 82728 ASSAY OF FERRITIN: CPT

## 2021-11-08 PROCEDURE — 83970 ASSAY OF PARATHORMONE: CPT

## 2021-11-08 PROCEDURE — 99215 OFFICE O/P EST HI 40 MIN: CPT | Performed by: EMERGENCY MEDICINE

## 2021-11-08 PROCEDURE — 83550 IRON BINDING TEST: CPT

## 2021-11-08 PROCEDURE — 84425 ASSAY OF VITAMIN B-1: CPT | Mod: 90

## 2021-11-08 RX ORDER — NALTREXONE HYDROCHLORIDE 50 MG/1
TABLET, FILM COATED ORAL
Qty: 90 TABLET | Refills: 0 | Status: SHIPPED | OUTPATIENT
Start: 2021-11-08 | End: 2022-11-09

## 2021-11-08 ASSESSMENT — MIFFLIN-ST. JEOR: SCORE: 1719.92

## 2021-11-08 NOTE — LETTER
"    2021         RE: Yolanda Vee  [address]  [facility] [address]        Dear Colleague,    Thank you for referring your patient, Yolanda Vee, to the [facility] [place]. Please see a copy of my visit note below.        New Bariatric Surgery Consultation Note    2021    RE: Yolanda Vee  MR#: [id number]  : 2000      Referring provider:       2021   Who referred you? Dr. Corona       Chief Complaint/Reason for visit: evaluation for possible weight loss surgery    Dear Litzy Corona MD (General),    I had the pleasure of seeing your patient, Yolanda Vee, to evaluate her obesity and consider her for possible weight loss surgery. As you know, Yolanda Vee is 21 year old.  She has a height of 5' .236\", a weight of 227 lbs 0 oz, and calculated Body mass index is 43.99 kg/m ..  Today we discussed our Bariatric Surgery program to address their weight related health. She has viewed our online seminar prior to this visit and on discussion today, have decided that she's interested in  the surgical program but given her recent cessation of problematic drinking and ongoing nicotine use with the need to yet set a quit date, I'd like to work with her for the next 6-9 months on non surgical weight loss while preparing over the long term for potential surgical interventions if her financial/social/mental and chemical health is suitable down the road. She does have genetic as well as habits and medication influences on her obesity diagnosis and I've encouarged her to consider alternative forms of birth control if possible given the obesogenic nature of Depo Provera (recently started) and some effects of her minipress and lexapro to a lesser degree.     Her overall affect was a bit stoic to blunted today and she didn't have many questions/take much of an active role so I'd like her to work with our " psychologist initially to set some goals for behavior change/habits/stability    Factors that could affect the success/risk of their bariatric surgery include:  Youth, alcohol abuse hx, nicotine use, some gastritis/esophagitis but was drinking heavily last summer.    Assessment & Plan   Problem List Items Addressed This Visit     None           Plan:  1. Welcome to the surgical program. Given recent alcohol cessation and never trying medication supported weight loss previously, we'll work over the next 6 months to drop as much weight as possible before ready to pursue surgery. I think it would be good to see our psychologist up front to make sure you can start working on those tools that will allow for successful clearance down the road as well as the dietician to help changes as soon as possible to the diet.    2. To curb cravings, start trial of naltrexone: half tablet with supper for 7-10 days then increase to twice daily dosing if tolerated (half tablet with breakfast,half with supper OR use one tablet with supper). Do not skip meals, aiming for 3 meals daily about 5-6 hours apart with 20-25grams of lean protein per meal (see below).   Hydrate well with water through the day, no sugary beverages/sodas/juices/energy drinks/etc.    3. Recheck with me in 6-8 weeks on medication and to review labs.    4. Labs done today.    5. Support group in the future.    6. Update pap smear or get records from recent one (can't see today in chart).    7. Clearance from your therapist as well.    8. [facility] phone call program, look to start this around Spring.    9. After surgery, anticipate actigall use to reduce risk of gallstones.    10. Set quit date for nicotine and avoid marijuana due to stomach irritation/ulcer risks.    11. Consider alternative to Depo provera given high association with weight gain if not careful about diet and distracted eating (in front of TV/screens/etc).  If not planning children in near future, IUD may  be less obesogenic form of birth control.     12. No alcohol given abuse history and gastric irritation last summer.    HISTORY OF PRESENT ILLNESS:  Weight Loss History Reviewed with Patient 11/6/2021   How long have you been overweight? Since puberty   What is the most that you have ever weighed? 234   What is the most weight you have lost? 5   I have tried the following methods to lose weight Watching portions or calories, Exercise, Weight Watchers, Atkins type diet (low carb/high protein), Pre packaged meals ex: Nutrisystem, Slimfast   I have tried the following weight loss medications? (Check all that apply) None   Have you ever had weight loss surgery? No   5 lbs is the most amount of weight ever lost.    CO-MORBIDITIES OF OBESITY INCLUDE:     11/6/2021   I have the following health issues associated with obesity: None of the above       PAST MEDICAL HISTORY:  Past Medical History:   Diagnosis Date     Anxiety      Chronic constipation      Constipation      Depression      Depression      Morbid obesity (H) 03/06/2020     Obese      Plantar warts      PTSD (post-traumatic stress disorder)      PTSD (post-traumatic stress disorder)      Uncomplicated asthma      All Reviewers List    Micaela Ren MD on 10/26/2021 12:08 PM       Result Information    Status: Edited Result - FINAL (Resulted: 10/15/2021) Provider Status: Reviewed   EGD results from 6/3/21 were:                                                                                    Findings:        LA Grade C (one or more mucosal breaks continuous between tops of 2 or        more mucosal folds, less than 75% circumference) esophagitis with no        bleeding was found.        The entire examined stomach was normal.        The examined duodenum was normal.                                                                                     Impression:          - LA Grade C reflux esophagitis most likely cause for                         hematemesis.   Recommendation:      - Will proceed with a colonoscopy as scheduled                        - Will recommend prilosec 20 mg BID and anti-reflux                        measures                        - To avoid alcohol and follow up with Dr Linares as                        previously scheduled                                                                                       ____________________   Guru Prado,   6/3/2021 4:05:25 PM   I was physically present for the entire viewing portion of the exam.   __________________________     Hydrogen breath test done 10/15.21 showed positive tests (follow up w/ GI is pending, no Rx yet).  Impression    Positive hydrogen breath test consistent with small intestinal bacterial overgrowth. Very high methane production at baseline and throughout the test (though it does not increase/reach threshold during the test period). This can be seen with constipation and may suggest intestinal methanogen overgrowth, though data on this is limited. Consideration could be given to treatment with neomycin and rifaximin. (ACG Clinical Guidelines: Small Intestinal Bacterial Overgrowth; Perry et al. The American Journal of Gastroenterology: February 2020 - Volume 115 - Issue 2 - p [phone])        PAST SURGICAL HISTORY:  Past Surgical History:   Procedure Laterality Date     COLONOSCOPY N/A 6/3/2021    Procedure: COLONOSCOPY;  Surgeon: Guru Gerardo Prado MD;  Location: [facility]     ESOPHAGOSCOPY, GASTROSCOPY, DUODENOSCOPY (EGD), COMBINED N/A 6/3/2021    Procedure: ESOPHAGOGASTRODUODENOSCOPY (EGD);  Surgeon: Guru Gerardo Prado MD;  Location:  GI       FAMILY HISTORY:   Family History   Problem Relation Age of Onset     Heart Disease Mother      Substance Abuse Mother         in remission     Substance Abuse Father      Bipolar Disorder Brother      Depression Brother      Anxiety Disorder Brother      Asthma Brother      Hypertension  Maternal Grandmother      Arthritis Maternal Grandmother      Heart Disease Maternal Grandmother      Heart Disease Maternal Uncle      Heart Disease Maternal Uncle      Asthma Brother      Substance Abuse Brother         meth     Alcoholism Brother      Hodgkin's lymphoma Maternal Grandfather 26.00     Esophageal Cancer Maternal Grandfather          at 54.     Alcoholism Father      No Known Problems Sister      Acute Myocardial Infarction No family hx of        SOCIAL HISTORY:   Social History Questions Reviewed With Patient 2021   Which best describes your employment status (select all that apply) I am unemployed   Which best describes your marital status: single   Do you have children? No   Who do you have in your support network that can be available to help you for the first 2 weeks after surgery? My mom   Who can you count on for support throughout your weight loss surgery journey? My mom   Can you afford 3 meals a day?  Yes   Can you afford 50-60 dollars a month for vitamins? No   lives w/ Mom.    HABITS:     2021   How often do you drink alcohol? 2-4 times a month   If you do drink alcohol, how many drinks might you have in a day? (one drink = 5 oz. wine, 1 can/bottle of beer, 1 shot liquor) 3 or 4   Do you currently use any of the following Nicotine products? cigarettes, e-cigarettes   Have you ever used any of the following nicotine products? No   Have you or are you currently using street drugs or prescription strength medication for which you do not have a prescription for? No   Do you have a history of chemical dependency (alcohol or drug abuse)? No     Currently taking narcotic/opioids No    PSYCHOLOGICAL HISTORY:   Psychological History Reviewed With Patient 2021   Have you ever attempted suicide? In the last 5 to 10 years.   Have you had thoughts of suicide in the past year? No   Have you ever been hospitalized for mental illness or a suicide attempt? Never.   Do you have a  history of chronic pain? No   Have you ever been diagnosed with fibromyalgia? No   Are you currently being treated for any of the following? (select all that apply) Depression, Anxiety   Are you currently seeing a therapist or counselor?  Yes   Are you currently seeing a psychiatrist? No       ROS:     11/6/2021   Skin:  None of the above   HEENT: Headaches   Musculoskeletal: Back pain, None of the above   Cardiovascular: Shortness of breath with activity   Pulmonary: Shortness of breath with activity, None of the above   Gastrointestinal: Heartburn, Constipation   Genitourinary: None of the above   Hematological: None of the above   Neurological: None of the above   Female only: None of the above   has called GI for results on SIBO tests but hasn't heard back yet.    EATING BEHAVIORS:     11/6/2021   Have you or anyone else thought that you had an eating disorder? No   Do you currently binge eat (eat a large amount of food in a short time)? No   Are you an emotional eater? No   Do you get up to eat after falling asleep? No       EXERCISE:     11/6/2021   How often do you exercise? Daily   What is the duration of your exercise (in minutes)? 20 Minutes   What types of exercise do you do? walking   What keeps you from being more active?  Worried people will look at me       MEDICATIONS:  Current Outpatient Medications   Medication Sig Dispense Refill     acamprosate (CAMPRAL) 333 MG EC tablet        albuterol (PROAIR HFA/PROVENTIL HFA/VENTOLIN HFA) 108 (90 Base) MCG/ACT inhaler Inhale 2 puffs into the lungs every 6 hours as needed for shortness of breath / dyspnea or wheezing 18 g 0     benzonatate (TESSALON) 100 MG capsule Take 1-2 capsules by mouth up to 3 x a day as needed with food 45 capsule 0     cyclobenzaprine (FLEXERIL) 5 MG tablet Take 1 tablet (5 mg) by mouth 3 times daily as needed for muscle spasms 30 tablet 0     escitalopram (LEXAPRO) 10 MG tablet Take 1 tablet (10 mg) by mouth daily For refills, please  "make an appointment with [facility] psychiatry clinic at [phone] (Patient taking differently: Take 10 mg by mouth every morning For refills, please make an appointment with [facility] psychiatry clinic at [phone]) 30 tablet 1     fluconazole (DIFLUCAN) 150 MG tablet Take one tablet by mouth now.  Repeat in 3 days. 2 tablet 0     ipratropium - albuterol 0.5 mg/2.5 mg/3 mL (DUONEB) 0.5-2.5 (3) MG/3ML neb solution Inhale 3 mLs into the lungs every 6 hours        naproxen (NAPROSYN) 500 MG tablet Take 1 tablet by mouth twice daily for 7 days and then twice daily as needed for pain 30 tablet 0     ondansetron (ZOFRAN-ODT) 8 MG ODT tab Take 1 tablet (8 mg) by mouth every 8 hours as needed for nausea 12 tablet 0     pantoprazole (PROTONIX) 40 MG EC tablet Take 1 tablet (40 mg) by mouth daily 31 tablet 3     prazosin (MINIPRESS) 5 MG capsule At Bedtime        PROAIR RESPICLICK 108 (90 Base) MCG/ACT inhaler as needed        sucralfate (CARAFATE) 1 GM tablet Take 1 tablet (1 g) by mouth 4 times daily (before meals and nightly) 180 tablet 3     Vitamin D, Cholecalciferol, 25 MCG (1000 UT) TABS Take by mouth every morning         ALLERGIES:  Allergies   Allergen Reactions     Ibuprofen Other (See Comments) and Hives     Throat gets itchy and sore     Hydrocodone-Acetaminophen Nausea       See above    PHYSICAL EXAM:  Objective    /84   Ht 1.53 m (5' 0.24\")   Wt 103 kg (227 lb)   Breastfeeding No   BMI 43.99 kg/m    /84   Ht 1.53 m (5' 0.24\")   Wt 103 kg (227 lb)   Breastfeeding No   BMI 43.99 kg/m    Body mass index is 43.99 kg/m .  Physical Exam   GENERAL: healthy, alert and no distress  NECK: no adenopathy, no asymmetry, masses, or scars and thyroid normal to palpation  RESP: lungs clear to auscultation - no rales, rhonchi or wheezes  CV: regular rate and rhythm, normal S1 S2, no S3 or S4, no murmur, click or rub, no peripheral edema and peripheral pulses strong  ABDOMEN: soft, nontender, no " hepatosplenomegaly, no masses and bowel sounds normal  MS: no gross musculoskeletal defects noted, no edema      In summary, Yolanda Vee has Class III obesity with a body mass index of Body mass index is 43.99 kg/m . kg/m2 and the comorbidities stated above. She completed an informational seminar and is a possible candidate for the laparoscopic gastric sleeve.  She will have to complete the following pre-requisites:    Received weight loss goal of 5 lb prior to surgery.  Achieve clearance from dietitian to see surgeon.  Have preoperative laboratory tests drawn.  Psychological Evaluation with MMPI and clearance for weight loss surgery.  Letter of clearance from the following therapist.    Today in the office we discussed gastric sleeve surgery. Preoperative, perioperative, and postoperative processes, management, and follow up were addressed.  Risks and benefits were outlined including the risk of death, staple line leak (1-2%), PE, DVT, ulcer, worsening GERD, N/V, stricture, hernia, wound infection, weight regain, and vitamin deficiencies. I emphasized exercise and activity along with appropriate food choice as the main foundation for weight loss with surgery providing surgical reinforcement of this.  All questions were answered.  A goal sheet and support group handout were given to the patient.        Once the patient has completed the requirements in their task list and there are no further recommendations, the pt will be allowed to see the surgeon of her choice for consultation on the laparoscopic gastric sleeve surgery. Patient verbalizes understanding of the process to surgery and expectations for the postoperative period including the need for lifelong lifestyle changes, vitamin supplementation, and laboratory monitoring.    Sincerely,     Klaus Amezquita MD            Again, thank you for allowing me to participate in the care of your patient.        Sincerely,        Klaus Amezquita MD

## 2021-11-08 NOTE — PATIENT INSTRUCTIONS
Plan:  1. Welcome to the surgical program. Given recent alcohol cessation and never trying medication supported weight loss previously, we'll work over the next 6 months to drop as much weight as possible before ready to pursue surgery. I think it would be good to see our psychologist up front to make sure you can start working on those tools that will allow for successful clearance down the road as well as the dietician to help changes as soon as possible to the diet.    2. To curb cravings, start trial of naltrexone: half tablet with supper for 7-10 days then increase to twice daily dosing if tolerated (half tablet with breakfast,half with supper OR use one tablet with supper). Do not skip meals, aiming for 3 meals daily about 5-6 hours apart with 20-25grams of lean protein per meal (see below).   Hydrate well with water through the day, no sugary beverages/sodas/juices/energy drinks/etc.    3. Recheck with me in 6-8 weeks on medication and to review labs.    4. Labs done today.    5. Support group in the future.    6. Update pap smear or get records from recent one (can't see today in chart).    7. Clearance from your therapist as well.    8. [facility] phone call program, look to start this around Spring.    9. After surgery, anticipate actigall use to reduce risk of gallstones.    10. Set quit date for nicotine and avoid marijuana due to stomach irritation/ulcer risks.    11. Consider alternative to Depo provera given high association with weight gain if not careful about diet and distracted eating (in front of TV/screens/etc).  If not planning children in near future, IUD may be less obesogenic form of birth control.     12. No alcohol given abuse history and gastric irritation last summer.            Before being submitted for insurance approval, you will need the following:    -Clearance by the Psychologist  -Clearance by the dietitian  -Attend Support Group ([address] [place] [address] at [facility]) 2nd Tuesday of the  month 6:30-8pm. Make sure to sign in.  -Routine Health Care Maintenance must be up to date (mammograms yearly after age 40, paps as recommended by your primary provider,  colonoscopy after age 50, earlier if high risk.  -If you are on estrogen, estrogen will be discontinued one month prior to surgery. It may be resumed one month after surgery unless otherwise advised to limit blood clotting risks.  -Pre Operative Lab work-ordered today.    -Structured weight loss visits IF mandated by your insurance carrier or bariatrician.  -Surgeon consult as we get nearer to potential surgery or sooner if you have special considerations that may make your surgery more complex.  -If you have sleep apnea you will need to be using CPAP for at least one month before surgery to reduce your risk of breathing problems after surgery. Make sure to bring your CPAP or BiPAP to the hospital at the time of surgery.    -You will need to be tobacco free for 2 months before surgery and remain a non-smoker thereafter. If you are currently smoking or have recently quit, your urine will be evaluated for tobacco metabolites pre-operatively.  Smoking is a major risk factor for developing ulceration of your surgical sites and potential severe complications.    -If you are on insulin, you might be referred to an endocrinologist who will manage your insulin during the liquid diet and around the time of surgery. This endocrinologist does not replace your primary provider or your endocrinologist.   -You will need an exercise plan which includes MOVE, ie., walking and MUSCLE, ie.,calisthenics, bands, weight, machines, etc...Maintenance of long term weight loss and quality of life is much improved with regular exercise as the weight comes off.  ______________________________________________________________________    Remember that after your bariatric surgery, vitamin supplementation is a lifelong need.    You will take:    B-12 300-500mcg or higher  sublingual (under the tongue) daily or by 1000mcg injection 1-2X/month  D3:  3000- 5000 IUs daily   Multivitamin containing 18mg of iron twice a day  Calcium citrate 1 or 2 daily    To keep your weight off and your vitamin levels up, follow-up is important.    Your labs will be monitored every 6 months for the first two years (every 3 months if you had a duodenal switch) and yearly thereafter.    To avoid ulcers in your stomach avoid tobacco forever, alcohol in excess, caffeine in excess and anti-inflammatories (NSAIDS)  (Aspirin, Ibuprofen, Naproxen and similar medications). Tylenol is fine at usual doses on the box.    If you are told by your physician take Aspirin to protect your heart or for another reason, make sure to take omeprazole or similar medication (protonix, nexium, prevacid) to protect your stomach.    Remember that alcohol affects you differently after bariatric surgery. If you have even ONE drink DO NOT DRIVE due to rapid absorption and fast spiking of blood alcohol levels within minutes of consumption.     Slowly Increasing your exercise capacity such that 3-6 months after your surgery you're tolerating 150-250 minutes of exercise weekly will help optimize your metabolism and improve the chance of good weight loss maintenance.                Welcome to [facility]!      We are grateful that you have chosen us to partner with you on your journey to better health!  We have included some very important information for you to read as you begin your journey towards weight loss surgery. We will discuss parts of this as you move closer to surgery but please reach out if you have any questions or concerns.      Please click on the following links and they will lead you to a printable version of each handout or copy into your browser to view/print at home:    Making Your Decision, Understanding Weight Loss Surgery  [email or web address]    A Roadmap to Your Weight  Loss Surgery  [email or web address]    Honoring Choices - Your Rights  [email or web address]    Honoring Choices - [place] Health Care Directive (form that can be filled out)  [email or web address]      Insurance Coverage and Approval for Surgery  Every insurance plan is different.  Many companies approve benefits for surgery, but many have specific requirements that must be completed prior to being approved.    Verification of benefits is the patient's responsibility.  You will be responsible for any charges not covered by your insurance plan - including, but not limited to copays, deductible, out of pocket, any amount over your cap limit, etc.  Using the guideline below, please contact your insurance plan (we recommend you call the Customer Service number on the back of your card).      Once all of the requirements for surgery are complete, you will see the surgeon.  Following this visit, we will submit your information to your insurance plan for Prior Authorization.  We strongly encourage you to submit any documentation that supports your weight loss attempts to us.    Questions that are important to ask your insurance company when you call them:    1. Are [facility] in my network?  2. Is bariatric surgery a covered benefit under my policy?  If so, what is my estimated out of pocket expense?  3. Are there any exclusions or cap limits on my plan for bariatric surgery?  If so, what are they?  4. What are the criteria necessary to be approved for bariatric surgery?  5. Do you require medically supervised weight loss visits for approval of surgery?  If yes, for how many months?  Within the last # of years?  6. Are the following procedures a covered benefit under my plan?  a. Laparoscopic Gastric Bypass (CPT Code 39696)  b. Laparoscopic Sleeve Gastrectomy (CPT Code 90528)  c. Nutrition/Dietitian Consult (CPT Code 48536  d. Nutrition/Dietitian Reassessment (CPT  Code 58388  e. Psychological Assment (CPT Codes 44660 & 68138          Keys to Success for Bariatric Surgery  Eat 3 nutrient-rich meals each day     Don't skip meals--it will cause you to overeat later in the day! Space meals 4-6 hours apart    Eat around the same times each day to develop a routine eating schedule    Avoid snacking unless physically hungry.   Planned snacks: 1-2 times per day and no more than 150 calories      Eating protein and fiber (vegetables/fruits/whole grains) with meals helps you stay full     Choose foods with less than 10 grams of sugar and 5-10 grams of fat per serving to prevent excess calories and weight gain  Eat protein first    Protein helps with healing, maintaining muscle mass and good nutrition, and reducing hunger     For RNY Gastric Bypass, Sleeve Gastrectomy, and Duodenal Switch: aim for 60-80 grams of protein per day    Eat protein first, then veggies and the fruits or grains  Stop drinking 15-30 minutes before meals and wait 30-45 minutes after eating to drink    Drinking too soon before a meal may cause you to be too full to eat    Drinking too soon after you eat will cause the food to  wash  through your new stomach too quickly--leaving you hungry and likely to eat more    Eat S-[id number]    Take 20-30 minutes to eat each meal by taking small bites, chewing foods to applesauce consistency or 20-30 times before you swallow    Not chewing food properly can block the opening of your pouch--causing pain, nausea, vomiting    Eating foods too fast can delay those full signals and increase overeating   ? Slow down your eating by smaller plates/bowls, toddler utensils, putting your fork/spoon down between bites and not watching TV/computer during meals!  Make a list of activities to prevent boredom, stress and emotional eating    Go for a walk or lift weights while watching your favorite TV show    Talk to a co-worker or email/call a friend     Keep your hands and mind busy with  woodworking, puzzles, knitting or painting your nails    Practice deep breathing or meditation  Drink 64 ounces of 0-Calorie drinks between meals     Water    Zero calorie Propel , Vitamin Water Zero  or So[employer] Lifewater , Crystal Light , Sugar-Free Hugo-Aid , and other sugar-free lemonade or flavored madera    Decaffeinated, unsweetened coffee/ tea (1 cup or less per day)   Avoid Caffeine and Carbonation- NO STRAWS    Caffeine can irritate your new pouch, increase risk for ulcers, and can increase your appetite      Carbonation can lead to increased bloating/gas and discomfort   Work up to a total of 30-60 minutes of physical activity most days of the week    Helps with losing weight and preventing weight regain after surgery     Do a combination of cardio (walking/water exercises/biking/swimming) and strength training  Take daily vitamin/mineral supplements after surgery    Daily use of vitamins/minerals is necessary for the rest of your life      Psychological Evaluation for Bariatric Surgery      Why do I need a psychological evaluation before bariatric surgery?       The psychologist's main purpose is to provide the support and education to ensure you have the most successful outcome after surgery.     Preparing for bariatric surgery often involves changing behavior patterns. Working on these changes before surgery allows you to achieve the best results after surgery as some of these behaviors have been entrenched for many years. In addition, your relationship with food may need to change. Psychologists are experts in behavior change and are there to guide and support you as you make these important changes.     A significant life change, like losing a lot of weight, may affect many areas of your life--self-concept, physical activity and relationships with others. Your psychologist will help you prepare to adjust to these changes in a healthy way.     If you have mental health symptoms, relationship struggles, or  other challenges, your psychologist will suggest how to best address these concerns so that they do not interfere with your progress toward a healthier lifestyle.       Is the psychologist looking for reasons to say I can't have surgery?     The goal is to not find specific psychological problems. Instead, the psychologist will be working with your strengths to ensure you have the most optimal outcome after surgery.    There is no expectation that you will have everything figured out already or that you must have  perfect  behaviors before moving forward with surgery. Your psychologist is expecting that you will have some behavior changes that will need to occur concurrent with the surgery.     You can feel comfortable that the psychologist is not there to    you or your habits. The psychologist is there to provide support and encourage your progress.      What should I expect during the evaluation?     During the interview, which could take place over 2 or more sessions, the psychologist will ask about:   -weight history, current eating pattern and fluid intake, and previous attempts at weight loss   -activity level   -psychiatric history  -drug, alcohol and nicotine use   -social and developmental history  -support system   -current stressors and coping mechanisms   -understanding of the risks of surgery   -expectations for outcomes after surgery     You will complete various questionnaires that will focus on coping strategies, eating behaviors, quality of life and adverse childhood experiences in order to provide additional information to inform the assessment.     Your psychologist will likely give you some  homework assignments  to practice as you prepare for surgery. This will be individually tailored to your specific needs.     Your psychologist will talk with you about some of the typical challenges that patients might encounter after surgery and how to prepare for these.     A final report will be  generated and any treatment recommendations will be discussed with you and the bariatric team to determine next steps.     If you would like, you may have any support people (e.g., spouse) join a session of the evaluation to provide additional information.       Bariatric surgery offers a physical  tool  for weight management. Similarly, your work with the psychologist will provide you with some additional emotional and behavioral  tools  as you work toward your healthier lifestyle goals.      Support Group    Support and accountability is an important part of your weight loss journey and maintenance once you reach your health goals.  Because of this, we require that you attend at least one of our support groups before you meet with the surgeon so you get a feel for what they are like and hopefully establish a connection to others who are going through a similar process or have had surgery before so you can ask your questions.    We currently have two options for support group but they are in the virtual format only at this time.  Both groups are using Microsoft Teams for their platform and you can access it through the web or an ryann that can be downloaded to a smart phone if you have one.      The Pre- and Post-op Connections Group is on the second Tuesday of the month from 6:30-8pm and is hosted by Phong Guzman, PhD.  If you are interested in this group, you will need to email him at [email or web address] each month and then he will in turn send you the invitation to join.      We also have a Post-op focused Connections Group the 4th Wednesday of the month from 11am-12pm that is mostly geared toward post-operative patients who are past three months post-op.  This group is hosted by OBDULIA Rodriguez, CBN, CIC and you can email her to join the group at [email or web address] each month and she will send you an invite similar to Phong's group.  If you decide you would like to be a regular attender at this group,  we can add you to an automatic invitation list of people.    You can see more information about available support groups that the [facility] offers to our patients as well by following the link:    [email or web address]      Please let us know if you have any questions about all the information above and we will be talking more about this during future visits.     Thank you,   [facility] Team    Bariatric Task List    Fax:  Please fax all paperwork to: [phone] -     Status:  Is patient a candidate for bariatric surgery?:  patient is a candidate for bariatric surgery - but will need at least 6 months of non surgical weight loss as she makes changes/gets assessements.   Cleared to schedule surgeon consult?:  not cleared to schedule surgeon consult -     Status:  surgery evaluation in process -     Surgeon: Corrine Car -        Insurance: Insurance:  [facility] Referral:  Needed - She to set up   Other:  Call She at [phone] to discuss insurance requirements. -        Patient Info: Initial Weight:  227# -     Date of Initial Weight/Height:  11/8/2021 -     Goal Weight (lbs):  222 -     Required Weight Loss:  5 -     Surgery Type:  sleeve gastrectomy -        Dietician Visits: Structured weight loss required by insurance?:  structured weight loss required - 6 months per Dr. Amezquita   Dietician Visit 1:  Completed -     Dietician Visit 2:  Needed -     Dietician Visit 3:  Needed -     Dietician Visit 4:  Needed -     Dietician Visit 5:  Needed -     Dietician Visit 6:  Needed -     Clearance from dietician to see surgeon?:  Needed - Diana      Psychological Evaluation: Psych eval:  Needed Melo Darling      Lab Work: Complete Blood Count:  Completed -     Comprehensive Metabolic Panel:  Completed -     PTH:  Completed -     Hgb A1c:  Completed - 5.5 on 11/8/2021  "   [facility] ([facility], [facility], [facility]): Completed -       Ferritin: Completed -       Folate: Completed -     Thiamine: Completed -     Vitamin A: Completed -     Zinc: Completed -     Nicotine Testing:  Needed - 3 months after quit date.   Other:  Completed - THC test-positive      Consults/ Clearance Medical Weight Management: Needed - Start trial of Naltrexone      Stopping Smoking/ Alcohol Use: Quit tobacco use (3 months smoke free)?:  Needed -     Quit date:    -     Quit alcohol use: Completed -     Quit date: 9/20/2021 -     Other: Marijuana -     Quit date:   -        Patient Education:  Information Session:  Completed - 11/5/21   Given \"Making your decision\" handout?:  Yes -     Given \"A Roadmap to you Weight Loss Surgery\" handout?: Yes -     Given support group information?:    - Send email to [email or web address] to request invite to next meeting    Attended support group?:  Needed - Must attend at least once   Support plan in place?:  Needed - should have indepedent source of income.      Additional Surgery Requirements: Review Coag plan:  Completed - standard   Final nicotine screen:  Needed - w/pre-op labs   Birth control plan:  Needed - should be on alternative to Depo Provera.   Gallstone prevention plan (Actigall for 6 months postop): Needed -        Final Tasks:  Before surgery online class:  Needed - with dietitian and nurse closer to surgery date.   After surgery online class:    - 1 week after surgery w/dietitian   History and Physical per clinic:   -  Needed within 30 days of surgery   Final labs per clinic: Needed - within 30 days of surgery   Chest xray per clinic:   -  Needed within 90 days of surgery   Electrocardiogram (ECG) per clinic:   -  Needed within 90 days of surgery      Notes: Will work with me on non surgical weight loss the next 6-9 months.   Should be evaluated w/ Dr. Guzman to set forth goals for mental health/chemical needs as currently just quit heavy alcohol use and still trying " to quit smoking.  Should have her SIBO treated by GI (positive breath test). -

## 2021-11-08 NOTE — PROGRESS NOTES
"    New Bariatric Surgery Consultation Note    2021    RE: Yolanda Vee  MR#: [id number]  : 2000      Referring provider:       2021   Who referred you? Dr. Corona       Chief Complaint/Reason for visit: evaluation for possible weight loss surgery    Dear Litzy Corona MD (General),    I had the pleasure of seeing your patient, Yolanda Vee, to evaluate her obesity and consider her for possible weight loss surgery. As you know, Yolanda Vee is 21 year old.  She has a height of 5' .236\", a weight of 227 lbs 0 oz, and calculated Body mass index is 43.99 kg/m ..  Today we discussed our Bariatric Surgery program to address their weight related health. She has viewed our online seminar prior to this visit and on discussion today, have decided that she's interested in  the surgical program but given her recent cessation of problematic drinking and ongoing nicotine use with the need to yet set a quit date, I'd like to work with her for the next 6-9 months on non surgical weight loss while preparing over the long term for potential surgical interventions if her financial/social/mental and chemical health is suitable down the road. She does have genetic as well as habits and medication influences on her obesity diagnosis and I've encouarged her to consider alternative forms of birth control if possible given the obesogenic nature of Depo Provera (recently started) and some effects of her minipress and lexapro to a lesser degree.     Her overall affect was a bit stoic to blunted today and she didn't have many questions/take much of an active role so I'd like her to work with our psychologist initially to set some goals for behavior change/habits/stability    Factors that could affect the success/risk of their bariatric surgery include:  Youth, alcohol abuse hx, nicotine use, some gastritis/esophagitis but was drinking heavily last summer.    Assessment & Plan   Problem " List Items Addressed This Visit     None           Plan:  1. Welcome to the surgical program. Given recent alcohol cessation and never trying medication supported weight loss previously, we'll work over the next 6 months to drop as much weight as possible before ready to pursue surgery. I think it would be good to see our psychologist up front to make sure you can start working on those tools that will allow for successful clearance down the road as well as the dietician to help changes as soon as possible to the diet.    2. To curb cravings, start trial of naltrexone: half tablet with supper for 7-10 days then increase to twice daily dosing if tolerated (half tablet with breakfast,half with supper OR use one tablet with supper). Do not skip meals, aiming for 3 meals daily about 5-6 hours apart with 20-25grams of lean protein per meal (see below).   Hydrate well with water through the day, no sugary beverages/sodas/juices/energy drinks/etc.    3. Recheck with me in 6-8 weeks on medication and to review labs.    4. Labs done today.    5. Support group in the future.    6. Update pap smear or get records from recent one (can't see today in chart).    7. Clearance from your therapist as well.    8. [facility] phone call program, look to start this around Spring.    9. After surgery, anticipate actigall use to reduce risk of gallstones.    10. Set quit date for nicotine and avoid marijuana due to stomach irritation/ulcer risks.    11. Consider alternative to Depo provera given high association with weight gain if not careful about diet and distracted eating (in front of TV/screens/etc).  If not planning children in near future, IUD may be less obesogenic form of birth control.     12. No alcohol given abuse history and gastric irritation last summer.    HISTORY OF PRESENT ILLNESS:  Weight Loss History Reviewed with Patient 11/6/2021   How long have you been overweight? Since puberty   What is the most that you have ever  weighed? 234   What is the most weight you have lost? 5   I have tried the following methods to lose weight Watching portions or calories, Exercise, Weight Watchers, Atkins type diet (low carb/high protein), Pre packaged meals ex: Nutrisystem, Slimfast   I have tried the following weight loss medications? (Check all that apply) None   Have you ever had weight loss surgery? No   5 lbs is the most amount of weight ever lost.    CO-MORBIDITIES OF OBESITY INCLUDE:     11/6/2021   I have the following health issues associated with obesity: None of the above       PAST MEDICAL HISTORY:  Past Medical History:   Diagnosis Date     Anxiety      Chronic constipation      Constipation      Depression      Depression      Morbid obesity (H) 03/06/2020     Obese      Plantar warts      PTSD (post-traumatic stress disorder)      PTSD (post-traumatic stress disorder)      Uncomplicated asthma      All Reviewers List    Micaela Ren MD on 10/26/2021 12:08 PM       Result Information    Status: Edited Result - FINAL (Resulted: 10/15/2021) Provider Status: Reviewed   EGD results from 6/3/21 were:                                                                                    Findings:        LA Grade C (one or more mucosal breaks continuous between tops of 2 or        more mucosal folds, less than 75% circumference) esophagitis with no        bleeding was found.        The entire examined stomach was normal.        The examined duodenum was normal.                                                                                     Impression:          - LA Grade C reflux esophagitis most likely cause for                        hematemesis.   Recommendation:      - Will proceed with a colonoscopy as scheduled                        - Will recommend prilosec 20 mg BID and anti-reflux                        measures                        - To avoid alcohol and follow up with Dr Linares as                         previously scheduled                                                                                       ____________________   Guru Prado,   6/3/2021 4:05:25 PM   I was physically present for the entire viewing portion of the exam.   __________________________     Hydrogen breath test done 10/15.21 showed positive tests (follow up w/ GI is pending, no Rx yet).  Impression    Positive hydrogen breath test consistent with small intestinal bacterial overgrowth. Very high methane production at baseline and throughout the test (though it does not increase/reach threshold during the test period). This can be seen with constipation and may suggest intestinal methanogen overgrowth, though data on this is limited. Consideration could be given to treatment with neomycin and rifaximin. (ACG Clinical Guidelines: Small Intestinal Bacterial Overgrowth; Perry et al. The American Journal of Gastroenterology: February 2020 - Volume 115 - Issue 2 - p [phone])        PAST SURGICAL HISTORY:  Past Surgical History:   Procedure Laterality Date     COLONOSCOPY N/A 6/3/2021    Procedure: COLONOSCOPY;  Surgeon: Guru Gerardo Prado MD;  Location:  GI     ESOPHAGOSCOPY, GASTROSCOPY, DUODENOSCOPY (EGD), COMBINED N/A 6/3/2021    Procedure: ESOPHAGOGASTRODUODENOSCOPY (EGD);  Surgeon: Guru Gerardo Prado MD;  Location:  GI       FAMILY HISTORY:   Family History   Problem Relation Age of Onset     Heart Disease Mother      Substance Abuse Mother         in remission     Substance Abuse Father      Bipolar Disorder Brother      Depression Brother      Anxiety Disorder Brother      Asthma Brother      Hypertension Maternal Grandmother      Arthritis Maternal Grandmother      Heart Disease Maternal Grandmother      Heart Disease Maternal Uncle      Heart Disease Maternal Uncle      Asthma Brother      Substance Abuse Brother         meth     Alcoholism Brother      Hodgkin's lymphoma Maternal  Grandfather 26.00     Esophageal Cancer Maternal Grandfather          at 54.     Alcoholism Father      No Known Problems Sister      Acute Myocardial Infarction No family hx of        SOCIAL HISTORY:   Social History Questions Reviewed With Patient 2021   Which best describes your employment status (select all that apply) I am unemployed   Which best describes your marital status: single   Do you have children? No   Who do you have in your support network that can be available to help you for the first 2 weeks after surgery? My mom   Who can you count on for support throughout your weight loss surgery journey? My mom   Can you afford 3 meals a day?  Yes   Can you afford 50-60 dollars a month for vitamins? No   lives w/ Mom.    HABITS:     2021   How often do you drink alcohol? 2-4 times a month   If you do drink alcohol, how many drinks might you have in a day? (one drink = 5 oz. wine, 1 can/bottle of beer, 1 shot liquor) 3 or 4   Do you currently use any of the following Nicotine products? cigarettes, e-cigarettes   Have you ever used any of the following nicotine products? No   Have you or are you currently using street drugs or prescription strength medication for which you do not have a prescription for? No   Do you have a history of chemical dependency (alcohol or drug abuse)? No     Currently taking narcotic/opioids No    PSYCHOLOGICAL HISTORY:   Psychological History Reviewed With Patient 2021   Have you ever attempted suicide? In the last 5 to 10 years.   Have you had thoughts of suicide in the past year? No   Have you ever been hospitalized for mental illness or a suicide attempt? Never.   Do you have a history of chronic pain? No   Have you ever been diagnosed with fibromyalgia? No   Are you currently being treated for any of the following? (select all that apply) Depression, Anxiety   Are you currently seeing a therapist or counselor?  Yes   Are you currently seeing a psychiatrist? No        ROS:     11/6/2021   Skin:  None of the above   HEENT: Headaches   Musculoskeletal: Back pain, None of the above   Cardiovascular: Shortness of breath with activity   Pulmonary: Shortness of breath with activity, None of the above   Gastrointestinal: Heartburn, Constipation   Genitourinary: None of the above   Hematological: None of the above   Neurological: None of the above   Female only: None of the above   has called GI for results on SIBO tests but hasn't heard back yet.    EATING BEHAVIORS:     11/6/2021   Have you or anyone else thought that you had an eating disorder? No   Do you currently binge eat (eat a large amount of food in a short time)? No   Are you an emotional eater? No   Do you get up to eat after falling asleep? No       EXERCISE:     11/6/2021   How often do you exercise? Daily   What is the duration of your exercise (in minutes)? 20 Minutes   What types of exercise do you do? walking   What keeps you from being more active?  Worried people will look at me       MEDICATIONS:  Current Outpatient Medications   Medication Sig Dispense Refill     acamprosate (CAMPRAL) 333 MG EC tablet        albuterol (PROAIR HFA/PROVENTIL HFA/VENTOLIN HFA) 108 (90 Base) MCG/ACT inhaler Inhale 2 puffs into the lungs every 6 hours as needed for shortness of breath / dyspnea or wheezing 18 g 0     benzonatate (TESSALON) 100 MG capsule Take 1-2 capsules by mouth up to 3 x a day as needed with food 45 capsule 0     cyclobenzaprine (FLEXERIL) 5 MG tablet Take 1 tablet (5 mg) by mouth 3 times daily as needed for muscle spasms 30 tablet 0     escitalopram (LEXAPRO) 10 MG tablet Take 1 tablet (10 mg) by mouth daily For refills, please make an appointment with [facility] psychiatry clinic at [phone] (Patient taking differently: Take 10 mg by mouth every morning For refills, please make an appointment with [facility] psychiatry clinic at [phone]) 30 tablet 1     fluconazole (DIFLUCAN) 150 MG tablet Take  "one tablet by mouth now.  Repeat in 3 days. 2 tablet 0     ipratropium - albuterol 0.5 mg/2.5 mg/3 mL (DUONEB) 0.5-2.5 (3) MG/3ML neb solution Inhale 3 mLs into the lungs every 6 hours        naproxen (NAPROSYN) 500 MG tablet Take 1 tablet by mouth twice daily for 7 days and then twice daily as needed for pain 30 tablet 0     ondansetron (ZOFRAN-ODT) 8 MG ODT tab Take 1 tablet (8 mg) by mouth every 8 hours as needed for nausea 12 tablet 0     pantoprazole (PROTONIX) 40 MG EC tablet Take 1 tablet (40 mg) by mouth daily 31 tablet 3     prazosin (MINIPRESS) 5 MG capsule At Bedtime        PROAIR RESPICLICK 108 (90 Base) MCG/ACT inhaler as needed        sucralfate (CARAFATE) 1 GM tablet Take 1 tablet (1 g) by mouth 4 times daily (before meals and nightly) 180 tablet 3     Vitamin D, Cholecalciferol, 25 MCG (1000 UT) TABS Take by mouth every morning         ALLERGIES:  Allergies   Allergen Reactions     Ibuprofen Other (See Comments) and Hives     Throat gets itchy and sore     Hydrocodone-Acetaminophen Nausea       See above    PHYSICAL EXAM:  Objective    /84   Ht 1.53 m (5' 0.24\")   Wt 103 kg (227 lb)   Breastfeeding No   BMI 43.99 kg/m    /84   Ht 1.53 m (5' 0.24\")   Wt 103 kg (227 lb)   Breastfeeding No   BMI 43.99 kg/m    Body mass index is 43.99 kg/m .  Physical Exam   GENERAL: healthy, alert and no distress  NECK: no adenopathy, no asymmetry, masses, or scars and thyroid normal to palpation  RESP: lungs clear to auscultation - no rales, rhonchi or wheezes  CV: regular rate and rhythm, normal S1 S2, no S3 or S4, no murmur, click or rub, no peripheral edema and peripheral pulses strong  ABDOMEN: soft, nontender, no hepatosplenomegaly, no masses and bowel sounds normal  MS: no gross musculoskeletal defects noted, no edema      In summary, Yolanda Vee has Class III obesity with a body mass index of Body mass index is 43.99 kg/m . kg/m2 and the comorbidities stated above. She completed an " informational seminar and is a possible candidate for the laparoscopic gastric sleeve.  She will have to complete the following pre-requisites:    Received weight loss goal of 5 lb prior to surgery.  Achieve clearance from dietitian to see surgeon.  Have preoperative laboratory tests drawn.  Psychological Evaluation with MMPI and clearance for weight loss surgery.  Letter of clearance from the following therapist.    Today in the office we discussed gastric sleeve surgery. Preoperative, perioperative, and postoperative processes, management, and follow up were addressed.  Risks and benefits were outlined including the risk of death, staple line leak (1-2%), PE, DVT, ulcer, worsening GERD, N/V, stricture, hernia, wound infection, weight regain, and vitamin deficiencies. I emphasized exercise and activity along with appropriate food choice as the main foundation for weight loss with surgery providing surgical reinforcement of this.  All questions were answered.  A goal sheet and support group handout were given to the patient.        Once the patient has completed the requirements in their task list and there are no further recommendations, the pt will be allowed to see the surgeon of her choice for consultation on the laparoscopic gastric sleeve surgery. Patient verbalizes understanding of the process to surgery and expectations for the postoperative period including the need for lifelong lifestyle changes, vitamin supplementation, and laboratory monitoring.    Sincerely,     Klaus Amezquita MD

## 2021-11-09 ENCOUNTER — OFFICE VISIT (OUTPATIENT)
Dept: FAMILY MEDICINE | Facility: CLINIC | Age: 21
End: 2021-11-09
Payer: COMMERCIAL

## 2021-11-09 ENCOUNTER — ANCILLARY PROCEDURE (OUTPATIENT)
Dept: GENERAL RADIOLOGY | Facility: CLINIC | Age: 21
End: 2021-11-09
Attending: NURSE PRACTITIONER
Payer: COMMERCIAL

## 2021-11-09 DIAGNOSIS — R05.8 PRODUCTIVE COUGH: ICD-10-CM

## 2021-11-09 DIAGNOSIS — R05.8 PRODUCTIVE COUGH: Primary | ICD-10-CM

## 2021-11-09 DIAGNOSIS — F41.9 ANXIETY: ICD-10-CM

## 2021-11-09 DIAGNOSIS — F33.1 MODERATE EPISODE OF RECURRENT MAJOR DEPRESSIVE DISORDER (H): ICD-10-CM

## 2021-11-09 PROCEDURE — 71046 X-RAY EXAM CHEST 2 VIEWS: CPT | Mod: TC | Performed by: RADIOLOGY

## 2021-11-09 PROCEDURE — 99214 OFFICE O/P EST MOD 30 MIN: CPT | Performed by: NURSE PRACTITIONER

## 2021-11-09 RX ORDER — BUPROPION HYDROCHLORIDE 150 MG/1
150 TABLET ORAL EVERY MORNING
Qty: 30 TABLET | Refills: 3 | Status: ON HOLD | OUTPATIENT
Start: 2021-11-09 | End: 2024-07-02

## 2021-11-09 ASSESSMENT — ANXIETY QUESTIONNAIRES
7. FEELING AFRAID AS IF SOMETHING AWFUL MIGHT HAPPEN: NOT AT ALL
2. NOT BEING ABLE TO STOP OR CONTROL WORRYING: NOT AT ALL
5. BEING SO RESTLESS THAT IT IS HARD TO SIT STILL: NOT AT ALL
3. WORRYING TOO MUCH ABOUT DIFFERENT THINGS: NEARLY EVERY DAY
GAD7 TOTAL SCORE: 6
GAD7 TOTAL SCORE: 6
4. TROUBLE RELAXING: SEVERAL DAYS
7. FEELING AFRAID AS IF SOMETHING AWFUL MIGHT HAPPEN: NOT AT ALL
GAD7 TOTAL SCORE: 6
6. BECOMING EASILY ANNOYED OR IRRITABLE: MORE THAN HALF THE DAYS
1. FEELING NERVOUS, ANXIOUS, OR ON EDGE: NOT AT ALL

## 2021-11-09 ASSESSMENT — PATIENT HEALTH QUESTIONNAIRE - PHQ9
10. IF YOU CHECKED OFF ANY PROBLEMS, HOW DIFFICULT HAVE THESE PROBLEMS MADE IT FOR YOU TO DO YOUR WORK, TAKE CARE OF THINGS AT HOME, OR GET ALONG WITH OTHER PEOPLE: SOMEWHAT DIFFICULT
SUM OF ALL RESPONSES TO PHQ QUESTIONS 1-9: 5
SUM OF ALL RESPONSES TO PHQ QUESTIONS 1-9: 5

## 2021-11-09 NOTE — PROGRESS NOTES
"      Assessment & Plan     Productive cough  She has had negative Covid test.  Her chest x-ray today is not obvious for infiltrate or other acute changes to suggest pneumonia.  I recommend that she continue to treat symptomatically with over-the-counter medications and follow-up if symptoms fail to improve over the next couple of weeks.  - XR Chest 2 Views    Moderate episode of recurrent major depressive disorder (H)  Anxiety  Patient was advised by weight loss doctor to possibly switch antidepressants in case be Escitalopram is contributing to her weight issues.  We discussed that while this is unlikely to be impacting her weight significantly, we can try alternative.  We will start bupropion 150 mg extended release.  I recommend that she stop Lexapro and start bupropion the following day.  - buPROPion (WELLBUTRIN XL) 150 MG 24 hr tablet  Dispense: 30 tablet; Refill: 3       Tobacco Cessation:   reports that she has been smoking cigarettes. She has a 5.00 pack-year smoking history. She has never used smokeless tobacco.      BMI:   Estimated body mass index is 43.99 kg/m  as calculated from the following:    Height as of 11/8/21: 1.53 m (5' 0.24\").    Weight as of 11/8/21: 103 kg (227 lb).   Weight management plan: Discussed healthy diet and exercise guidelines      No follow-ups on file.    ABA Jovel CNP  [facility] is a 21 year old who presents for the following health issues: cough, depression medication     HPI   Patient is here today primarily concerned about a cough that she has had for the last several weeks.  She has had negative Covid test and continues to have symptoms of productive cough.  She has been using over-the-counter medications with minimal relief.  She has had intermittent low-grade fevers.  No other systemic symptoms.  She also would like to discuss the possibility of switching from Escitalopram to an alternative antidepressant.  She is " considering undergoing weight loss surgery this upcoming spring.  She met with a weight loss physician recommended managing weight issues with nonsurgical means initially.  One of the suggestions was to switch SSRIs in case the Lexapro is contributing to her weight issues.  She states that her mental health feels well controlled on the current dose of Lexapro which she would like to switch to something different.  She has been on sertraline in the past, otherwise does not recall being on any other treatment for depression.    Review of Systems   Review of Systems - pertinent positives noted in HPI, otherwise ROS is negative.        Objective    There were no vitals taken for this visit.  There is no height or weight on file to calculate BMI.  Physical Exam     General Appearance:  Alert, cooperative, no distress, appears stated age   HEENT:  Normal.  No acute findings.   Neck: Supple, symmetrical, no adenopathy.   Lungs:   Clear to auscultation bilaterally, respirations unlabored.  No expiratory wheeze or inspiratory crackles noted.   Heart:  Regular rate and rhythm, S1, S2 normal, no murmur, rub or gallop   Extremities: Extremities normal.  No cyanosis or edema   Skin: Warm, dry.  Skin color, texture, turgor normal, no rashes or lesions           CXR - Reviewed and interpreted by me Normal- no infiltrates, effusions, pneumothoraces, cardiomegaly or masses        Answers for HPI/ROS submitted by the patient on 11/9/2021  If you checked off any problems, how difficult have these problems made it for you to do your work, take care of things at home, or get along with other people?: Somewhat difficult  PHQ9 TOTAL SCORE: 5  DEYVI 7 TOTAL SCORE: 6

## 2021-11-10 ENCOUNTER — VIRTUAL VISIT (OUTPATIENT)
Dept: SURGERY | Facility: CLINIC | Age: 21
End: 2021-11-10
Payer: COMMERCIAL

## 2021-11-10 DIAGNOSIS — E66.01 MORBID OBESITY (H): Primary | ICD-10-CM

## 2021-11-10 LAB
VIT B1 PYROPHOSHATE BLD-SCNC: 73 NMOL/L
ZINC SERPL-MCNC: 93 UG/DL

## 2021-11-10 ASSESSMENT — PATIENT HEALTH QUESTIONNAIRE - PHQ9: SUM OF ALL RESPONSES TO PHQ QUESTIONS 1-9: 5

## 2021-11-10 ASSESSMENT — ANXIETY QUESTIONNAIRES: GAD7 TOTAL SCORE: 6
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
## 2021-11-11 DIAGNOSIS — E61.1 IRON DEFICIENCY: ICD-10-CM

## 2021-11-11 DIAGNOSIS — E50.9 VITAMIN A DEFICIENCY: Primary | ICD-10-CM

## 2021-11-11 LAB
ANNOTATION COMMENT IMP: ABNORMAL
RETINYL PALMITATE SERPL-MCNC: <0.02 MG/L
VIT A SERPL-MCNC: 0.22 MG/L

## 2021-11-11 RX ORDER — RIBOFLAVIN (VITAMIN B2) 100 MG
1 TABLET ORAL
Qty: 30 TABLET | Refills: 0 | Status: SHIPPED | OUTPATIENT
Start: 2021-11-11 | End: 2021-12-11

## 2021-11-16 ENCOUNTER — VIRTUAL VISIT (OUTPATIENT)
Dept: SURGERY | Facility: CLINIC | Age: 21
End: 2021-11-16
Payer: COMMERCIAL

## 2021-11-16 DIAGNOSIS — E66.01 CLASS 3 SEVERE OBESITY DUE TO EXCESS CALORIES WITHOUT SERIOUS COMORBIDITY WITH BODY MASS INDEX (BMI) OF 40.0 TO 44.9 IN ADULT (H): Primary | ICD-10-CM

## 2021-11-16 DIAGNOSIS — E66.813 CLASS 3 SEVERE OBESITY DUE TO EXCESS CALORIES WITHOUT SERIOUS COMORBIDITY WITH BODY MASS INDEX (BMI) OF 40.0 TO 44.9 IN ADULT (H): Primary | ICD-10-CM

## 2021-11-16 DIAGNOSIS — Z71.3 NUTRITIONAL COUNSELING: ICD-10-CM

## 2021-11-16 PROCEDURE — 97802 MEDICAL NUTRITION INDIV IN: CPT | Mod: GT | Performed by: DIETITIAN, REGISTERED

## 2021-11-16 NOTE — LETTER
11/16/2021         RE: Yolanda Vee  [address]  [facility] [address]        Dear Colleague,    Thank you for referring your patient, Yolanda Vee, to the [facility] [place]. Please see a copy of my visit note below.    Yolanda Vee is a 21 year old who is being evaluated via a billable /video visit.      How would you like to obtain your AVS? MyChart  If the video visit is dropped, the invitation should be resent by: Send to e-mail at: [email or web address]  Will anyone else be joining your video visit? No      Video Start Time: 9:51 AM      Initial Structured Weight Loss Supervised Diet Evaluation     Assessment:  Yolanda is being seen today for initial RD nutritional evaluation. Patient has been unsuccessful with non-surgical weight loss methods and is interested in bariatric surgery. Today we reviewed current eating habits and level of physical activity, and instructed on the changes that are required for successful bariatric outcomes.  Patient has also started taking Naltrexone to help with her weight loss journey.     Surgery of interest per pt: LSG.    Workflow review:  Support Group: Not completed.  Psychology:In progress.  Lab work:Completed.  SWL:Yes 2nd Visit   Smoking Cessation-plans on a quit date Thursday, November 25th (Thanksgiving)    Weight goal: lose 5 lbs prior to surgery.    Anthropometrics:  Pt's weight is 227 lbs  Initial weight: 230 lbs   Weight change: 3 lbs (up)  BMI: There is no height or weight on file to calculate BMI.   Ideal body weight: 46 kg (101 lb 8.1 oz)  Adjusted ideal body weight: 68.8 kg (151 lb 11.3 oz)    Estimated RMR ([place]-St Jeor equation):  1735 kcals x 1.3 (light active) = 2256 kcals (for weight maintenance)    Medical History:  Patient Active Problem List   Diagnosis     CN (constipation)     Morbid obesity (H)     Vitamin D deficiency     Tobacco use     Sinus tachycardia      Periumbilical pain     PTSD (post-traumatic stress disorder)     Nicotine use disorder     Neutrophilia     Moderate episode of recurrent major depressive disorder (H)     Marijuana use     Hypochromic anemia     Chronic nasal congestion     Chronic idiopathic constipation     Anxiety      Diabetes: No   HbA1c:  No results found for: HGBA1C    Nutrition History  Food allergies/intolerances/cultural or religous food customs: No     Vitamins/Mineral currently taking: Vitamin A, Vitamin B12, Vitamin D, Fe    Socioeconomic Status  Who does the grocery shopping for your household? Mom     Who prepares your meals at home? Mom     Diet Recall/Time  Breakfast: cereal   Lunch: sandwich or fast food   Dinner: steak or fajitas with rice or pasta, vegetables (onions and peppers)    Typical Snacks: chips (small bag)    Overnight eating: No    Eating out: 1 time/week     Beverages  Pop (working on eliminating), Red Bull (eliminated more recently), Water, occasional cup of Coffee, Juice 6-8 oz/day     Exercise  Walking daily for 20-30 minutes     Nutrition Diagnosis (PES statement)  Overweight/Obesity (NC 3.3) related to overeating and poor lifestyle habits as evidenced by patient's subjective statements (excessive energy intake) and BMI of 44.7 kg/m2.     Intervention    Nutrition Education:   1. Provided general overview of diet and lifestyle modifications needed to be a deemed a safe candidate for bariatric surgery.   2. Educated patient on how to read a food label: choosing foods with than 10 grams fat and 10 grams sugar per serving to avoid dumping syndrome.  3. Dumping Syndrome: Described the mechanisms of syndrome, symptoms, and prevention tools from a dietary perspective.   4. Vitamins: Educated on post-op vitamin regimen including MVI+ 18 mg Fe two times a day, calcium citrate 400-600 mg two times a day, 6691-3806 mcg sublingual B12 daily, 5000 IU vitamin D3 daily.     Food/Nutrient Delivery:  5. Educated patient on  eating three meals, with cutting out snacking.  6. Bariatric Plate: Patient and I discussed the importance of including a lean protein source (20-30 grams/meal), vegetables (included at lunch and dinner), one serving (15g) of carbohydrate, and limited added fat (1 tb/day) at each meal.   7. Educated patient on using a protein powder drink as a meal replacement and/or supplement after bariatric surgery.   8. Discussed importance of adequate hydration after surgery, with goal of at least 64 oz of fluids/day.  9. Addressed avoiding all carbonated, caffeinated and sweetened drinks to prepare for bariatric surgery.     Nutrition Counseling:  10. Mindful eating techniques: Encouraged slow meal pace, chewing foods to applesauce consistency for 20-30 minutes/meal.   11. Discussed  fluids 30 minutes before, during, and after meal to prevent dumping syndrome and discomfort post bariatric surgery.     Instructions/Goals:     1. Start implementing bariatric surgery lifestyle modifications.  2. Focus on protein first, aiming for 20-30 grams of protein/meal, 3 meals/day.   3. Continue to work on elimination of pop and juice.     Handouts Provided:   [facility] Weight Management Patient Handbook  Protein supplement list  Snack Ideas    Monitor/Evaluation:  Pt. s target weight: weight loss to 5 lbs or more/no gain from initial visit, patient verbalized understanding.     Plan for next visit:   Bariatric plate.   (Final Supervised Diet visit with RD) pre/post-op  diet progression, give review of surgery process.      Video-Visit Details    Type of service:  Video Visit    Video End Time:10:21 AM    Originating Location (pt. Location): Home    Distant Location (provider location):  [facility] [place]     Platform used for Video Visit: Rachid Chamberlain RD        Again, thank you for allowing me to participate in the care of your patient.         Sincerely,        Diana Chamberlain, RD

## 2021-11-16 NOTE — PROGRESS NOTES
Yolanda Vee is a 21 year old who is being evaluated via a billable /video visit.      How would you like to obtain your AVS? MyChart  If the video visit is dropped, the invitation should be resent by: Send to e-mail at: [email or web address]  Will anyone else be joining your video visit? No      Video Start Time: 9:51 AM      Initial Structured Weight Loss Supervised Diet Evaluation     Assessment:  Yolanda is being seen today for initial RD nutritional evaluation. Patient has been unsuccessful with non-surgical weight loss methods and is interested in bariatric surgery. Today we reviewed current eating habits and level of physical activity, and instructed on the changes that are required for successful bariatric outcomes.  Patient has also started taking Naltrexone to help with her weight loss journey.     Surgery of interest per pt: LSG.    Workflow review:  Support Group: Not completed.  Psychology:In progress.  Lab work:Completed.  SWL:Yes 2nd Visit   Smoking Cessation-plans on a quit date Thursday, November 25th (Thanksgiving)    Weight goal: lose 5 lbs prior to surgery.    Anthropometrics:  Pt's weight is 227 lbs  Initial weight: 230 lbs   Weight change: 3 lbs (up)  BMI: There is no height or weight on file to calculate BMI.   Ideal body weight: 46 kg (101 lb 8.1 oz)  Adjusted ideal body weight: 68.8 kg (151 lb 11.3 oz)    Estimated RMR ([place]-St Jeor equation):  1735 kcals x 1.3 (light active) = 2256 kcals (for weight maintenance)    Medical History:  Patient Active Problem List   Diagnosis     CN (constipation)     Morbid obesity (H)     Vitamin D deficiency     Tobacco use     Sinus tachycardia     Periumbilical pain     PTSD (post-traumatic stress disorder)     Nicotine use disorder     Neutrophilia     Moderate episode of recurrent major depressive disorder (H)     Marijuana use     Hypochromic anemia     Chronic nasal congestion     Chronic idiopathic constipation     Anxiety       Diabetes: No   HbA1c:  No results found for: HGBA1C    Nutrition History  Food allergies/intolerances/cultural or religous food customs: No     Vitamins/Mineral currently taking: Vitamin A, Vitamin B12, Vitamin D, Fe    Socioeconomic Status  Who does the grocery shopping for your household? Mom     Who prepares your meals at home? Mom     Diet Recall/Time  Breakfast: cereal   Lunch: sandwich or fast food   Dinner: steak or fajitas with rice or pasta, vegetables (onions and peppers)    Typical Snacks: chips (small bag)    Overnight eating: No    Eating out: 1 time/week     Beverages  Pop (working on eliminating), Red Bull (eliminated more recently), Water, occasional cup of Coffee, Juice 6-8 oz/day     Exercise  Walking daily for 20-30 minutes     Nutrition Diagnosis (PES statement)  Overweight/Obesity (NC 3.3) related to overeating and poor lifestyle habits as evidenced by patient's subjective statements (excessive energy intake) and BMI of 44.7 kg/m2.     Intervention    Nutrition Education:   1. Provided general overview of diet and lifestyle modifications needed to be a deemed a safe candidate for bariatric surgery.   2. Educated patient on how to read a food label: choosing foods with than 10 grams fat and 10 grams sugar per serving to avoid dumping syndrome.  3. Dumping Syndrome: Described the mechanisms of syndrome, symptoms, and prevention tools from a dietary perspective.   4. Vitamins: Educated on post-op vitamin regimen including MVI+ 18 mg Fe two times a day, calcium citrate 400-600 mg two times a day, 6280-1250 mcg sublingual B12 daily, 5000 IU vitamin D3 daily.     Food/Nutrient Delivery:  5. Educated patient on eating three meals, with cutting out snacking.  6. Bariatric Plate: Patient and I discussed the importance of including a lean protein source (20-30 grams/meal), vegetables (included at lunch and dinner), one serving (15g) of carbohydrate, and limited added fat (1 tb/day) at each meal.    7. Educated patient on using a protein powder drink as a meal replacement and/or supplement after bariatric surgery.   8. Discussed importance of adequate hydration after surgery, with goal of at least 64 oz of fluids/day.  9. Addressed avoiding all carbonated, caffeinated and sweetened drinks to prepare for bariatric surgery.     Nutrition Counseling:  10. Mindful eating techniques: Encouraged slow meal pace, chewing foods to applesauce consistency for 20-30 minutes/meal.   11. Discussed  fluids 30 minutes before, during, and after meal to prevent dumping syndrome and discomfort post bariatric surgery.     Instructions/Goals:     1. Start implementing bariatric surgery lifestyle modifications.  2. Focus on protein first, aiming for 20-30 grams of protein/meal, 3 meals/day.   3. Continue to work on elimination of pop and juice.     Handouts Provided:   [facility] Weight Management Patient Handbook  Protein supplement list  Snack Ideas    Monitor/Evaluation:  Pt. s target weight: weight loss to 5 lbs or more/no gain from initial visit, patient verbalized understanding.     Plan for next visit:   Bariatric plate.   (Final Supervised Diet visit with RD) pre/post-op  diet progression, give review of surgery process.      Video-Visit Details    Type of service:  Video Visit    Video End Time:10:21 AM    Originating Location (pt. Location): Home    Distant Location (provider location):  [facility]ICS [facility] [place]     Platform used for Video Visit: Rachid Chamberlain RD

## 2021-11-17 ENCOUNTER — IMMUNIZATION (OUTPATIENT)
Dept: NURSING | Facility: CLINIC | Age: 21
End: 2021-11-17
Attending: FAMILY MEDICINE
Payer: COMMERCIAL

## 2021-11-17 ENCOUNTER — TELEPHONE (OUTPATIENT)
Dept: FAMILY MEDICINE | Facility: CLINIC | Age: 21
End: 2021-11-17

## 2021-11-17 PROCEDURE — 91301 COVID-19,PF,MODERNA (18+ YRS PRIMARY SERIES .5ML): CPT

## 2021-11-17 PROCEDURE — 0012A COVID-19,PF,MODERNA (18+ YRS PRIMARY SERIES .5ML): CPT

## 2021-11-17 NOTE — TELEPHONE ENCOUNTER
Pt has nurse only appt today for moderna vaccine.  It appears pt had a first dose on 9/15   COVID-19,PF,Moderna9/15/2021  Please advise and enter order if appropriate.

## 2021-11-24 ENCOUNTER — TRANSFERRED RECORDS (OUTPATIENT)
Dept: HEALTH INFORMATION MANAGEMENT | Facility: CLINIC | Age: 21
End: 2021-11-24
Payer: COMMERCIAL

## 2021-11-24 ENCOUNTER — VIRTUAL VISIT (OUTPATIENT)
Dept: SURGERY | Facility: CLINIC | Age: 21
End: 2021-11-24
Payer: COMMERCIAL

## 2021-11-24 DIAGNOSIS — E66.01 MORBID OBESITY (H): Primary | ICD-10-CM

## 2021-11-24 NOTE — PROGRESS NOTES
Video-Visit Details    Type of service:  Video Visit    Video Start Time (time video started): 2:15 PM    Video End Time (time video stopped): 2:53 PM    Originating Location (pt. Location): Home    Distant Location (provider location):  Home office    Mode of Communication:  Video Conference via [employer]    Physician has received verbal consent for a Video Visit from the patient? Yes    Kem has made some quality changes in her eating and lifestyle. She is still smoking a couple of cigarettes per day and plans to quit tomorrow at midnight. She will follow up with a list of activities and mindful eating strategies. F43.9; Unspecified Learning Disorder; E66.01    Phong Guzman, PhD

## 2021-11-24 NOTE — LETTER
11/24/2021         RE: Yolanda Vee  [address]  [facility] [address]        Dear Colleague,    Thank you for referring your patient, Yolanda Vee, to the [facility] [place]. Please see a copy of my visit note below.    Video-Visit Details    Type of service:  Video Visit    Video Start Time (time video started): 2:15 PM    Video End Time (time video stopped): 2:53 PM    Originating Location (pt. Location): Home    Distant Location (provider location):  Home office    Mode of Communication:  Video Conference via [employer]    Physician has received verbal consent for a Video Visit from the patient? Yes    Kem has made some quality changes in her eating and lifestyle. She is still smoking a couple of cigarettes per day and plans to quit tomorrow at midnight. She will follow up with a list of activities and mindful eating strategies. F43.9; Unspecified Learning Disorder; E66.01    Phong Guzman, PhD            Again, thank you for allowing me to participate in the care of your patient.        Sincerely,        Phong Guzman, PhD

## 2021-12-20 ENCOUNTER — ALLIED HEALTH/NURSE VISIT (OUTPATIENT)
Dept: FAMILY MEDICINE | Facility: CLINIC | Age: 21
End: 2021-12-20
Payer: COMMERCIAL

## 2021-12-20 ENCOUNTER — VIRTUAL VISIT (OUTPATIENT)
Dept: SURGERY | Facility: CLINIC | Age: 21
End: 2021-12-20

## 2021-12-20 DIAGNOSIS — E66.01 CLASS 3 SEVERE OBESITY DUE TO EXCESS CALORIES WITHOUT SERIOUS COMORBIDITY WITH BODY MASS INDEX (BMI) OF 40.0 TO 44.9 IN ADULT (H): Primary | ICD-10-CM

## 2021-12-20 DIAGNOSIS — E66.813 CLASS 3 SEVERE OBESITY DUE TO EXCESS CALORIES WITHOUT SERIOUS COMORBIDITY WITH BODY MASS INDEX (BMI) OF 40.0 TO 44.9 IN ADULT (H): Primary | ICD-10-CM

## 2021-12-20 DIAGNOSIS — Z71.3 NUTRITIONAL COUNSELING: ICD-10-CM

## 2021-12-20 DIAGNOSIS — Z30.9 CONTRACEPTIVE MANAGEMENT: Primary | ICD-10-CM

## 2021-12-20 PROCEDURE — 96372 THER/PROPH/DIAG INJ SC/IM: CPT | Performed by: FAMILY MEDICINE

## 2021-12-20 PROCEDURE — 97803 MED NUTRITION INDIV SUBSEQ: CPT | Mod: TEL | Performed by: DIETITIAN, REGISTERED

## 2021-12-20 PROCEDURE — 99207 PR DROP WITH A PROCEDURE: CPT

## 2021-12-20 RX ADMIN — MEDROXYPROGESTERONE ACETATE 150 MG: 150 INJECTION, SUSPENSION INTRAMUSCULAR at 10:32

## 2021-12-20 NOTE — PROGRESS NOTES
Yolanda Vee is a 21 year old who is being evaluated via a billable telephone visit.      What phone number would you like to be contacted at? [phone]  How would you like to obtain your AVS? [place]      Follow Up Surgical Weight Loss Supervised Diet Evaluation    Assessment:  Pt. is being seen today for a follow up RD nutritional evaluation. Pt. has been unsuccessful with non-surgical weight loss methods and is interested in bariatric surgery. Today we reviewed current eating habits and level of physical activity, and instructed on the changes that are required for successful bariatric outcomes.    Surgery of interest per pt: LSG.    Workflow review:  Support Group: Not completed.  Psychology:In progress.  Lab work:Completed.  SWL:Yes 3rd Visit  Smoking Cessation-smoke free since Thanksgiving (11/25)    Weight goal: lose 5 lbs prior to surgery.    Anthropometrics:  Pt's weight is 227 lbs  Initial weight: 230 lbs   Weight change: 3 lbs (down)  BMI: There is no height or weight on file to calculate BMI.   Ideal body weight: 46 kg (101 lb 8.1 oz)  Adjusted ideal body weight: 68.8 kg (151 lb 11.3 oz)    Estimated RMR ([place]-St Jeor equation):  1735 kcals x 1.3 (light active) = 2256 kcals (for weight maintenance)    Medical History:  Patient Active Problem List   Diagnosis     CN (constipation)     Morbid obesity (H)     Vitamin D deficiency     Tobacco use     Sinus tachycardia     Periumbilical pain     PTSD (post-traumatic stress disorder)     Nicotine use disorder     Neutrophilia     Moderate episode of recurrent major depressive disorder (H)     Marijuana use     Hypochromic anemia     Chronic nasal congestion     Chronic idiopathic constipation     Anxiety      Diabetes: No  HbA1c:  No results found for: HGBA1C    Progress over past month: has eliminated pop and Red Bull as well as alcohol from her diet.  Has been working on increased protein at meals (cheese, chicken, eggs, etc...) along with  reduced carbohydrates.     Diet Recall/Time  Breakfast: breakfast burrito (eggs, sausage or jon, veggies)   Lunch: lunchable or small salad with chicken and jon bits   Dinner: fajita meat with rice, peppers and onion     Typical Snacks: balanced breaks (meat, cheese, dried fruit)       Meal duration: has been working on, continues to be a work in progress      fluids by 30 minutes before, during meal, and waiting 30 minutes after meal before drinking fluids: Yes-has been going well, has been waiting 15 minutes before and 30 minutes after more recently    Beverages  Water-at least 64 oz/day    Exercise  Walking for 30 minutes, aiming for 3 times/week   Up and down stairs at work     PES statement:   Overweight/Obesity (NC 3.3) related to overeating and poor lifestyle habits as evidenced by patient's subjective statements (excessive energy intake) and BMI of 44.7 kg/m2.       Intervention    Nutrition Education:   1. Provided general overview of diet and lifestyle modifications needed to be a deemed a safe candidate for bariatric surgery.     Food/Nutrient Delivery:  2. Educated patient on eating three meals, with cutting out snacking.  3. Bariatric Plate: Patient and I discussed the importance of including a lean protein source (20-30 grams/meal), vegetables (included at lunch and dinner), one serving (15g) of carbohydrate, and limited added fat (1 tb/day) at each meal.   4. Discussed importance of adequate hydration after surgery, with goal of at least 64 oz of fluids/day.  5. Addressed avoiding all carbonated, caffeinated and sweetened drinks to prepare for bariatric surgery.     Nutrition Counselin. Mindful eating techniques: Encouraged slow meal pace, chewing foods to applesauce consistency for 20-30 minutes/meal.   7. Discussed  fluids 30 minutes before, during, and after meal to prevent dumping syndrome and discomfort post bariatric surgery.     Instructions/Goals:     1. Continue  implementing bariatric surgery lifestyle modifications.  2. Updated Weight.    Handouts Provided:   [facility] Weight Management Patient Handbook    Monitor/Evaluation:  Pt. s target weight:  weight loss to 5 lbs from initial weight/no gain from initial visit, pt. verbalized understanding.       Plan for next visit:   (Final supervised diet visit with RD) pre/post-op  diet progression, give review of surgery process.      Phone call duration: 15 minutes      Diana Chamberlain RD

## 2021-12-20 NOTE — LETTER
12/20/2021         RE: Yolanda Vee  [address]  [place] [address]        Dear Colleague,    Thank you for referring your patient, Yolanda Vee, to the [facility] [place]. Please see a copy of my visit note below.    Yolanda Vee is a 21 year old who is being evaluated via a billable telephone visit.      What phone number would you like to be contacted at? [phone]  How would you like to obtain your AVS? MyChart      Follow Up Surgical Weight Loss Supervised Diet Evaluation    Assessment:  Pt. is being seen today for a follow up RD nutritional evaluation. Pt. has been unsuccessful with non-surgical weight loss methods and is interested in bariatric surgery. Today we reviewed current eating habits and level of physical activity, and instructed on the changes that are required for successful bariatric outcomes.    Surgery of interest per pt: LSG.    Workflow review:  Support Group: Not completed.  Psychology:In progress.  Lab work:Completed.  SWL:Yes 3rd Visit  Smoking Cessation-smoke free since Thanksgiving (11/25)    Weight goal: lose 5 lbs prior to surgery.    Anthropometrics:  Pt's weight is 227 lbs  Initial weight: 230 lbs   Weight change: 3 lbs (down)  BMI: There is no height or weight on file to calculate BMI.   Ideal body weight: 46 kg (101 lb 8.1 oz)  Adjusted ideal body weight: 68.8 kg (151 lb 11.3 oz)    Estimated RMR ([place]-St Jeor equation):  1735 kcals x 1.3 (light active) = 2256 kcals (for weight maintenance)    Medical History:  Patient Active Problem List   Diagnosis     CN (constipation)     Morbid obesity (H)     Vitamin D deficiency     Tobacco use     Sinus tachycardia     Periumbilical pain     PTSD (post-traumatic stress disorder)     Nicotine use disorder     Neutrophilia     Moderate episode of recurrent major depressive disorder (H)     Marijuana use     Hypochromic anemia     Chronic nasal congestion      Chronic idiopathic constipation     Anxiety      Diabetes: No  HbA1c:  No results found for: HGBA1C    Progress over past month: has eliminated pop and Red Bull as well as alcohol from her diet.  Has been working on increased protein at meals (cheese, chicken, eggs, etc...) along with reduced carbohydrates.     Diet Recall/Time  Breakfast: breakfast burrito (eggs, sausage or jon, veggies)   Lunch: lunchable or small salad with chicken and jon bits   Dinner: fajita meat with rice, peppers and onion     Typical Snacks: balanced breaks (meat, cheese, dried fruit)       Meal duration: has been working on, continues to be a work in progress      fluids by 30 minutes before, during meal, and waiting 30 minutes after meal before drinking fluids: Yes-has been going well, has been waiting 15 minutes before and 30 minutes after more recently    Beverages  Water-at least 64 oz/day    Exercise  Walking for 30 minutes, aiming for 3 times/week   Up and down stairs at work     PES statement:   Overweight/Obesity (NC 3.3) related to overeating and poor lifestyle habits as evidenced by patient's subjective statements (excessive energy intake) and BMI of 44.7 kg/m2.       Intervention    Nutrition Education:   1. Provided general overview of diet and lifestyle modifications needed to be a deemed a safe candidate for bariatric surgery.     Food/Nutrient Delivery:  2. Educated patient on eating three meals, with cutting out snacking.  3. Bariatric Plate: Patient and I discussed the importance of including a lean protein source (20-30 grams/meal), vegetables (included at lunch and dinner), one serving (15g) of carbohydrate, and limited added fat (1 tb/day) at each meal.   4. Discussed importance of adequate hydration after surgery, with goal of at least 64 oz of fluids/day.  5. Addressed avoiding all carbonated, caffeinated and sweetened drinks to prepare for bariatric surgery.     Nutrition Counselin. Mindful eating  techniques: Encouraged slow meal pace, chewing foods to applesauce consistency for 20-30 minutes/meal.   7. Discussed  fluids 30 minutes before, during, and after meal to prevent dumping syndrome and discomfort post bariatric surgery.     Instructions/Goals:     1. Continue implementing bariatric surgery lifestyle modifications.  2. Updated Weight.    Handouts Provided:   [facility] Weight Management Patient Handbook    Monitor/Evaluation:  Pt. s target weight:  weight loss to 5 lbs from initial weight/no gain from initial visit, pt. verbalized understanding.       Plan for next visit:   (Final supervised diet visit with RD) pre/post-op  diet progression, give review of surgery process.      Phone call duration: 15 minutes      Diana Chamberlain RD          Again, thank you for allowing me to participate in the care of your patient.        Sincerely,        Diana Chamberlain RD

## 2021-12-22 ENCOUNTER — VIRTUAL VISIT (OUTPATIENT)
Dept: SURGERY | Facility: CLINIC | Age: 21
End: 2021-12-22
Payer: COMMERCIAL

## 2021-12-22 DIAGNOSIS — E66.01 MORBID OBESITY (H): Primary | ICD-10-CM

## 2021-12-22 NOTE — LETTER
"    12/22/2021         RE: Yolanda Vee  [address]  [facility] [address]        Dear Colleague,    Thank you for referring your patient, Yolanda Vee, to the [facility] SURGERY CLINIC AND BARIATRICS CARE [place]. Please see a copy of my visit note below.    The patient has been notified of the following:      \"We have found that certain health care needs can be provided without the need for a face to face visit.  This service lets us provide the care you need with a phone conversation.       I will have full access to your [place] medical record during this entire phone call.   I will be taking notes for your medical record.      Since this is like an office visit, we will bill your insurance company for this service.       There are potential benefits and risks of telephone visits (e.g. limits to patient confidentiality) that differ from in-person visits.?  Confidentiality still applies for telephone services, and nobody will record the visit.  It is important to be in a quiet, private space that is free of distractions (including cell phone or other devices) during the visit.??      If during the course of the call I believe a telephone visit is not appropriate, you will not be charged for this service\"     Consent has been obtained for this service by care team member: Yes     Kem has made significant changes in eating and lifestyle. She is also doing Facebook support groups, but was encouraged to come to the [facility] sponsored ones as well. She is now ready to proceed with surgery and a report will be sent to the clinic. F43.9; Unspecified Learning Disorder      Again, thank you for allowing me to participate in the care of your patient.        Sincerely,        Phong Guzman, PhD    "

## 2021-12-22 NOTE — PROGRESS NOTES
"The patient has been notified of the following:      \"We have found that certain health care needs can be provided without the need for a face to face visit.  This service lets us provide the care you need with a phone conversation.       I will have full access to your [place] medical record during this entire phone call.   I will be taking notes for your medical record.      Since this is like an office visit, we will bill your insurance company for this service.       There are potential benefits and risks of telephone visits (e.g. limits to patient confidentiality) that differ from in-person visits.?  Confidentiality still applies for telephone services, and nobody will record the visit.  It is important to be in a quiet, private space that is free of distractions (including cell phone or other devices) during the visit.??      If during the course of the call I believe a telephone visit is not appropriate, you will not be charged for this service\"     Consent has been obtained for this service by care team member: Yes     Kem has made significant changes in eating and lifestyle. She is also doing Facebook support groups, but was encouraged to come to the [facility] sponsored ones as well. She is now ready to proceed with surgery and a report will be sent to the clinic. F43.9; Unspecified Learning Disorder  "

## 2021-12-30 ENCOUNTER — DOCUMENTATION ONLY (OUTPATIENT)
Dept: SURGERY | Facility: CLINIC | Age: 21
End: 2021-12-30
Payer: COMMERCIAL

## 2021-12-30 DIAGNOSIS — Z87.891 HISTORY OF NICOTINE USE: Primary | ICD-10-CM

## 2021-12-30 DIAGNOSIS — F12.90 MARIJUANA USE: ICD-10-CM

## 2021-12-30 NOTE — PROGRESS NOTES
Yolanda has been cleared by Phong to proceed.  In your intake notes you stated you wanted to work with her for 6-9 months and have her behaviorally cleared before considering surgery.  I have not placed a referral for her to do the phone program with [facility].  I can do this now if you feel it's appropriate.

## 2022-01-13 DIAGNOSIS — F43.10 PTSD (POST-TRAUMATIC STRESS DISORDER): Primary | ICD-10-CM

## 2022-01-15 ENCOUNTER — HEALTH MAINTENANCE LETTER (OUTPATIENT)
Age: 22
End: 2022-01-15

## 2022-01-16 RX ORDER — PRAZOSIN HYDROCHLORIDE 1 MG/1
CAPSULE ORAL
Qty: 180 CAPSULE | Refills: 1 | Status: SHIPPED | OUTPATIENT
Start: 2022-01-16 | End: 2022-11-09

## 2022-01-16 NOTE — TELEPHONE ENCOUNTER
"Outpatient Medication Detail     Disp Refills Start End NIHARIKA   prazosin (MINIPRESS) 5 MG capsule 30 capsule 2 6/4/2021  No   Sig - Route: Take 1 capsule (5 mg total) by mouth at bedtime. - Oral   Sent to pharmacy as: prazosin 5 mg capsule (MINIPRESS)   E-Prescribing Status: Receipt confirmed by pharmacy (6/4/2021 11:12 AM CDT)       prazosin (MINIPRESS) 5 MG capsule [[id number]]    Electronically signed by: Stephanie Lynne CNP on 06/04/21 1112 Status: Active   Ordering user: Stephanie Lynne CNP 06/04/21 1112 Authorized by: Stephanie Lynne CNP   Frequency: QHS 06/04/21 - Until Discontinued Released by: Stephanie Lynne CNP 06/04/21 1112   Diagnoses  Nightmares [F51.5]       Routing refill request to provider for review/approval because:  A break in medication    Last office visit provider:  11/9/21     Requested Prescriptions   Pending Prescriptions Disp Refills     prazosin (MINIPRESS) 1 MG capsule [Pharmacy Med Name: PRAZOSIN 1MG CAPSULES] 180 capsule      Sig: TAKE 1 CAPSULE BY MOUTH EVERY NIGHT AT BEDTIME. AFTER 2-3 DAYS, INCREASE TO 2 CAPSULE EVERY NIGHT AT BEDTIME       Alpha Blockers Passed - 1/13/2022  6:02 AM        Passed - Blood pressure under 140/90 in past 12 months     BP Readings from Last 3 Encounters:   11/08/21 132/84   10/26/21 118/70   10/15/21 118/59                 Passed - Recent (12 mo) or future (30 days) visit within the authorizing provider's specialty     Patient has had an office visit with the authorizing provider or a provider within the authorizing providers department within the previous 12 mos or has a future within next 30 days. See \"Patient Info\" tab in inbasket, or \"Choose Columns\" in Meds & Orders section of the refill encounter.              Passed - Patient does not have Tadalafil, Vardenafil, or Sildenafil on their medication list        Passed - Medication is active on med list        Passed - Patient is 18 years of age or older        " "Passed - No active pregnancy on record        Passed - No positive pregnancy test in past 12 months       Antiadrenergic Antihypertensives Passed - 1/13/2022  6:02 AM        Passed - Blood pressure less than 140/90 in past 6 months     BP Readings from Last 3 Encounters:   11/08/21 132/84   10/26/21 118/70   10/15/21 118/59                 Passed - Medication is active on med list        Passed - Patient is age 18 or older        Passed - No active pregnancy on record        Passed - Normal serum creatinine on file in past 12 months     Recent Labs   Lab Test 11/08/21  1014   CR 0.69       Ok to refill medication if creatinine is low          Passed - No positive pregnancy test within past 12 months        Passed - Recent (6 mo) or future (30 days) visit within the authorizing provider's specialty     Patient had office visit in the last 6 months or has a visit in the next 30 days with authorizing provider or within the authorizing provider's specialty.  See \"Patient Info\" tab in inbasket, or \"Choose Columns\" in Meds & Orders section of the refill encounter.                 Darwin Inman RN 01/16/22 10:03 AM  "

## 2022-01-19 ENCOUNTER — VIRTUAL VISIT (OUTPATIENT)
Dept: SURGERY | Facility: CLINIC | Age: 22
End: 2022-01-19
Payer: COMMERCIAL

## 2022-01-19 DIAGNOSIS — E66.813 CLASS 3 SEVERE OBESITY DUE TO EXCESS CALORIES WITHOUT SERIOUS COMORBIDITY WITH BODY MASS INDEX (BMI) OF 40.0 TO 44.9 IN ADULT (H): Primary | ICD-10-CM

## 2022-01-19 DIAGNOSIS — E66.01 CLASS 3 SEVERE OBESITY DUE TO EXCESS CALORIES WITHOUT SERIOUS COMORBIDITY WITH BODY MASS INDEX (BMI) OF 40.0 TO 44.9 IN ADULT (H): Primary | ICD-10-CM

## 2022-01-19 DIAGNOSIS — Z71.3 NUTRITIONAL COUNSELING: ICD-10-CM

## 2022-01-19 PROCEDURE — 97803 MED NUTRITION INDIV SUBSEQ: CPT | Mod: GT | Performed by: DIETITIAN, REGISTERED

## 2022-01-19 NOTE — PROGRESS NOTES
Yolanda Vee is a 21 year old who is being evaluated via a billable video visit.      How would you like to obtain your AVS? MyChart  If the video visit is dropped, the invitation should be resent by: Send to e-mail at: [email or web address]  Will anyone else be joining your video visit? No      Video Start Time: 2:20 PM      Follow Up Surgical Weight Loss Supervised Diet Evaluation    Assessment:  Pt. is being seen today for a follow up RD nutritional evaluation. Pt. has been unsuccessful with non-surgical weight loss methods and is interested in bariatric surgery. Today we reviewed current eating habits and level of physical activity, and instructed on the changes that are required for successful bariatric outcomes.    Surgery of interest per pt: LSG.    Workflow review:  Support Group: Not completed.  Psychology:Completed.  Lab work:Completed.  SWL:Yes 4th Visit   Smoking Cessation-did have a relapse around New Years, however has not had a cigarette now since then.      Weight goal: 5 lbs prior to surgery     Anthropometrics:  Pt's weight is 227 lbs  Initial weight: 230 lbs   Weight change: 3 lbs   BMI: There is no height or weight on file to calculate BMI.   Ideal body weight: 46 kg (101 lb 8.1 oz)  Adjusted ideal body weight: 68.8 kg (151 lb 11.3 oz)    Estimated RMR ([place]-St Jeor equation):  1735 kcals x 1.3 (light active) = 2256 kcals (for weight maintenance)    Medical History:  Patient Active Problem List   Diagnosis     CN (constipation)     Morbid obesity (H)     Vitamin D deficiency     Tobacco use     Sinus tachycardia     Periumbilical pain     PTSD (post-traumatic stress disorder)     Nicotine use disorder     Neutrophilia     Moderate episode of recurrent major depressive disorder (H)     Marijuana use     Hypochromic anemia     Chronic nasal congestion     Chronic idiopathic constipation     Anxiety      Diabetes: No   HbA1c:  No results found for: HGBA1C    Progress over past  month: Patient reports that currently she has COVID and does not have much of an appetite.  Patient notes that otherwise, she is still trying to focus on adequate protein along with increased water consumption.     Diet Recall/Time  Breakfast: Protein Shake, Banana and Piece of Toast   Lunch: Protein Shake   Dinner: steak, veggies (broccoli and cheese), potatoes with ranch dressing     Typical Snacks: none     Eating out: 0-1 time/week     Meal duration: going well, more consistent with.      fluids by 30 minutes before, during meal, and waiting 30 minutes after meal before drinking fluids: Yes    Beverages  Water-at least 64 oz/day, <4 oz/day Orange Juice in the AM     Exercise  Walking up and down stairs at work (on the weekends), walking the dog as well for 20-40 minutes 5 days/week    PES statement:   Overweight/Obesity (NC 3.3) related to overeating and poor lifestyle habits as evidenced by patient's subjective statements (excessive energy intake) and BMI of 44.7 kg/m2.       Intervention    Nutrition Education:   1. Provided general overview of diet and lifestyle modifications needed to be a deemed a safe candidate for bariatric surgery.     Food/Nutrient Delivery:  2. Educated patient on eating three meals, with cutting out snacking.  3. Bariatric Plate: Patient and I discussed the importance of including a lean protein source (20-30 grams/meal), vegetables (included at lunch and dinner), one serving (15g) of carbohydrate, and limited added fat (1 tb/day) at each meal.   4. Educated patient on using a protein powder drink as a meal replacement and/or supplement after bariatric surgery.   5. Discussed importance of adequate hydration after surgery, with goal of at least 64 oz of fluids/day.  6. Addressed avoiding all carbonated, caffeinated and sweetened drinks to prepare for bariatric surgery.     Nutrition Counselin. Mindful eating techniques: Encouraged slow meal pace, chewing foods to  applesauce consistency for 20-30 minutes/meal.   8. Discussed  fluids 30 minutes before, during, and after meal to prevent dumping syndrome and discomfort post bariatric surgery.      Instructions/Goals:     1. Continue implementing bariatric surgery lifestyle modifications.    Handouts Provided:   [facility] Bariatric Surgery Nutrition Info  Food journal  Food label  Protein supplement list    Monitor/Evaluation:  Pt. s target weight:  weight loss to 225 lbs or less/no gain from initial visit, pt. verbalized understanding.       Plan for next visit:   (Final supervised diet visit with RD) pre/post-op  diet progression, give review of surgery process.      Video-Visit Details    Type of service:  Video Visit    Video End Time:2:36 PM    Originating Location (pt. Location): Home    Distant Location (provider location):  [facility] SURGERY CLINIC AND BARIATRICS CARE [place]     Platform used for Video Visit: Rachid Chamberlain RD

## 2022-01-19 NOTE — LETTER
1/19/2022         RE: Yolanda Vee  [address]  [facility] [address]        Dear Colleague,    Thank you for referring your patient, Yolanda Vee, to the [facility] SURGERY CLINIC AND BARIATRICS CARE [place]. Please see a copy of my visit note below.    Yolanda Vee is a 21 year old who is being evaluated via a billable video visit.      How would you like to obtain your AVS? MyChart  If the video visit is dropped, the invitation should be resent by: Send to e-mail at: [email or web address]  Will anyone else be joining your video visit? No      Video Start Time: 2:20 PM      Follow Up Surgical Weight Loss Supervised Diet Evaluation    Assessment:  Pt. is being seen today for a follow up RD nutritional evaluation. Pt. has been unsuccessful with non-surgical weight loss methods and is interested in bariatric surgery. Today we reviewed current eating habits and level of physical activity, and instructed on the changes that are required for successful bariatric outcomes.    Surgery of interest per pt: LSG.    Workflow review:  Support Group: Not completed.  Psychology:Completed.  Lab work:Completed.  SWL:Yes 4th Visit   Smoking Cessation-did have a relapse around New Years, however has not had a cigarette now since then.      Weight goal: 5 lbs prior to surgery     Anthropometrics:  Pt's weight is 227 lbs  Initial weight: 230 lbs   Weight change: 3 lbs   BMI: There is no height or weight on file to calculate BMI.   Ideal body weight: 46 kg (101 lb 8.1 oz)  Adjusted ideal body weight: 68.8 kg (151 lb 11.3 oz)    Estimated RMR ([place]-St Jeor equation):  1735 kcals x 1.3 (light active) = 2256 kcals (for weight maintenance)    Medical History:  Patient Active Problem List   Diagnosis     CN (constipation)     Morbid obesity (H)     Vitamin D deficiency     Tobacco use     Sinus tachycardia     Periumbilical pain     PTSD (post-traumatic stress disorder)     Nicotine use  disorder     Neutrophilia     Moderate episode of recurrent major depressive disorder (H)     Marijuana use     Hypochromic anemia     Chronic nasal congestion     Chronic idiopathic constipation     Anxiety      Diabetes: No   HbA1c:  No results found for: HGBA1C    Progress over past month: Patient reports that currently she has COVID and does not have much of an appetite.  Patient notes that otherwise, she is still trying to focus on adequate protein along with increased water consumption.     Diet Recall/Time  Breakfast: Protein Shake, Banana and Piece of Toast   Lunch: Protein Shake   Dinner: steak, veggies (broccoli and cheese), potatoes with ranch dressing     Typical Snacks: none     Eating out: 0-1 time/week     Meal duration: going well, more consistent with.      fluids by 30 minutes before, during meal, and waiting 30 minutes after meal before drinking fluids: Yes    Beverages  Water-at least 64 oz/day, <4 oz/day Orange Juice in the AM     Exercise  Walking up and down stairs at work (on the weekends), walking the dog as well for 20-40 minutes 5 days/week    PES statement:   Overweight/Obesity (NC 3.3) related to overeating and poor lifestyle habits as evidenced by patient's subjective statements (excessive energy intake) and BMI of 44.7 kg/m2.       Intervention    Nutrition Education:   1. Provided general overview of diet and lifestyle modifications needed to be a deemed a safe candidate for bariatric surgery.     Food/Nutrient Delivery:  2. Educated patient on eating three meals, with cutting out snacking.  3. Bariatric Plate: Patient and I discussed the importance of including a lean protein source (20-30 grams/meal), vegetables (included at lunch and dinner), one serving (15g) of carbohydrate, and limited added fat (1 tb/day) at each meal.   4. Educated patient on using a protein powder drink as a meal replacement and/or supplement after bariatric surgery.   5. Discussed importance of  adequate hydration after surgery, with goal of at least 64 oz of fluids/day.  6. Addressed avoiding all carbonated, caffeinated and sweetened drinks to prepare for bariatric surgery.     Nutrition Counselin. Mindful eating techniques: Encouraged slow meal pace, chewing foods to applesauce consistency for 20-30 minutes/meal.   8. Discussed  fluids 30 minutes before, during, and after meal to prevent dumping syndrome and discomfort post bariatric surgery.      Instructions/Goals:     1. Continue implementing bariatric surgery lifestyle modifications.    Handouts Provided:   [facility] Bariatric Surgery Nutrition Info  Food journal  Food label  Protein supplement list    Monitor/Evaluation:  Pt. s target weight:  weight loss to 225 lbs or less/no gain from initial visit, pt. verbalized understanding.       Plan for next visit:   (Final supervised diet visit with RD) pre/post-op  diet progression, give review of surgery process.      Video-Visit Details    Type of service:  Video Visit    Video End Time:2:36 PM    Originating Location (pt. Location): Home    Distant Location (provider location):  [facility] SURGERY CLINIC AND BARIATRICS CARE [place]     Platform used for Video Visit: Rachid Chamberlain RD          Again, thank you for allowing me to participate in the care of your patient.        Sincerely,        Diana Chamberlain RD

## 2022-01-26 ENCOUNTER — HOSPITAL ENCOUNTER (OUTPATIENT)
Facility: CLINIC | Age: 22
Setting detail: OBSERVATION
Discharge: HOME | End: 2022-01-27
Attending: STUDENT IN AN ORGANIZED HEALTH CARE EDUCATION/TRAINING PROGRAM | Admitting: STUDENT IN AN ORGANIZED HEALTH CARE EDUCATION/TRAINING PROGRAM
Payer: COMMERCIAL

## 2022-01-26 ENCOUNTER — NURSE TRIAGE (OUTPATIENT)
Dept: NURSING | Facility: CLINIC | Age: 22
End: 2022-01-26
Payer: COMMERCIAL

## 2022-01-26 ENCOUNTER — APPOINTMENT (OUTPATIENT)
Dept: CT IMAGING | Facility: CLINIC | Age: 22
End: 2022-01-26
Attending: STUDENT IN AN ORGANIZED HEALTH CARE EDUCATION/TRAINING PROGRAM
Payer: COMMERCIAL

## 2022-01-26 DIAGNOSIS — Z87.19 HISTORY OF ALCOHOLIC GASTRITIS: Primary | ICD-10-CM

## 2022-01-26 DIAGNOSIS — K92.2 GASTROINTESTINAL HEMORRHAGE, UNSPECIFIED GASTROINTESTINAL HEMORRHAGE TYPE: ICD-10-CM

## 2022-01-26 LAB
ALBUMIN SERPL-MCNC: 3.5 G/DL (ref 3.5–5)
ALP SERPL-CCNC: 82 U/L (ref 45–120)
ALT SERPL W P-5'-P-CCNC: 20 U/L (ref 0–45)
ANION GAP SERPL CALCULATED.3IONS-SCNC: 7 MMOL/L (ref 5–18)
APTT PPP: 29 SECONDS (ref 22–38)
AST SERPL W P-5'-P-CCNC: 18 U/L (ref 0–40)
BASOPHILS # BLD AUTO: 0 10E3/UL (ref 0–0.2)
BASOPHILS NFR BLD AUTO: 0 %
BILIRUB SERPL-MCNC: 0.4 MG/DL (ref 0–1)
BUN SERPL-MCNC: 7 MG/DL (ref 8–22)
CALCIUM SERPL-MCNC: 8.8 MG/DL (ref 8.5–10.5)
CHLORIDE BLD-SCNC: 111 MMOL/L (ref 98–107)
CO2 SERPL-SCNC: 21 MMOL/L (ref 22–31)
CREAT SERPL-MCNC: 0.73 MG/DL (ref 0.6–1.1)
EOSINOPHIL # BLD AUTO: 0.4 10E3/UL (ref 0–0.7)
EOSINOPHIL NFR BLD AUTO: 2 %
ERYTHROCYTE [DISTWIDTH] IN BLOOD BY AUTOMATED COUNT: 14.8 % (ref 10–15)
GFR SERPL CREATININE-BSD FRML MDRD: >90 ML/MIN/1.73M2
GLUCOSE BLD-MCNC: 101 MG/DL (ref 70–125)
HCT VFR BLD AUTO: 45.3 % (ref 35–47)
HGB BLD-MCNC: 14.2 G/DL (ref 11.7–15.7)
HOLD SPECIMEN: NORMAL
HOLD SPECIMEN: NORMAL
IMM GRANULOCYTES # BLD: 0.1 10E3/UL
IMM GRANULOCYTES NFR BLD: 1 %
INR PPP: 1.09 (ref 0.85–1.15)
LIPASE SERPL-CCNC: 17 U/L (ref 0–52)
LYMPHOCYTES # BLD AUTO: 3.1 10E3/UL (ref 0.8–5.3)
LYMPHOCYTES NFR BLD AUTO: 21 %
MCH RBC QN AUTO: 28.4 PG (ref 26.5–33)
MCHC RBC AUTO-ENTMCNC: 31.3 G/DL (ref 31.5–36.5)
MCV RBC AUTO: 91 FL (ref 78–100)
MONOCYTES # BLD AUTO: 1.1 10E3/UL (ref 0–1.3)
MONOCYTES NFR BLD AUTO: 8 %
NEUTROPHILS # BLD AUTO: 10.1 10E3/UL (ref 1.6–8.3)
NEUTROPHILS NFR BLD AUTO: 68 %
NRBC # BLD AUTO: 0 10E3/UL
NRBC BLD AUTO-RTO: 0 /100
PLATELET # BLD AUTO: 364 10E3/UL (ref 150–450)
POTASSIUM BLD-SCNC: 3.6 MMOL/L (ref 3.5–5)
PROT SERPL-MCNC: 7.7 G/DL (ref 6–8)
RBC # BLD AUTO: 5 10E6/UL (ref 3.8–5.2)
SODIUM SERPL-SCNC: 139 MMOL/L (ref 136–145)
WBC # BLD AUTO: 14.8 10E3/UL (ref 4–11)

## 2022-01-26 PROCEDURE — 85025 COMPLETE CBC W/AUTO DIFF WBC: CPT | Performed by: STUDENT IN AN ORGANIZED HEALTH CARE EDUCATION/TRAINING PROGRAM

## 2022-01-26 PROCEDURE — 80053 COMPREHEN METABOLIC PANEL: CPT | Performed by: EMERGENCY MEDICINE

## 2022-01-26 PROCEDURE — 83690 ASSAY OF LIPASE: CPT | Performed by: STUDENT IN AN ORGANIZED HEALTH CARE EDUCATION/TRAINING PROGRAM

## 2022-01-26 PROCEDURE — 99285 EMERGENCY DEPT VISIT HI MDM: CPT | Mod: 25

## 2022-01-26 PROCEDURE — 96375 TX/PRO/DX INJ NEW DRUG ADDON: CPT | Mod: 59

## 2022-01-26 PROCEDURE — 74174 CTA ABD&PLVS W/CONTRAST: CPT

## 2022-01-26 PROCEDURE — 85610 PROTHROMBIN TIME: CPT | Performed by: STUDENT IN AN ORGANIZED HEALTH CARE EDUCATION/TRAINING PROGRAM

## 2022-01-26 PROCEDURE — 84443 ASSAY THYROID STIM HORMONE: CPT | Performed by: STUDENT IN AN ORGANIZED HEALTH CARE EDUCATION/TRAINING PROGRAM

## 2022-01-26 PROCEDURE — 84703 CHORIONIC GONADOTROPIN ASSAY: CPT | Performed by: STUDENT IN AN ORGANIZED HEALTH CARE EDUCATION/TRAINING PROGRAM

## 2022-01-26 PROCEDURE — 250N000011 HC RX IP 250 OP 636: Performed by: STUDENT IN AN ORGANIZED HEALTH CARE EDUCATION/TRAINING PROGRAM

## 2022-01-26 PROCEDURE — 36415 COLL VENOUS BLD VENIPUNCTURE: CPT | Performed by: EMERGENCY MEDICINE

## 2022-01-26 PROCEDURE — 96374 THER/PROPH/DIAG INJ IV PUSH: CPT | Mod: 59

## 2022-01-26 PROCEDURE — C9113 INJ PANTOPRAZOLE SODIUM, VIA: HCPCS | Performed by: STUDENT IN AN ORGANIZED HEALTH CARE EDUCATION/TRAINING PROGRAM

## 2022-01-26 PROCEDURE — 87635 SARS-COV-2 COVID-19 AMP PRB: CPT | Performed by: STUDENT IN AN ORGANIZED HEALTH CARE EDUCATION/TRAINING PROGRAM

## 2022-01-26 PROCEDURE — G0378 HOSPITAL OBSERVATION PER HR: HCPCS

## 2022-01-26 PROCEDURE — 85025 COMPLETE CBC W/AUTO DIFF WBC: CPT | Performed by: EMERGENCY MEDICINE

## 2022-01-26 PROCEDURE — 80053 COMPREHEN METABOLIC PANEL: CPT | Performed by: STUDENT IN AN ORGANIZED HEALTH CARE EDUCATION/TRAINING PROGRAM

## 2022-01-26 PROCEDURE — 36415 COLL VENOUS BLD VENIPUNCTURE: CPT | Performed by: STUDENT IN AN ORGANIZED HEALTH CARE EDUCATION/TRAINING PROGRAM

## 2022-01-26 PROCEDURE — C9803 HOPD COVID-19 SPEC COLLECT: HCPCS

## 2022-01-26 PROCEDURE — 71275 CT ANGIOGRAPHY CHEST: CPT

## 2022-01-26 PROCEDURE — 96361 HYDRATE IV INFUSION ADD-ON: CPT

## 2022-01-26 PROCEDURE — 83735 ASSAY OF MAGNESIUM: CPT | Performed by: STUDENT IN AN ORGANIZED HEALTH CARE EDUCATION/TRAINING PROGRAM

## 2022-01-26 PROCEDURE — 258N000003 HC RX IP 258 OP 636: Performed by: STUDENT IN AN ORGANIZED HEALTH CARE EDUCATION/TRAINING PROGRAM

## 2022-01-26 PROCEDURE — 84100 ASSAY OF PHOSPHORUS: CPT | Performed by: STUDENT IN AN ORGANIZED HEALTH CARE EDUCATION/TRAINING PROGRAM

## 2022-01-26 PROCEDURE — 85730 THROMBOPLASTIN TIME PARTIAL: CPT | Performed by: STUDENT IN AN ORGANIZED HEALTH CARE EDUCATION/TRAINING PROGRAM

## 2022-01-26 RX ORDER — IOPAMIDOL 755 MG/ML
100 INJECTION, SOLUTION INTRAVASCULAR ONCE
Status: COMPLETED | OUTPATIENT
Start: 2022-01-26 | End: 2022-01-26

## 2022-01-26 RX ORDER — MORPHINE SULFATE 4 MG/ML
4 INJECTION, SOLUTION INTRAMUSCULAR; INTRAVENOUS ONCE
Status: COMPLETED | OUTPATIENT
Start: 2022-01-26 | End: 2022-01-26

## 2022-01-26 RX ORDER — ONDANSETRON 2 MG/ML
4 INJECTION INTRAMUSCULAR; INTRAVENOUS ONCE
Status: COMPLETED | OUTPATIENT
Start: 2022-01-26 | End: 2022-01-26

## 2022-01-26 RX ADMIN — SODIUM CHLORIDE, POTASSIUM CHLORIDE, SODIUM LACTATE AND CALCIUM CHLORIDE 1000 ML: 600; 310; 30; 20 INJECTION, SOLUTION INTRAVENOUS at 21:28

## 2022-01-26 RX ADMIN — IOPAMIDOL 100 ML: 755 INJECTION, SOLUTION INTRAVENOUS at 20:28

## 2022-01-26 RX ADMIN — PANTOPRAZOLE SODIUM 40 MG: 40 INJECTION, POWDER, FOR SOLUTION INTRAVENOUS at 22:53

## 2022-01-26 RX ADMIN — ONDANSETRON 4 MG: 2 INJECTION INTRAMUSCULAR; INTRAVENOUS at 22:45

## 2022-01-26 RX ADMIN — MORPHINE SULFATE 4 MG: 4 INJECTION INTRAVENOUS at 22:47

## 2022-01-26 ASSESSMENT — MIFFLIN-ST. JEOR: SCORE: 1691.22

## 2022-01-26 NOTE — TELEPHONE ENCOUNTER
"Triage call    Pt has been \"cramping all day long\" since 7am  Just now patient is having rectal bleeding that fills the \"toilet seat\" and patient vomited black material     Blood in toilet is dark red    Disposition: Call 911. Pt calling 911 due to the black vomit and rectal bleeding    Adeline Ortiz RN  [facility] Nurse Advisor 3:25 PM 1/26/2022      Reason for Disposition    Vomiting red blood or black (coffee ground) material    Additional Information    Negative: Passed out (i.e., fainted, collapsed and was not responding)    Negative: Shock suspected (e.g., cold/pale/clammy skin, too weak to stand, low BP, rapid pulse)    Protocols used: RECTAL BLEEDING-A-OH    COVID 19 Nurse Triage Plan/Patient Instructions    Please be aware that novel coronavirus (COVID-19) may be circulating in the community. If you develop symptoms such as fever, cough, or SOB or if you have concerns about the presence of another infection including coronavirus (COVID-19), please contact your health care provider or visit [email or web address].     Disposition/Instructions    Call to EMS/911 recommended. Follow protocol based instructions.     Bring Your Own Device:  Please also bring your smart device(s) (smart phones, tablets, laptops) and their charging cables for your personal use and to communicate with your care team during your visit.    Thank you for taking steps to prevent the spread of this virus.  o Limit your contact with others.  o Wear a simple mask to cover your cough.  o Wash your hands well and often.    Resources    [facility]: About COVID-19: [email or web address]    CDC: What to Do If You're Sick: www.cdc.gov/coronavirus/2019-ncov/about/steps-when-sick.html    CDC: Ending Home Isolation: www.cdc.gov/coronavirus/2019-ncov/hcp/disposition-in-home-patients.html     CDC: Caring for Someone: www.cdc.gov/coronavirus/2019-ncov/if-you-are-sick/care-for-someone.html     MD: Interim Guidance for " Hospital Discharge to Home: www.health.[facility].[place]/diseases/coronavirus/hcp/hospdischarge.pdf    [facility] clinical trials (COVID-19 research studies): [email or web address]/[email or web address]     Below are the COVID-19 hotlines at the [place] Department of Health ([facility]). Interpreters are available.   o For health questions: Call 423-384-8248 or 1-716.678.3814 (7 a.m. to 7 p.m.)  o For questions about schools and childcare: Call 869-681-0552 or 1-287.177.1168 (7 a.m. to 7 p.m.)

## 2022-01-26 NOTE — ED TRIAGE NOTES
Rectal bleeding and hematemesis since this morning. Patient reports 1 maroon bowel movement that was preceded by sudden abdominal cramping. She states that she has been vomiting all day, emesis is brown to black. Covid positive on 1/15, patient reports she has since tested negative and returned to work.

## 2022-01-27 VITALS
BODY MASS INDEX: 45.36 KG/M2 | DIASTOLIC BLOOD PRESSURE: 52 MMHG | HEIGHT: 59 IN | HEART RATE: 56 BPM | TEMPERATURE: 97.4 F | OXYGEN SATURATION: 98 % | WEIGHT: 225 LBS | RESPIRATION RATE: 18 BRPM | SYSTOLIC BLOOD PRESSURE: 108 MMHG

## 2022-01-27 PROBLEM — F10.20 ALCOHOL USE DISORDER, MODERATE, DEPENDENCE (H): Status: ACTIVE | Noted: 2021-04-06

## 2022-01-27 PROBLEM — Z87.19 HISTORY OF ALCOHOLIC GASTRITIS: Status: ACTIVE | Noted: 2022-01-27

## 2022-01-27 PROBLEM — K59.04 CHRONIC IDIOPATHIC CONSTIPATION: Status: ACTIVE | Noted: 2019-05-16

## 2022-01-27 PROBLEM — F43.10 PTSD (POST-TRAUMATIC STRESS DISORDER): Status: ACTIVE | Noted: 2021-02-07

## 2022-01-27 LAB
AMPHETAMINES UR QL SCN: ABNORMAL
ANION GAP SERPL CALCULATED.3IONS-SCNC: 9 MMOL/L (ref 5–18)
BARBITURATES UR QL: ABNORMAL
BENZODIAZ UR QL: ABNORMAL
BUN SERPL-MCNC: 6 MG/DL (ref 8–22)
CALCIUM SERPL-MCNC: 7.7 MG/DL (ref 8.5–10.5)
CANNABINOIDS UR QL SCN: ABNORMAL
CHLORIDE BLD-SCNC: 113 MMOL/L (ref 98–107)
CO2 SERPL-SCNC: 18 MMOL/L (ref 22–31)
COCAINE UR QL: ABNORMAL
CREAT SERPL-MCNC: 0.63 MG/DL (ref 0.6–1.1)
CREAT UR-MCNC: 126 MG/DL
ERYTHROCYTE [DISTWIDTH] IN BLOOD BY AUTOMATED COUNT: 14.7 % (ref 10–15)
ETHANOL SERPL-MCNC: 10 MG/DL
GFR SERPL CREATININE-BSD FRML MDRD: >90 ML/MIN/1.73M2
GLUCOSE BLD-MCNC: 75 MG/DL (ref 70–125)
HCG SERPL QL: NEGATIVE
HCT VFR BLD AUTO: 39.6 % (ref 35–47)
HGB BLD-MCNC: 12.5 G/DL (ref 11.7–15.7)
MAGNESIUM SERPL-MCNC: 1.9 MG/DL (ref 1.8–2.6)
MCH RBC QN AUTO: 28.5 PG (ref 26.5–33)
MCHC RBC AUTO-ENTMCNC: 31.6 G/DL (ref 31.5–36.5)
MCV RBC AUTO: 90 FL (ref 78–100)
OPIATES UR QL SCN: ABNORMAL
OXYCODONE UR QL: ABNORMAL
PCP UR QL SCN: ABNORMAL
PHOSPHATE SERPL-MCNC: 3.1 MG/DL (ref 2.5–4.5)
PLATELET # BLD AUTO: 295 10E3/UL (ref 150–450)
POTASSIUM BLD-SCNC: 3.3 MMOL/L (ref 3.5–5)
RBC # BLD AUTO: 4.38 10E6/UL (ref 3.8–5.2)
SARS-COV-2 RNA RESP QL NAA+PROBE: POSITIVE
SODIUM SERPL-SCNC: 140 MMOL/L (ref 136–145)
TSH SERPL DL<=0.005 MIU/L-ACNC: 1.21 UIU/ML (ref 0.3–5)
WBC # BLD AUTO: 10.6 10E3/UL (ref 4–11)

## 2022-01-27 PROCEDURE — 250N000013 HC RX MED GY IP 250 OP 250 PS 637: Performed by: STUDENT IN AN ORGANIZED HEALTH CARE EDUCATION/TRAINING PROGRAM

## 2022-01-27 PROCEDURE — 36415 COLL VENOUS BLD VENIPUNCTURE: CPT | Performed by: PHYSICIAN ASSISTANT

## 2022-01-27 PROCEDURE — 99219 PR INITIAL OBSERVATION CARE,LEVEL II: CPT | Mod: GC | Performed by: STUDENT IN AN ORGANIZED HEALTH CARE EDUCATION/TRAINING PROGRAM

## 2022-01-27 PROCEDURE — 80307 DRUG TEST PRSMV CHEM ANLYZR: CPT | Performed by: STUDENT IN AN ORGANIZED HEALTH CARE EDUCATION/TRAINING PROGRAM

## 2022-01-27 PROCEDURE — 96375 TX/PRO/DX INJ NEW DRUG ADDON: CPT

## 2022-01-27 PROCEDURE — 85027 COMPLETE CBC AUTOMATED: CPT | Performed by: PHYSICIAN ASSISTANT

## 2022-01-27 PROCEDURE — 250N000011 HC RX IP 250 OP 636: Performed by: STUDENT IN AN ORGANIZED HEALTH CARE EDUCATION/TRAINING PROGRAM

## 2022-01-27 PROCEDURE — G0378 HOSPITAL OBSERVATION PER HR: HCPCS

## 2022-01-27 PROCEDURE — 258N000003 HC RX IP 258 OP 636: Performed by: STUDENT IN AN ORGANIZED HEALTH CARE EDUCATION/TRAINING PROGRAM

## 2022-01-27 PROCEDURE — 82077 ASSAY SPEC XCP UR&BREATH IA: CPT | Performed by: STUDENT IN AN ORGANIZED HEALTH CARE EDUCATION/TRAINING PROGRAM

## 2022-01-27 PROCEDURE — 36415 COLL VENOUS BLD VENIPUNCTURE: CPT | Performed by: STUDENT IN AN ORGANIZED HEALTH CARE EDUCATION/TRAINING PROGRAM

## 2022-01-27 PROCEDURE — 80048 BASIC METABOLIC PNL TOTAL CA: CPT | Performed by: PHYSICIAN ASSISTANT

## 2022-01-27 RX ORDER — PANTOPRAZOLE SODIUM 20 MG/1
40 TABLET, DELAYED RELEASE ORAL
Status: DISCONTINUED | OUTPATIENT
Start: 2022-01-27 | End: 2022-01-27 | Stop reason: HOSPADM

## 2022-01-27 RX ORDER — SODIUM CHLORIDE, SODIUM LACTATE, POTASSIUM CHLORIDE, CALCIUM CHLORIDE 600; 310; 30; 20 MG/100ML; MG/100ML; MG/100ML; MG/100ML
INJECTION, SOLUTION INTRAVENOUS CONTINUOUS
Status: DISCONTINUED | OUTPATIENT
Start: 2022-01-27 | End: 2022-01-27 | Stop reason: HOSPADM

## 2022-01-27 RX ORDER — ONDANSETRON 2 MG/ML
4 INJECTION INTRAMUSCULAR; INTRAVENOUS EVERY 6 HOURS PRN
Status: DISCONTINUED | OUTPATIENT
Start: 2022-01-27 | End: 2022-01-27 | Stop reason: HOSPADM

## 2022-01-27 RX ORDER — HYDROXYZINE HYDROCHLORIDE 25 MG/1
25 TABLET, FILM COATED ORAL EVERY 6 HOURS PRN
Status: DISCONTINUED | OUTPATIENT
Start: 2022-01-27 | End: 2022-01-27 | Stop reason: HOSPADM

## 2022-01-27 RX ORDER — CLONIDINE HYDROCHLORIDE 0.1 MG/1
0.1 TABLET ORAL EVERY 8 HOURS
Status: DISCONTINUED | OUTPATIENT
Start: 2022-01-27 | End: 2022-01-27 | Stop reason: HOSPADM

## 2022-01-27 RX ORDER — ONDANSETRON 4 MG/1
4 TABLET, ORALLY DISINTEGRATING ORAL EVERY 6 HOURS PRN
Status: DISCONTINUED | OUTPATIENT
Start: 2022-01-27 | End: 2022-01-27 | Stop reason: HOSPADM

## 2022-01-27 RX ORDER — FOLIC ACID 1 MG/1
1 TABLET ORAL DAILY
Status: DISCONTINUED | OUTPATIENT
Start: 2022-01-27 | End: 2022-01-27 | Stop reason: HOSPADM

## 2022-01-27 RX ORDER — FLUMAZENIL 0.1 MG/ML
0.2 INJECTION, SOLUTION INTRAVENOUS
Status: DISCONTINUED | OUTPATIENT
Start: 2022-01-27 | End: 2022-01-27 | Stop reason: HOSPADM

## 2022-01-27 RX ORDER — MULTIPLE VITAMINS W/ MINERALS TAB 9MG-400MCG
1 TAB ORAL DAILY
Status: DISCONTINUED | OUTPATIENT
Start: 2022-01-27 | End: 2022-01-27 | Stop reason: HOSPADM

## 2022-01-27 RX ORDER — ACETAMINOPHEN 325 MG/1
650 TABLET ORAL EVERY 6 HOURS PRN
Status: DISCONTINUED | OUTPATIENT
Start: 2022-01-27 | End: 2022-01-27 | Stop reason: HOSPADM

## 2022-01-27 RX ORDER — PRAZOSIN HYDROCHLORIDE 5 MG/1
5 CAPSULE ORAL AT BEDTIME
Status: CANCELLED | OUTPATIENT
Start: 2022-01-27

## 2022-01-27 RX ORDER — DIAZEPAM 10 MG/2ML
5-10 INJECTION, SOLUTION INTRAMUSCULAR; INTRAVENOUS EVERY 30 MIN PRN
Status: DISCONTINUED | OUTPATIENT
Start: 2022-01-27 | End: 2022-01-27 | Stop reason: HOSPADM

## 2022-01-27 RX ORDER — LIDOCAINE 40 MG/G
CREAM TOPICAL
Status: DISCONTINUED | OUTPATIENT
Start: 2022-01-27 | End: 2022-01-27 | Stop reason: HOSPADM

## 2022-01-27 RX ORDER — DIAZEPAM 5 MG
10 TABLET ORAL EVERY 30 MIN PRN
Status: DISCONTINUED | OUTPATIENT
Start: 2022-01-27 | End: 2022-01-27 | Stop reason: HOSPADM

## 2022-01-27 RX ORDER — LOPERAMIDE HCL 2 MG
2 CAPSULE ORAL
Status: DISCONTINUED | OUTPATIENT
Start: 2022-01-27 | End: 2022-01-27 | Stop reason: HOSPADM

## 2022-01-27 RX ORDER — ACETAMINOPHEN 650 MG/1
650 SUPPOSITORY RECTAL EVERY 6 HOURS PRN
Status: DISCONTINUED | OUTPATIENT
Start: 2022-01-27 | End: 2022-01-27 | Stop reason: HOSPADM

## 2022-01-27 RX ORDER — OLANZAPINE 5 MG/1
5-10 TABLET, ORALLY DISINTEGRATING ORAL EVERY 6 HOURS PRN
Status: DISCONTINUED | OUTPATIENT
Start: 2022-01-27 | End: 2022-01-27 | Stop reason: HOSPADM

## 2022-01-27 RX ORDER — ESCITALOPRAM OXALATE 20 MG/1
20 TABLET ORAL DAILY
COMMUNITY
End: 2022-11-09

## 2022-01-27 RX ORDER — HYDROXYZINE HYDROCHLORIDE 25 MG/1
50 TABLET, FILM COATED ORAL EVERY 6 HOURS PRN
Status: DISCONTINUED | OUTPATIENT
Start: 2022-01-27 | End: 2022-01-27 | Stop reason: HOSPADM

## 2022-01-27 RX ORDER — FERROUS FUMARATE 324(106)MG
324 TABLET ORAL DAILY
COMMUNITY
End: 2022-11-09

## 2022-01-27 RX ORDER — LOPERAMIDE HCL 2 MG
4 CAPSULE ORAL ONCE
Status: COMPLETED | OUTPATIENT
Start: 2022-01-27 | End: 2022-01-27

## 2022-01-27 RX ORDER — HALOPERIDOL 5 MG/ML
2.5-5 INJECTION INTRAMUSCULAR EVERY 6 HOURS PRN
Status: DISCONTINUED | OUTPATIENT
Start: 2022-01-27 | End: 2022-01-27 | Stop reason: HOSPADM

## 2022-01-27 RX ADMIN — Medication 100 MG: at 08:50

## 2022-01-27 RX ADMIN — MULTIPLE VITAMINS W/ MINERALS TAB 1 TABLET: TAB at 08:50

## 2022-01-27 RX ADMIN — SODIUM CHLORIDE, POTASSIUM CHLORIDE, SODIUM LACTATE AND CALCIUM CHLORIDE: 600; 310; 30; 20 INJECTION, SOLUTION INTRAVENOUS at 02:43

## 2022-01-27 RX ADMIN — FOLIC ACID 1 MG: 1 TABLET ORAL at 08:50

## 2022-01-27 RX ADMIN — CLONIDINE HYDROCHLORIDE 0.1 MG: 0.1 TABLET ORAL at 09:48

## 2022-01-27 RX ADMIN — LOPERAMIDE HYDROCHLORIDE 4 MG: 2 CAPSULE ORAL at 02:45

## 2022-01-27 RX ADMIN — DIAZEPAM 5 MG: 5 INJECTION, SOLUTION INTRAMUSCULAR; INTRAVENOUS at 09:49

## 2022-01-27 RX ADMIN — HYDROXYZINE HYDROCHLORIDE 50 MG: 25 TABLET ORAL at 09:48

## 2022-01-27 NOTE — DISCHARGE SUMMARY
Discharge Summary    Primary Care Physician:  Litzy Corona  Discharge Provider: Joey Orellana DO   Consult/s: GI  Admission Date: 1/26/2022.   Admission Diagnoses:   GI bleed [K92.2]   Discharge Date: 1/27/2022  Disposition: Home  Condition at Discharge: Stable  Code Status: Prior  Principal Diagnosis:  GI bleed  Discharge Diagnoses:    Principal Problem:    GI bleed  Active Problems:    Alcohol use disorder, moderate, dependence (H)    PTSD (post-traumatic stress disorder)    Chronic idiopathic constipation    History of alcoholic gastritis    Reason for Admission:   Yolanda Vee is a 21 year old female who presented on the day of admission with several reported episodes of hematemesis and hematochezia.     Hospital Summary:   Yolanda Vee is a 21 year old female, with a past medical history of gastritis, esophagitis, alcohol abuse, morbid obesity, depression, PTSD who was admitted on 1/26/2022 concern of GI bleed.  Patient with reported hematemesis and hematochezia up to 6 times prior to presenting to the ED.  None witnessed in the ED.  No signs of bleeding.  Her vital signs were stable.  Her labs were unremarkable for any acute drop in hemoglobin.  Initial hemoglobin 14.2.  She received Protonix and IV lactated Ringer's. Follow-up hemoglobin prior to discharge 12.5, likely dilutional.  Patient was seen by gastroenterology prior to discharge recommended 20 mg omeprazole prior to breakfast daily, alcohol cessation, and follow-up with GI for EGD in 8 to 12 weeks.    Other problems:  1) alcohol abuse, opioid use, cannabis use.  Patient with a history of alcohol abuse, likely contributing significantly to her consistent GI symptoms.  Her last drink was Tuesday night.  Urine drug screen also positive for opioids and cannabinoids.  Discussed cessation of all of these.  Patient states she will follow-up outpatient.  2) major depressive disorder, anxiety.  Not currently on any medications.  May benefit  from restarting some of her prior medications.  Follow-up outpatient    Discharge Medications:       Review of your medicines      START taking      Dose / Directions   omeprazole 20 MG DR capsule  Commonly known as: priLOSEC  Used for: History of alcoholic gastritis      Dose: 20 mg  Take 1 capsule (20 mg) by mouth daily  Quantity: 30 capsule  Refills: 1        CONTINUE these medicines which may have CHANGED, or have new prescriptions. If we are uncertain of the size of tablets/capsules you have at home, strength may be listed as something that might have changed.      Dose / Directions   naltrexone 50 MG tablet  Commonly known as: DEPADE/REVIA  This may have changed:     how much to take    how to take this    when to take this  Used for: Obesity, Class III, BMI 40-49.9 (morbid obesity) (H), History of alcohol abuse, Current nicotine use      Start half a tablet daily with supper for 7 days then increase, if tolerated to half a tablet with breakfast and supper.  Quantity: 90 tablet  Refills: 0     prazosin 1 MG capsule  Commonly known as: MINIPRESS  This may have changed: See the new instructions.  Used for: PTSD (post-traumatic stress disorder)      TAKE 1 CAPSULE BY MOUTH EVERY NIGHT AT BEDTIME. AFTER 2-3 DAYS, INCREASE TO 2 CAPSULE EVERY NIGHT AT BEDTIME  Quantity: 180 capsule  Refills: 1        CONTINUE these medicines which have NOT CHANGED      Dose / Directions   albuterol 108 (90 Base) MCG/ACT inhaler  Commonly known as: PROAIR HFA/PROVENTIL HFA/VENTOLIN HFA  Used for: Cough, Suspected COVID-19 virus infection      Dose: 2 puff  Inhale 2 puffs into the lungs every 6 hours as needed for shortness of breath / dyspnea or wheezing  Quantity: 18 g  Refills: 0     buPROPion 150 MG 24 hr tablet  Commonly known as: WELLBUTRIN XL  Used for: Moderate episode of recurrent major depressive disorder (H)      Dose: 150 mg  Take 1 tablet (150 mg) by mouth every morning  Quantity: 30 tablet  Refills: 3     escitalopram 20  MG tablet  Commonly known as: LEXAPRO      Dose: 20 mg  Take 20 mg by mouth daily  Refills: 0     ferrous fumarate 324 (106 Fe) MG Tabs tablet  Commonly known as: FERRETTS      Dose: 324 mg  Take 324 mg by mouth daily  Refills: 0     ipratropium - albuterol 0.5 mg/2.5 mg/3 mL 0.5-2.5 (3) MG/3ML neb solution  Commonly known as: DUONEB      Dose: 3 mL  Inhale 3 mLs into the lungs every 6 hours  Refills: 0     sucralfate 1 GM tablet  Commonly known as: Carafate  Used for: Non-intractable vomiting with nausea, unspecified vomiting type, Lower abdominal pain      Dose: 1 g  Take 1 tablet (1 g) by mouth 4 times daily (before meals and nightly)  Quantity: 180 tablet  Refills: 3     Vitamin D (Cholecalciferol) 25 MCG (1000 UT) Tabs      Dose: 1,000 Units  Take 1,000 Units by mouth every morning  Refills: 0        STOP taking    pantoprazole 40 MG EC tablet  Commonly known as: PROTONIX              Where to get your medicines      These medications were sent to [id number] - [place], [place] - [address]  [address] [place] [address] [address] , [place] [address]    Phone: [phone]     omeprazole 20 MG DR capsule       Changes to Home Medications and Reasonin) start omeprazole daily with breakfast  2) discontinue pantoprazole    Significant Laboratory Studies:   Recent Labs   Lab 22  0400 22  1629    139   CO2 18* 21*   BUN 6* 7*   ALKPHOS  --  82   ALT  --  20   AST  --  18     Recent Labs   Lab 22  0941 22  1629   WBC 10.6 14.8*   HGB 12.5 14.2   HCT 39.6 45.3    364     Significant Radiology Studies:    CTA Chest Abdomen Pelvis w Contrast  Result Date: 2022   IMPRESSION: 1.  Site of GI bleed not identified. 2.  Underdistention versus mild colonic wall thickening. Correlate for colitis.     Discharge Instructions:  Follow up appointment with Primary Care Physician: Litzy Corona within 7 days  Follow up appointment with Specialist:  Gastroenterology in 8 to 12 weeks for follow-up EGD  Follow up test / procedure to do as outpatient: Follow-up EGD  Diet: Regular  Activity: As tolerated  Restrictions: None  Wound / drain care: None  The following tests have been done with results pending: N/A     Physical Exam:    Temp:  [97.4  F (36.3  C)] 97.4  F (36.3  C)  Pulse:  [] 56  Resp:  [16-20] 18  BP: ()/(52-99) 108/52  SpO2:  [97 %-100 %] 98 %    General Appearance:    Alert, cooperative, no distress, appears stated age   Head:    Normocephalic, without obvious abnormality, atraumatic   Eyes:    PERRL, conjunctiva/corneas clear, EOM's intact, fundi     benign, both eyes   Nose:   Nares normal, septum midline, mucosa normal, no drainage    or sinus tenderness   Throat:   Lips, mucosa, and tongue normal; teeth and gums normal   Neck:   Supple, symmetrical, trachea midline, no adenopathy;     thyroid:  no enlargement/tenderness/nodules; no carotid    bruit or JVD   Back:     Symmetric, no curvature, ROM normal, no CVA tenderness   Lungs:     Clear to auscultation bilaterally, respirations unlabored   Chest Wall:    No tenderness or deformity    Heart:    Regular rate and rhythm, S1 and S2 normal, no murmur, rub   or gallop   Abdomen:     Soft, mildly tender to palpation diffusely, bowel sounds active all four quadrants,     no masses, no organomegaly   Extremities:   Extremities normal, atraumatic, no cyanosis or edema   Pulses:   2+ and symmetric all extremities   Skin:   Skin color, texture, turgor normal, no rashes or lesions   Lymph nodes:   Cervical, supraclavicular, and axillary nodes normal   Neurologic:   CNII-XII grossly intact       This note was created with help of Dragon dictation system. Grammatical/typing errors are not intentional.     Patient discussed with attending physician, Dr. Liberty Thakur , who agrees with the plan.      Joey Orellana DO PGY1 1/27/2022  [facility] Residency  Program

## 2022-01-27 NOTE — PHARMACY-ADMISSION MEDICATION HISTORY
Pharmacy Note - Admission Medication History    Pertinent Provider Information:    ______________________________________________________________________    Prior To Admission (PTA) med list completed and updated in EMR.       PTA Med List   Medication Sig Last Dose     albuterol (PROAIR HFA/PROVENTIL HFA/VENTOLIN HFA) 108 (90 Base) MCG/ACT inhaler Inhale 2 puffs into the lungs every 6 hours as needed for shortness of breath / dyspnea or wheezing Past Week at not with     buPROPion (WELLBUTRIN XL) 150 MG 24 hr tablet Take 1 tablet (150 mg) by mouth every morning 1/26/2022 at Unknown time     escitalopram (LEXAPRO) 20 MG tablet Take 20 mg by mouth daily 1/26/2022 at Unknown time     ferrous fumarate (FERRETTS) 324 (106 Fe) MG TABS tablet Take 324 mg by mouth daily 1/26/2022 at Unknown time     ipratropium - albuterol 0.5 mg/2.5 mg/3 mL (DUONEB) 0.5-2.5 (3) MG/3ML neb solution Inhale 3 mLs into the lungs every 6 hours  Past Week at Unknown time     naltrexone (DEPADE/REVIA) 50 MG tablet Start half a tablet daily with supper for 7 days then increase, if tolerated to half a tablet with breakfast and supper. (Patient taking differently: Take 50 mg by mouth daily Start half a tablet daily with supper for 7 days then increase, if tolerated to half a tablet with breakfast and supper.) 1/26/2022 at Unknown time     pantoprazole (PROTONIX) 40 MG EC tablet Take 1 tablet (40 mg) by mouth daily 1/26/2022 at Unknown time     prazosin (MINIPRESS) 1 MG capsule TAKE 1 CAPSULE BY MOUTH EVERY NIGHT AT BEDTIME. AFTER 2-3 DAYS, INCREASE TO 2 CAPSULE EVERY NIGHT AT BEDTIME (Patient taking differently: Take 1 mg by mouth At Bedtime ) 1/26/2022 at Unknown time     sucralfate (CARAFATE) 1 GM tablet Take 1 tablet (1 g) by mouth 4 times daily (before meals and nightly) 1/26/2022 at Unknown time     Vitamin D, Cholecalciferol, 25 MCG (1000 UT) TABS Take 1,000 Units by mouth every morning  1/26/2022 at Unknown time       Information source(s):  Patient and CareEverywhere/SureScripts    Method of interview communication: phone    Patient was asked about OTC/herbal products specifically.  PTA med list reflects this.    Based on the pharmacist's assessment, the PTA med list information appears reliable    Allergies were reviewed, assessed, and updated with the patient.      Patient did not bring any medications to the hospital and can't retrieve from home. No multi-dose medications are available for use during hospital stay.      Thank you for the opportunity to participate in the care of this patient.      Marcelo Gold RPH     1/27/2022     10:00 AM

## 2022-01-27 NOTE — CONSULTS
GI CONSULT NOTE      Name: Yolanda Vee  Medical Record #: [id number]  YOB: 2000  Date of Admission: 1/26/2022  Date/Time: 1/27/2022/9:34 AM     CHIEF COMPLAINT: Hematemesis, rectal bleeding    HISTORY OF PRESENT ILLNESS: We were asked to see Yolanda Vee by Dr. Navarrete  for GI bleed.     Yolanda Vee is a 21 year old year old female with history of depression, anxiety, obesity, alcohol use who presented to the ED with complaints of hematemesis and rectal bleeding. She was in her normal state of health until yesterday morning, when she had several episodes of emesis, she tells me she thinks all of them appeared black, but she can't really remember. She also reports having a maroon colored stool. She presented to the ED. Hemoglobin was 14.1. BAL 10. LFTs, lipase, INR normal. CTA negative for active bleeding. Recheck hemoglobin 12. This AM. She is feeling better, but still has some mild nausea and abdominal pain. She tells me she always has abdominal pain and has frequent heartburn at home.     She reports drinking 2 glasses of wine yesterday to me, but she admits she cannot really remember how much she drinks. She told the resident she drank 1/2 bottle of whiskey 1/25/22.     She underwent recent EGD and colonoscopy at the [facility] for hematemsis and hematochezia.  EGD showed LA Grade C esophagitis and colonoocpy was unremarkable. It was recommended she start Prilosec 20mg once daily (which she tells me she is not taking) and repeat colon at age 45.     REVIEW OF SYSTEMS (ROS): Complete review of systems negative other than listed in HPI.    PAST MEDICAL HISTORY:  Past Medical History:   Diagnosis Date     Anxiety     therapist: [facility]     Chemical dependency (H)     quit Sept 2021, alcohol. some family hx as well.      Chronic constipation      Constipation      Depression      Depression      Esophageal reflux     controlled well on protonix currently, EGD 6/3/21 w/  some mild esophagitis when alcohol intake was higher (bottle vodka daily).     Heart rate problem     some palpitations in the past w/ anxiety attacks.     Migraines     occ tylenol use.     Morbid obesity (H) 2020     Obese      Palpitations      Plantar warts      PTSD (post-traumatic stress disorder)      PTSD (post-traumatic stress disorder)      Uncomplicated asthma         FAMILY HISTORY:  Family History   Problem Relation Age of Onset     Heart Disease Mother      Substance Abuse Mother         in remission     Substance Abuse Father      Alcoholism Father      Bipolar Disorder Brother      Depression Brother      Anxiety Disorder Brother      Hypertension Maternal Grandmother      Arthritis Maternal Grandmother      Heart Disease Maternal Grandmother      Heart Disease Maternal Uncle      Heart Disease Maternal Uncle      Hodgkin's lymphoma Maternal Grandfather 26.00     Esophageal Cancer Maternal Grandfather          at 54.     Acute Myocardial Infarction No family hx of        SOCIAL HISTORY:  Social History     Socioeconomic History     Marital status: Single     Spouse name: Not on file     Number of children: Not on file     Years of education: Not on file     Highest education level: Not on file   Occupational History     Not on file   Tobacco Use     Smoking status: Current Some Day Smoker     Packs/day: 1.00     Years: 5.00     Pack years: 5.00     Types: Cigarettes     Smokeless tobacco: Never Used     Tobacco comment: 2 packs daily and vapes   Substance and Sexual Activity     Alcohol use: Yes     Alcohol/week: 6.0 - 8.0 standard drinks     Comment: quit in 2021. heavy use prior up to a bottle of vodka daily.     Drug use: Yes     Types: Marijuana     Comment: Drug use: every night     Sexual activity: Not Currently     Birth control/protection: Injection   Other Topics Concern     Parent/sibling w/ CABG, MI or angioplasty before 65F 55M? Not Asked   Social History Narrative      "Not on file     Social Determinants of Health     Financial Resource Strain: Not on file   Food Insecurity: Not on file   Transportation Needs: Not on file   Physical Activity: Not on file   Stress: Not on file   Social Connections: Not on file   Intimate Partner Violence: Not on file   Housing Stability: Not on file       MEDICATIONS PRIOR TO ADMISSION: (Not in a hospital admission)         ALLERGIES: Ibuprofen and Hydrocodone-acetaminophen    PHYSICAL EXAM:    /52   Pulse 56   Temp 97.4  F (36.3  C) (Oral)   Resp 18   Ht 1.499 m (4' 11\")   Wt 102.1 kg (225 lb)   SpO2 98%   BMI 45.44 kg/m      GENERAL: Pleasant, no obvious distress  NECK: Supple without adenopathy  EYES: No scleral icterus  LUNGS: Clear to auscultation bilaterally  HEART: Regular rate and rhythm, S1 and S2 present, no lower extremity edema  ABDOMEN: Obese. Non-distended. Positive bowel sounds. Soft, mild diffuse tenderness, no guarding/rebound/mass, no obvious organomegaly  RECTAL: No hemorrhoids noted, mild pain with pressure, no blood.   MUSKULOSKELETAL:  Warm and well perfused, moves all extremities well  SKIN: No jaundice. Tattoos  NEUROLOGIC: Alert and oriented  PSYCHIATRIC: Normal affect    LAB DATA:  CMP Results:   Recent Labs   Lab Test 01/26/22  1629 11/08/21  1014 09/21/21  0421 05/16/21  0139    140 143 144   POTASSIUM 3.6 4.2 3.5 3.5   CHLORIDE 111* 107 109* 109*   CO2 21* 21* 25 21*   ANIONGAP 7 12 9 14    84 96 86   BUN 7* 6* 6* 7*   CR 0.73 0.69 0.76 0.81   BILITOTAL 0.4 0.4  --  0.2   ALKPHOS 82 85  --  94   ALT 20 22  --  18   AST 18 15  --  16      CBC  Recent Labs   Lab 01/26/22  1629   WBC 14.8*   RBC 5.00   HGB 14.2   HCT 45.3   MCV 91   MCH 28.4   MCHC 31.3*   RDW 14.8        INR  Recent Labs   Lab 01/26/22  1921   INR 1.09      Lipase   Date Value Ref Range Status   01/26/2022 17 0 - 52 U/L Final   05/16/2021 13 0 - 52 U/L Final   04/06/2021 <9 0 - 52 U/L Final   06/21/2013 44 20 - 250 U/L " Final       IMAGING:  EXAM: CTA CHEST ABDOMEN PELVIS W CONTRAST  LOCATION: [facility]  DATE/TIME: 1/26/2022 8:25 PM     INDICATION: Emesis. Lower GI bleed.  COMPARISON: 9/21/2021 and 1/31/2020  TECHNIQUE: CT angiogram chest abdomen pelvis during arterial phase of injection of IV contrast. 2D and 3D MIP reconstructions were performed by the CT technologist. Dose reduction techniques were used.   CONTRAST: ISOVUE 370 100 mL     FINDINGS:   CT ANGIOGRAM CHEST, ABDOMEN, AND PELVIS: No aortic dissection. The celiac, superior mesenteric, renal and inferior mesenteric arteries are patent. No visible extravasation.     LUNGS AND PLEURA: No infiltrate or pleural effusion.     MEDIASTINUM/AXILLAE: No adenopathy or pericardial effusion.     CORONARY ARTERY CALCIFICATION: None visualized.     HEPATOBILIARY: Normal.     PANCREAS: Normal.     SPLEEN: Normal.     ADRENAL GLANDS: Normal.     KIDNEYS/BLADDER: Normal.     BOWEL: Normal caliber. Underdistention of colon versus mild colonic wall thickening. Normal appendix.     LYMPH NODES: Normal.     PELVIC ORGANS: Normal.     MUSCULOSKELETAL: Normal.                                                                      IMPRESSION:  1.  Site of GI bleed not identified.  2.  Underdistention versus mild colonic wall thickening. Correlate for colitis.    PROCEDURES:  EGD 6/3/21  Findings:        LA Grade C (one or more mucosal breaks continuous between tops of 2 or        more mucosal folds, less than 75% circumference) esophagitis with no        bleeding was found.        The entire examined stomach was normal.        The examined duodenum was normal.                                                                                     Impression:          - LA Grade C reflux esophagitis most likely cause for                        hematemesis.     Colon 6/3/21:  Findings:        The perianal examination was normal.        The entire examined colon appeared  normal.        The terminal ileum appeared normal.        The retroflexed view of the distal rectum and anal verge was normal and        showed no anal or rectal abnormalities.                                                                                     Impression:          - No cause for hematochezia was seen during this exam                        - The entire examined colon is normal.                        - The examined portion of the ileum was normal.     ASSESSMENT:  1. Black emesis  21 year old female with h/o anxiety, depression, LA Grade C esopahgitis, alcohol abuse presenting with multiple episodes of dark emesis and one maroon stool. Rectal exam negative. Hemoglobin 14 on presentation, recheck pending. CTA negative for bleed. Bun/Cr points against florez upper GI bleed. Suspect this is due to esophagitis (seen on 6/2021 EGD) and or gastritis due to ongoing alcohol use, reflux. Hemoglobin 12.5 on recheck (drop expected due to dilution from IVF). Recommend resume omeprazole, advance diet and focus on alcohol cessation. She should have repeat EGD to ensure healing of esophagitis.      2. Maroon stool  May be due to outlet bleeding. Negative rectal exam. Colonoscopy 6/2021 unremarkable. ? Constipation. Would recommend follow-up with primary/GI to discuss bowel regimen if persists.     PLAN:  1. Advance diet   2. Omeprazole 20mg once daily 30 min before breakfast daily  3. Complete alcohol cessation  4. Follow-up with preferred GI service for EGD in 8-12 weeks.     Ok from our standpoint for discharge home.     Discussed with Dr. Aquino, GI attending.      TIME SPENT: 45 min including chart review, patient interview and care coordination.                                                Jaja Romero PA-C  Thank you for the opportunity to participate in the care of this patient.   Please feel free to call me with any questions or concerns.  Phone number [phone].

## 2022-01-27 NOTE — H&P
[facility]    History and Physical - Teaching Service        Date of Admission:  1/26/2022    Assessment & Plan      Yolanda Vee is a 21 year old female with a history of gastritis, esophagitis, alcohol abuse, morbid obesity, depression, PTSD who came to [facility] ED after several episodes of hematemesis and hematochezia at home.    Hematemesis and hematochezia  None witnessed in the ED, but as many as 6 episodes of dark-colored emesis per patient report to ED provider.  No nausea, no dry heaving.  Kem says vomiting is provoked by food intake.  Has been able to tolerate fluids.  Hemoglobin, creatinine, vital signs, CT abdomen all reassuring against active GI bleed.  ED MD consulted gastroenterologist Dr. Ellsworth, who plans to see patient in the morning for possible EGD.  Received IV Protonix x1 in ED.  Hemodynamically stable.  Alert and oriented.  -Admit for observation  -IV LR at 100 cc/h  -GI consulted, appreciate recommendations  -Recheck CBC, BMP in the morning      Alcohol abuse  Likely major contributor to patient's ongoing GI symptoms.  Strong family history.  Encouraged patient to seek support from community and healthcare sources to reach and maintain sobriety. Last drink was last night (2 glasses of wine). Had 1/2 bottle whiskey the day before that. No history of alcohol withdrawal seizures. BAL 10 at 0400 1/27.  -Social work consult for community support recommendations, rule 25 assessment, CD treatment  -CIWA with diazepam  -Consider multivitamin at discharge      Major depressive disorder  Anxiety  Previously on sertraline, venlafaxine, escitalopram, Wellbutrin per chart review.  Not currently on any medication.  Has good insight into the effect of alcohol on mental health, including anxiety.  Also suspect illness anxiety.  Sees therapist weekly.  Would benefit from regular, sustained use of SSRI and abstinence from alcohol.  -Outpatient follow-up  -As needed  hydroxyzine for rest      PTSD  Nightmares  -Prazosin 5 mg at bedtime    Severe obesity  Follows with weight management clinic.  Plans on bariatric surgery sometime in the future.  Recommend support to address and treat alcohol use disorder prior to surgery for most successful outcomes.  -Outpatient follow-up       Diet: NPO for Medical/Clinical Reasons Except for: Ice Chips, Meds  DVT Prophylaxis: Low Risk/Ambulatory with no VTE prophylaxis indicated  Shukla Catheter: Not present  Fluids: LR @ 100 ml/hr  Central Lines: None  Cardiac Monitoring: None  Code Status: Full Code      Disposition Plan   Expected Discharge: 01/28/2022   Anticipated discharge location:  Awaiting care coordination huddle         The patient's care was discussed with the Attending Physician, Dr. Teressa Pimentel. Patient will be seen and formally assessed by Dr. Liberty Thakur in the morning.    Marizol Oconnell MD  Hospitalist Service  [facility]  Securely message with the Vocera Web Console (learn more here)  Text page via [facility] Paging/Directory       ______________________________________________________________________    Chief Complaint   Rectal bleeding and hematemesis    History is obtained from the patient    History of Present Illness   Yolanda Vee is a 21 year old female with a history of GERD, gastritis, small intestinal bacterial overgrowth, alcohol abuse, depression, PTSD presented to the emergency department from home with rectal bleeding and vomiting blood that started this morning.  In interview, she said she had several maroon-colored loosely formed stools since this morning.  She also said she had an episode of emesis that was possibly coffee-ground like.  She reported having a fever 102 degrees at home.  Unknown if she took any antipyretics.  She reported some abdominal discomfort with generalized tenderness.  She has infrequent bowel movements every 2 to 3 days and has not had a significant  change recently.    She had previous episode of hematemesis and hematochezia in June 2021.  Upper endoscopy on 6/3/2021 showed LA grade C reflux esophagitis with normal stomach and normal duodenum.  She was recommended to take Prilosec 20 mg twice daily, antireflux measures, and to avoid alcohol.  Colonoscopy performed the same day showed an entirely normal colon and normal examined portion of ileum.  Next colonoscopy was recommended at 45 years of age.    In the ED laboratory work-up was grossly normal.  Lipase, creatinine, liver enzymes, hemoglobin, platelet, INR, and TSH were all within normal limits.  CT abdomen showed possible colitis.  She received IV Protonix and IV fluids.  ED MD discussed with on-call GI MD.  Despite grossly normal work-up, patient's history is concerning for GI bleed.  Advised admission for observation.    Patient was initially agreeable but eventually felt that not much was being done and wanted to go home.  She was hungry, was concerned about not able to reach her mom because her phone was dying and she did have a , with bothered by overhead lights.  I discussed with her the risks of leaving, which includes but is not limited to death.  At the conclusion of discussion, patient was agreeable to staying until the morning for further evaluation by GI.          Review of Systems    The 10 point Review of Systems is negative other than noted in the HPI or here.     Past Medical History    I have reviewed this patient's medical history and updated it with pertinent information if needed.   Past Medical History:   Diagnosis Date     Anxiety     therapist: [facility]     Chemical dependency (H)     quit Sept 2021, alcohol. some family hx as well.      Chronic constipation      Constipation      Depression      Depression      Esophageal reflux     controlled well on protonix currently, EGD 6/3/21 w/ some mild esophagitis when alcohol intake was higher (bottle vodka daily).     Heart rate  problem     some palpitations in the past w/ anxiety attacks.     Migraines     occ tylenol use.     Morbid obesity (H) 2020     Obese      Palpitations      Plantar warts      PTSD (post-traumatic stress disorder)      PTSD (post-traumatic stress disorder)      Uncomplicated asthma         Past Surgical History   I have reviewed this patient's surgical history and updated it with pertinent information if needed.  Past Surgical History:   Procedure Laterality Date     COLONOSCOPY N/A 6/3/2021    Procedure: COLONOSCOPY;  Surgeon: Guru Gerardo Prado MD;  Location:  GI     ESOPHAGOSCOPY, GASTROSCOPY, DUODENOSCOPY (EGD), COMBINED N/A 6/3/2021    Procedure: ESOPHAGOGASTRODUODENOSCOPY (EGD);  Surgeon: Guru Gerardo Prado MD;  Location:  GI     wisdom teeth          Social History   I have reviewed this patient's social history and updated it with pertinent information if needed. Yolanda Vee  reports that she has been smoking cigarettes. She has a 5.00 pack-year smoking history. She has never used smokeless tobacco. She reports current alcohol use of about 6.0 - 8.0 standard drinks of alcohol per week. She reports current drug use. Drug: Marijuana.    Family History   I have reviewed this patient's family history and updated it with pertinent information if needed.  Family History   Problem Relation Age of Onset     Heart Disease Mother      Substance Abuse Mother         in remission     Substance Abuse Father      Alcoholism Father      Bipolar Disorder Brother      Depression Brother      Anxiety Disorder Brother      Hypertension Maternal Grandmother      Arthritis Maternal Grandmother      Heart Disease Maternal Grandmother      Heart Disease Maternal Uncle      Heart Disease Maternal Uncle      Hodgkin's lymphoma Maternal Grandfather 26.00     Esophageal Cancer Maternal Grandfather          at 54.     Acute Myocardial Infarction No family hx of        Prior to  Admission Medications   Prior to Admission Medications   Prescriptions Last Dose Informant Patient Reported? Taking?   PROAIR RESPICLICK 108 (90 Base) MCG/ACT inhaler   Yes No   Sig: as needed    Vitamin D, Cholecalciferol, 25 MCG (1000 UT) TABS   Yes No   Sig: Take by mouth every morning   acamprosate (CAMPRAL) 333 MG EC tablet   Yes No   albuterol (PROAIR HFA/PROVENTIL HFA/VENTOLIN HFA) 108 (90 Base) MCG/ACT inhaler   No No   Sig: Inhale 2 puffs into the lungs every 6 hours as needed for shortness of breath / dyspnea or wheezing   buPROPion (WELLBUTRIN XL) 150 MG 24 hr tablet   No No   Sig: Take 1 tablet (150 mg) by mouth every morning   cyclobenzaprine (FLEXERIL) 5 MG tablet   No No   Sig: Take 1 tablet (5 mg) by mouth 3 times daily as needed for muscle spasms   ipratropium - albuterol 0.5 mg/2.5 mg/3 mL (DUONEB) 0.5-2.5 (3) MG/3ML neb solution   Yes No   Sig: Inhale 3 mLs into the lungs every 6 hours    naltrexone (DEPADE/REVIA) 50 MG tablet   No No   Sig: Start half a tablet daily with supper for 7 days then increase, if tolerated to half a tablet with breakfast and supper.   naproxen (NAPROSYN) 500 MG tablet   No No   Sig: Take 1 tablet by mouth twice daily for 7 days and then twice daily as needed for pain   Patient not taking: Reported on 11/8/2021   ondansetron (ZOFRAN-ODT) 8 MG ODT tab   No No   Sig: Take 1 tablet (8 mg) by mouth every 8 hours as needed for nausea   pantoprazole (PROTONIX) 40 MG EC tablet   No No   Sig: Take 1 tablet (40 mg) by mouth daily   prazosin (MINIPRESS) 1 MG capsule   No No   Sig: TAKE 1 CAPSULE BY MOUTH EVERY NIGHT AT BEDTIME. AFTER 2-3 DAYS, INCREASE TO 2 CAPSULE EVERY NIGHT AT BEDTIME   prazosin (MINIPRESS) 5 MG capsule   Yes No   Sig: At Bedtime    sucralfate (CARAFATE) 1 GM tablet   No No   Sig: Take 1 tablet (1 g) by mouth 4 times daily (before meals and nightly)   Patient not taking: Reported on 11/8/2021      Facility-Administered Medications Last Administration Doses  Remaining   medroxyPROGESTERone (DEPO-PROVERA) injection 150 mg 12/20/2021 10:32 AM 2        Allergies   Allergies   Allergen Reactions     Ibuprofen Other (See Comments) and Hives     Throat gets itchy and sore     Hydrocodone-Acetaminophen Nausea       Physical Exam   Vital Signs: Temp: 97.4  F (36.3  C) Temp src: Oral BP: 132/64 Pulse: 80   Resp: 18 SpO2: 100 % O2 Device: None (Room air)    Weight: 225 lbs 0 oz    Physical Exam  Constitutional:       General: She is awake. She is not in acute distress.     Appearance: She is obese. She is not toxic-appearing.   HENT:      Head: Normocephalic.   Cardiovascular:      Rate and Rhythm: Normal rate and regular rhythm.      Pulses: Normal pulses.   Pulmonary:      Effort: Pulmonary effort is normal.      Breath sounds: No wheezing.   Abdominal:      General: Bowel sounds are decreased.      Palpations: Abdomen is soft.      Tenderness: There is abdominal tenderness in the left lower quadrant. There is no guarding. Negative signs include Bass's sign.   Musculoskeletal:      Right lower leg: No edema.      Left lower leg: No edema.   Neurological:      Mental Status: She is alert.   Psychiatric:         Attention and Perception: Attention normal.         Mood and Affect: Affect is tearful.         Speech: Speech normal.           Data   Data reviewed today: I reviewed all medications, new labs and imaging results over the last 24 hours.   Recent Labs   Lab 01/26/22  1921 01/26/22  1629   WBC  --  14.8*   HGB  --  14.2   MCV  --  91   PLT  --  364   INR 1.09  --    NA  --  139   POTASSIUM  --  3.6   CHLORIDE  --  111*   CO2  --  21*   BUN  --  7*   CR  --  0.73   ANIONGAP  --  7   HIMANSHU  --  8.8   GLC  --  101   ALBUMIN  --  3.5   PROTTOTAL  --  7.7   BILITOTAL  --  0.4   ALKPHOS  --  82   ALT  --  20   AST  --  18   LIPASE  --  17     Recent Results (from the past 24 hour(s))   CTA Chest Abdomen Pelvis w Contrast    Narrative    EXAM: CTA CHEST ABDOMEN PELVIS W  CONTRAST  LOCATION: [facility]  DATE/TIME: 1/26/2022 8:25 PM    INDICATION: Emesis. Lower GI bleed.  COMPARISON: 9/21/2021 and 1/31/2020  TECHNIQUE: CT angiogram chest abdomen pelvis during arterial phase of injection of IV contrast. 2D and 3D MIP reconstructions were performed by the CT technologist. Dose reduction techniques were used.   CONTRAST: ISOVUE 370 100 mL    FINDINGS:   CT ANGIOGRAM CHEST, ABDOMEN, AND PELVIS: No aortic dissection. The celiac, superior mesenteric, renal and inferior mesenteric arteries are patent. No visible extravasation.    LUNGS AND PLEURA: No infiltrate or pleural effusion.    MEDIASTINUM/AXILLAE: No adenopathy or pericardial effusion.    CORONARY ARTERY CALCIFICATION: None visualized.    HEPATOBILIARY: Normal.    PANCREAS: Normal.    SPLEEN: Normal.    ADRENAL GLANDS: Normal.    KIDNEYS/BLADDER: Normal.    BOWEL: Normal caliber. Underdistention of colon versus mild colonic wall thickening. Normal appendix.    LYMPH NODES: Normal.    PELVIC ORGANS: Normal.    MUSCULOSKELETAL: Normal.      Impression    IMPRESSION:  1.  Site of GI bleed not identified.  2.  Underdistention versus mild colonic wall thickening. Correlate for colitis.

## 2022-01-27 NOTE — PROGRESS NOTES
Pt discharged to home. Mother picked up pt. Went over AVS. Stronger encouraged alcohol cessation and marijuana cessation. All personal belongings sent with pt.

## 2022-01-27 NOTE — ED PROVIDER NOTES
Emergency Department Encounter         FINAL IMPRESSION:  GI bleed        ED COURSE AND MEDICAL DECISION MAKING       ED Course as of 01/26/22 2321 Wed Jan 26, 2022 1915 Morbidly obese 21-year-old with history of depression, here from home with vomiting blood as well as having rectal bleeding.  States it began this morning.  States he also is a fever of 102.  States she has chest pain from vomiting.  States she has a history of hemorrhagic cystitis although denies any rectal bleeding or GI bleeding history.  No recent travel.  Is not a drinker.  She feels tired and weak.  No rashes.  Arrival patient's vitals are stable.  She looks well and nontoxic.  Heart and lungs normal.  Abdomen is obese and generally tender to palpation.  Plan for broad labs, CT fluids and reevaluate.   2020 Labs reassuring.  Hemoglobin 14.  CTA pending.   -CT normal.  Discussed case with Dr. David KHAN who is leaning towards observation admission.  Patient given PPI here as well as antiemetics.  Admitted to the [place] resident.    7:16 PM I met with the patient, obtained history, performed an initial exam, and discussed options and plan for diagnostics and treatment here in the ED.  10:10 PM I rechecked and updated patient.  10:31 PM I spoke to Dr. Puentes from [facility].   11:02 PM I spoke to Dr. Oconnell, resident, from [place].         At the conclusion of the encounter I discussed the results of all the tests and the disposition. The questions were answered. The patient or family acknowledged understanding and was agreeable with the care plan.      MEDICATIONS GIVEN IN THE EMERGENCY DEPARTMENT:  Medications   iopamidol (ISOVUE-370) solution 100 mL (100 mLs Intravenous Given 1/26/22 2028)   lactated ringers BOLUS 1,000 mL (1,000 mLs Intravenous New Bag 1/26/22 2128)   ondansetron (ZOFRAN) injection 4 mg (4 mg Intravenous Given 1/26/22 2245)   morphine (PF) injection 4 mg (4 mg Intravenous Given 1/26/22 2247)   pantoprazole  (PROTONIX) IV push injection 40 mg (40 mg Intravenous Given 1/26/22 8137)       NEW PRESCRIPTIONS STARTED AT TODAY'S ED VISIT:  New Prescriptions    No medications on file       HPI     Patient information obtained from: Patient    Use of Interpretor: N/A     Yolanda Vee is a 21 year old female with a pertinent history of morbid obesity, nicotine use disorder, who presents to this ED via walk-in for evaluation of rectal bleeding and hematemesis.    Patient reports hematemesis and rectal bleeding that started this morning. She states she has had about 5-6 episodes of hematemesis and states that the blood, which is black in color, fills up the entire toilet bowl. She also states that blood from rectal is maroon in color. Patient denies any prior history of blood in stool.     Patient reports associating abdominal pain, rectal pain, chest pain secondary to vomiting, fatigue, generalized weakness, and fever of 102 degrees this afternoon. Otherwise, she denies any shortness of breath. She reports a history of hemorrhagic cystitis. Patient denies any significant medical history, family history of Crohn's disease or ulcerative colitis, prior abdominal surgeries, use of blood thinners, drug use, alcohol use, and recent travel. She notes allergies to Ibuprofen. No other complaints at this time.    REVIEW OF SYSTEMS:  Review of Systems   Constitutional: Positive for fever and fatigue.  HEENT: Negative runny nose, sore throat, ear pain, neck pain  Respiratory: Negative for shortness of breath, cough, congestion  Cardiovascular: Negative for leg edema. Positive for chest pain secondary to hematemesis.  Gastrointestinal: Negative for abdominal distention, abdominal pain, constipation, vomiting, nausea, diarrhea. Positive for abdominal pain, hematemesis, rectal bleeding, rectal pain.  Genitourinary: Negative for dysuria and hematuria.   Integument: Negative for rash, skin breakdown  Neurological: Negative for  paresthesias, headache. Positive for generalized weakness.  Musculoskeletal: Negative for joint pain, joint swelling      All other systems reviewed and are negative.      MEDICAL HISTORY     Past Medical History:   Diagnosis Date     Anxiety      Chemical dependency (H)      Chronic constipation      Constipation      Depression      Depression      Esophageal reflux      Heart rate problem      Migraines      Morbid obesity (H) 03/06/2020     Obese      Palpitations      Plantar warts      PTSD (post-traumatic stress disorder)      PTSD (post-traumatic stress disorder)      Uncomplicated asthma        Past Surgical History:   Procedure Laterality Date     COLONOSCOPY N/A 6/3/2021    Procedure: COLONOSCOPY;  Surgeon: Guru Gerardo Prado MD;  Location:  GI     ESOPHAGOSCOPY, GASTROSCOPY, DUODENOSCOPY (EGD), COMBINED N/A 6/3/2021    Procedure: ESOPHAGOGASTRODUODENOSCOPY (EGD);  Surgeon: Guru Gerardo Prado MD;  Location:  GI     wisdom teeth         Social History     Tobacco Use     Smoking status: Current Some Day Smoker     Packs/day: 1.00     Years: 5.00     Pack years: 5.00     Types: Cigarettes     Smokeless tobacco: Never Used     Tobacco comment: 2 packs daily and vapes   Substance Use Topics     Alcohol use: Yes     Alcohol/week: 6.0 - 8.0 standard drinks     Comment: quit in September 2021. heavy use prior up to a bottle of vodka daily.     Drug use: Yes     Types: Marijuana     Comment: Drug use: every night       acamprosate (CAMPRAL) 333 MG EC tablet  albuterol (PROAIR HFA/PROVENTIL HFA/VENTOLIN HFA) 108 (90 Base) MCG/ACT inhaler  buPROPion (WELLBUTRIN XL) 150 MG 24 hr tablet  cyclobenzaprine (FLEXERIL) 5 MG tablet  ipratropium - albuterol 0.5 mg/2.5 mg/3 mL (DUONEB) 0.5-2.5 (3) MG/3ML neb solution  naltrexone (DEPADE/REVIA) 50 MG tablet  naproxen (NAPROSYN) 500 MG tablet  ondansetron (ZOFRAN-ODT) 8 MG ODT tab  pantoprazole (PROTONIX) 40 MG EC tablet  prazosin  "(MINIPRESS) 1 MG capsule  prazosin (MINIPRESS) 5 MG capsule  PROAIR RESPICLICK 108 (90 Base) MCG/ACT inhaler  sucralfate (CARAFATE) 1 GM tablet  Vitamin D, Cholecalciferol, 25 MCG (1000 UT) TABS            PHYSICAL EXAM     BP (!) 143/71   Pulse 65   Temp 97.4  F (36.3  C) (Oral)   Resp 18   Ht 1.499 m (4' 11\")   Wt 102.1 kg (225 lb)   SpO2 100%   BMI 45.44 kg/m        PHYSICAL EXAM:   General: Patient appears well, nontoxic, comfortable  HEENT: Moist mucous membranes, no tongue swelling.  No head trauma.  No midline neck pain.  Cardiovascular: Normal rate, normal rhythm, no extremity edema.  No appreciable murmur.  Respiratory: No signs of respiratory distress, lungs are clear to auscultation bilaterally with no wheezes rhonchi or rales.  Abdominal: Generally tender. Obese abdomen limiting examination.  Musculoskeletal: Full range of motion of joints, no deformities appreciated.  Neurological: Alert and oriented, grossly neurologically intact.  Psychological: Normal affect and mood.  Integument: No rashes appreciated    RESULTS       Labs Ordered and Resulted from Time of ED Arrival to Time of ED Departure   COMPREHENSIVE METABOLIC PANEL - Abnormal       Result Value    Sodium 139      Potassium 3.6      Chloride 111 (*)     Carbon Dioxide (CO2) 21 (*)     Anion Gap 7      Urea Nitrogen 7 (*)     Creatinine 0.73      Calcium 8.8      Glucose 101      Alkaline Phosphatase 82      AST 18      ALT 20      Protein Total 7.7      Albumin 3.5      Bilirubin Total 0.4      GFR Estimate >90     CBC WITH PLATELETS AND DIFFERENTIAL - Abnormal    WBC Count 14.8 (*)     RBC Count 5.00      Hemoglobin 14.2      Hematocrit 45.3      MCV 91      MCH 28.4      MCHC 31.3 (*)     RDW 14.8      Platelet Count 364      % Neutrophils 68      % Lymphocytes 21      % Monocytes 8      % Eosinophils 2      % Basophils 0      % Immature Granulocytes 1      NRBCs per 100 WBC 0      Absolute Neutrophils 10.1 (*)     Absolute " Lymphocytes 3.1      Absolute Monocytes 1.1      Absolute Eosinophils 0.4      Absolute Basophils 0.0      Absolute Immature Granulocytes 0.1      Absolute NRBCs 0.0     INR - Normal    INR 1.09     PARTIAL THROMBOPLASTIN TIME - Normal    aPTT 29     LIPASE - Normal    Lipase 17     COVID-19 VIRUS (CORONAVIRUS) BY PCR       CTA Chest Abdomen Pelvis w Contrast   Final Result   IMPRESSION:   1.  Site of GI bleed not identified.   2.  Underdistention versus mild colonic wall thickening. Correlate for colitis.                 PROCEDURES:  Procedures:  Procedures       I, Megan Blake am serving as a scribe to document services personally performed by Alek Navarrete DO, based on my observations and the provider's statements to me.  I, Alek Navarrete DO, attest that Megan Blake is acting in a scribe capacity, has observed my performance of the services and has documented them in accordance with my direction.    Alek Navarrete DO  Emergency Medicine  [facility] EMERGENCY ROOM     Alek Navarrete DO  01/27/22 0054

## 2022-01-31 ENCOUNTER — NURSE TRIAGE (OUTPATIENT)
Dept: NURSING | Facility: CLINIC | Age: 22
End: 2022-01-31
Payer: COMMERCIAL

## 2022-01-31 NOTE — TELEPHONE ENCOUNTER
"Patient calling to report \"Symptoms of bleeding from rectum\".  Patient reports that mild bleeding has been present since last Wednesday and she was seen in the Emergency room for bleeding.    Blood is light red to dark red blood when pushing to have a bowel movement, reports passing blood once today, yesterday passed blood two times.  Patient denies clots, \"couple drops of blood\".    Patient reports stomach cramping, no appetite, constipation and is taking Miralax \"that is not helping\", patient does not remember when her last bowel movement was.  Complains of abdominal pain 7/10, cramping pain and fatigue.    According to the protocol, patient should be seen within three days.  Care advice given. Patient verbalizes understanding and agrees with plan of care. Transferred to scheduling.     COVID 19 Nurse Triage Plan/Patient Instructions    Please be aware that novel coronavirus (COVID-19) may be circulating in the community. If you develop symptoms such as fever, cough, or SOB or if you have concerns about the presence of another infection including coronavirus (COVID-19), please contact your health care provider or visit [email or web address].     Disposition/Instructions    In-Person Visit with provider recommended. Reference Visit Selection Guide.    Thank you for taking steps to prevent the spread of this virus.  o Limit your contact with others.  o Wear a simple mask to cover your cough.  o Wash your hands well and often.    Resources    [facility]: About COVID-19: [email or web address]    CDC: What to Do If You're Sick: www.cdc.gov/coronavirus/2019-ncov/about/steps-when-sick.html    CDC: Ending Home Isolation: www.cdc.gov/coronavirus/2019-ncov/hcp/disposition-in-home-patients.html     CDC: Caring for Someone: www.cdc.gov/coronavirus/2019-ncov/if-you-are-sick/care-for-someone.html     [facility]: Interim Guidance for Hospital Discharge to Home: " www.health.[facility].mn.us/diseases/coronavirus/hcp/hospdischarge.pdf    [facility] clinical trials (COVID-19 research studies): [email or web address]     Below are the COVID-19 hotlines at the [place] Department of Health ([facility]). Interpreters are available.   o For health questions: Call 451-666-7462 or 1-746.871.5383 (7 a.m. to 7 p.m.)  o For questions about schools and childcare: Call 027-839-0673 or 1-511.372.8175 (7 a.m. to 7 p.m.)                       Reason for Disposition    MILD rectal bleeding (more than just a few drops or streaks)    Additional Information    Negative: Passed out (i.e., fainted, collapsed and was not responding)    Negative: Shock suspected (e.g., cold/pale/clammy skin, too weak to stand, low BP, rapid pulse)    Negative: Vomiting red blood or black (coffee ground) material    Negative: Sounds like a life-threatening emergency to the triager    Negative: Diarrhea is the main symptom    Negative: Rectal symptoms    Negative: SEVERE rectal bleeding (large blood clots; on and off, or constant bleeding)    Negative: SEVERE dizziness (e.g., unable to stand, requires support to walk, feels like passing out now)    Negative: MODERATE rectal bleeding (small blood clots, passing blood without stool, or toilet water turns red) more than once a day    Negative: Bloody, black, or tarry bowel movements (Exception: chronic-unchanged  black-grey bowel movements and is taking iron pills or Pepto-bismol)    Negative: High-risk adult (e.g., prior surgery on aorta, abdominal aortic aneurysm)    Negative: Rectal foreign body (inserted or swallowed)    Negative: SEVERE abdominal pain (e.g., excruciating)    Negative: Constant abdominal pain lasting > 2 hours    Negative: Pale skin (pallor) of new onset or worsening    Negative: Patient sounds very sick or weak to the triager    Negative: MODERATE rectal bleeding (small blood clots, passing blood without stool, or toilet water  turns red)    Negative: Taking Coumadin (warfarin) or other strong blood thinner, or known bleeding disorder (e.g., thrombocytopenia)    Negative: Colonoscopy in past 72 hours    Negative: Known cirrhosis of the liver (or history of liver failure or ascites)    Negative: Patient wants to be seen    Protocols used: RECTAL BLEEDING-A-[place]

## 2022-02-01 ENCOUNTER — OFFICE VISIT (OUTPATIENT)
Dept: FAMILY MEDICINE | Facility: CLINIC | Age: 22
End: 2022-02-01
Payer: COMMERCIAL

## 2022-02-01 VITALS
TEMPERATURE: 98.7 F | DIASTOLIC BLOOD PRESSURE: 65 MMHG | OXYGEN SATURATION: 97 % | HEART RATE: 95 BPM | SYSTOLIC BLOOD PRESSURE: 127 MMHG

## 2022-02-01 DIAGNOSIS — F10.20 ALCOHOL USE DISORDER, MODERATE, DEPENDENCE (H): ICD-10-CM

## 2022-02-01 DIAGNOSIS — F17.200 NICOTINE USE DISORDER: ICD-10-CM

## 2022-02-01 DIAGNOSIS — E66.01 MORBID OBESITY (H): ICD-10-CM

## 2022-02-01 DIAGNOSIS — K62.5 RECTAL BLEEDING: Primary | ICD-10-CM

## 2022-02-01 DIAGNOSIS — F33.1 MODERATE EPISODE OF RECURRENT MAJOR DEPRESSIVE DISORDER (H): ICD-10-CM

## 2022-02-01 DIAGNOSIS — K92.0 HEMATEMESIS, PRESENCE OF NAUSEA NOT SPECIFIED: ICD-10-CM

## 2022-02-01 LAB — HGB BLD-MCNC: 14.4 G/DL (ref 11.7–15.7)

## 2022-02-01 PROCEDURE — 99214 OFFICE O/P EST MOD 30 MIN: CPT | Performed by: NURSE PRACTITIONER

## 2022-02-01 PROCEDURE — 85018 HEMOGLOBIN: CPT | Performed by: NURSE PRACTITIONER

## 2022-02-01 PROCEDURE — 36415 COLL VENOUS BLD VENIPUNCTURE: CPT | Performed by: NURSE PRACTITIONER

## 2022-02-01 ASSESSMENT — ASTHMA QUESTIONNAIRES
QUESTION_3 LAST FOUR WEEKS HOW OFTEN DID YOUR ASTHMA SYMPTOMS (WHEEZING, COUGHING, SHORTNESS OF BREATH, CHEST TIGHTNESS OR PAIN) WAKE YOU UP AT NIGHT OR EARLIER THAN USUAL IN THE MORNING: TWO OR THREE NIGHTS A WEEK
QUESTION_5 LAST FOUR WEEKS HOW WOULD YOU RATE YOUR ASTHMA CONTROL: WELL CONTROLLED
QUESTION_4 LAST FOUR WEEKS HOW OFTEN HAVE YOU USED YOUR RESCUE INHALER OR NEBULIZER MEDICATION (SUCH AS ALBUTEROL): ONCE A WEEK OR LESS
ACT_TOTALSCORE: 15
QUESTION_2 LAST FOUR WEEKS HOW OFTEN HAVE YOU HAD SHORTNESS OF BREATH: MORE THAN ONCE A DAY
QUESTION_1 LAST FOUR WEEKS HOW MUCH OF THE TIME DID YOUR ASTHMA KEEP YOU FROM GETTING AS MUCH DONE AT WORK, SCHOOL OR AT HOME: A LITTLE OF THE TIME
ACT_TOTALSCORE: 15

## 2022-02-01 ASSESSMENT — ANXIETY QUESTIONNAIRES
GAD7 TOTAL SCORE: 12
5. BEING SO RESTLESS THAT IT IS HARD TO SIT STILL: NOT AT ALL
1. FEELING NERVOUS, ANXIOUS, OR ON EDGE: MORE THAN HALF THE DAYS
7. FEELING AFRAID AS IF SOMETHING AWFUL MIGHT HAPPEN: NOT AT ALL
6. BECOMING EASILY ANNOYED OR IRRITABLE: NEARLY EVERY DAY
GAD7 TOTAL SCORE: 12
4. TROUBLE RELAXING: NEARLY EVERY DAY
3. WORRYING TOO MUCH ABOUT DIFFERENT THINGS: MORE THAN HALF THE DAYS
GAD7 TOTAL SCORE: 12
2. NOT BEING ABLE TO STOP OR CONTROL WORRYING: MORE THAN HALF THE DAYS
7. FEELING AFRAID AS IF SOMETHING AWFUL MIGHT HAPPEN: NOT AT ALL

## 2022-02-01 ASSESSMENT — PATIENT HEALTH QUESTIONNAIRE - PHQ9
SUM OF ALL RESPONSES TO PHQ QUESTIONS 1-9: 14
SUM OF ALL RESPONSES TO PHQ QUESTIONS 1-9: 14
10. IF YOU CHECKED OFF ANY PROBLEMS, HOW DIFFICULT HAVE THESE PROBLEMS MADE IT FOR YOU TO DO YOUR WORK, TAKE CARE OF THINGS AT HOME, OR GET ALONG WITH OTHER PEOPLE: SOMEWHAT DIFFICULT

## 2022-02-01 NOTE — PROGRESS NOTES
Assessment & Plan     Rectal bleeding  She continues to have rectal bleeding without bowel movements.  Please urgent referral to gastroenterology to further evaluate and discuss recommendations going forward.  She will continue omeprazole 20 mg twice a day.  Her hemoglobin today is normal at 14.4.  She is advised to closely monitor for any acutely worsening symptoms.  - Hemoglobin  - Adult Gastro Ref - Consult Only  - Hemoglobin    Hematemesis, presence of nausea not specified  Recent CTA was without any identifiable source of GI bleed.As above, hemoglobin is stable and she is asymptomatic currently.  Referral placed to gastroenterology for further evaluation.  - Hemoglobin  - Adult Gastro Ref - Consult Only  - Hemoglobin    Alcohol use disorder, moderate, dependence (H)  .  Currently reports drinking a few glasses of wine per week.  We discussed the importance of abstaining from alcohol completely given her current bleeding concerns.    Nicotine use disorder  Advised smoking cessation.  I recommend that she restart her Wellbutrin  .  Depression  We discussed the importance of taking depression medications consistent basis.    Morbid obesity (H)  She is working with a nutritionist.  Reports possibility of weight loss surgery in the future.  Encouraged healthy lifestyle modifications in regards to her diet, exercise and alcohol intake.       Tobacco Cessation:   reports that she has been smoking cigarettes. She has a 5.00 pack-year smoking history. She has never used smokeless tobacco.  Tobacco Cessation Action Plan: Information offered: Patient not interested at this time        No follow-ups on file.    ABA Jovel CNP  [facility] is a 21 year old who presents for the following health issues: rectal bleeding    HPI   Patient is here today to follow-up on recent ER visit  Medical history is significant for obesity, alcohol abuse, nicotine use.  She was seen  concerns of emesis and rectal bleeding.  She has had issues with hematemesis in the past and this has been thought to be in large part due to excessive alcohol use.  She has been working on cutting down his prescribed naltrexone.  She reports drinking a few drinks per week.  According to most recent hospital summary, she had 5 or 6 episodes of vomiting with blood earlier that day along with black blood in the toilet bowl.  She reports having no black stools currently.  Denies vomiting blood in recent days.  CT in the hospital was negative.  Gastroenterology was consulted and recommended starting PPI.  Patient was not aware of any follow-up recommendations for her.  She continues to have bleeding from her rectum intermittently.  She denies having any bowel movements for the last week or 2.  She is using MiraLAX without relief.  She has been feeling fatigued and has had fevers, chills and cough.  Tested positive for Covid on 1/26/2022.     We discussed her antidepressants which she has not been taking.  She cannot remember which medication she is currently prescribed but according to her chart is Wellbutrin and Lexapro.    She reports following with nutritionist and is considering undergoing weight loss surgery sometime in the next year or 2.      Review of Systems   Review of Systems - pertinent positives noted in HPI, otherwise ROS is negative.        Objective    There were no vitals taken for this visit.  There is no height or weight on file to calculate BMI.  Physical Exam     General Appearance:  Alert, overweight.  Cooperative, no distress, appears stated age   Lungs:   Clear to auscultation bilaterally, respirations unlabored.  No expiratory wheeze or inspiratory crackles noted.   Heart:  Regular rate and rhythm, S1, S2 normal, no murmur, rub or gallop   Abdomen:   Soft, no organomegaly.  Positive bowel sounds.  Generalized tenderness on palpation.   Extremities: Extremities normal.  No cyanosis or edema    Skin: Warm, dry.  Skin color, texture, turgor normal, no rashes or lesions   Neurologic:  Grossly intact.           CTA chest/abdomen 1/26/2022:  IMPRESSION:  1.  Site of GI bleed not identified.  2.  Underdistention versus mild colonic wall thickening. Correlate for colitis.          Hem  Answers for HPI/ROS submitted by the patient on 2/1/2022  If you checked off any problems, how difficult have these problems made it for you to do your work, take care of things at home, or get along with other people?: Somewhat difficult  PHQ9 TOTAL SCORE: 14  DEYVI 7 TOTAL SCORE: 12

## 2022-02-02 ASSESSMENT — PATIENT HEALTH QUESTIONNAIRE - PHQ9: SUM OF ALL RESPONSES TO PHQ QUESTIONS 1-9: 14

## 2022-02-02 ASSESSMENT — ANXIETY QUESTIONNAIRES: GAD7 TOTAL SCORE: 12

## 2022-02-23 ENCOUNTER — VIRTUAL VISIT (OUTPATIENT)
Dept: SURGERY | Facility: CLINIC | Age: 22
End: 2022-02-23
Payer: COMMERCIAL

## 2022-02-23 DIAGNOSIS — E66.01 CLASS 3 SEVERE OBESITY DUE TO EXCESS CALORIES WITHOUT SERIOUS COMORBIDITY WITH BODY MASS INDEX (BMI) OF 40.0 TO 44.9 IN ADULT (H): Primary | ICD-10-CM

## 2022-02-23 DIAGNOSIS — Z71.3 NUTRITIONAL COUNSELING: ICD-10-CM

## 2022-02-23 DIAGNOSIS — E66.813 CLASS 3 SEVERE OBESITY DUE TO EXCESS CALORIES WITHOUT SERIOUS COMORBIDITY WITH BODY MASS INDEX (BMI) OF 40.0 TO 44.9 IN ADULT (H): Primary | ICD-10-CM

## 2022-02-23 PROCEDURE — 97803 MED NUTRITION INDIV SUBSEQ: CPT | Mod: GT | Performed by: DIETITIAN, REGISTERED

## 2022-02-23 NOTE — LETTER
2/23/2022         RE: Yolanda Vee  [address]  [place] [place] [facility] [address]        Dear Colleague,    Thank you for referring your patient, Yolanda Vee, to the [facility] [place]. Please see a copy of my visit note below.    Yolanda Vee is a 21 year old who is being evaluated via a billable video visit.      How would you like to obtain your AVS? MyChart  If the video visit is dropped, the invitation should be resent by: Send to e-mail at: [email or web address]  Will anyone else be joining your video visit? No      Video Start Time: 2:20 PM      Follow Up Surgical Weight Loss Supervised Diet Evaluation    Assessment:  Pt. is being seen today for a follow up RD nutritional evaluation. Pt. has been unsuccessful with non-surgical weight loss methods and is interested in bariatric surgery. Today we reviewed current eating habits and level of physical activity, and instructed on the changes that are required for successful bariatric outcomes.    Surgery of interest per pt: LSG.    Workflow review:  Support Group: Completed.  Psychology:Completed.  Lab work:Completed.  SWL:Yes 5th Visit   Smoking Cessation-Quit Date around New Years     Weight goal: 5 lbs prior to surgery.    Anthropometrics:  Pt's weight is 220 lbs   Initial weight: 230 lbs   Weight change: 10 lbs (down)   BMI: There is no height or weight on file to calculate BMI.   Patient must be at least 60 in tall to calculate ideal body weight    Estimated RMR ([place]-St Jeor equation):  1670 kcals x 1.3 (light active) = 2171 kcals (for weight maintenance)    Medical History:  Patient Active Problem List   Diagnosis     CN (constipation)     Alcohol use disorder, moderate, dependence (H)     Morbid obesity (H)     Vitamin D deficiency     Tobacco use     Sinus tachycardia     Periumbilical pain     PTSD (post-traumatic stress disorder)     Nicotine use disorder     Neutrophilia      Moderate episode of recurrent major depressive disorder (H)     Marijuana use     Hypochromic anemia     Chronic nasal congestion     Chronic idiopathic constipation     Anxiety     GI bleed     History of alcoholic gastritis      Diabetes: No   HbA1c:  No results found for: HGBA1C  Vitamins: MVI with Iron, Vitamin D, Vitamin B12     Progress over past month: Continues to focus on protein first at each meal.  Patient reports that she has also been trying to be more mindful regarding her eating habits. Continues to work on increased hydration as well.     Diet Recall/Time  Breakfast: Protein Shake, 1 slice of toast, hard boiled egg   Lunch: chicken caesar Salad   Dinner: 3 oz Steak, veggies (broccoli with cheese or mix vegetables) or Spaghetti with Meatballs and Corn     Typical Snacks: none     Meal duration: continues to work on, feels that it is going well.      fluids by 30 minutes before, during meal, and waiting 30 minutes after meal before drinking fluids: Yes    Beverages  Water-at least 32 oz/day-working on increasing, 24 oz Gatorade Zero-1 time/day    Exercise  Walking the dog most days of the week for 20-40 minutes, pull ups and sit ups, jumping jacks, household chores     PES statement:   Overweight/Obesity (NC 3.3) related to overeating and poor lifestyle habits as evidenced by patient's subjective statements (h/o excessive energy intake) and BMI of 44.5 kg/m2.     Intervention    Nutrition Education:   1. Provided general overview of diet and lifestyle modifications needed to be a deemed a safe candidate for bariatric surgery.   2. Vitamins: Educated on post-op vitamin regimen including MVI+ 18 mg Fe two times a day, calcium citrate 400-600 mg two times a day, 3333-5936 mcg sublingual B12 daily, 5000 IU vitamin D3 daily.     Food/Nutrient Delivery:  3. Educated patient on eating three meals, with cutting out snacking.  4. Bariatric Plate: Patient and I discussed the importance of including a  lean protein source (20-30 grams/meal), vegetables (included at lunch and dinner), one serving (15g) of carbohydrate, and limited added fat (1 tb/day) at each meal.   5. Discussed importance of adequate hydration after surgery, with goal of at least 64 oz of fluids/day.  6. Addressed avoiding all carbonated, caffeinated and sweetened drinks to prepare for bariatric surgery.     Nutrition Counselin. Mindful eating techniques: Encouraged slow meal pace, chewing foods to applesauce consistency for 20-30 minutes/meal.   8. Discussed  fluids 30 minutes before, during, and after meal to prevent dumping syndrome and discomfort post bariatric surgery.   9. Discussed pre/post operative diet progression, post op vitamin regimen, gave review of surgery process.     Instructions/Goals:     1. Continue implementing bariatric surgery lifestyle modifications.    Handouts Provided:   [facility] Bariatric Surgery Nutrition Info      Monitor/Evaluation:  Pt. s target weight:  weight loss to 225 lbs or less/no gain from initial visit, pt. verbalized understanding.     Pt has completed all nutrition requirements and is well-informed of the dietary and physical activity requirements that are necessary for successful bariatric outcomes. This pt is an appropriate candidate for surgery from a nutrition standpoint at this time. The patient understands that surgery is a tool, not a cure, and post operative follow up is essential.     Plan for next visit:   Post Op Nutrition        Video-Visit Details    Type of service:  Video Visit    Video End Time:2:42 PM    Originating Location (pt. Location): Home    Distant Location (provider location):  [facility] [place]     Platform used for Video Visit: Rachid Chamberlain RD          Again, thank you for allowing me to participate in the care of your patient.        Sincerely,        Diana Chamberlain RD

## 2022-02-23 NOTE — PROGRESS NOTES
Yolanda Vee is a 21 year old who is being evaluated via a billable video visit.      How would you like to obtain your AVS? MyChart  If the video visit is dropped, the invitation should be resent by: Send to e-mail at: [email or web address]  Will anyone else be joining your video visit? No      Video Start Time: 2:20 PM      Follow Up Surgical Weight Loss Supervised Diet Evaluation    Assessment:  Pt. is being seen today for a follow up RD nutritional evaluation. Pt. has been unsuccessful with non-surgical weight loss methods and is interested in bariatric surgery. Today we reviewed current eating habits and level of physical activity, and instructed on the changes that are required for successful bariatric outcomes.    Surgery of interest per pt: LSG.    Workflow review:  Support Group: Completed.  Psychology:Completed.  Lab work:Completed.  SWL:Yes 5th Visit   Smoking Cessation-Quit Date around New Years     Weight goal: 5 lbs prior to surgery.    Anthropometrics:  Pt's weight is 220 lbs   Initial weight: 230 lbs   Weight change: 10 lbs (down)   BMI: There is no height or weight on file to calculate BMI.   Patient must be at least 60 in tall to calculate ideal body weight    Estimated RMR ([place]-St Jeor equation):  1670 kcals x 1.3 (light active) = 2171 kcals (for weight maintenance)    Medical History:  Patient Active Problem List   Diagnosis     CN (constipation)     Alcohol use disorder, moderate, dependence (H)     Morbid obesity (H)     Vitamin D deficiency     Tobacco use     Sinus tachycardia     Periumbilical pain     PTSD (post-traumatic stress disorder)     Nicotine use disorder     Neutrophilia     Moderate episode of recurrent major depressive disorder (H)     Marijuana use     Hypochromic anemia     Chronic nasal congestion     Chronic idiopathic constipation     Anxiety     GI bleed     History of alcoholic gastritis      Diabetes: No   HbA1c:  No results found for: HGBA1C  Vitamins:  MVI with Iron, Vitamin D, Vitamin B12     Progress over past month: Continues to focus on protein first at each meal.  Patient reports that she has also been trying to be more mindful regarding her eating habits. Continues to work on increased hydration as well.     Diet Recall/Time  Breakfast: Protein Shake, 1 slice of toast, hard boiled egg   Lunch: chicken caesar Salad   Dinner: 3 oz Steak, veggies (broccoli with cheese or mix vegetables) or Spaghetti with Meatballs and Corn     Typical Snacks: none     Meal duration: continues to work on, feels that it is going well.      fluids by 30 minutes before, during meal, and waiting 30 minutes after meal before drinking fluids: Yes    Beverages  Water-at least 32 oz/day-working on increasing, 24 oz Gatorade Zero-1 time/day    Exercise  Walking the dog most days of the week for 20-40 minutes, pull ups and sit ups, jumping jacks, household chores     PES statement:   Overweight/Obesity (NC 3.3) related to overeating and poor lifestyle habits as evidenced by patient's subjective statements (h/o excessive energy intake) and BMI of 44.5 kg/m2.     Intervention    Nutrition Education:   1. Provided general overview of diet and lifestyle modifications needed to be a deemed a safe candidate for bariatric surgery.   2. Vitamins: Educated on post-op vitamin regimen including MVI+ 18 mg Fe two times a day, calcium citrate 400-600 mg two times a day, 5185-8356 mcg sublingual B12 daily, 5000 IU vitamin D3 daily.     Food/Nutrient Delivery:  3. Educated patient on eating three meals, with cutting out snacking.  4. Bariatric Plate: Patient and I discussed the importance of including a lean protein source (20-30 grams/meal), vegetables (included at lunch and dinner), one serving (15g) of carbohydrate, and limited added fat (1 tb/day) at each meal.   5. Discussed importance of adequate hydration after surgery, with goal of at least 64 oz of fluids/day.  6. Addressed avoiding  all carbonated, caffeinated and sweetened drinks to prepare for bariatric surgery.     Nutrition Counselin. Mindful eating techniques: Encouraged slow meal pace, chewing foods to applesauce consistency for 20-30 minutes/meal.   8. Discussed  fluids 30 minutes before, during, and after meal to prevent dumping syndrome and discomfort post bariatric surgery.   9. Discussed pre/post operative diet progression, post op vitamin regimen, gave review of surgery process.     Instructions/Goals:     1. Continue implementing bariatric surgery lifestyle modifications.    Handouts Provided:   [facility] Bariatric Surgery Nutrition Info      Monitor/Evaluation:  Pt. s target weight:  weight loss to 225 lbs or less/no gain from initial visit, pt. verbalized understanding.     Pt has completed all nutrition requirements and is well-informed of the dietary and physical activity requirements that are necessary for successful bariatric outcomes. This pt is an appropriate candidate for surgery from a nutrition standpoint at this time. The patient understands that surgery is a tool, not a cure, and post operative follow up is essential.     Plan for next visit:   Post Op Nutrition        Video-Visit Details    Type of service:  Video Visit    Video End Time:2:42 PM    Originating Location (pt. Location): Home    Distant Location (provider location):  [facility] [place]     Platform used for Video Visit: Rachid Chamberlain RD

## 2022-03-11 ENCOUNTER — TELEPHONE (OUTPATIENT)
Dept: FAMILY MEDICINE | Facility: CLINIC | Age: 22
End: 2022-03-11
Payer: COMMERCIAL

## 2022-03-11 DIAGNOSIS — Z30.42 ENCOUNTER FOR SURVEILLANCE OF INJECTABLE CONTRACEPTIVE: Primary | ICD-10-CM

## 2022-03-13 RX ORDER — MEDROXYPROGESTERONE ACETATE 150 MG/ML
150 INJECTION, SUSPENSION INTRAMUSCULAR
Status: COMPLETED | OUTPATIENT
Start: 2022-03-13 | End: 2023-02-01

## 2022-03-15 ENCOUNTER — ALLIED HEALTH/NURSE VISIT (OUTPATIENT)
Dept: FAMILY MEDICINE | Facility: CLINIC | Age: 22
End: 2022-03-15
Payer: COMMERCIAL

## 2022-03-15 DIAGNOSIS — Z30.42 ENCOUNTER FOR SURVEILLANCE OF INJECTABLE CONTRACEPTIVE: Primary | ICD-10-CM

## 2022-03-15 PROCEDURE — 99207 PR NO CHARGE NURSE ONLY: CPT

## 2022-03-15 PROCEDURE — 96372 THER/PROPH/DIAG INJ SC/IM: CPT | Performed by: FAMILY MEDICINE

## 2022-03-15 RX ADMIN — MEDROXYPROGESTERONE ACETATE 150 MG: 150 INJECTION, SUSPENSION INTRAMUSCULAR at 10:33

## 2022-03-17 ENCOUNTER — VIRTUAL VISIT (OUTPATIENT)
Dept: SURGERY | Facility: CLINIC | Age: 22
End: 2022-03-17
Payer: COMMERCIAL

## 2022-03-17 VITALS — HEIGHT: 59 IN | BODY MASS INDEX: 44.35 KG/M2 | WEIGHT: 220 LBS

## 2022-03-17 DIAGNOSIS — F12.11 MILD TETRAHYDROCANNABINOL (THC) ABUSE IN EARLY REMISSION: ICD-10-CM

## 2022-03-17 DIAGNOSIS — Z87.891 HISTORY OF NICOTINE USE: Primary | ICD-10-CM

## 2022-03-17 PROCEDURE — 99214 OFFICE O/P EST MOD 30 MIN: CPT | Mod: GT | Performed by: EMERGENCY MEDICINE

## 2022-03-17 NOTE — PATIENT INSTRUCTIONS
Plan   Weight will need to be stable under 225 lbs prior to the 2 week pre-operative diet.  Heparin Protocol: standard  CPAP: no  Actigall: anticipate use for 6 months post op  Exercise Plan: walking/gym once cleared  Contraception Plan: depo should change to IUD if proceeding to surgery to limit obesogenic properties.  Agrees to take vitamins for Life: yes  Agrees to follow up for life: yes. Reviewed pitfalls of drugs/alcohol/nicotine should she relapse or go back to regular use after bariatric surgery. Given her ER visit at theEnd of January she states she's been abstinent since and we'll recheck THC/nicotine tests this next month and again preop. She reports abstinence and I reiterated importance of good coping/behavior skills after bariatric surgery with delay should she relapse.  Medication Management: naltrexone tolerated. Will continue until preop liquid diet and stop.  Using multivitamin for low A and iron hx.  Cleared psych.     Recheck with me in May to make sure stomach continues to feel well and on track for program completion.            Exercise Guidance    Nearly everything that bothers us gets better when the proper amount of exercise can be done in the proper amounts.  Getting to that level safely and without injury is the key.  When it comes to weight loss, exercise is especially important in maintaining the weight loss.  Unfortunately, one of the harsh realities is that substantial weight loss slows our metabolism, often anywhere from 5-20%.    Our brain always remembers our heaviest weight and we can return to that if we're not mindful and moving regularly.  Our biology doesn't understand the concept of having too much energy, only not having enough.  As such, when we lose weight, it's thought that the brain interprets this as we're ill or in a famine and dials back our metabolism to limit further weight loss.  This is why exercise is so important in keeping the weight off and is the main reason  people have some weight regain from their low weight point after weight loss.  We have to make up that 10-20% of calories not being burned.Since we can restrict our intake for only so long, exercise becomes very important in our long term healthy weigh maintenance to balance out the occasional indiscretion with our diet.    Generally, for every 5% body weight reduction in a weight loss season, a person needs to add  kilocalories of exercise in their daily routine to keep that weight off for the long term.  This is why it's vital to be starting your fitness regimen during weight loss season, so that routine is well established as you move into your maintenance period.    Additionally, all sorts of good enzymes and genes turn on with exercise and our stress, sleep, mood and bodies feel better when we can get to the point of making ourselves a little sweaty and short of breath 35-50 minutes most days of the week. But we have to start with what we can do first and give ourselves permission to work our way up to this goal.    Who isn't ready for exercise? Well, if you get severe dizziness/palpitations, chest pain or short of breath/faint with even minimal activity like walking across a room or you're having to pause while going up a flight of stairs, then getting your heart and/or lungs fully evaluated prior to starting an exercise regimen is recommended. Everyone else can probably start a program, but everyone may start at a different point:  Some can set a 5-10 minute walking goal and others will be able to ride their bike for an hour.      Start with where you're at and look to add 10% more each week until you're at that 150 minutes or more a week (or 75 minutes/week or more of vigorous exercise). Moderate exercise can be estimated as the pace you can carry on a conversation and vigorous is the pace at which you can get 3-5 words out before having to take a breath.  If you're using heart rate monitoring,  "Moderate is about 60% of your maximum heart rate and vigorous about 75%. (Max heart rate estimated as 220 beats minus your age:  Example: 220-age of 44 =176 Beats per minute (BPM) maximum. 0.6X 176= 105 BPM (moderate), 132 BMP(vigorous)).    If you like to count steps, the 10,000 steps per day does correlate well with weight maintenance but try to make at least 20-25% of those steps at a brisk pace (like you are about to miss your bus).    Finally, if you are pressed for time, it's important to know that some exercise is better than none.  High Intensity Interval training (HIIT) is a good way to get as much out of a short period of working out. If you can't walk, use the stairs, bike or swim; you could use a punching/arm workout regimen for your activity.  The idea with HIIT is to have a 3-6 minute warm up period of low intensity and the 3-6 \"intervals\" where you push the intensity up and then recover and start the next interval. One study showed that 3 intervals of 20 seconds at \"Maximum Effort\" while either biking on a stationary bike or going up stairs and then having 100 seconds recovery time before the next Maximum Effort was equally as beneficial on cardiovascular fitness development as doing 30 minutes of moderately paced walking 3 days weekly over a 6 week period of time.  So intensity matters. You just need to be able to safely do your desired exercise without injury. There are many great HIIT exercises/routines out there. IF you're not doing much exercise currently, I recommend giving your self 2-3 weeks of moderate exercise, 3 days weekly minimum to get your bones/tendons/muscles used to exercise before going for High Intensity workouts.    If you like to use Apps on the phone, the couch to 5k ryann and 7 minute workout apps are nice places to start if you are reasonably healthy.  There are hundreds of other options out there.  Consider viewing [employer] if gentler exercise/movement is desired. Videos on [employer] " Chi and chair yoga for seniors exist and are free. Check them out and let's get that 3-4 days a week routine going.    Let's move!  Klaus Amezquita MD.       LEAN PROTEIN SOURCES  Getting 20-30 grams of protein, 3 meals daily, is appropriate for most people, some need more but more than about 40 grams per meal is not useful.  General rule is drinking one ounce of water per gram of protein eaten over the course of the day:  70 grams of protein each day, drink 70 oz of water.  Protein Source Portion Calories Grams of Protein                           Nonfat, plain Greek yogurt    (10 grams sugar or less) 3/4 cup (6 oz)  12-17   Light Yogurt (10 grams sugar or less) 3/4 cup (6 oz)  6-8   Protein Shake 1 shake 110-180 15-30   Skim/1% Milk or lactose-free milk 1 cup ( 8 oz)  8   Plain or light, flavored soymilk 1 cup  7-8   Plain or light, hemp milk 1 cup 110 6   Fat Free or 1% Cottage Cheese 1/2 cup 90 15   Part skim ricotta cheese 1/2 cup 100 14   Part skim or reduced fat cheese slices 1 ounce 65-80 8     Mozzarella String Cheese 1 80 8   Canned tuna, chicken, crab or salmon  (canned in water)  1/2 cup 100 15-20   White fish (broiled, grilled, baked) 3 ounces 100 21   [place]/Tuna (broiled, grilled, baked) 3 ounces 150-180 21   Shrimp, Scallops, Lobster, Crab 3 ounces 100 21   Pork loin, Pork Tenderloin 3 ounces 150 21   Boneless, skinless chicken /turkey breast                          (broiled, grilled, baked) 3 ounces 120 21   [place], [place], [place], and Venison 3 ounces 120 21   Lean cuts of red meat and pork (sirloin,   round, tenderloin, flank, ground 93%-96%) 3 ounces 170 21   Lean or Extra Lean Ground Turkey 1/2 cup 150 20   90-95% Lean [place] Burger 1 madelaine 140-180 21   Low-fat casserole with lean meat 3/4 cup 200 17   Luncheon Meats                                                        (turkey, lean ham, roast beef, chicken) 3 ounces 100 21   Egg (boiled, poached, scrambled) 1 Egg 60 7    Egg Substitute 1/2 cup 70 10   Nuts (limit to 1 serving per day)  3 Tbsp. 150 7   Nut [place] (peanut, almond)  Limit to 1 serving or less daily 1 Tbsp. 90 4   Soy Burger (varies) 1  15   Garbanzo, Black, Sunshine Beans 1/2 cup 110 7   Refried Beans 1/2 cup 100 7   Kidney and Lima beans 1/2 cup 110 7   Tempeh 3 oz 175 18   Vegan crumbles 1/2 cup 100 14   Tofu 1/2 cup 110 14   Chili (beans and extra lean beef or turkey) 1 cup 200 23   Lentil Stew/Soup 1 cup 150 12   Black Bean Soup 1 cup 175 12         To help lose weight in a safe way and sustainable way, I'd like to start you on a 1300 calorie diet each day.  Understanding that every 3500 calorie deficit adds up to a pound of weight reduction, with the combination of light aerobic exercise, some modest weight training and diet, we should be able to lose around 1 to 2.5lbs weekly depending on your starting height and weight and exercise routine with this goal intake. Dropping abut 1% total body weight weekly is exceptional weight loss and very sustainable once in a good routine.    Hunger and fatigue are the enemies of weight loss and behavior change in general.  We become much more reactive in our eating when we're hungry and tired and decrease our levels of self control.  It's not a personal failing, it's just body chemistry.   We can combat this by trying to avoid being tired and hungry at the same time.  To help this,  try to front load your calories in the first 10 hours of you day so you get into the fatigued evening hours reasonably full and you can control impulses/mindless eating a lot better and avoid those bedtime snacks/evening treats that the tired brain craves.  If you can getting your exercise in the beginning of your day has also been shown to have superior results (but anytime is better than none).  We want to build up to 150 minutes or more as you progress through the first 2-3 months of weight loss season (300 minutes weekly is ideal for  maintaining weight loss for most after the end of weight loss season).     Weight loss goes through ups and downs and plateaus but if you stay on the program, you will enjoy success.   Commit to the process, try to be on track at least 19 days out of 20 and continue to think about why you're doing this and what you're working towards. If you haven't thought of your reward for hitting your weight loss goals, think about it now. Using these little victory bribes along the way helps a lot.    Finding a diet that is satisfying and repeatable-- day in and day out, improves success.  The following uses the concept of Daily Caloric Restriction, eating less every day.  An outline of how to break up the day's food is given below.  It is a starting point and example of what a 1300 calorie diet looks like.  You can modify the listed foods to suite your particular tastes, but pay attention to portions and protein content.   Do not skip breakfast on this plan as it will leave you hungry and lead to overeating at some point during the rest of the day.  If you can make supper the last food for the day more days of the week then not, it will help a lot.  That means that the intake during those first 10-12 hours of the day and hitting your protein/intake targets for all three meals is vital to your success and evening hunger control.     Start reading labels so you know that you're getting what you think you are and start measuring foods so you can eventually look at a portion and understand how it will provide the fuel you want.    Prepping raw veggies after you buy them (washing and putting into bags/tupperware for easy access), cooking several chicken breasts/proteins for the next 2-3 lunches and generally being able to grab and go what will keep you on target when time is short will greatly aid your success.  Prepare and plan ahead and success will follow. It really only requires a couple days weekly to optimize the access to  the right food at the right time of day.  Food delivery and stopping at fast food is a sign of reactive eating and usually will signal a stalling of your weight loss.    Read labels:  for protein portions/yogurt, protein bars etc looking for items with more than 10 grams of protein and less than 10 grams of sugar is very helpful.  Frozen meals should have at least 18 grams of protein, under 10 grams of sugar as well (typically around 300-380 calories).    Please note: if you've had previous bariatric surgery: wait 20-30 minutes before/after eating to drink your beverages to avoid early fullness/dumping syndrome/worsening malabsorption, early loss of fullness and hunger.  Start with eating your protein first, slow down your meals and chew thoroughly.          1300 calorie diet:  Think Big Breakfast, Medium Lunch, smaller dinner.  Note: it's OK to consolidate the calories/protein of the mid morning snack with breakfast and the midafternoon snack with lunch if time doesn't allow or if your don't wish to have those snacks. No snacks recommended after supper. If you're prone to late afternoon nibbling, that is a great time to get your fitness in so you can get to supper without the extra.    Breakfast goal of 300 calories-350 calories (egg, 1/3 to 1/2 cup cooked old fashioned oatmeal or steel cut oats and berries,3-4oz greek yogurt.)Glass of water and if you like coffee (black) or tea.        Mid morning Snack or part of lunch, about 2-2.5 hrs later: 100 calories (cottage cheese/string cheese/ fruit or banana) and a handful of cruchy/green veggies (cucumber/celery/green peppers/broccoli).  Small glass of water    Lunch:  300 calories (3.5-4 oz tuna with little castillo (no bread), apple, salad with drizzle of olive oil/balsamic vinegar for example)  Water    Snack:  100 calories.  4-5 oz Greek Yogurt with at least 17 grams of protein per serving.    Or Cottage cheese (lower sodium version preferable). Get this in about 2 hrs  before you plan to have dinner. Protein drinks with at least 15-20 grams of protein and less than 6 grams of sugar could be used here to hit protein goals and decrease afternoon/evening hunger.  Glass of water    Dinner:  350 calories (4 oz meat or fish with cooked veggies or salad with minimal dressing, one piece of bread).  Glass of water or unsweetened tea with lemon  Dessert: 100 calories Medium Apple or Small handful of nuts, about 2/3 of an ounce (almonds/walnuts/cashews or pistachios are ideal).   Glass of water.    Try to avoid all soda and juice (low sodium V8 ok once a day).   You can do it! Embrace the healthier you and give up the inflammatory sugary treats that accelerate disease.    Choose an activity that is fun/interesting and available to you such as  Going for a swim for 15 minutes, walking 40 minutes, elliptical 20 minutes or cycling 20 minutes as many days a week as you can.  Having a walking or workout buddy can make it even more enjoyable and keep you on track the days your motivation/energy may be lower.  If those times are too long for your fitness level, start at 10 minutes of movement and each week try to increase by 2 minutes each week.  The first 70 minutes a week (10 minutes a day only) of exercise drops the mortality rate of a sedentary person 30%!!!!  Our goal is to work up to 150 minutes or more per week of moderate to vigorous exercise  to optimize metabolism and prevent weight regain during maintenance. If time is short and your fitness allows it, high intensity interval training can be a nice way to cut the workout time in half:  warm up 2-4 minutes then 3-6 intervals of increased intensity effort (70% of max heart rate) followed by an equal amount or more of recovery before repeating, then 1-3 minutes of cooldown.     10 minutes of weight lifting can be helpful as well or using some body weight exercises like wall squats, pushups (with assistance as needed or standing/wall  "pushups), seat presses, yoga moves. At least twice weekly helps maintain a good strength to weight ratio, more days is better.  [employer] is a great resource for free video demonstrations.    If you are into strenuous weight lifting or prolonged exercise, use an online calculator for how many calories you've burned and if your exercise is lasting over 60 minutes, replace 20-30% of those calories burned immediately after exercise .  For the more limited exercise (less than 500 calories burned), there is no need to add extra food unless you notice a lot of hunger on your food journal.  Usually  Even a  calorie load after longer workouts is more than adequate.  For longer efforts, hunger will increase if you don't refuel afterwards and can get meal plan off track due to hunger, so replenish immediately after the workout to keep on the diet plan and feeling good.    Exercise example:  If you burned 1000 calories during the exercise, immediately (within 30 minutes) have a snack/replacement beverage totaling 200-300 calories and ideally have a 3:1 ratio of carbohydrate grams to protein grams to keep muscles ready for exercise the next day.  Have your next, full meal within 2-3 hours of exercise.    Tips for success:  KEEP A FOOD JOURNAL and a log of daily weights.  Pencil and paper works fine for most. Otherwise, [employer], [employer], [employer], [employer], [employer] are all good tracker apps/programs or websites for food/fitness.  Remembering to ask yourself, \"How did my nourishment affect me today\" and comparing \"good days\" to \"hungry days\" to solidify what helps your body, in your life, feel it's best while losing weight.    1.  Prepare proteins ahead of time (broil chicken breasts in bulk so you can grab and go), steel cut oats can be stored in casserole dish/bowl in the fridge for quick scoop in the morning and rewarm in microwave, make use of crock pot recipes (watch salt content).    2.  Drink a 8-12 oz glass of water " "every 2-3 hours when awake.  We often mistake hunger for thirst, especially when losing weight.    3. Remember your Reward and Motivation when things get hard.    4.  Weigh yourself every morning and record, you'll stay on track better and learn how your body loses weight. Don't worry about 1 or 2 day patterns, but when on track you'll notice good trend downward of weight over 3-4 day segments.    5. Call or use [employer] messaging if problems/concerns .    6.  Find a handful of meals/foods that keep you on track and get into a boring routine that is sustainable for you.    7.  Take a complete multivitamin just to make sure all micronutrients are adequate during weight loss.    8. If losing hair/brittle nails it often means you are not taking enough protein.  Minimum goal is 60 grams daily of protein, most people with normal kidneys do well with upwards of 100-120 grams/day of protein. Consider taking Biotin as supplement or a \"Hair and Nail\" multivitamin.  If you are hypothyroid and losing hair, see you doctor for a check up of your levels if you haven't had one recently.    9.  Getting adequate sleep is very important for starting your day properly, when we are sleep deprived, our morning appetite is suppressed and without eating an adequate breakfast, we overeat later in the day when we're tired.  Our body heals in our sleep and our mental and immune health depends on this rest.  Aim for 7-8 hours of sleep nightly if possible.  If you sleep is disturbed, perform some introspection on stressors/depression/anxiety/PTSD events or possible sleep disorders and we can trouble shoot solutions/evaulations if issues persist.    Exercise during the day, meditative breathing before bed and after waking and removing the television from the bedroom are easy ways to improve quality of sleep.      10. Relaxation.  Controlled breathing exercises can lower stress levels in the brain.  One technique is 4, 7, 8 breathing:  Place the " tip of your tongue behind your front teeth, breath in through your mouth for 4 seconds, Hold it for 7 seconds and breath out slowly, making a leaky tire noise for 8 seconds.  Repeat 4 times.  Ideally do at the start and end of your day or if feeling stressed.  It works and it's why meditation/yoga/martial arts are often very breath based activities.  There are breathing techniques for alertness as well as relaxation out there and they can be quite helpful.

## 2022-03-17 NOTE — LETTER
3/17/2022         RE: Yolanda Vee  [address]  [facility] [address]        Dear Colleague,    Thank you for referring your patient, Yolanda Vee, to the [facility] SURGERY CLINIC AND BARIATRICS CARE [place]. Please see a copy of my visit note below.    Yolanda Vee is 21 year old  female who presents for a billable video visit today.    How would you like to obtain your AVS? MyChart  If dropped from the video visit, the video invitation should be resent by: Text to cell phone: [phone]  Will anyone else be joining your video visit? No      Video Start Time: 12:46 PM    Are there any specific questions or needs that you would like addressed at your visit today? No    Provider Notes:   Outpatient Bariatric Medicine Progress Note-Structured Weight Loss   Indication: Medical bariatric therapy to precede bariatric surgery    Surgery: sleeve gastrectomy  Surgeon: OSCAR    Impression     21 year old female with There is no height or weight on file to calculate BMI. in the setting of morbid obesity which satisfies the NIH criteria for bariatric surgery. She is making progress, reports abstinence since end of January so we'll recheck levels this next month and proceed if negative. ER was still positive for THC possibly from Herminio time use at the end of January.    GERD well controlled on omeprazole and given age, sleeve gastrectomy likely best procedure if clears program. .      Comorbidities:  Patient Active Problem List   Diagnosis     CN (constipation)     Alcohol use disorder, moderate, dependence (H)     Morbid obesity (H)     Vitamin D deficiency     Tobacco use     Sinus tachycardia     Periumbilical pain     PTSD (post-traumatic stress disorder)     Nicotine use disorder     Neutrophilia     Moderate episode of recurrent major depressive disorder (H)     Marijuana use     Hypochromic anemia     Chronic nasal congestion     Chronic idiopathic constipation     Anxiety      GI bleed     History of alcoholic gastritis       Plan   Weight will need to be stable under 225 lbs prior to the 2 week pre-operative diet.  Heparin Protocol: standard  CPAP: no  Actigall: anticipate use for 6 months post op  Exercise Plan: walking/gym once cleared  Contraception Plan: depo should change to IUD if proceeding to surgery to limit obesogenic properties.  Agrees to take vitamins for Life: yes  Agrees to follow up for life: yes. Reviewed pitfalls of drugs/alcohol/nicotine should she relapse or go back to regular use after bariatric surgery. Given her ER visit at theEnd of January she states she's been abstinent since and we'll recheck THC/nicotine tests this next month and again preop. She reports abstinence and I reiterated importance of good coping/behavior skills after bariatric surgery with delay should she relapse.  Medication Management: naltrexone tolerated. Will continue until preop liquid diet and stop.  Using multivitamin for low A and iron hx.  Cleared psych.     Recheck with me in May to make sure stomach continues to feel well and on track for program completion.  .  Past Surgical History        Past Surgical History:   Procedure Laterality Date     COLONOSCOPY N/A 6/3/2021    Procedure: COLONOSCOPY;  Surgeon: Guru Gerardo Prado MD;  Location:  GI     ESOPHAGOSCOPY, GASTROSCOPY, DUODENOSCOPY (EGD), COMBINED N/A 6/3/2021    Procedure: ESOPHAGOGASTRODUODENOSCOPY (EGD);  Surgeon: Guru Gerardo Prado MD;  Location:  GI     wisdom teeth         Family History, Social History   Reviewed as documented. See time and date confirmation.    Interim History/Pre-op needs list    Initial Labs Complete and Reviewed: yes  Dietitian Visits Initiated/cleared: in process  Weight Change since initial weight: down 5 lbs  Psychologist visits initiated/cleared: cleared    Medications and Allergies      Current Outpatient Medications   Medication Sig Dispense Refill      albuterol (PROAIR HFA/PROVENTIL HFA/VENTOLIN HFA) 108 (90 Base) MCG/ACT inhaler Inhale 2 puffs into the lungs every 6 hours as needed for shortness of breath / dyspnea or wheezing 18 g 0     buPROPion (WELLBUTRIN XL) 150 MG 24 hr tablet Take 1 tablet (150 mg) by mouth every morning 30 tablet 3     escitalopram (LEXAPRO) 20 MG tablet Take 20 mg by mouth daily       ferrous fumarate (FERRETTS) 324 (106 Fe) MG TABS tablet Take 324 mg by mouth daily       ipratropium - albuterol 0.5 mg/2.5 mg/3 mL (DUONEB) 0.5-2.5 (3) MG/3ML neb solution Inhale 3 mLs into the lungs every 6 hours        naltrexone (DEPADE/REVIA) 50 MG tablet Start half a tablet daily with supper for 7 days then increase, if tolerated to half a tablet with breakfast and supper. (Patient taking differently: Take 50 mg by mouth daily Start half a tablet daily with supper for 7 days then increase, if tolerated to half a tablet with breakfast and supper.) 90 tablet 0     omeprazole (PRILOSEC) 20 MG DR capsule Take 1 capsule (20 mg) by mouth daily 30 capsule 1     prazosin (MINIPRESS) 1 MG capsule TAKE 1 CAPSULE BY MOUTH EVERY NIGHT AT BEDTIME. AFTER 2-3 DAYS, INCREASE TO 2 CAPSULE EVERY NIGHT AT BEDTIME (Patient taking differently: Take 1 mg by mouth At Bedtime ) 180 capsule 1     sucralfate (CARAFATE) 1 GM tablet Take 1 tablet (1 g) by mouth 4 times daily (before meals and nightly) 180 tablet 3     Vitamin D, Cholecalciferol, 25 MCG (1000 UT) TABS Take 1,000 Units by mouth every morning        Allergies: Ibuprofen and Hydrocodone-acetaminophen    Habits   Tobacco Use: quit in January  Alcohol Use: at parties/celebrations  NSAIDS: allergic  Caffeine: none. Off [employer].  Lost job after got COVID. 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   SLEEP: unsure.   CPAP: n/a  PHYSICAL ACTIVITY PATTERNS:  Cardiovascular: with warmer weather, walking more.   Strength Training: planning to sign up for gym   dancing  Dietary History   Reviewed proper bariatric method  again today.  The patient has been working with our bariatric dieticians and has started process. .    As far as distracted eating/grazing, getting 3 meals daily, avoiding empty calories and optimizing their protein intake, she's working on experience w/ protein. Using 2 shakes daily and lots of water..    Review of Systems     GENERAL/CONSTITUTIONAL:  Fatigue: improved after covid  HEENT:  Dental Pain: no  Trouble Swallowing: no  CARDIOVASCULAR:  Chest pain with exertion: no  Syncope: no.  PULMONARY:  CPAP Use: n/a  Asthma Controlled: n/a  GASTROINTESTINAL:  GERD/Heartburn: controlled w/ omeprazole  Gallbladder: intact  UROLOGIC:  Urinary Symptoms: none.  Menses irregular. Depo shots are her preference.  NEUROLOGIC:  Headaches: n/a  Paresthesias: n/a  PSYCHIATRIC:  Moods: stable.  MUSCULOSKELETAL/RHEUMATOLOGIC  Arthralgias: n/a  Myalgias: n/a  ENDOCRINE:  Monitoring Blood Sugars: no  Sugars Well Controlled: yes  DERMATOLOGIC:  Rashes: no  Mom smokes so can be hard to abstain.  Naltrexone tolerated.   Physical Exam   Vitals: There were no vitals taken for this visit.  There is no height or weight on file to calculate BMI.  GENERAL: appears her stated age. Sleepy voice.  HEART:   LUNGS: no cough  ABDOMEN:  Neuro: normal facies/speech.  SKIN:no obvious pallor on video. No jaundice    Counseling     We discussed the National Weight Control Registry and Healthy Weight Maintenance Strategies.   We discussed ways to optimize her metabolism.  We discussed specific exercise goals and dietary habits conducive to a healthy weight.  We discussed the importance of lifelong vitamin supplementation and the potential medical complications of vitamin non-compliance.  We discussed the importance of restorative sleep and stress management in maintaining a healthy weight.  I reminded her to avoid alcohol for 1 year post-operatively, to avoid NSAIDS for life unless specifically indicated and used with a concomitant PPI, to avoid  caffeine in excess, and explained the importance of lifelong tobacco abstinence.  Medical Decision Makin minutes spent on the date of the encounter doing chart review, history and exam, documentation and further activities per the note      Klaus Amezquita MD  [facility] Bariatric Care and Surgery [facility]  3/17/2022  12:46 PM      Video-Visit Details    Type of service:  Video Visit    Video End Time (time video stopped): 1:10 PM  Originating Location (pt. Location): Home    Distant Location (provider location):  [facility] SURGERY CLINIC AND BARIATRICS [facility]     Platform used for Video Visit: Rachid      Again, thank you for allowing me to participate in the care of your patient.        Sincerely,        Klaus Amezquita MD

## 2022-03-17 NOTE — PROGRESS NOTES
Yolanda Vee is 21 year old  female who presents for a billable video visit today.    How would you like to obtain your AVS? MyChart  If dropped from the video visit, the video invitation should be resent by: Text to cell phone: [phone]  Will anyone else be joining your video visit? No      Video Start Time: 12:46 PM    Are there any specific questions or needs that you would like addressed at your visit today? No    Provider Notes:   Outpatient Bariatric Medicine Progress Note-Structured Weight Loss   Indication: Medical bariatric therapy to precede bariatric surgery    Surgery: sleeve gastrectomy  Surgeon: OSCAR    Impression     21 year old female with There is no height or weight on file to calculate BMI. in the setting of morbid obesity which satisfies the NIH criteria for bariatric surgery. She is making progress, reports abstinence since end of January so we'll recheck levels this next month and proceed if negative. ER was still positive for THC possibly from Herminio time use at the end of January.    GERD well controlled on omeprazole and given age, sleeve gastrectomy likely best procedure if clears program. .      Comorbidities:  Patient Active Problem List   Diagnosis     CN (constipation)     Alcohol use disorder, moderate, dependence (H)     Morbid obesity (H)     Vitamin D deficiency     Tobacco use     Sinus tachycardia     Periumbilical pain     PTSD (post-traumatic stress disorder)     Nicotine use disorder     Neutrophilia     Moderate episode of recurrent major depressive disorder (H)     Marijuana use     Hypochromic anemia     Chronic nasal congestion     Chronic idiopathic constipation     Anxiety     GI bleed     History of alcoholic gastritis       Plan   Weight will need to be stable under 225 lbs prior to the 2 week pre-operative diet.  Heparin Protocol: standard  CPAP: no  Actigall: anticipate use for 6 months post op  Exercise Plan: walking/gym once cleared  Contraception  Plan: depo should change to IUD if proceeding to surgery to limit obesogenic properties.  Agrees to take vitamins for Life: yes  Agrees to follow up for life: yes. Reviewed pitfalls of drugs/alcohol/nicotine should she relapse or go back to regular use after bariatric surgery. Given her ER visit at theEnd of January she states she's been abstinent since and we'll recheck THC/nicotine tests this next month and again preop. She reports abstinence and I reiterated importance of good coping/behavior skills after bariatric surgery with delay should she relapse.  Medication Management: naltrexone tolerated. Will continue until preop liquid diet and stop.  Using multivitamin for low A and iron hx.  Cleared psych.     Recheck with me in May to make sure stomach continues to feel well and on track for program completion.  .  Past Surgical History        Past Surgical History:   Procedure Laterality Date     COLONOSCOPY N/A 6/3/2021    Procedure: COLONOSCOPY;  Surgeon: Guru Gerardo Prado MD;  Location:  GI     ESOPHAGOSCOPY, GASTROSCOPY, DUODENOSCOPY (EGD), COMBINED N/A 6/3/2021    Procedure: ESOPHAGOGASTRODUODENOSCOPY (EGD);  Surgeon: Guru Gerardo Prado MD;  Location:  GI     wisdom teeth         Family History, Social History   Reviewed as documented. See time and date confirmation.    Interim History/Pre-op needs list    Initial Labs Complete and Reviewed: yes  Dietitian Visits Initiated/cleared: in process  Weight Change since initial weight: down 5 lbs  Psychologist visits initiated/cleared: cleared    Medications and Allergies      Current Outpatient Medications   Medication Sig Dispense Refill     albuterol (PROAIR HFA/PROVENTIL HFA/VENTOLIN HFA) 108 (90 Base) MCG/ACT inhaler Inhale 2 puffs into the lungs every 6 hours as needed for shortness of breath / dyspnea or wheezing 18 g 0     buPROPion (WELLBUTRIN XL) 150 MG 24 hr tablet Take 1 tablet (150 mg) by mouth every morning 30  tablet 3     escitalopram (LEXAPRO) 20 MG tablet Take 20 mg by mouth daily       ferrous fumarate (FERRETTS) 324 (106 Fe) MG TABS tablet Take 324 mg by mouth daily       ipratropium - albuterol 0.5 mg/2.5 mg/3 mL (DUONEB) 0.5-2.5 (3) MG/3ML neb solution Inhale 3 mLs into the lungs every 6 hours        naltrexone (DEPADE/REVIA) 50 MG tablet Start half a tablet daily with supper for 7 days then increase, if tolerated to half a tablet with breakfast and supper. (Patient taking differently: Take 50 mg by mouth daily Start half a tablet daily with supper for 7 days then increase, if tolerated to half a tablet with breakfast and supper.) 90 tablet 0     omeprazole (PRILOSEC) 20 MG DR capsule Take 1 capsule (20 mg) by mouth daily 30 capsule 1     prazosin (MINIPRESS) 1 MG capsule TAKE 1 CAPSULE BY MOUTH EVERY NIGHT AT BEDTIME. AFTER 2-3 DAYS, INCREASE TO 2 CAPSULE EVERY NIGHT AT BEDTIME (Patient taking differently: Take 1 mg by mouth At Bedtime ) 180 capsule 1     sucralfate (CARAFATE) 1 GM tablet Take 1 tablet (1 g) by mouth 4 times daily (before meals and nightly) 180 tablet 3     Vitamin D, Cholecalciferol, 25 MCG (1000 UT) TABS Take 1,000 Units by mouth every morning        Allergies: Ibuprofen and Hydrocodone-acetaminophen    Habits   Tobacco Use: quit in January  Alcohol Use: at parties/celebrations  NSAIDS: allergic  Caffeine: none. Off [employer].  Lost job after got COVID. 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   SLEEP: unsure.   CPAP: n/a  PHYSICAL ACTIVITY PATTERNS:  Cardiovascular: with warmer weather, walking more.   Strength Training: planning to sign up for gym   dancing  Dietary History   Reviewed proper bariatric method again today.  The patient has been working with our bariatric dieticians and has started process. .    As far as distracted eating/grazing, getting 3 meals daily, avoiding empty calories and optimizing their protein intake, she's working on experience w/ protein. Using 2 shakes daily  and lots of water..    Review of Systems     GENERAL/CONSTITUTIONAL:  Fatigue: improved after covid  HEENT:  Dental Pain: no  Trouble Swallowing: no  CARDIOVASCULAR:  Chest pain with exertion: no  Syncope: no.  PULMONARY:  CPAP Use: n/a  Asthma Controlled: n/a  GASTROINTESTINAL:  GERD/Heartburn: controlled w/ omeprazole  Gallbladder: intact  UROLOGIC:  Urinary Symptoms: none.  Menses irregular. Depo shots are her preference.  NEUROLOGIC:  Headaches: n/a  Paresthesias: n/a  PSYCHIATRIC:  Moods: stable.  MUSCULOSKELETAL/RHEUMATOLOGIC  Arthralgias: n/a  Myalgias: n/a  ENDOCRINE:  Monitoring Blood Sugars: no  Sugars Well Controlled: yes  DERMATOLOGIC:  Rashes: no  Mom smokes so can be hard to abstain.  Naltrexone tolerated.   Physical Exam   Vitals: There were no vitals taken for this visit.  There is no height or weight on file to calculate BMI.  GENERAL: appears her stated age. Sleepy voice.  HEART:   LUNGS: no cough  ABDOMEN:  Neuro: normal facies/speech.  SKIN:no obvious pallor on video. No jaundice    Counseling     We discussed the National Weight Control Registry and Healthy Weight Maintenance Strategies.   We discussed ways to optimize her metabolism.  We discussed specific exercise goals and dietary habits conducive to a healthy weight.  We discussed the importance of lifelong vitamin supplementation and the potential medical complications of vitamin non-compliance.  We discussed the importance of restorative sleep and stress management in maintaining a healthy weight.  I reminded her to avoid alcohol for 1 year post-operatively, to avoid NSAIDS for life unless specifically indicated and used with a concomitant PPI, to avoid caffeine in excess, and explained the importance of lifelong tobacco abstinence.  Medical Decision Makin minutes spent on the date of the encounter doing chart review, history and exam, documentation and further activities per the note      Klaus Amezquita MD  [facility]  3/17/2022  12:46 PM      Video-Visit Details    Type of service:  Video Visit    Video End Time (time video stopped): 1:10 PM  Originating Location (pt. Location): Home    Distant Location (provider location):  [facility] SURGERY CLINIC AND BARIATRICS CARE [place]     Platform used for Video Visit: [employer]

## 2022-03-24 ENCOUNTER — DOCUMENTATION ONLY (OUTPATIENT)
Dept: SURGERY | Facility: CLINIC | Age: 22
End: 2022-03-24
Payer: COMMERCIAL

## 2022-03-24 NOTE — PROGRESS NOTES
I have submitted a Referral to  for this patient to participate in the phone program.  At his visit in November, Dr. Amezquita stated that he wanted to work with this patient for 6-9 months before he felt she would be ready for surgery

## 2022-03-30 DIAGNOSIS — F64.0 GENDER DYSPHORIA IN ADULT: Primary | ICD-10-CM

## 2022-04-18 ENCOUNTER — VIRTUAL VISIT (OUTPATIENT)
Dept: GASTROENTEROLOGY | Facility: CLINIC | Age: 22
End: 2022-04-18
Payer: COMMERCIAL

## 2022-04-18 VITALS — WEIGHT: 244 LBS | HEIGHT: 59 IN | BODY MASS INDEX: 49.19 KG/M2

## 2022-04-18 DIAGNOSIS — R19.7 DIARRHEA, UNSPECIFIED TYPE: Primary | ICD-10-CM

## 2022-04-18 DIAGNOSIS — K21.00 GASTROESOPHAGEAL REFLUX DISEASE WITH ESOPHAGITIS WITHOUT HEMORRHAGE: ICD-10-CM

## 2022-04-18 DIAGNOSIS — R11.0 NAUSEA: ICD-10-CM

## 2022-04-18 PROCEDURE — 99214 OFFICE O/P EST MOD 30 MIN: CPT | Mod: GT | Performed by: PHYSICIAN ASSISTANT

## 2022-04-18 RX ORDER — LANSOPRAZOLE 30 MG/1
30 CAPSULE, DELAYED RELEASE ORAL DAILY
Qty: 90 CAPSULE | Refills: 3 | Status: SHIPPED | OUTPATIENT
Start: 2022-04-18 | End: 2022-06-23

## 2022-04-18 ASSESSMENT — PAIN SCALES - GENERAL: PAINLEVEL: SEVERE PAIN (6)

## 2022-04-18 NOTE — PROGRESS NOTES
"Yolanda Vee is a 21 year old female who is being evaluated via a billable video visit.      The patient has been notified of following:     \"This video visit will be conducted via a call between you and your physician/provider. We have found that certain health care needs can be provided without the need for an in-person physical exam.  This service lets us provide the care you need with a video conversation.  If a prescription is necessary we can send it directly to your pharmacy.  If lab work is needed we can place an order for that and you can then stop by our lab to have the test done at a later time.    If during the course of the call the physician/provider feels a video visit is not appropriate, you will not be charged for this service.\"     Patient confirmed that they are in [place] for today's visit Yes    Video-Visit Details  Type of service:  Video Visit    Video Start Time: 9:14  Video End Time:  9:34    Originating Location (pt. Location): [place]    Distant Location (provider location):  [facility] GASTROENTEROLOGY CLINIC [place]     Platform used: [employer] System      GASTROENTEROLOGY Video Follow up visit    CC/REFERRING MD:    Litzy Corona    HISTORY OF PRESENT ILLNESS:    Yolanda Vee is a 21 year old female who is being evaluated via a billable video visit. She has previously been seen by Dr. Linares (last visit 7/2021) and this is my first encounter with the patient.    She was previously seen for hematochezia and hematemesis. She had EGD with grade C esophagitis (6/2021), unremarkable colonoscopy. She has been on PPI (BID dosing did not improve symptoms). She also had hydrogen breath test notable for elevated methane and hydrogen. She was treated with rifaximin (I do not see that she was prescribed neomycin) without improvement in symptoms.      She has been seen in the ED for her symptoms, most recently 1/26/2022. At that time she had a CTA with underdistension vs. Mild " "colonic wall thickening. She was recommended to follow-up with GI for repeat EGD. Urine tox screen positive for opioids and cannabinoids.     Today she reports the following symptoms:   She has lower abdominal pelvic pain, and can have low back pain. She states this is constant.     She had pain after eating yesterday. She is nauseated, no vomiting. She reports GERD symptoms \"24/7\". She is taking omeprazole 20 mg once a day, she is taking carafate once a day. She does not eat spicy food. She reports dysphagia with steak or lettuce. Can have hematemesis.     Bowel pattern: usually every day. Diarrhea every day, otherwise constipated. She reports that rifaximin did not help. Can have blood in the stool. She has not had this recently, but she notes that she does not look in the stool.     Weight is stable. She thinks the GERD is getting worse.     No THC- some alcohol use at holidays and she has cut back. No advil or ibuprofen.     Diet recall:   Protein shake in the morning w/ banana   Can skip lunch or go out to eat  Pork chops, steak   Will drink Gatorade 0 throughout the day, water, 1 can of diet soda every other day     I have reviewed and updated the patient's Past Medical History, Social History, Family History and Medication List.    Meds/  Campral  for etoh use d/o    fam hx/  No DM    ALLERGIES  Ibuprofen and Hydrocodone-acetaminophen    PE/  Gen: pleasant NAD  HEENT: hearing intact  Psych: AOx3, normal mood and affect  Patient is laying in bed for today's visit.     PERTINENT STUDIES/consults have been reviewed.  TSH wnl 11/2020 1/19/2021 Hematology consult: elevated WCC likely reactive. Normal PT/PTT/fibrinogen.    Breath test 10/2021:  Positive hydrogen breath test consistent with small intestinal bacterial overgrowth. Very high methane production at baseline and throughout the test (though it does not increase/reach threshold during the test period). This can be seen with constipation and may suggest " intestinal methanogen overgrowth, though data on this is limited. Consideration could be given to treatment with neomycin and rifaximin. (ACG Clinical Guidelines: Small Intestinal Bacterial Overgrowth; Perry et al. The American Journal of Gastroenterology: February 2020 - Volume 115 - Issue 2 - p 165-178)     Impression/Recommendations:    Yolanda Vee is a 21 year old female who presents for follow up of GI symptoms.  Initial consult for hematochezia and hematemesis.  Endoscopic evaluation revealed normal colon, grade C esophagitis. Most recent CTA was 1/2022 with concern for underdistension of the colon vs. Colitis. She also was positive for SIBO w/ high methane, no improvement with rifaximin.     Would recommend evaluation per last visit with Dr. Linares for chronic infection w ova and parasite, fecal calprotectin, celiac serologies. Will also check giardia and cryptosporidium. If elevated fecal calprotectin- will plan for EGD and colonoscopy. If normal fecal calprotectin- will just plan for EGD to evaluate for healing of grade C esophagitis.     She does not find omeprazole helpful- will plan for trial of alternative PPI with lansoprazole. If no improvement can try famotidine. May also have delayed gastric emptying contributing to symptoms. She denies THC or opiate use today, can plan for GES to see if this is contributing.     Future considerations include pH impedence, re-trial of SIBO treatment (with neomycin).   -- stool studies (fecal calprotecin, O&P, cryptosporidium/giardia)  -- labs (celiac tests)   -- depending on labs and stool tests- will do EGD vs. EGD + colonoscopy  -- gastric emptying study  -- start lansoprazole 30 mg a day on an empty stomach (30-60 minutes before eating or drinking)  -- ok to take carafate up to 3 times a day  -- track symptoms     RTC 1-2 month     Appointment duration: 38 minutes    Thank you for this consultation.  It was a pleasure to participate in the care of this  patient; please contact us with any further questions.        Diana Iyer PA-C  Division of Gastroenterology, Hepatology & Nutrition  [facility]

## 2022-04-18 NOTE — NURSING NOTE
Kem is a 21 year old who is being evaluated via a billable video visit.      How would you like to obtain your AVS? Mail a copy  If the video visit is dropped, the invitation should be resent by: Text to cell phone: [phone]  Will anyone else be joining your video visit? No      Video Start Time: 2:49 PM  Video-Visit Details    Type of service:  Video Visit    Video End Time:2:49 PM    Originating Location (pt. Location): Home    Patient in [place] for her video visit.    Distant Location (provider location):  [facility] GASTROENTEROLOGY CLINIC [place]

## 2022-04-18 NOTE — LETTER
"    4/18/2022         RE: Yolanda Vee  [address]  [facility] [address]        Dear Colleague,    Thank you for referring your patient, Yolanda Vee, to the [facility] [place]. Please see a copy of my visit note below.      GASTROENTEROLOGY Video Follow up visit    CC/REFERRING MD:    Litzy Corona    HISTORY OF PRESENT ILLNESS:    Yolanda Vee is a 21 year old female who is being evaluated via a billable video visit. She has previously been seen by Dr. Linares (last visit 7/2021) and this is my first encounter with the patient.    She was previously seen for hematochezia and hematemesis. She had EGD with grade C esophagitis (6/2021), unremarkable colonoscopy. She has been on PPI (BID dosing did not improve symptoms). She also had hydrogen breath test notable for elevated methane and hydrogen. She was treated with rifaximin (I do not see that she was prescribed neomycin) without improvement in symptoms.      She has been seen in the ED for her symptoms, most recently 1/26/2022. At that time she had a CTA with underdistension vs. Mild colonic wall thickening. She was recommended to follow-up with GI for repeat EGD. Urine tox screen positive for opioids and cannabinoids.     Today she reports the following symptoms:   She has lower abdominal pelvic pain, and can have low back pain. She states this is constant.     She had pain after eating yesterday. She is nauseated, no vomiting. She reports GERD symptoms \"24/7\". She is taking omeprazole 20 mg once a day, she is taking carafate once a day. She does not eat spicy food. She reports dysphagia with steak or lettuce. Can have hematemesis.     Bowel pattern: usually every day. Diarrhea every day, otherwise constipated. She reports that rifaximin did not help. Can have blood in the stool. She has not had this recently, but she notes that she does not look in the stool.     Weight is stable. She thinks the " GERD is getting worse.     No THC- some alcohol use at holidays and she has cut back. No advil or ibuprofen.     Diet recall:   Protein shake in the morning w/ banana   Can skip lunch or go out to eat  Pork chops, steak   Will drink Gatorade 0 throughout the day, water, 1 can of diet soda every other day     I have reviewed and updated the patient's Past Medical History, Social History, Family History and Medication List.    Meds/  Campral  for etoh use d/o    fam hx/  No DM    ALLERGIES  Ibuprofen and Hydrocodone-acetaminophen    PE/  Gen: pleasant NAD  HEENT: hearing intact  Psych: AOx3, normal mood and affect  Patient is laying in bed for today's visit.     PERTINENT STUDIES/consults have been reviewed.  TSH wnl 11/2020 1/19/2021 Hematology consult: elevated WCC likely reactive. Normal PT/PTT/fibrinogen.    Breath test 10/2021:  Positive hydrogen breath test consistent with small intestinal bacterial overgrowth. Very high methane production at baseline and throughout the test (though it does not increase/reach threshold during the test period). This can be seen with constipation and may suggest intestinal methanogen overgrowth, though data on this is limited. Consideration could be given to treatment with neomycin and rifaximin. (ACG Clinical Guidelines: Small Intestinal Bacterial Overgrowth; Perry et al. The American Journal of Gastroenterology: February 2020 - Volume 115 - Issue 2 - p 165-178)     Impression/Recommendations:    Yolanda Vee is a 21 year old female who presents for follow up of GI symptoms.  Initial consult for hematochezia and hematemesis.  Endoscopic evaluation revealed normal colon, grade C esophagitis. Most recent CTA was 1/2022 with concern for underdistension of the colon vs. Colitis. She also was positive for SIBO w/ high methane, no improvement with rifaximin.     Would recommend evaluation per last visit with Dr. Linares for chronic infection w ova and parasite, fecal  calprotectin, celiac serologies. Will also check giardia and cryptosporidium. If elevated fecal calprotectin- will plan for EGD and colonoscopy. If normal fecal calprotectin- will just plan for EGD to evaluate for healing of grade C esophagitis.     She does not find omeprazole helpful- will plan for trial of alternative PPI with lansoprazole. If no improvement can try famotidine. May also have delayed gastric emptying contributing to symptoms. She denies THC or opiate use today, can plan for GES to see if this is contributing.     Future considerations include pH impedence, re-trial of SIBO treatment (with neomycin).   -- stool studies (fecal calprotecin, O&P, cryptosporidium/giardia)  -- labs (celiac tests)   -- depending on labs and stool tests- will do EGD vs. EGD + colonoscopy  -- gastric emptying study  -- start lansoprazole 30 mg a day on an empty stomach (30-60 minutes before eating or drinking)  -- ok to take carafate up to 3 times a day  -- track symptoms     RTC 1-2 month     Appointment duration: 38 minutes    Thank you for this consultation.  It was a pleasure to participate in the care of this patient; please contact us with any further questions.        Diana Iyer PA-C  Division of Gastroenterology, Hepatology & Nutrition  [facility]

## 2022-04-18 NOTE — PATIENT INSTRUCTIONS
It was a pleasure taking care of you today.  I've included a brief summary of our discussion and care plan from today's visit below.  Please review this information with your primary care provider.  ______________________________________________________________________    My recommendations are summarized as follows:    -- stool studies (fecal calprotecin, O&P, cryptosporidium/giardia)  -- labs (celiac tests)   -- depending on labs and stool tests- will do EGD vs. EGD + colonoscopy  -- gastric emptying study  -- start lansoprazole 30 mg a day on an empty stomach (30-60 minutes before eating or drinking)  -- ok to take carafate up to 3 times a day  -- track symptoms   -- please see scheduling information provided below     Return to GI Clinic in 1-2 months (with Dr. Linares or I) to review your progress.    ______________________________________________________________________    How do I schedule labs, imaging studies, or procedures that were ordered in clinic today?     Labs: To schedule lab appointment at the [facility], use my chart or call [phone]. If you have a [place] lab closer to home where you are regularly seen you can give them a call.     Procedures: If a colonoscopy, upper endoscopy, breath test, esophageal manometry, or pH impedence was ordered today, our endoscopy team will call you to schedule this. If you have not heard from our endoscopy team within a week, please call [phone] to schedule.     Imaging Studies: If you were scheduled for a CT scan, X-ray, MRI, ultrasound, HIDA scan or other imaging study, please call [phone] to have this scheduled.     Referral: If a referral to another specialty was ordered, expect a phone call or follow instructions above. If you have not heard from anyone regarding your referral in a week, please call our clinic to check the status.     Who do I call with any questions after my visit?  Please be in touch if there are any further  questions that arise following today's visit.  There are multiple ways to contact your gastroenterology care team.      During business hours, you may reach a Gastroenterology nurse at [phone]    To schedule or reschedule an appointment, please call [phone].     You can always send a secure message through Fivejack.  Fivejack messages are answered by your nurse or doctor typically within 24 hours.  Please allow extra time on weekends and holidays.      For urgent/emergent questions after business hours, you may reach the on-call GI Fellow by contacting the [facility]  at [phone].     How will I get the results of any tests ordered?    You will receive all of your results.  If you have signed up for [employer], any tests ordered at your visit will be available to you after your provider reviews them.  Typically this takes 1-2 weeks.  If there are urgent results that require a change in your care plan, your provider or nurse will call you to discuss the next steps.      What is Fivejack?  Fivejack is a secure way for you to access all of your healthcare records from the [facility].  It is a web based computer program, so you can sign on to it from any location.  It also allows you to send secure messages to your care team.  I recommend signing up for Fivejack access if you have not already done so and are comfortable with using a computer.      How to I schedule a follow-up visit?  If you did not schedule a follow-up visit today, please call [phone] to schedule a follow-up office visit.      Sincerely,    Diana Iyer PA-C  Division of Gastroenterology, Hepatology & Nutrition  [facility]

## 2022-04-18 NOTE — Clinical Note
Malika- would you be able to check on this patient in a couple weeks to make sure she does the plan?

## 2022-04-20 ENCOUNTER — LAB (OUTPATIENT)
Dept: LAB | Facility: CLINIC | Age: 22
End: 2022-04-20

## 2022-04-20 DIAGNOSIS — R19.7 DIARRHEA, UNSPECIFIED TYPE: ICD-10-CM

## 2022-04-20 PROCEDURE — 86364 TISS TRNSGLTMNASE EA IG CLAS: CPT

## 2022-04-20 PROCEDURE — 86258 DGP ANTIBODY EACH IG CLASS: CPT

## 2022-04-20 PROCEDURE — 36415 COLL VENOUS BLD VENIPUNCTURE: CPT

## 2022-04-21 LAB
GLIADIN IGA SER-ACNC: 1.3 U/ML
GLIADIN IGG SER-ACNC: <0.6 U/ML
TTG IGA SER-ACNC: 0.6 U/ML
TTG IGG SER-ACNC: 1 U/ML

## 2022-04-24 PROCEDURE — 87177 OVA AND PARASITES SMEARS: CPT

## 2022-04-24 PROCEDURE — 87328 CRYPTOSPORIDIUM AG IA: CPT

## 2022-04-24 PROCEDURE — 83993 ASSAY FOR CALPROTECTIN FECAL: CPT

## 2022-04-24 PROCEDURE — 87329 GIARDIA AG IA: CPT

## 2022-04-24 PROCEDURE — 87209 SMEAR COMPLEX STAIN: CPT

## 2022-04-25 ENCOUNTER — LAB (OUTPATIENT)
Dept: LAB | Facility: CLINIC | Age: 22
End: 2022-04-25
Payer: COMMERCIAL

## 2022-04-25 ENCOUNTER — PATIENT OUTREACH (OUTPATIENT)
Dept: GASTROENTEROLOGY | Facility: CLINIC | Age: 22
End: 2022-04-25
Payer: COMMERCIAL

## 2022-04-25 DIAGNOSIS — R19.7 DIARRHEA, UNSPECIFIED TYPE: ICD-10-CM

## 2022-04-25 NOTE — PROGRESS NOTES
Call placed to Kem to remind her to schedule the GES.   Provided the number to call to schedule.   Kem stated that she will call to schedule.

## 2022-04-26 LAB
C PARVUM AG STL QL IA: NEGATIVE
G LAMBLIA AG STL QL IA: NEGATIVE
O+P STL MICRO: NEGATIVE

## 2022-04-27 LAB — CALPROTECTIN STL-MCNT: 92.3 MG/KG (ref 0–49.9)

## 2022-05-06 ENCOUNTER — VIRTUAL VISIT (OUTPATIENT)
Dept: FAMILY MEDICINE | Facility: CLINIC | Age: 22
End: 2022-05-06
Payer: COMMERCIAL

## 2022-05-06 DIAGNOSIS — T78.40XA ALLERGIC REACTION, INITIAL ENCOUNTER: Primary | ICD-10-CM

## 2022-05-06 PROCEDURE — 99213 OFFICE O/P EST LOW 20 MIN: CPT | Mod: TEL | Performed by: NURSE PRACTITIONER

## 2022-05-06 NOTE — PROGRESS NOTES
"Kem is a 21 year old who is being evaluated via a billable telephone visit.      What phone number would you like to be contacted at? [phone]  How would you like to obtain your AVS? MyChart    Assessment & Plan     Allergic reaction, initial encounter  Symptoms are consistent with an allergic dermatitis. She has had a similar type of reaction previously with a particular soap. I advised discontinuing use and washing any pillow cases, clothing etc. She may start zyrtec or benadryl and is advised to follow up with any worsening symptoms.       BMI:   Estimated body mass index is 49.28 kg/m  as calculated from the following:    Height as of 4/18/22: 1.499 m (4' 11\").    Weight as of 4/18/22: 110.7 kg (244 lb).     No follow-ups on file.    ABA Jovel CNP  [facility]   Kem is a 21 year old who presents for the following health issues: allergic reaction    HPI   Patient is here today with concerns of a rash on her face. She has always had skin sensitivity with certain washes/lotions. She has been using this wash again which has given her a similar type of reaction previously. She Noticed rash appear on her face around her eyes a week or so ago. It is itchy and almost appears like a rug burn. No eye swelling, fevers, chills etc. It is staying stable  And not worsening     Review of Systems     Review of systems isas stated in HPI, and the remainder of the 10 system review is otherwise unremarkable.            Objective           Vitals:  No vitals were obtained today due to virtual visit.    Physical Exam   healthy, alert and no distress  PSYCH: Alert and oriented times 3; coherent speech, normal   rate and volume, able to articulate logical thoughts, able   to abstract reason, no tangential thoughts, no hallucinations   or delusions  Her affect is normal and pleasant  RESP: No cough, no audible wheezing, able to talk in full sentences  Remainder of exam unable to be " completed due to telephone visits            Phone call duration: 6 minutes

## 2022-05-06 NOTE — PROGRESS NOTES
{PROVIDER CHARTING PREFERENCE:388128}    Subjective   Kem is a 21 year old who presents for the following health issues    HPI     {SUPERLIST (Optional):585943}  {additonal problems for provider to add (Optional):977810}    Review of Systems   Review of Systems - pertinent positives noted in HPI, otherwise ROS is negative.        Objective    There were no vitals taken for this visit.  There is no height or weight on file to calculate BMI.  Physical Exam     General Appearance:  Alert, cooperative, no distress, appears stated age   HEENT:  Normal.  No acute findings.   Neck: Supple, symmetrical, no adenopathy.   Lungs:   Clear to auscultation bilaterally, respirations unlabored.  No expiratory wheeze or inspiratory crackles noted.   Heart:  Regular rate and rhythm, S1, S2 normal, no murmur, rub or gallop   Abdomen:   Soft, non-tender, positive bowel sounds, no masses, no organomegaly   Extremities: Extremities normal.  No cyanosis or edema   Skin: Warm, dry.  Skin color, texture, turgor normal, no rashes or lesions   Neurologic: Nonfocal.           {Diagnostic Test Results (Optional):226607}    {AMBULATORY ATTESTATION (Optional):378949}

## 2022-05-10 ENCOUNTER — PATIENT OUTREACH (OUTPATIENT)
Dept: GASTROENTEROLOGY | Facility: CLINIC | Age: 22
End: 2022-05-10
Payer: COMMERCIAL

## 2022-05-10 NOTE — PROGRESS NOTES
Spoke with Kem due to the GES being cancelled last week due to the camera being down.   Kem is waiting on her mom's work scedule to call to make the appointment.

## 2022-05-15 DIAGNOSIS — K21.00 GASTROESOPHAGEAL REFLUX DISEASE WITH ESOPHAGITIS WITHOUT HEMORRHAGE: Primary | ICD-10-CM

## 2022-05-15 DIAGNOSIS — K52.9 COLITIS: ICD-10-CM

## 2022-05-20 ENCOUNTER — TELEPHONE (OUTPATIENT)
Dept: GASTROENTEROLOGY | Facility: CLINIC | Age: 22
End: 2022-05-20
Payer: COMMERCIAL

## 2022-05-20 ENCOUNTER — HOSPITAL ENCOUNTER (OUTPATIENT)
Facility: AMBULATORY SURGERY CENTER | Age: 22
End: 2022-05-20
Attending: INTERNAL MEDICINE
Payer: COMMERCIAL

## 2022-05-20 NOTE — TELEPHONE ENCOUNTER
Screening Questions  BlueKIND OF PREP RedLOCATION [review exclusion criteria] GreenSEDATION TYPE  1. Have you had a positive covid test in the last 90 days? N     2. Do you have a legal guardian or medical Power of ? N  Are you able to give consent for your medical care? Y (Sedation review/consideration needed)    3. Are you active on mychart? Y    4. What insurance is in the chart? HP     3.   Ordering/Referring Provider: Diana Iyer PA-C    4. BMI 48.5 [BMI OVER 40-EXTENDED PREP]  If greater than 40 review exclusion criteria [PAC APPT IF @ [facility]]        5.  Respiratory Screening :  [If yes to any of the following HOSPITAL setting only]     Do you use daily home oxygen? N    Do you have mod to severe Obstructive Sleep Apnea? N  [OKAY @ [facility] SH PH RI]   Do you have Pulmonary Hypertension? N     Do you have UNCONTROLLED asthma? N        6.   Have you had a heart or lung transplant? N      7.   Are you currently on dialysis? N [ If yes, G-PREP & HOSPITAL setting only]     8.   Do you have chronic kidney disease? N [ If yes, G-PREP ]    9.   Have you had a stroke or Transient ischemic attack (TIA - aka  mini stroke ) within 6 months?  N (If yes, please review exclusion criteria)    10.   In the past 6 months, have you had any heart related issues including cardiomyopathy or heart attack? N           If yes, did it require cardiac stenting or other implantable device? NA      11.   Do you have any implantable devices in your body (pacemaker, defib, LVAD)? N (If yes, please review exclusion criteria)    12.   Do you take nitroglycerin? N           If yes, how often? N  (if yes, HOSPITAL setting ONLY)    13.   Are you currently taking any blood thinners? N           [IF YES, INFORM PATIENT TO FOLLOW UP W/ ORDERING PROVIDER FOR BRIDGING INSTRUCTIONS]     14.   Do you have a diagnosis of diabetes? N   [ If yes, G-PREP ]    15.   [FEMALES] Are you currently pregnant? N    If yes, how many weeks?  NA    16.   Are you taking any prescription pain medications on a routine schedule?  N  [ If yes, EXTENDED PREP.] [If yes, MAC]    17.   Do you have any chemical dependencies such as alcohol, street drugs, or methadone?  N [If yes, MAC]    18.   Do you have any history of post-traumatic stress syndrome, severe anxiety or history of psychosis?  N  [If yes, MAC]    19.   Do you transfer independently?  Y    20.  On a regular basis do you go 3-5 days between bowel movements? N    [ If yes, EXTENDED PREP.]    21.   Preferred LOCAL Pharmacy for Pre Prescription [id number] - [place], [place] - [address] AT [facility] OF [address]      Scheduling Details      Caller : Yolanda Vee  (Please ask for phone number if not scheduled by patient)    Type of Procedure Scheduled: Colon/EGD  Which Colonoscopy Prep was Sent?: Extended   KHORUTS CF PATIENTS & GROEN'S PATIENTS NEEDS EXTENDED PREP  Surgeon: Jamila  Date of Procedure: 7/7  [place] [facility] [place]ed[place] Type: CS  Conscious Sedation- Needs  for 6 hours after the procedure  MAC/General-Needs  for 24 hours after procedure    Pre-op Required at [facility] [place], [place] and OR for MAC sedation: Y  (advise patient they will need a pre-op prior to procedure -)      Informed patient they will need an adult  Y  Cannot take any type of public or medical transportation alone    Pre-Procedure Covid test to be completed at [facility] or Externally: Yes, External    Confirmed Nurse will call to complete assessment Y    Additional comments:

## 2022-06-14 ENCOUNTER — TELEPHONE (OUTPATIENT)
Dept: GASTROENTEROLOGY | Facility: CLINIC | Age: 22
End: 2022-06-14
Payer: COMMERCIAL

## 2022-06-21 ENCOUNTER — TELEPHONE (OUTPATIENT)
Dept: GASTROENTEROLOGY | Facility: CLINIC | Age: 22
End: 2022-06-21
Payer: COMMERCIAL

## 2022-06-21 ENCOUNTER — MYC MEDICAL ADVICE (OUTPATIENT)
Dept: FAMILY MEDICINE | Facility: CLINIC | Age: 22
End: 2022-06-21
Payer: COMMERCIAL

## 2022-06-21 ENCOUNTER — HOSPITAL ENCOUNTER (OUTPATIENT)
Facility: CLINIC | Age: 22
End: 2022-06-21
Attending: INTERNAL MEDICINE | Admitting: INTERNAL MEDICINE
Payer: COMMERCIAL

## 2022-06-21 NOTE — TELEPHONE ENCOUNTER
Caller: RUTH Daly    Procedure: EGD    Date, Location, and Surgeon of Procedure Cancelled: 7/7/22, JOHN CHANDLER    Ordering Provider:MODE    Reason for cancel (please be detailed, any staff messages or encounters to note?): PT NEEDS EGD URGENTLY        Rescheduled: YES     If rescheduled:    Date: 6/23   [place] [facility] [place] Resent: Y(changes to prep?)   Covid Test Rescheduled:    Note any change or update to original order/sedation:

## 2022-06-21 NOTE — TELEPHONE ENCOUNTER
Spoke with endo scheduling and they are unable to get her in sooner due to extended prep needed for the colonoscopy.  Currently scheduled on 7-7 for a colonoscopy and EGD.   Message sent to Dr. Linares and she would like the EGD done only so we do not need to be concerned about the prep.   EGD on 6-23.   Appointment modified  on 7-7 for colonoscopy only.    Kem has an appointment with Diana Iyer on 7-1 so the need for the colonoscopy can be discussed at that time.  Provider updated.     Spoke with Kem to give her the update on the EGD. Appointment information provided. Informed Kem that she will get a My Chart with more information about the EGD.

## 2022-06-22 ENCOUNTER — TELEPHONE (OUTPATIENT)
Dept: GASTROENTEROLOGY | Facility: CLINIC | Age: 22
End: 2022-06-22

## 2022-06-22 NOTE — TELEPHONE ENCOUNTER
Attempted to contact patient regarding upcoming EGD (late add on) procedure on 6/23/22 for pre assessment questions. No answer.     Left message to return call to [phone] #3    Covid test scheduled? No. Discuss at home rapid antigen COVID test 1-2 days prior to procedure.    Arrival time: 1215    Facility location: [facility] - borderline BMI 49.28    Sedation type: CS - patient is on naltrexone. Have they been holding?    Indication for procedure: EGD only at this time per IntervalZerot message. Hematemesis    Anticoagulants: no    Will send IntervalZerot message regarding holding of naltrexone.     Indu Means RN       0

## 2022-06-23 ENCOUNTER — HOSPITAL ENCOUNTER (OUTPATIENT)
Facility: AMBULATORY SURGERY CENTER | Age: 22
Discharge: HOME | End: 2022-06-23
Attending: INTERNAL MEDICINE
Payer: COMMERCIAL

## 2022-06-23 ENCOUNTER — TELEPHONE (OUTPATIENT)
Dept: GASTROENTEROLOGY | Facility: CLINIC | Age: 22
End: 2022-06-23

## 2022-06-23 ENCOUNTER — ANESTHESIA (OUTPATIENT)
Dept: SURGERY | Facility: AMBULATORY SURGERY CENTER | Age: 22
End: 2022-06-23
Payer: COMMERCIAL

## 2022-06-23 ENCOUNTER — ANESTHESIA EVENT (OUTPATIENT)
Dept: SURGERY | Facility: AMBULATORY SURGERY CENTER | Age: 22
End: 2022-06-23
Payer: COMMERCIAL

## 2022-06-23 VITALS
OXYGEN SATURATION: 100 % | WEIGHT: 240 LBS | DIASTOLIC BLOOD PRESSURE: 73 MMHG | HEIGHT: 59 IN | HEART RATE: 108 BPM | TEMPERATURE: 97.4 F | BODY MASS INDEX: 48.38 KG/M2 | RESPIRATION RATE: 16 BRPM | SYSTOLIC BLOOD PRESSURE: 125 MMHG

## 2022-06-23 VITALS — HEART RATE: 78 BPM

## 2022-06-23 DIAGNOSIS — K21.01 GASTROESOPHAGEAL REFLUX DISEASE WITH ESOPHAGITIS AND HEMORRHAGE: ICD-10-CM

## 2022-06-23 DIAGNOSIS — R11.0 NAUSEA: ICD-10-CM

## 2022-06-23 DIAGNOSIS — K92.0 HEMATEMESIS WITH NAUSEA: Primary | ICD-10-CM

## 2022-06-23 DIAGNOSIS — K21.00 GASTROESOPHAGEAL REFLUX DISEASE WITH ESOPHAGITIS WITHOUT HEMORRHAGE: ICD-10-CM

## 2022-06-23 LAB
HCG UR QL: NEGATIVE
INTERNAL QC OK POCT: NORMAL
POCT KIT EXPIRATION DATE: NORMAL
POCT KIT LOT NUMBER: NORMAL
UPPER GI ENDOSCOPY: NORMAL

## 2022-06-23 PROCEDURE — 81025 URINE PREGNANCY TEST: CPT | Performed by: PATHOLOGY

## 2022-06-23 PROCEDURE — 43235 EGD DIAGNOSTIC BRUSH WASH: CPT

## 2022-06-23 RX ORDER — PROPOFOL 10 MG/ML
INJECTION, EMULSION INTRAVENOUS PRN
Status: DISCONTINUED | OUTPATIENT
Start: 2022-06-23 | End: 2022-06-23

## 2022-06-23 RX ORDER — NALOXONE HYDROCHLORIDE 0.4 MG/ML
0.2 INJECTION, SOLUTION INTRAMUSCULAR; INTRAVENOUS; SUBCUTANEOUS
Status: DISCONTINUED | OUTPATIENT
Start: 2022-06-23 | End: 2022-06-24 | Stop reason: HOSPADM

## 2022-06-23 RX ORDER — FAMOTIDINE 20 MG/1
20 TABLET, FILM COATED ORAL AT BEDTIME
Qty: 60 TABLET | Refills: 3 | Status: SHIPPED | OUTPATIENT
Start: 2022-06-23

## 2022-06-23 RX ORDER — FLUMAZENIL 0.1 MG/ML
0.2 INJECTION, SOLUTION INTRAVENOUS
Status: DISCONTINUED | OUTPATIENT
Start: 2022-06-23 | End: 2022-06-24 | Stop reason: HOSPADM

## 2022-06-23 RX ORDER — LANSOPRAZOLE 30 MG/1
30 CAPSULE, DELAYED RELEASE ORAL 2 TIMES DAILY
Qty: 120 CAPSULE | Refills: 3 | Status: SHIPPED | OUTPATIENT
Start: 2022-06-23 | End: 2022-11-18

## 2022-06-23 RX ORDER — NALOXONE HYDROCHLORIDE 0.4 MG/ML
0.4 INJECTION, SOLUTION INTRAMUSCULAR; INTRAVENOUS; SUBCUTANEOUS
Status: DISCONTINUED | OUTPATIENT
Start: 2022-06-23 | End: 2022-06-24 | Stop reason: HOSPADM

## 2022-06-23 RX ORDER — PROPOFOL 10 MG/ML
INJECTION, EMULSION INTRAVENOUS CONTINUOUS PRN
Status: DISCONTINUED | OUTPATIENT
Start: 2022-06-23 | End: 2022-06-23

## 2022-06-23 RX ORDER — ONDANSETRON 2 MG/ML
4 INJECTION INTRAMUSCULAR; INTRAVENOUS
Status: COMPLETED | OUTPATIENT
Start: 2022-06-23 | End: 2022-06-23

## 2022-06-23 RX ORDER — ONDANSETRON 4 MG/1
4 TABLET, ORALLY DISINTEGRATING ORAL EVERY 6 HOURS PRN
Status: DISCONTINUED | OUTPATIENT
Start: 2022-06-23 | End: 2022-06-24 | Stop reason: HOSPADM

## 2022-06-23 RX ORDER — LIDOCAINE 40 MG/G
CREAM TOPICAL
Status: DISCONTINUED | OUTPATIENT
Start: 2022-06-23 | End: 2022-06-23 | Stop reason: HOSPADM

## 2022-06-23 RX ORDER — ONDANSETRON 2 MG/ML
4 INJECTION INTRAMUSCULAR; INTRAVENOUS EVERY 6 HOURS PRN
Status: DISCONTINUED | OUTPATIENT
Start: 2022-06-23 | End: 2022-06-24 | Stop reason: HOSPADM

## 2022-06-23 RX ORDER — SODIUM CHLORIDE, SODIUM LACTATE, POTASSIUM CHLORIDE, CALCIUM CHLORIDE 600; 310; 30; 20 MG/100ML; MG/100ML; MG/100ML; MG/100ML
INJECTION, SOLUTION INTRAVENOUS CONTINUOUS
Status: DISCONTINUED | OUTPATIENT
Start: 2022-06-23 | End: 2022-06-23 | Stop reason: HOSPADM

## 2022-06-23 RX ORDER — LIDOCAINE HYDROCHLORIDE 20 MG/ML
INJECTION, SOLUTION INFILTRATION; PERINEURAL PRN
Status: DISCONTINUED | OUTPATIENT
Start: 2022-06-23 | End: 2022-06-23

## 2022-06-23 RX ORDER — PROCHLORPERAZINE MALEATE 10 MG
10 TABLET ORAL EVERY 6 HOURS PRN
Status: DISCONTINUED | OUTPATIENT
Start: 2022-06-23 | End: 2022-06-24 | Stop reason: HOSPADM

## 2022-06-23 RX ADMIN — PROPOFOL 70 MG: 10 INJECTION, EMULSION INTRAVENOUS at 14:46

## 2022-06-23 RX ADMIN — PROPOFOL 200 MCG/KG/MIN: 10 INJECTION, EMULSION INTRAVENOUS at 14:47

## 2022-06-23 RX ADMIN — PROPOFOL 50 MG: 10 INJECTION, EMULSION INTRAVENOUS at 14:51

## 2022-06-23 RX ADMIN — SODIUM CHLORIDE, SODIUM LACTATE, POTASSIUM CHLORIDE, CALCIUM CHLORIDE: 600; 310; 30; 20 INJECTION, SOLUTION INTRAVENOUS at 14:40

## 2022-06-23 RX ADMIN — LIDOCAINE HYDROCHLORIDE 40 MG: 20 INJECTION, SOLUTION INFILTRATION; PERINEURAL at 14:46

## 2022-06-23 RX ADMIN — ONDANSETRON 4 MG: 2 INJECTION INTRAMUSCULAR; INTRAVENOUS at 14:46

## 2022-06-23 NOTE — TELEPHONE ENCOUNTER
Screening Questions    BlueKIND OF PREP RedLOCATION [review exclusion criteria] GreenSEDATION TYPE      1. Are you active on mychart? Y    2. What insurance is in the chart? HP      3.   Ordering/Referring Provider: JACKELYN MCDANIEL     4. BMI   (If greater than 40 review exclusion criteria [PAC APPT IF [MAC] @ [facility])  48.5  [If yes, BMI OVER 40-EXTENDED PREP]      **(Sedation review/consideration needed)**  Do you have a legal guardian or Medical Power of    and/or are you able to give consent for your medical care?     SELF     5. Have you had a positive covid test in the last 90 days?   N     6.  Are you currently on dialysis?   N [ If yes, G-PREP & HOSPITAL setting ONLY]     7.  Do you have chronic kidney disease?  N [ If yes, G-PREP ]    8.   Do you have a diagnosis of diabetes?   N   [ If yes, G-PREP ]    9.  On a regular basis do you go 3-5 days between bowel movements?   Y - CONSTIPATED    [ If yes, EXTENDED PREP]    10.  Are you taking any prescription pain medications on a routine schedule?    N  [ If yes, EXTENDED PREP] [If yes, MAC]      11.   Do you have any chemical dependencies such as alcohol, street drugs, or methadone?    Y - ALCOHOL/ STREET DRUGS  [If yes, MAC]    12.   Do you have any history of post-traumatic stress syndrome, severe anxiety or history of psychosis?    Y - BOTH   [If yes, MAC]    13.  [FEMALES] Are you currently pregnant? N    If yes, how many weeks?       Respiratory/Heart Screening:  [If yes to any of the following HOSPITAL setting only]     14. Do you have Pulmonary Hypertension [Lungs]?   N       15. Do you have UNCONTROLLED asthma?   N     16.  Do you use daily home oxygen?  N      17. Do you have mod to severe Obstructive Sleep Apnea?         (OKAY @ [facility]  [facility]  SH  PH  RI  MG - if pt is not on OXYGEN)  N      18.   Have you had a heart or lung transplant?   N      19.   Have you had a stroke or Transient ischemic attack (TIA - aka  mini stroke ) within 6 months?  (If  yes, please review exclusion criteria)  N     20.   In the past 6 months, have you had any heart related issues including cardiomyopathy or heart attack?   N           If yes, did it require cardiac stenting or other implantable device?   N      21.   Do you have any implantable devices in your body (pacemaker, defib, LVAD)? (If yes, please review exclusion criteria)  N     22. Do you take nitroglycerin?   N           If yes, how often? N  (if yes, HOSPITAL setting ONLY)    23.  Are you currently taking any blood thinners?    [If yes, INFORM patient to follw up w/ ORDERING PROVIDER FOR BRIDGING INSTRUCTIONS]     N    24.   Do you transfer independently?                (If NO, please HOSPITAL setting ONLY)  Y    25.   Preferred LOCAL Pharmacy for Pre Prescription:           [facility][id number] [address] AT [place] [address]    Scheduling Details  (Please ask for phone number if not scheduled by patient)      Caller : Yolanda Vee   Additional comments:     PT'S BMI:48.5 NEEDS (MAC) -- CAN NOT SCHEDULE AT [facility].     MSG TO DR. JACKELYN MCDANIEL.     NH

## 2022-06-23 NOTE — TELEPHONE ENCOUNTER
LVMx2 that 7/1 appt is cancelled. Diana unavailable that date and is leaving practice at the  of  soon. Can r/s to Dr. Gallagher or Dr. Linares, or new provider Alesia Fritz once their schedule is open. Sent mychart.

## 2022-06-23 NOTE — ANESTHESIA PREPROCEDURE EVALUATION
Anesthesia Pre-Procedure Evaluation    Patient: Yolanda Vee   MRN: [id number] : 2000        Procedure : Procedure(s):  ESOPHAGOGASTRODUODENOSCOPY (EGD)          Past Medical History:   Diagnosis Date     Anxiety     therapist: [facility]     Chemical dependency (H)     quit 2021, alcohol. some family hx as well.      Chronic constipation      Constipation      Depression      Depression      Esophageal reflux     controlled well on protonix currently, EGD 6/3/21 w/ some mild esophagitis when alcohol intake was higher (bottle vodka daily).     Heart rate problem     some palpitations in the past w/ anxiety attacks.     Migraines     occ tylenol use.     Morbid obesity (H) 2020     Obese      Palpitations      Plantar warts      PTSD (post-traumatic stress disorder)      PTSD (post-traumatic stress disorder)      Uncomplicated asthma       Past Surgical History:   Procedure Laterality Date     COLONOSCOPY N/A 6/3/2021    Procedure: COLONOSCOPY;  Surgeon: Guru Gerardo Prado MD;  Location:  GI     ESOPHAGOSCOPY, GASTROSCOPY, DUODENOSCOPY (EGD), COMBINED N/A 6/3/2021    Procedure: ESOPHAGOGASTRODUODENOSCOPY (EGD);  Surgeon: Guru Gerardo Prado MD;  Location:  GI     wisdom teeth        Allergies   Allergen Reactions     Ibuprofen Other (See Comments) and Hives     Throat gets itchy and sore     Hydrocodone-Acetaminophen Nausea      Social History     Tobacco Use     Smoking status: Current Every Day Smoker     Packs/day: 0.00     Years: 5.00     Pack years: 0.00     Types: Cigarettes     Smokeless tobacco: Never Used     Tobacco comment: Quit late 2022    Substance Use Topics     Alcohol use: Yes     Alcohol/week: 6.0 - 8.0 standard drinks     Comment: quit in 2021. heavy use prior up to a bottle of vodka daily.  Drinks only on speacial occations now       Wt Readings from Last 1 Encounters:   22 108.9 kg (240 lb)           Physical  Exam    Airway      Comment: Small mouth    Mallampati: II   TM distance: > 3 FB   Neck ROM: full   Mouth opening: > 3 cm    Respiratory Devices and Support         Dental  no notable dental history         Cardiovascular   cardiovascular exam normal          Pulmonary   pulmonary exam normal                OUTSIDE LABS:  CBC:   Lab Results   Component Value Date    WBC 10.6 01/27/2022    WBC 14.8 (H) 01/26/2022    HGB 14.4 02/01/2022    HGB 12.5 01/27/2022    HCT 39.6 01/27/2022    HCT 45.3 01/26/2022     01/27/2022     01/26/2022     BMP:   Lab Results   Component Value Date     01/27/2022     01/26/2022    POTASSIUM 3.3 (L) 01/27/2022    POTASSIUM 3.6 01/26/2022    CHLORIDE 113 (H) 01/27/2022    CHLORIDE 111 (H) 01/26/2022    CO2 18 (L) 01/27/2022    CO2 21 (L) 01/26/2022    BUN 6 (L) 01/27/2022    BUN 7 (L) 01/26/2022    CR 0.63 01/27/2022    CR 0.73 01/26/2022    GLC 75 01/27/2022     01/26/2022     COAGS:   Lab Results   Component Value Date    PTT 29 01/26/2022    INR 1.09 01/26/2022    FIBR 473 (H) 01/04/2021     POC:   Lab Results   Component Value Date    BGM 95 06/03/2021    HCG Negative 06/23/2022    HCGS Negative 01/26/2022     HEPATIC:   Lab Results   Component Value Date    ALBUMIN 3.5 01/26/2022    PROTTOTAL 7.7 01/26/2022    ALT 20 01/26/2022    AST 18 01/26/2022    ALKPHOS 82 01/26/2022    BILITOTAL 0.4 01/26/2022     OTHER:   Lab Results   Component Value Date    LACT 0.7 09/21/2021    A1C 5.5 11/08/2021    HIMANSHU 7.7 (L) 01/27/2022    PHOS 3.1 01/26/2022    MAG 1.9 01/26/2022    LIPASE 17 01/26/2022    TSH 1.21 01/26/2022       Anesthesia Plan    ASA Status:  3   NPO Status:  NPO Appropriate    Anesthesia Type: MAC.     - Reason for MAC: straight local not clinically adequate   Induction: Intravenous, Propofol.   Maintenance: TIVA.        Consents    Anesthesia Plan(s) and associated risks, benefits, and realistic alternatives discussed. Questions answered and  patient/representative(s) expressed understanding.    - Discussed:     - Discussed with:  Patient      - Extended Intubation/Ventilatory Support Discussed: No.      - Patient is DNR/DNI Status: No    Use of blood products discussed: No .     Postoperative Care       PONV prophylaxis: Ondansetron (or other 5HT-3), Background Propofol Infusion     Comments:                Marko Tapia MD

## 2022-06-23 NOTE — INTERVAL H&P NOTE
"I have reviewed the surgical (or preoperative) H&P that is linked to this encounter, and examined the patient. There are no significant changes    Clinical Conditions Present on Arrival:  Clinically Significant Risk Factors Present on Admission                   # Severe Obesity: Estimated body mass index is 48.47 kg/m  as calculated from the following:    Height as of this encounter: 1.499 m (4' 11\").    Weight as of this encounter: 108.9 kg (240 lb).       "

## 2022-06-23 NOTE — ANESTHESIA CARE TRANSFER NOTE
Patient: Yolanda Vee    Procedure: Procedure(s):  ESOPHAGOGASTRODUODENOSCOPY (EGD)       Diagnosis: Gastroesophageal reflux disease with esophagitis without hemorrhage [K21.00]  Diagnosis Additional Information: No value filed.    Anesthesia Type:   No value filed.     Note:    Oropharynx: oropharynx clear of all foreign objects  Level of Consciousness: drowsy  Oxygen Supplementation: room air    Independent Airway: airway patency satisfactory and stable  Dentition: dentition unchanged  Vital Signs Stable: post-procedure vital signs reviewed and stable  Report to RN Given: handoff report given  Patient transferred to: Phase II  Comments: Vital signs per nursing documentation.     Handoff Report: Identifed the Patient, Identified the Reponsible Provider, Reviewed the pertinent medical history, Discussed the surgical course, Reviewed Intra-OP anesthesia mangement and issues during anesthesia, Set expectations for post-procedure period and Allowed opportunity for questions and acknowledgement of understanding      Vitals:  Vitals Value Taken Time   BP     Temp     Pulse     Resp 13    SpO2 98%        Electronically Signed By: ABA Christie CRNA  June 23, 2022  3:04 PM

## 2022-06-23 NOTE — ANESTHESIA POSTPROCEDURE EVALUATION
Patient: Yolanda Vee    Procedure: Procedure(s):  ESOPHAGOGASTRODUODENOSCOPY (EGD)       Anesthesia Type:  MAC    Note:  Disposition: Outpatient   Postop Pain Control: Uneventful            Sign Out: Well controlled pain   PONV:    Neuro/Psych: Uneventful            Sign Out: Acceptable/Baseline neuro status   Airway/Respiratory: Uneventful            Sign Out: Acceptable/Baseline resp. status   CV/Hemodynamics: Uneventful            Sign Out: Acceptable CV status; No obvious hypovolemia; No obvious fluid overload   Other NRE:    DID A NON-ROUTINE EVENT OCCUR?            Last vitals:  Vitals Value Taken Time   /65 06/23/22 1530   Temp 36.5  C (97.7  F) 06/23/22 1506   Pulse     Resp 14 06/23/22 1530   SpO2 100 % 06/23/22 1530       Electronically Signed By: Marko Tapia MD  June 23, 2022  3:42 PM

## 2022-06-23 NOTE — OR NURSING
HCG ordered for patient. Urine sample collected and hcg negative for pregnancy. Pregnancy result entered into epic incorrectly. Patient's hcg negative 6/23/2022 at 1330. MD and procedural nurses notified. Compass report completed. Mariam Whitt on 6/23/2022 at 2:02 PM

## 2022-06-23 NOTE — H&P
Yolanda Vee  [id number]  female  22 year old      Reason for procedure/surgery: hematemesis    Patient Active Problem List   Diagnosis     CN (constipation)     Alcohol use disorder, moderate, dependence (H)     Morbid obesity (H)     Vitamin D deficiency     Tobacco use     Sinus tachycardia     Periumbilical pain     PTSD (post-traumatic stress disorder)     Nicotine use disorder     Neutrophilia     Moderate episode of recurrent major depressive disorder (H)     Marijuana use     Hypochromic anemia     Chronic nasal congestion     Chronic idiopathic constipation     Anxiety     GI bleed     History of alcoholic gastritis       Past Surgical History:    Past Surgical History:   Procedure Laterality Date     COLONOSCOPY N/A 6/3/2021    Procedure: COLONOSCOPY;  Surgeon: Guru Gerardo Prado MD;  Location:  GI     ESOPHAGOSCOPY, GASTROSCOPY, DUODENOSCOPY (EGD), COMBINED N/A 6/3/2021    Procedure: ESOPHAGOGASTRODUODENOSCOPY (EGD);  Surgeon: Guru Gerardo Prado MD;  Location:  GI     wisdom teeth         Past Medical History:   Past Medical History:   Diagnosis Date     Anxiety     therapist: [facility]     Chemical dependency (H)     quit Sept 2021, alcohol. some family hx as well.      Chronic constipation      Constipation      Depression      Depression      Esophageal reflux     controlled well on protonix currently, EGD 6/3/21 w/ some mild esophagitis when alcohol intake was higher (bottle vodka daily).     Heart rate problem     some palpitations in the past w/ anxiety attacks.     Migraines     occ tylenol use.     Morbid obesity (H) 03/06/2020     Obese      Palpitations      Plantar warts      PTSD (post-traumatic stress disorder)      PTSD (post-traumatic stress disorder)      Uncomplicated asthma        Social History:   Social History     Tobacco Use     Smoking status: Current Every Day Smoker     Packs/day: 0.00     Years: 5.00     Pack years: 0.00     Types:  Cigarettes     Smokeless tobacco: Never Used     Tobacco comment: Quit late 2022    Substance Use Topics     Alcohol use: Yes     Alcohol/week: 6.0 - 8.0 standard drinks     Comment: quit in 2021. heavy use prior up to a bottle of vodka daily.  Drinks only on speacial occations now        Family History:   Family History   Problem Relation Age of Onset     Heart Disease Mother      Substance Abuse Mother         in remission     Substance Abuse Father      Alcoholism Father      Bipolar Disorder Brother      Depression Brother      Anxiety Disorder Brother      Hypertension Maternal Grandmother      Arthritis Maternal Grandmother      Heart Disease Maternal Grandmother      Heart Disease Maternal Uncle      Heart Disease Maternal Uncle      Hodgkin's lymphoma Maternal Grandfather 26.00     Esophageal Cancer Maternal Grandfather          at 54.     Acute Myocardial Infarction No family hx of        Allergies:   Allergies   Allergen Reactions     Ibuprofen Other (See Comments) and Hives     Throat gets itchy and sore     Hydrocodone-Acetaminophen Nausea       Active Medications:   Current Outpatient Medications   Medication Sig Dispense Refill     albuterol (PROAIR HFA/PROVENTIL HFA/VENTOLIN HFA) 108 (90 Base) MCG/ACT inhaler Inhale 2 puffs into the lungs every 6 hours as needed for shortness of breath / dyspnea or wheezing 18 g 0     buPROPion (WELLBUTRIN XL) 150 MG 24 hr tablet Take 1 tablet (150 mg) by mouth every morning 30 tablet 3     escitalopram (LEXAPRO) 20 MG tablet Take 20 mg by mouth daily       ferrous fumarate (FERRETTS) 324 (106 Fe) MG TABS tablet Take 324 mg by mouth daily       ipratropium - albuterol 0.5 mg/2.5 mg/3 mL (DUONEB) 0.5-2.5 (3) MG/3ML neb solution Inhale 3 mLs into the lungs every 6 hours        LANsoprazole (PREVACID) 30 MG DR capsule Take 1 capsule (30 mg) by mouth daily 90 capsule 3     omeprazole (PRILOSEC) 20 MG DR capsule Take 1 capsule (20 mg) by mouth daily 30  "capsule 1     prazosin (MINIPRESS) 1 MG capsule TAKE 1 CAPSULE BY MOUTH EVERY NIGHT AT BEDTIME. AFTER 2-3 DAYS, INCREASE TO 2 CAPSULE EVERY NIGHT AT BEDTIME (Patient taking differently: Take 1 mg by mouth At Bedtime) 180 capsule 1     sucralfate (CARAFATE) 1 GM tablet Take 1 tablet (1 g) by mouth 4 times daily (before meals and nightly) 180 tablet 3     Vitamin D, Cholecalciferol, 25 MCG (1000 UT) TABS Take 1,000 Units by mouth every morning        naltrexone (DEPADE/REVIA) 50 MG tablet Start half a tablet daily with supper for 7 days then increase, if tolerated to half a tablet with breakfast and supper. (Patient taking differently: Take 50 mg by mouth daily Start half a tablet daily with supper for 7 days then increase, if tolerated to half a tablet with breakfast and supper.) 90 tablet 0       Systemic Review:   CONSTITUTIONAL: NEGATIVE for fever, chills, change in weight  ENT/MOUTH: NEGATIVE for ear, mouth and throat problems  RESP: NEGATIVE for significant cough or SOB  CV: NEGATIVE for chest pain, palpitations or peripheral edema    Physical Examination:   Vital Signs: /81 (BP Location: Right arm)   Pulse 108   Temp 97.4  F (36.3  C) (Skin)   Resp 16   Ht 1.499 m (4' 11\")   Wt 108.9 kg (240 lb)   SpO2 98%   BMI 48.47 kg/m    GENERAL: healthy, alert and no distress  RESP: clear  CV: rrr  ABDOMEN: soft      Plan: Appropriate to proceed as scheduled.      Amanda Linares MD  6/23/2022    PCP:  Litzy Corona    "

## 2022-06-29 ENCOUNTER — TELEPHONE (OUTPATIENT)
Dept: GASTROENTEROLOGY | Facility: CLINIC | Age: 22
End: 2022-06-29

## 2022-06-29 NOTE — TELEPHONE ENCOUNTER
Outbound Call made to: Yolanda Vee     Procedure: EGD     Date, Location, and Surgeon of Procedure Cancelled:   07/7/2022 - RAMESH LOPEZ       Reason for call (please be detailed, any staff messages or encounters to note?):     From: Diana Gr RN   Sent: 6/29/2022   6:38 AM CDT   To: Debbi Raymundo, Yolanda Trinidad RN, *   Subject: RE: 7.7.2022                                     The BMI was adjusted recently for MAC patients to 48. There will be no exceptions to anything slightly over. I will make sure I update this as a process change.           Rescheduled:     YES      If rescheduled:    Date: 08/11/2022  Melo PEGUERO    Location: [facility]    Note any change or update to original order/sedation:   (CS) to (MAC)

## 2022-08-05 ENCOUNTER — TELEPHONE (OUTPATIENT)
Dept: GASTROENTEROLOGY | Facility: CLINIC | Age: 22
End: 2022-08-05

## 2022-08-05 NOTE — TELEPHONE ENCOUNTER
Caller: Yolanda Vee      Procedure: EGD    Date, Location, and Surgeon of Procedure Cancelled: 8/11, Ron COATS    Ordering Provider:KADEEM COELLO      Reason for cancel (please be detailed, any staff messages or encounters to note?): patient scheduling conflict        Rescheduled: y     If rescheduled:    Date: 9/22   Location:    Prep Resent: resent, no changes (changes to prep?)   Covid Test Rescheduled: home test   Note any change or update to original order/sedation: n/a

## 2022-08-09 ENCOUNTER — ALLIED HEALTH/NURSE VISIT (OUTPATIENT)
Dept: FAMILY MEDICINE | Facility: CLINIC | Age: 22
End: 2022-08-09
Payer: COMMERCIAL

## 2022-08-09 ENCOUNTER — TELEPHONE (OUTPATIENT)
Dept: FAMILY MEDICINE | Facility: CLINIC | Age: 22
End: 2022-08-09

## 2022-08-09 DIAGNOSIS — Z30.42 DEPO-PROVERA CONTRACEPTIVE STATUS: Primary | ICD-10-CM

## 2022-08-09 LAB — HCG UR QL: NEGATIVE

## 2022-08-09 PROCEDURE — 99207 PR NO CHARGE NURSE ONLY: CPT

## 2022-08-09 PROCEDURE — 96372 THER/PROPH/DIAG INJ SC/IM: CPT | Performed by: FAMILY MEDICINE

## 2022-08-09 PROCEDURE — 81025 URINE PREGNANCY TEST: CPT

## 2022-08-09 RX ADMIN — MEDROXYPROGESTERONE ACETATE 150 MG: 150 INJECTION, SUSPENSION INTRAMUSCULAR at 13:56

## 2022-08-09 NOTE — TELEPHONE ENCOUNTER
Patient has apt this afternoon for Depo, last injection date on file is 3-15-22. Does patient need 1-2 pregnancy test to restart shots?

## 2022-08-09 NOTE — TELEPHONE ENCOUNTER
If patient has not had unprotected sex in the last 2 weeks, she may received the depo shot today with a negative pregnancy test.  If the patient HAS had unprotected sex in the last 2 weeks, then she has two options.  One option is to abstain from sex for two weeks, then return for a pregnancy test and Depo administration. The second option is to received the Depo shot today known that she could be in the early stage of pregnancy (before a pregnancy test becomes positive).  The specific risk of Depo early on in pregnancy are not well known; we know that this risk of a condition called hypospadius (where a male's urethra is not at the tip of the penis but closer to the body) is a risk of a baby boy born to a mom who has received depo.  There are no other known severe effects of Depo early in pregnancy. If she is OK with this risk, then she may receive the matDepo shot.  If she has additional questions, I am happy to speak with her by phone today.  GABI

## 2022-09-15 ENCOUNTER — TELEPHONE (OUTPATIENT)
Dept: GASTROENTEROLOGY | Facility: CLINIC | Age: 22
End: 2022-09-15

## 2022-09-15 NOTE — TELEPHONE ENCOUNTER
Attempted to contact patient regarding upcoming EGD procedure on 9.22.2022 for pre assessment questions. No answer.     Left message to return call to [phone] #4    Discuss at home rapid antigen COVID test 1-2 days prior to procedure.    Pre op exam? PAC number left on pt's VM; [phone].  Needs PAC eval d/t BMI.    Arrival time: 0700    Facility location: [facility]    Sedation type: MAC    Indication for procedure: 3 month follow up esophageal ulcer/esophagitis    Yolanda Trinidad RN

## 2022-09-19 ENCOUNTER — TELEPHONE (OUTPATIENT)
Dept: GASTROENTEROLOGY | Facility: CLINIC | Age: 22
End: 2022-09-19

## 2022-09-19 NOTE — TELEPHONE ENCOUNTER
Caller: Kem    Procedure: EGD    Date, Location, and Surgeon of Procedure Cancelled: 9/22, RAMBO, [place]    Ordering Provider: JACKELYN Linares    Reason for cancel (please be detailed, any staff messages or encounters to note?): pt has rash, must reschedule        Rescheduled: Y, pt requested morning appt time     If rescheduled:    Date: 12/1, BANG Velasquez   Location: [facility]   Prep Resent: (changes to prep?)   Covid Test Rescheduled: home   Note any change or update to original order/sedation: was MAC, still MAC    Will need preop. Discussed and included in msgs.

## 2022-09-19 NOTE — TELEPHONE ENCOUNTER
Pre assessment call placed to patient to go over EGD instructions.    Patient states has a contagious rash at this time and needs to reschedule.    Transferred patient to scheduling per her request.

## 2022-09-29 ENCOUNTER — OFFICE VISIT (OUTPATIENT)
Dept: FAMILY MEDICINE | Facility: CLINIC | Age: 22
End: 2022-09-29
Payer: COMMERCIAL

## 2022-09-29 VITALS
WEIGHT: 240.6 LBS | HEART RATE: 93 BPM | OXYGEN SATURATION: 98 % | TEMPERATURE: 98.2 F | BODY MASS INDEX: 48.6 KG/M2 | DIASTOLIC BLOOD PRESSURE: 66 MMHG | SYSTOLIC BLOOD PRESSURE: 100 MMHG

## 2022-09-29 DIAGNOSIS — R21 RASH: Primary | ICD-10-CM

## 2022-09-29 DIAGNOSIS — Z80.7 FAMILY HISTORY OF HODGKIN'S DISEASE: ICD-10-CM

## 2022-09-29 DIAGNOSIS — D72.829 LEUKOCYTOSIS, UNSPECIFIED TYPE: ICD-10-CM

## 2022-09-29 LAB
BASOPHILS # BLD AUTO: 0.1 10E3/UL (ref 0–0.2)
BASOPHILS NFR BLD AUTO: 0 %
EOSINOPHIL # BLD AUTO: 0.4 10E3/UL (ref 0–0.7)
EOSINOPHIL NFR BLD AUTO: 3 %
ERYTHROCYTE [DISTWIDTH] IN BLOOD BY AUTOMATED COUNT: 15.1 % (ref 10–15)
HCT VFR BLD AUTO: 43 % (ref 35–47)
HGB BLD-MCNC: 13.7 G/DL (ref 11.7–15.7)
HOLD SPECIMEN: NORMAL
IMM GRANULOCYTES # BLD: 0 10E3/UL
IMM GRANULOCYTES NFR BLD: 0 %
LYMPHOCYTES # BLD AUTO: 2.7 10E3/UL (ref 0.8–5.3)
LYMPHOCYTES NFR BLD AUTO: 21 %
MCH RBC QN AUTO: 28.6 PG (ref 26.5–33)
MCHC RBC AUTO-ENTMCNC: 31.9 G/DL (ref 31.5–36.5)
MCV RBC AUTO: 90 FL (ref 78–100)
MONOCYTES # BLD AUTO: 0.8 10E3/UL (ref 0–1.3)
MONOCYTES NFR BLD AUTO: 6 %
NEUTROPHILS # BLD AUTO: 9.3 10E3/UL (ref 1.6–8.3)
NEUTROPHILS NFR BLD AUTO: 70 %
NRBC # BLD AUTO: 0 10E3/UL
NRBC BLD AUTO-RTO: 0 /100
PLATELET # BLD AUTO: 344 10E3/UL (ref 150–450)
RBC # BLD AUTO: 4.79 10E6/UL (ref 3.8–5.2)
RETICS # AUTO: 0.12 10E6/UL (ref 0.01–0.11)
RETICS/RBC NFR AUTO: 2.6 % (ref 0.8–2.7)
WBC # BLD AUTO: 13.2 10E3/UL (ref 4–11)

## 2022-09-29 PROCEDURE — 85045 AUTOMATED RETICULOCYTE COUNT: CPT | Performed by: FAMILY MEDICINE

## 2022-09-29 PROCEDURE — 85060 BLOOD SMEAR INTERPRETATION: CPT | Performed by: PATHOLOGY

## 2022-09-29 PROCEDURE — 85025 COMPLETE CBC W/AUTO DIFF WBC: CPT | Performed by: FAMILY MEDICINE

## 2022-09-29 PROCEDURE — 99213 OFFICE O/P EST LOW 20 MIN: CPT | Performed by: FAMILY MEDICINE

## 2022-09-29 PROCEDURE — 36415 COLL VENOUS BLD VENIPUNCTURE: CPT | Performed by: FAMILY MEDICINE

## 2022-09-29 ASSESSMENT — PAIN SCALES - GENERAL: PAINLEVEL: SEVERE PAIN (6)

## 2022-09-29 NOTE — LETTER
September 29, 2022      Yolanda SPARROW Mariusz  [address] [facility] [address]  [place] [address]        To Whom It May Concern:    Yolanda Vee  was seen on 9/29/22.  Please excuse her  until 10/6/22 due to potentially contagious illness.        Sincerely,        Litzy Corona MD

## 2022-09-30 LAB
PATH REPORT.COMMENTS IMP SPEC: NORMAL
PATH REPORT.COMMENTS IMP SPEC: NORMAL
PATH REPORT.FINAL DX SPEC: NORMAL
PATH REPORT.RELEVANT HX SPEC: NORMAL

## 2022-10-04 NOTE — PROGRESS NOTES
"  Assessment & Plan     Rash  Unlikely that this represents dermatologic manifestation of Hodgkin's, reviewed this with patient's.  She has been tested for monkey pox and is negative which is reassuring, lesions are not painful which suggests against monkey pox as well.  Advised keeping her appointment with dermatology as scheduled.  - Extra Tube; Future  - Extra Tube  - Reticulocyte count; Future  - Reticulocyte count    Leukocytosis, unspecified type  This is been ongoing, previous hematologic assessment unremarkable.  Will repeat hemogram today along with peripheral smear given concerns about Hodgkin's lymphoma and onset of new rash.  Referral is placed to follow-up with hematology to review.  - Lab Blood Morphology Pathologist Review; Future  - Adult Oncology/Hematology  Referral; Future  - Lab Blood Morphology Pathologist Review  - Extra Tube; Future  - Extra Tube  - Reticulocyte count; Future  - Reticulocyte count    Family history of Hodgkin's disease  Patient's concerns about potential of Hodgkin's disease are impacting concerns today as outlined above.               BMI:   Estimated body mass index is 48.6 kg/m  as calculated from the following:    Height as of 6/23/22: 1.499 m (4' 11\").    Weight as of this encounter: 109.1 kg (240 lb 9.6 oz).   Weight management plan: Discussed healthy diet and exercise guidelines        No follow-ups on file.    Litzy Corona MD  [facility]   Kem is a 22 year old, presenting for the following health issues:  Derm Problem (rash on bottom of feet, lower back and hands but cheek / high wbc 15.4- not hungry )      Noting a rash on her hands, feet, and slightly on her buttocks and low back for the past 2 weeks, spreading.  Sometimes painful in her feet though mildly so.  Describes significant itching.  No new exposures.  History of eczema that has led to allergic reaction the past, feels different.  She is very concerned as her " father kris was diagnosed with Hodgkin's at age 17 with recurrence age 20 and he had onset of rash.  She also notes history of leukocytosis which was concerning to her.  Her white blood cell count at this point is around 15.  Dr. Poon of hematology January 2021 who did not identify concerns.  Patient denies any new fevers, chills, generalized illness, or exposure.  In general has been feeling more ill over the last several years, history of persisting ulcer with history of bleeding, continued mild heartburn, has upper endoscopy scheduled in December.  No risk for scabies exposure.             Review of Systems         Objective    /66 (BP Location: Right arm, Patient Position: Sitting, Cuff Size: Adult Large)   Pulse 93   Temp 98.2  F (36.8  C) (Oral)   Wt 109.1 kg (240 lb 9.6 oz)   SpO2 98%   BMI 48.60 kg/m    Body mass index is 48.6 kg/m .  Physical Exam   Alert and she is female.  She has multiple resolving papules, no blister or vesicle formation, scattered on hands and feet, single lesion left buttock is more flat and not clearly a similar rash.  Face and trunk are spared.  She has no oral lesions.

## 2022-10-07 NOTE — PROGRESS NOTES
Hematology/Medical Oncology Follow-up Note      October 7, 2022      ADDENDUM (10/14/2022):  Called patient regarding unremarkable serology for lupus although her CRP and ESR are modestly elevated indicative of some type of inflammatory stimuli.  It is possible that her intermittent skin rashes are related to these findings.  However I do not see sufficient evidence to suggest looking for Hodgkin lymphoma in view of her clinical exam and history.    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    Impression:  1.  Stable chronic neutrophilia probably related to tobacco use  2.  Indeterminate, waxing-waning slightly erythematous pruritic rash  3.  Obesity  4.  Gender dysphoria with testosterone hormone therapy for male reassignment  5.  History of anxiety, depression, intermittent asthma and esophagitis  6.  No clinical evidence to support concern for Hodgkin lymphoma or lupus erythematosus    Recommendation, plan, instructions:  1.  Check CRISTOBAL, dsDNA, rheumatoid factor, ESR; however, I explained to the patient and her mother positive autoimmune serology does not necessarily equate to an autoimmune diagnosis without concurrent clinical findings.  2.  I detected no historical or clinical evidence to suggest Hodgkin's lymphoma and would not recommend CT imaging pending the above-mentioned test results.  3.  The patient declined tobacco cessation intervention  4.  Discussed importance of weight loss.    Time with patient including review, documentation, history, examination, coordination of care and counseling was 50 minutes.    History of present illness:  The patient was seen by Dr. Litzy Corona on 9/29/2022 with a  2-week history of a nonpruritic rash over her hands, feet and buttocks.  Patient reports a remote history of eczema.  The patient was quite concerned as her maternal grandfather was diagnosed with Hodgkin's lymphoma at age 17 with recurrence at age 20 associate with the onset of a rash.    She also experience a facial rash a week ago that resolved. The mother is concerned about a butterfly rash that is associated with lupus erythematosus.    The patient was seen by Dr. Sadie Maria in this department in  who felt that her neutrophilic leukocytosis was related to tobacco use with a negative BCR/ABL qualitative PCR being obtained.    Past medical history:  History of gender dysphoria who who desires full masculine changes and [facility].  Past history of anxiety, depression, mild intermittent asthma, and esophagitis.    Family history:  I have reviewed this patient's family history and updated it with pertinent information if needed.  Family History   Problem Relation Age of Onset     Heart Disease Mother      Substance Abuse Mother         in remission     Substance Abuse Father      Alcoholism Father      Bipolar Disorder Brother      Depression Brother      Anxiety Disorder Brother      Hypertension Maternal Grandmother      Arthritis Maternal Grandmother      Heart Disease Maternal Grandmother      Heart Disease Maternal Uncle      Heart Disease Maternal Uncle      Hodgkin's lymphoma Maternal Grandfather 26.00     Esophageal Cancer Maternal Grandfather          at 54.     Acute Myocardial Infarction No family hx of        Medications:  Current Outpatient Medications   Medication     albuterol (PROAIR HFA/PROVENTIL HFA/VENTOLIN HFA) 108 (90 Base) MCG/ACT inhaler     buPROPion (WELLBUTRIN XL) 150 MG 24 hr tablet     escitalopram (LEXAPRO) 20 MG tablet     famotidine (PEPCID) 20 MG tablet     ferrous fumarate (FERRETTS) 324 (106 Fe) MG  "TABS tablet     ipratropium - albuterol 0.5 mg/2.5 mg/3 mL (DUONEB) 0.5-2.5 (3) MG/3ML neb solution     LANsoprazole (PREVACID) 30 MG DR capsule     naltrexone (DEPADE/REVIA) 50 MG tablet     prazosin (MINIPRESS) 1 MG capsule     sucralfate (CARAFATE) 1 GM tablet     Vitamin D, Cholecalciferol, 25 MCG (1000 UT) TABS     Current Facility-Administered Medications   Medication     medroxyPROGESTERone (DEPO-PROVERA) injection 150 mg       Allergies:  Allergies   Allergen Reactions     Ibuprofen Other (See Comments) and Hives     Throat gets itchy and sore     Hydrocodone-Acetaminophen Nausea       Review of systems:  Complains of headaches after loud noises for 3-4 years.  She has had problems with chronic abdominal pain and has a December, 2022 EGD to reevaluate history esophagitis.  She is on testosterone Depo therapy x3 months as part of gender reassignment.  Her last menstrual period was 3 years ago.    Except as noted in the note above, the patient denies headaches, diplopia, hearing loss or dizziness; dyspnea, cough, hemoptysis, pleurisy; chest pain/pressure, palpitations, lightheadedness; anorexia, nausea, vomiting, abdominal pain, diarrhea, constipation, melena or rectal bleeding; dysuria, frequency,  blood in the urine; vaginal bleeding or discharge; fever, chills, sweats, hot flashes; tingling, numbness, loss of balance; insomnia, depression, anxiety.    Physical examination:  BP (!) 133/93 (BP Location: Right arm, Patient Position: Sitting, Cuff Size: Adult Large)   Pulse 96   Ht 1.499 m (4' 11.02\")   Wt 110.5 kg (243 lb 8 oz)   SpO2 97%   BMI 49.15 kg/m      The patient is alert and oriented. Throat and oral mucosa are normal-appearing. Adenopathy is absent in the neck, axilla, groin and supraclavicular fossa; Lungs are clear to auscultation and percussion without rales, wheezes or rubs; heart rhythm is regular, heart sounds are without murmurs or gallops; the abdomen is soft and flat without " hepatosplenomegaly, masses, ascites or tenderness.;  No facial or bimalar rash was noted.  Examination of the hands were unremarkable.  There is some slightly scaly macular non-erythematous eruption over the periphery of the feet.      Tom Rodríguez MD

## 2022-10-13 ENCOUNTER — LAB (OUTPATIENT)
Dept: INFUSION THERAPY | Facility: CLINIC | Age: 22
End: 2022-10-13
Attending: INTERNAL MEDICINE
Payer: COMMERCIAL

## 2022-10-13 ENCOUNTER — ONCOLOGY VISIT (OUTPATIENT)
Dept: ONCOLOGY | Facility: CLINIC | Age: 22
End: 2022-10-13
Attending: INTERNAL MEDICINE
Payer: COMMERCIAL

## 2022-10-13 VITALS
HEIGHT: 59 IN | BODY MASS INDEX: 49.09 KG/M2 | HEART RATE: 96 BPM | SYSTOLIC BLOOD PRESSURE: 133 MMHG | WEIGHT: 243.5 LBS | OXYGEN SATURATION: 97 % | DIASTOLIC BLOOD PRESSURE: 93 MMHG

## 2022-10-13 DIAGNOSIS — L30.9 DERMATITIS: Primary | ICD-10-CM

## 2022-10-13 DIAGNOSIS — D72.829 LEUKOCYTOSIS, UNSPECIFIED TYPE: ICD-10-CM

## 2022-10-13 LAB
C REACTIVE PROTEIN LHE: 2.8 MG/DL (ref 0–?)
ERYTHROCYTE [SEDIMENTATION RATE] IN BLOOD BY WESTERGREN METHOD: 44 MM/HR (ref 0–20)

## 2022-10-13 PROCEDURE — G0463 HOSPITAL OUTPT CLINIC VISIT: HCPCS

## 2022-10-13 PROCEDURE — 99215 OFFICE O/P EST HI 40 MIN: CPT | Performed by: INTERNAL MEDICINE

## 2022-10-13 PROCEDURE — 86038 ANTINUCLEAR ANTIBODIES: CPT | Performed by: INTERNAL MEDICINE

## 2022-10-13 PROCEDURE — 86225 DNA ANTIBODY NATIVE: CPT | Performed by: INTERNAL MEDICINE

## 2022-10-13 PROCEDURE — 86140 C-REACTIVE PROTEIN: CPT | Performed by: INTERNAL MEDICINE

## 2022-10-13 PROCEDURE — 86431 RHEUMATOID FACTOR QUANT: CPT | Performed by: INTERNAL MEDICINE

## 2022-10-13 PROCEDURE — 36415 COLL VENOUS BLD VENIPUNCTURE: CPT | Performed by: INTERNAL MEDICINE

## 2022-10-13 PROCEDURE — 85652 RBC SED RATE AUTOMATED: CPT | Performed by: INTERNAL MEDICINE

## 2022-10-13 RX ORDER — NEEDLES, DISPOSABLE 25GX5/8"
NEEDLE, DISPOSABLE MISCELLANEOUS
COMMUNITY
Start: 2022-09-19

## 2022-10-13 RX ORDER — FLUOCINONIDE 0.5 MG/G
OINTMENT TOPICAL
COMMUNITY
Start: 2022-10-05 | End: 2024-06-06

## 2022-10-13 RX ORDER — TESTOSTERONE CYPIONATE 200 MG/ML
0.2 INJECTION, SOLUTION INTRAMUSCULAR WEEKLY
Status: ON HOLD | COMMUNITY
End: 2024-07-02

## 2022-10-13 RX ORDER — SYRINGE, DISPOSABLE, 1 ML
SYRINGE, EMPTY DISPOSABLE MISCELLANEOUS
COMMUNITY
Start: 2022-09-19

## 2022-10-13 ASSESSMENT — PAIN SCALES - GENERAL: PAINLEVEL: NO PAIN (0)

## 2022-10-13 NOTE — PROGRESS NOTES
"Oncology Rooming Note    October 13, 2022 10:22 AM   Yolanda Vee is a 22 year old female who presents for:    Chief Complaint   Patient presents with     Hematology     Persistent leukocytosis     Initial Vitals: BP (!) 133/93 (BP Location: Right arm, Patient Position: Sitting, Cuff Size: Adult Large)   Pulse 96   Ht 1.499 m (4' 11.02\")   Wt 110.5 kg (243 lb 8 oz)   SpO2 97%   BMI 49.15 kg/m   Estimated body mass index is 49.15 kg/m  as calculated from the following:    Height as of this encounter: 1.499 m (4' 11.02\").    Weight as of this encounter: 110.5 kg (243 lb 8 oz). Body surface area is 2.15 meters squared.  No Pain (0) Comment: Data Unavailable   No LMP recorded. Patient has had an injection.  Allergies reviewed: Yes  Medications reviewed: Yes    Medications: Medication refills not needed today.  Pharmacy name entered into [employer]:    [id number] - [place], [place] - [address] AT [facility] [address]  [id number] - [place], [place] - [address] AT [place] [address]    Clinical concerns: re-evaluation, persistant leukocytosis      Megan Oropeza MA            "

## 2022-10-13 NOTE — LETTER
10/13/2022         RE: Yolanda Vee  [address]  [facility] [place] [facility] [address]        Dear Colleague,    Thank you for referring your patient, Yolanda Vee, to the [facility]. Please see a copy of my visit note below.    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    Impression:  1.  Stable chronic neutrophilia probably related to tobacco use  2.  Indeterminate, waxing-waning slightly erythematous pruritic rash  3.  Obesity  4.  Gender dysphoria with testosterone hormone therapy for male reassignment  5.  History of anxiety, depression, intermittent asthma and esophagitis  6.  No clinical evidence to support concern for Hodgkin lymphoma or lupus erythematosus    Recommendation, plan, instructions:  1.  Check CRISTOBAL, dsDNA, rheumatoid factor, ESR; however, I explained to the patient and her mother positive autoimmune serology does not necessarily equate to an autoimmune diagnosis without concurrent clinical findings.  2.  I detected no historical or clinical evidence to suggest Hodgkin's lymphoma and would not recommend CT imaging pending the above-mentioned test results.  3.  The patient declined tobacco cessation intervention  4.  Discussed importance of weight loss.    Time with patient including review, documentation, history, examination, coordination of care and counseling was 50 minutes.    History of present illness:  The patient was seen by Dr. Litzy Corona on 9/29/2022 with a 2-week history of a nonpruritic rash over her hands, feet and buttocks.  Patient reports a remote history of eczema.  The patient was  quite concerned as her maternal grandfather was diagnosed with Hodgkin's lymphoma at age 17 with recurrence at age 20 associate with the onset of a rash.    She also experience a facial rash a week ago that resolved. The mother is concerned about a butterfly rash that is associated with lupus erythematosus.    The patient was seen by Dr. Sadie Maria in this department in  who felt that her neutrophilic leukocytosis was related to tobacco use with a negative BCR/ABL qualitative PCR being obtained.    Past medical history:  History of gender dysphoria who who desires full masculine changes and [facility].  Past history of anxiety, depression, mild intermittent asthma, and esophagitis.    Family history:  I have reviewed this patient's family history and updated it with pertinent information if needed.  Family History   Problem Relation Age of Onset     Heart Disease Mother      Substance Abuse Mother         in remission     Substance Abuse Father      Alcoholism Father      Bipolar Disorder Brother      Depression Brother      Anxiety Disorder Brother      Hypertension Maternal Grandmother      Arthritis Maternal Grandmother      Heart Disease Maternal Grandmother      Heart Disease Maternal Uncle      Heart Disease Maternal Uncle      Hodgkin's lymphoma Maternal Grandfather 26.00     Esophageal Cancer Maternal Grandfather          at 54.     Acute Myocardial Infarction No family hx of        Medications:  Current Outpatient Medications   Medication     albuterol (PROAIR HFA/PROVENTIL HFA/VENTOLIN HFA) 108 (90 Base) MCG/ACT inhaler     buPROPion (WELLBUTRIN XL) 150 MG 24 hr tablet     escitalopram (LEXAPRO) 20 MG tablet     famotidine (PEPCID) 20 MG tablet     ferrous fumarate (FERRETTS) 324 (106 Fe) MG TABS tablet     ipratropium - albuterol 0.5 mg/2.5 mg/3 mL (DUONEB) 0.5-2.5 (3) MG/3ML neb solution     LANsoprazole (PREVACID) 30 MG   "capsule     naltrexone (DEPADE/REVIA) 50 MG tablet     prazosin (MINIPRESS) 1 MG capsule     sucralfate (CARAFATE) 1 GM tablet     Vitamin D, Cholecalciferol, 25 MCG (1000 UT) TABS     Current Facility-Administered Medications   Medication     medroxyPROGESTERone (DEPO-PROVERA) injection 150 mg       Allergies:  Allergies   Allergen Reactions     Ibuprofen Other (See Comments) and Hives     Throat gets itchy and sore     Hydrocodone-Acetaminophen Nausea       Review of systems:  Complains of headaches after loud noises for 3-4 years.  She has had problems with chronic abdominal pain and has a December, 2022 EGD to reevaluate history esophagitis.  She is on testosterone Depo therapy x3 months as part of gender reassignment.  Her last menstrual period was 3 years ago.    Except as noted in the note above, the patient denies headaches, diplopia, hearing loss or dizziness; dyspnea, cough, hemoptysis, pleurisy; chest pain/pressure, palpitations, lightheadedness; anorexia, nausea, vomiting, abdominal pain, diarrhea, constipation, melena or rectal bleeding; dysuria, frequency,  blood in the urine; vaginal bleeding or discharge; fever, chills, sweats, hot flashes; tingling, numbness, loss of balance; insomnia, depression, anxiety.    Physical examination:  BP (!) 133/93 (BP Location: Right arm, Patient Position: Sitting, Cuff Size: Adult Large)   Pulse 96   Ht 1.499 m (4' 11.02\")   Wt 110.5 kg (243 lb 8 oz)   SpO2 97%   BMI 49.15 kg/m      The patient is alert and oriented. Throat and oral mucosa are normal-appearing. Adenopathy is absent in the neck, axilla, groin and supraclavicular fossa; Lungs are clear to auscultation and percussion without rales, wheezes or rubs; heart rhythm is regular, heart sounds are without murmurs or gallops; the abdomen is soft and flat without hepatosplenomegaly, masses, ascites or tenderness.;  No facial or bimalar rash was noted.  Examination of the hands were unremarkable.  There is " "some slightly scaly macular non-erythematous eruption over the periphery of the feet.      Tom Rodríguez MD    Oncology Rooming Note    October 13, 2022 10:22 AM   Yolanda Vee is a 22 year old female who presents for:    Chief Complaint   Patient presents with     Hematology     Persistent leukocytosis     Initial Vitals: BP (!) 133/93 (BP Location: Right arm, Patient Position: Sitting, Cuff Size: Adult Large)   Pulse 96   Ht 1.499 m (4' 11.02\")   Wt 110.5 kg (243 lb 8 oz)   SpO2 97%   BMI 49.15 kg/m   Estimated body mass index is 49.15 kg/m  as calculated from the following:    Height as of this encounter: 1.499 m (4' 11.02\").    Weight as of this encounter: 110.5 kg (243 lb 8 oz). Body surface area is 2.15 meters squared.  No Pain (0) Comment: Data Unavailable   No LMP recorded. Patient has had an injection.  Allergies reviewed: Yes  Medications reviewed: Yes    Medications: Medication refills not needed today.  Pharmacy name entered into Sp[employer] Backup:    [facility] #[id number] - [place], [place] - [address] AT [id number] - [place], [place] - [address] AT [place] [address]    Clinical concerns: re-evaluation, persistant leukocytosis      Megan Oropeza MA                Again, thank you for allowing me to participate in the care of your patient.        Sincerely,        Tom Rodríguez MD    "

## 2022-10-14 LAB
ANA PAT SER IF-IMP: ABNORMAL
ANA SER QL IF: ABNORMAL
ANA TITR SER IF: ABNORMAL {TITER}
DSDNA AB SER-ACNC: 1.2 IU/ML
RHEUMATOID FACT SER NEPH-ACNC: <6 IU/ML

## 2022-10-17 ENCOUNTER — TELEPHONE (OUTPATIENT)
Dept: GASTROENTEROLOGY | Facility: CLINIC | Age: 22
End: 2022-10-17

## 2022-10-17 NOTE — TELEPHONE ENCOUNTER
LVmx1 sent my chart message letting pt know that the provider is no longer do in person visits at this time. Due to this the apt has has been changed to virtual     Left phone number in case pt needs to call back to reschedule.

## 2022-11-01 ENCOUNTER — ALLIED HEALTH/NURSE VISIT (OUTPATIENT)
Dept: FAMILY MEDICINE | Facility: CLINIC | Age: 22
End: 2022-11-01
Payer: COMMERCIAL

## 2022-11-01 DIAGNOSIS — Z23 NEED FOR VACCINATION: Primary | ICD-10-CM

## 2022-11-01 PROCEDURE — 99207 PR NO CHARGE NURSE ONLY: CPT

## 2022-11-01 PROCEDURE — 90686 IIV4 VACC NO PRSV 0.5 ML IM: CPT

## 2022-11-01 PROCEDURE — 96372 THER/PROPH/DIAG INJ SC/IM: CPT | Performed by: FAMILY MEDICINE

## 2022-11-01 PROCEDURE — 90471 IMMUNIZATION ADMIN: CPT

## 2022-11-01 RX ADMIN — MEDROXYPROGESTERONE ACETATE 150 MG: 150 INJECTION, SUSPENSION INTRAMUSCULAR at 10:50

## 2022-11-07 ENCOUNTER — MYC MEDICAL ADVICE (OUTPATIENT)
Dept: FAMILY MEDICINE | Facility: CLINIC | Age: 22
End: 2022-11-07

## 2022-11-07 NOTE — TELEPHONE ENCOUNTER
Appointment made with Dr. Corona on 11/09 at 2:50 to be seen.    Patient states hard to walk and feet swollen. Has been getting worse since Oct. Makes it hard for her to do perform daily activities. Appointment made for patient to be further evaluated.     Isak Aiken RN on 11/7/2022 at 11:23 AM

## 2022-11-09 ENCOUNTER — OFFICE VISIT (OUTPATIENT)
Dept: FAMILY MEDICINE | Facility: CLINIC | Age: 22
End: 2022-11-09
Payer: COMMERCIAL

## 2022-11-09 VITALS
HEIGHT: 59 IN | DIASTOLIC BLOOD PRESSURE: 82 MMHG | RESPIRATION RATE: 16 BRPM | HEART RATE: 110 BPM | SYSTOLIC BLOOD PRESSURE: 114 MMHG | OXYGEN SATURATION: 98 % | WEIGHT: 243.8 LBS | BODY MASS INDEX: 49.15 KG/M2 | TEMPERATURE: 98.2 F

## 2022-11-09 DIAGNOSIS — R45.88 NONSUICIDAL SELF-INJURY (H): ICD-10-CM

## 2022-11-09 DIAGNOSIS — F33.1 MODERATE EPISODE OF RECURRENT MAJOR DEPRESSIVE DISORDER (H): ICD-10-CM

## 2022-11-09 DIAGNOSIS — Z87.19 HISTORY OF ALCOHOLIC GASTRITIS: ICD-10-CM

## 2022-11-09 DIAGNOSIS — R21 RASH: Primary | ICD-10-CM

## 2022-11-09 DIAGNOSIS — F43.10 PTSD (POST-TRAUMATIC STRESS DISORDER): ICD-10-CM

## 2022-11-09 PROCEDURE — 99214 OFFICE O/P EST MOD 30 MIN: CPT | Performed by: FAMILY MEDICINE

## 2022-11-09 RX ORDER — CLOTRIMAZOLE 1 %
CREAM (GRAM) TOPICAL 2 TIMES DAILY
Qty: 30 G | Refills: 1 | Status: SHIPPED | OUTPATIENT
Start: 2022-11-09 | End: 2022-11-23

## 2022-11-09 RX ORDER — TRIAMCINOLONE ACETONIDE 5 MG/G
CREAM TOPICAL 2 TIMES DAILY
Qty: 30 G | Refills: 0 | Status: SHIPPED | OUTPATIENT
Start: 2022-11-09 | End: 2022-11-23

## 2022-11-09 RX ORDER — PRAZOSIN HYDROCHLORIDE 1 MG/1
CAPSULE ORAL
Qty: 180 CAPSULE | Refills: 1 | Status: ON HOLD | OUTPATIENT
Start: 2022-11-09 | End: 2024-07-02

## 2022-11-09 RX ORDER — ESCITALOPRAM OXALATE 20 MG/1
20 TABLET ORAL DAILY
Qty: 90 TABLET | Refills: 1 | Status: SHIPPED | OUTPATIENT
Start: 2022-11-09 | End: 2023-06-15

## 2022-11-09 ASSESSMENT — ASTHMA QUESTIONNAIRES
ACT_TOTALSCORE: 11
QUESTION_5 LAST FOUR WEEKS HOW WOULD YOU RATE YOUR ASTHMA CONTROL: SOMEWHAT CONTROLLED
QUESTION_3 LAST FOUR WEEKS HOW OFTEN DID YOUR ASTHMA SYMPTOMS (WHEEZING, COUGHING, SHORTNESS OF BREATH, CHEST TIGHTNESS OR PAIN) WAKE YOU UP AT NIGHT OR EARLIER THAN USUAL IN THE MORNING: FOUR OR MORE NIGHTS A WEEK
QUESTION_4 LAST FOUR WEEKS HOW OFTEN HAVE YOU USED YOUR RESCUE INHALER OR NEBULIZER MEDICATION (SUCH AS ALBUTEROL): ONE OR TWO TIMES PER DAY
QUESTION_2 LAST FOUR WEEKS HOW OFTEN HAVE YOU HAD SHORTNESS OF BREATH: THREE TO SIX TIMES A WEEK
QUESTION_1 LAST FOUR WEEKS HOW MUCH OF THE TIME DID YOUR ASTHMA KEEP YOU FROM GETTING AS MUCH DONE AT WORK, SCHOOL OR AT HOME: MOST OF THE TIME
ACT_TOTALSCORE: 11

## 2022-11-09 ASSESSMENT — ANXIETY QUESTIONNAIRES
5. BEING SO RESTLESS THAT IT IS HARD TO SIT STILL: MORE THAN HALF THE DAYS
GAD7 TOTAL SCORE: 12
GAD7 TOTAL SCORE: 12
6. BECOMING EASILY ANNOYED OR IRRITABLE: NEARLY EVERY DAY
3. WORRYING TOO MUCH ABOUT DIFFERENT THINGS: MORE THAN HALF THE DAYS
IF YOU CHECKED OFF ANY PROBLEMS ON THIS QUESTIONNAIRE, HOW DIFFICULT HAVE THESE PROBLEMS MADE IT FOR YOU TO DO YOUR WORK, TAKE CARE OF THINGS AT HOME, OR GET ALONG WITH OTHER PEOPLE: VERY DIFFICULT
1. FEELING NERVOUS, ANXIOUS, OR ON EDGE: MORE THAN HALF THE DAYS
2. NOT BEING ABLE TO STOP OR CONTROL WORRYING: NOT AT ALL
7. FEELING AFRAID AS IF SOMETHING AWFUL MIGHT HAPPEN: NOT AT ALL

## 2022-11-09 ASSESSMENT — PAIN SCALES - GENERAL: PAINLEVEL: SEVERE PAIN (6)

## 2022-11-09 ASSESSMENT — PATIENT HEALTH QUESTIONNAIRE - PHQ9
SUM OF ALL RESPONSES TO PHQ QUESTIONS 1-9: 18
5. POOR APPETITE OR OVEREATING: NEARLY EVERY DAY

## 2022-11-09 NOTE — PATIENT INSTRUCTIONS
For your rash, try clotrimazole cream and triamcinolone cream, 1 thin layer of after another twice daily for 14 days.  Schedule a visit with your dermatologist for follow-up of the rash and pain.    Resume escitalopram and prazosin.  Let me know if you are needing refills of your other medications.  Schedule follow-up visit with your psychiatrist.

## 2022-11-09 NOTE — LETTER
My Depression Action Plan  Name: Yolanda Vee   Date of Birth 2000  Date: 11/9/2022    My doctor: Litzy Corona   My clinic: [facility]  [address] [facility] [address]  [facility] [address]  [phone]          GREEN    ZONE   Good Control    What it looks like:     Things are going generally well. You have normal ups and downs. You may even feel depressed from time to time, but bad moods usually last less than a day.   What you need to do:  1. Continue to care for yourself (see self care plan)  2. Check your depression survival kit and update it as needed  3. Follow your physician s recommendations including any medication.  4. Do not stop taking medication unless you consult with your physician first.           YELLOW         ZONE Getting Worse    What it looks like:     Depression is starting to interfere with your life.     It may be hard to get out of bed; you may be starting to isolate yourself from others.    Symptoms of depression are starting to last most all day and this has happened for several days.     You may have suicidal thoughts but they are not constant.   What you need to do:     1. Call your care team. Your response to treatment will improve if you keep your care team informed of your progress. Yellow periods are signs an adjustment may need to be made.     2. Continue your self-care.  Just get dressed and ready for the day.  Don't give yourself time to talk yourself out of it.    3. Talk to someone in your support network.    4. Open up your Depression Self-Care Plan/Wellness Kit.           RED    ZONE Medical Alert - Get Help    What it looks like:     Depression is seriously interfering with your life.     You may experience these or other symptoms: You can t get out of bed most days, can t work or engage in other necessary activities, you have trouble taking care of basic hygiene, or basic responsibilities, thoughts of suicide or death that will  not go away, self-injurious behavior.     What you need to do:  1. Call your care team and request a same-day appointment. If they are not available (weekends or after hours) call your local crisis line, emergency room or 911.          Depression Self-Care Plan / Wellness Kit    Many people find that medication and therapy are helpful treatments for managing depression. In addition, making small changes to your everyday life can help to boost your mood and improve your wellbeing. Below are some tips for you to consider. Be sure to talk with your medical provider and/or behavioral health consultant if your symptoms are worsening or not improving.     Sleep   Sleep hygiene  means all of the habits that support good, restful sleep. It includes maintaining a consistent bedtime and wake time, using your bedroom only for sleeping or sex, and keeping the bedroom dark and free of distractions like a computer, smartphone, or television.     Develop a Healthy Routine  Maintain good hygiene. Get out of bed in the morning, make your bed, brush your teeth, take a shower, and get dressed. Don t spend too much time viewing media that makes you feel stressed. Find time to relax each day.    Exercise  Get some form of exercise every day. This will help reduce pain and release endorphins, the  feel good  chemicals in your brain. It can be as simple as just going for a walk or doing some gardening, anything that will get you moving.      Diet  Strive to eat healthy foods, including fruits and vegetables. Drink plenty of water. Avoid excessive sugar, caffeine, alcohol, and other mood-altering substances.     Stay Connected with Others  Stay in touch with friends and family members.    Manage Your Mood  Try deep breathing, massage therapy, biofeedback, or meditation. Take part in fun activities when you can. Try to find something to smile about each day.     Psychotherapy  Be open to working with a therapist if your provider recommends  it.     Medication  Be sure to take your medication as prescribed. Most anti-depressants need to be taken every day. It usually takes several weeks for medications to work. Not all medicines work for all people. It is important to follow-up with your provider to make sure you have a treatment plan that is working for you. Do not stop your medication abruptly without first discussing it with your provider.    Crisis Resources   These hotlines are for both adults and children. They and are open 24 hours a day, 7 days a week unless noted otherwise.      National Suicide Prevention Lifeline   988 or 8-719-984-MKXT (8838)      Crisis Text Line    www.crisistextline.org  Text HOME to 246564 from anywhere in the [place], anytime, about any type of crisis. A live, trained crisis counselor will receive the text and respond quickly.      [place] Lifeline for LGBTQ Youth  A national crisis intervention and suicide lifeline for LGBTQ youth under 25. Provides a safe place to talk without judgement. Call 1-976.733.2552; text START to 182220 or visit www.thetrevorproject.org to talk to a trained counselor.      For [facility] crisis numbers, visit the [facility] website at:  https://[place].gov/dhs/people-we-serve/adults/health-care/mental-health/resources/crisis-contacts.jsp

## 2022-11-09 NOTE — LETTER
November 9, 2022      Yolanda Vee  [address] [place] [address]        To Whom It May Concern:    Yolanda Vee was seen in our clinic. He may return to work without restrictions.      Sincerely,        Litzy Corona MD

## 2022-11-18 ENCOUNTER — VIRTUAL VISIT (OUTPATIENT)
Dept: GASTROENTEROLOGY | Facility: CLINIC | Age: 22
End: 2022-11-18
Payer: COMMERCIAL

## 2022-11-18 DIAGNOSIS — K21.00 GASTROESOPHAGEAL REFLUX DISEASE WITH ESOPHAGITIS WITHOUT HEMORRHAGE: Primary | ICD-10-CM

## 2022-11-18 DIAGNOSIS — K20.80 LOS ANGELES GRADE C ESOPHAGITIS: ICD-10-CM

## 2022-11-18 PROCEDURE — 99215 OFFICE O/P EST HI 40 MIN: CPT | Mod: GT | Performed by: PHYSICIAN ASSISTANT

## 2022-11-18 RX ORDER — OMEPRAZOLE 40 MG/1
40 CAPSULE, DELAYED RELEASE ORAL
Qty: 90 CAPSULE | Refills: 3 | Status: SHIPPED | OUTPATIENT
Start: 2022-11-18 | End: 2023-04-21

## 2022-11-18 NOTE — LETTER
"    11/18/2022         RE: Yolanda Vee  [address]  [facility] [address]        Dear Colleague,    Thank you for referring your patient, Yolanda Vee, to the [facility] [place]. Please see a copy of my visit note below.    GI CLINIC VISIT    CC/REFERRING PROVIDER: No ref. 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    Kem was previously seen by multiple providers in our clinic. This is my first encounter with the patient. Briefly, he initially presented with report of BRBPR and hematemesis. EGD June 2021 was notable for LA grade C esophagitis. Breath testing was performed and notable for increased methane and hydrogen, and was given a course of Rifaximin without change in symptoms. He was seen in the ED 1/26/2022 with ongoing symptoms, with CTA showing possible mild colonic wall thickening. Urine tox screen was positive for opiates and cannabinoids. At most recent follow-up April 2022, he noted lower abdominal pelvic pain that is constant, as well as frequent, constant GERD symptoms despite omeprazole 20 mg daily and carafate daily.He reported dysphagia with steak and lettuce as well as intermittent hematemesis. Repeat EGD 6/2022 with LA grade A esophagitis. He was recommended lansoprazole 30 mg twice daily, with recommendation for repeat EGD in 3 months.    Stool testing was notable for elevated fecal calprotectin at 92.3, with unremarkable testing for chronic enteric pathogens. Celiac serologies negative. EGD and colonoscopy was ordered and scheduled for 12/1/2022.    Today, he notes that he is taking omeprazole 20 mg daily and Prevacid 30 mg twice daily. With this, he states he is still having frequent heartburn and reflux symptoms \"all the time\".  Symptoms start with " substernal burning, which leads to coughing, which then leads to either regurgitation of either acid and/or food particles. He still can note specks of blood, which was also reported at previous visit.  Rmains with dysphagia to lettuce and steak and chicken, sandwiches. This occurs most days. There is pain with swallowing. Hurts even to take a pill.    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 He is taking MiraLAX every morning, full capful in Gatorade or orange juice.      Recent blood tests notable for CRP elevated to 2.8, ESR 44    ROS: 10pt ROS performed and otherwise negative.    PAST MEDICAL HISTORY:  Past Medical History:   Diagnosis Date     Anxiety     therapist: [facility]     Chemical dependency (H)     quit Sept 2021, alcohol. some family hx as well.      Chronic constipation      Constipation      Depression      Depression      Esophageal reflux     controlled well on protonix currently, EGD 6/3/21 w/ some mild esophagitis when alcohol intake was higher (bottle vodka daily).     Heart rate problem     some palpitations in the past w/ anxiety attacks.     Migraines     occ tylenol use.     Morbid obesity (H) 03/06/2020     Obese      Palpitations      Plantar warts      PTSD (post-traumatic stress disorder)      PTSD (post-traumatic stress disorder)      Uncomplicated asthma        PREVIOUS ABDOMINAL/GYNECOLOGIC SURGERIES:  Past Surgical History:   Procedure Laterality Date     COLONOSCOPY N/A 6/3/2021    Procedure: COLONOSCOPY;  Surgeon: Guru Gerardo Prado MD;  Location:  GI     ESOPHAGOSCOPY, GASTROSCOPY, DUODENOSCOPY (EGD), COMBINED N/A 6/3/2021    Procedure: ESOPHAGOGASTRODUODENOSCOPY (EGD);  Surgeon: Guru Gerardo Prado MD;  Location:  GI     ESOPHAGOSCOPY, GASTROSCOPY, DUODENOSCOPY (EGD), COMBINED N/A 6/23/2022    Procedure:  "ESOPHAGOGASTRODUODENOSCOPY (EGD);  Surgeon: Amanda Linares MD;  Location: [facility] OR     wisdom teeth           PERTINENT MEDICATIONS:  Current Outpatient Medications   Medication Sig Dispense Refill     albuterol (PROAIR HFA/PROVENTIL HFA/VENTOLIN HFA) 108 (90 Base) MCG/ACT inhaler Inhale 2 puffs into the lungs every 6 hours as needed for shortness of breath / dyspnea or wheezing 18 g 0     B-D SYRINGE LUER-SANTA 1 ML MISC USE TO ADMINISTER ONCE A WEEK       BD DISP NEEDLES 25G X 5/8\" MISC USE FOR ADMINISTRATION OF MEDICATION WEEKLY. SUBSTITUTE AS NEEDED BASED ON MEDICAL INSURANCE COVERAGE       buPROPion (WELLBUTRIN XL) 150 MG 24 hr tablet Take 1 tablet (150 mg) by mouth every morning 30 tablet 3     clotrimazole (LOTRIMIN) 1 % external cream Apply topically 2 times daily for 14 days 30 g 1     escitalopram (LEXAPRO) 20 MG tablet Take 1 tablet (20 mg) by mouth daily 90 tablet 1     famotidine (PEPCID) 20 MG tablet Take 1 tablet (20 mg) by mouth At Bedtime 60 tablet 3     fluocinonide (LIDEX) 0.05 % external ointment        ipratropium - albuterol 0.5 mg/2.5 mg/3 mL (DUONEB) 0.5-2.5 (3) MG/3ML neb solution Inhale 3 mLs into the lungs every 6 hours        LANsoprazole (PREVACID) 30 MG DR capsule Take 1 capsule (30 mg) by mouth 2 times daily 120 capsule 3     omeprazole (PRILOSEC) 20 MG DR capsule Take 20 mg by mouth       prazosin (MINIPRESS) 1 MG capsule TAKE 1 CAPSULE BY MOUTH EVERY NIGHT AT BEDTIME. AFTER 2-3 DAYS, INCREASE TO 2 CAPSULE EVERY NIGHT AT BEDTIME 180 capsule 1     sucralfate (CARAFATE) 1 GM tablet Take 1 tablet (1 g) by mouth 4 times daily (before meals and nightly) 180 tablet 3     testosterone cypionate (DEPOTESTOSTERONE) 200 MG/ML injection Inject 0.2 mg into the muscle once a week       triamcinolone (ARISTOCORT HP) 0.5 % external cream Apply topically 2 times daily for 14 days 30 g 0     Vitamin D, Cholecalciferol, 25 MCG (1000 UT) TABS Take 1,000 Units by mouth every morning          SOCIAL " HISTORY:    Social History     Socioeconomic History     Marital status: Single     Spouse name: Not on file     Number of children: Not on file     Years of education: Not on file     Highest education level: Not on file   Occupational History     Not on file   Tobacco Use     Smoking status: Every Day     Packs/day: 0.00     Years: 5.00     Pack years: 0.00     Types: Cigarettes     Smokeless tobacco: Never     Tobacco comments:     \   Substance and Sexual Activity     Alcohol use: Yes     Alcohol/week: 6.0 - 8.0 standard drinks     Types: 6 - 8 Standard drinks or equivalent per week     Comment: smetimes     Drug use: Yes     Types: Marijuana     Sexual activity: Not Currently     Birth control/protection: Injection   Other Topics Concern     Parent/sibling w/ CABG, MI or angioplasty before 65F 55M? Not Asked   Social History Narrative     Not on file     Social Determinants of Health     Financial Resource Strain: Not on file   Food Insecurity: Not on file   Transportation Needs: Not on file   Physical Activity: Not on file   Stress: Not on file   Social Connections: Not on file   Intimate Partner Violence: Not on file   Housing Stability: Not on file       FAMILY HISTORY:  Family History   Problem Relation Age of Onset     Heart Disease Mother      Substance Abuse Mother         in remission     Substance Abuse Father      Alcoholism Father      Bipolar Disorder Brother      Depression Brother      Anxiety Disorder Brother      Hypertension Maternal Grandmother      Arthritis Maternal Grandmother      Heart Disease Maternal Grandmother      Heart Disease Maternal Uncle      Heart Disease Maternal Uncle      Hodgkin's lymphoma Maternal Grandfather 26.00     Esophageal Cancer Maternal Grandfather          at 54.     Acute Myocardial Infarction No family hx of        PHYSICAL EXAMINATION:  Vitals reviewed  There were no vitals taken for this visit.  Video physical exam  General: Patient appears well in no  acute distress.   Skin: No visualized rash or lesions on visualized skin  Eyes: EOMI, no erythema, sclera icterus or discharge noted  Resp: Appears to be breathing comfortably without accessory muscle usage, speaking in full sentences, no cough  MSK: Appears to have normal range of motion based on visualized movements  Neurologic: No apparent tremors, facial movements symmetric  Psych: affect normal, alert and oriented    The rest of a comprehensive physical examination is deferred due to PHE (public health emergency) video restrictions      PERTINENT STUDIES Reviewed in EMR    ASSESSMENT/PLAN:    # GERD  # LA grade C esophagitis  # Dysphagia to solids  Frequent GERD symptoms, with LA grade C esophagitis noted on endoscopy in 2021, with follow-up endoscopy June 2022 showing LA grade A esophagitis, on unclear dosage of antisecretory therapy. He is now on both omeprazole 20 mg and lansoprazole 30 mg twice daily with uncontrolled GERD symptoms. He is already scheduled for follow-up EGD in several weeks. We reviewed the following plan:  -- Stop Lansoprazole, increase omeprazole to 40 mg twice daily (pt feels omeprazole historically has worked better)  -- Can add in Gaviscon PRN  -- pH impedence per patient preference, with patient goal to visit with surgery for possible anti-reflux surgery. Tobacco and alcohol use will have to be stopped prior to any surgical procedure.  -- Constipation management, as below.    # Chronic constipation  # Elevated fecal calprotectin  # Elevated serum inflammatory markers  # Possible bowel wall thickening on CT  Previous colonoscopy in 2021 was unrevealing. Following this, a CT showed possible bowel wall thickening. Fecal calprotectin was mildly elevated, which can occur in the setting of PPI use. He was previously recommended repeat colonoscopy, sheduled for 12/1.  We reviewed constipation management as follows:    -- Increase MiraLAX to 2-3 capfuls daily  -- Pending response to MiraLAX,  as well as findings at time of colonoscopy, consider trial of secretagogue    RTC 2 months    Thank you for this consultation. It was a pleasure to participate in the care of this patient; please contact us with any further questions.      Madelyn Gutierrez PA-C    44  minutes spent on the date of the encounter doing chart review, review of test results, patient visit and documentation

## 2022-11-18 NOTE — PROGRESS NOTES
Kem is a 22 year old who is being evaluated via a billable video visit.      Pt is in [place]    How would you like to obtain your AVS? MyChart  If the video visit is dropped, the invitation should be resent by: Text to cell phone: [phone]  Will anyone else be joining your video visit? No    Ildaroger Fernando MINNIE      Video-Visit Details    Video Start Time: 251    Type of service:  Video Visit    Video End Time:313    Originating Location (pt. Location): Home        Distant Location (provider location):  Off-site    Platform used for Video Visit: [facility]    GI CLINIC VISIT    CC/REFERRING PROVIDER: No ref. 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    Kem was previously seen by multiple providers in our clinic. This is my first encounter with the patient. Briefly, he initially presented with report of BRBPR and hematemesis. EGD June 2021 was notable for LA grade C esophagitis. Breath testing was performed and notable for increased methane and hydrogen, and was given a course of Rifaximin without change in symptoms. He was seen in the ED 1/26/2022 with ongoing symptoms, with CTA showing possible mild colonic wall thickening. Urine tox screen was positive for opiates and cannabinoids. At most recent follow-up April 2022, he noted lower abdominal pelvic pain that is constant, as well as frequent, constant GERD symptoms despite omeprazole 20 mg daily and carafate daily.He reported dysphagia with steak and lettuce as well as intermittent hematemesis. Repeat EGD 6/2022 with LA grade A esophagitis. He was recommended lansoprazole 30 mg twice daily, with recommendation for repeat EGD in 3 months.    Stool testing was notable for elevated fecal calprotectin at 92.3, with unremarkable testing for chronic enteric pathogens. Celiac serologies negative.  "EGD and colonoscopy was ordered and scheduled for 12/1/2022.    Today, he notes that he is taking omeprazole 20 mg daily and Prevacid 30 mg twice daily. With this, he states he is still having frequent heartburn and reflux symptoms \"all the time\".  Symptoms start with substernal burning, which leads to coughing, which then leads to either regurgitation of either acid and/or food particles. He still can note specks of blood, which was also reported at previous visit.  Rmains with dysphagia to lettuce and steak and chicken, sandwiches. This occurs most days. There is pain with swallowing. Hurts even to take a pill.    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 He is taking MiraLAX every morning, full capful in Gatorade or orange juice.      Recent blood tests notable for CRP elevated to 2.8, ESR 44    ROS: 10pt ROS performed and otherwise negative.    PAST MEDICAL HISTORY:  Past Medical History:   Diagnosis Date     Anxiety     therapist: [facility]     Chemical dependency (H)     quit Sept 2021, alcohol. some family hx as well.      Chronic constipation      Constipation      Depression      Depression      Esophageal reflux     controlled well on protonix currently, EGD 6/3/21 w/ some mild esophagitis when alcohol intake was higher (bottle vodka daily).     Heart rate problem     some palpitations in the past w/ anxiety attacks.     Migraines     occ tylenol use.     Morbid obesity (H) 03/06/2020     Obese      Palpitations      Plantar warts      PTSD (post-traumatic stress disorder)      PTSD (post-traumatic stress disorder)      Uncomplicated asthma        PREVIOUS ABDOMINAL/GYNECOLOGIC SURGERIES:  Past Surgical History:   Procedure Laterality Date     COLONOSCOPY N/A 6/3/2021    Procedure: COLONOSCOPY;  Surgeon: Guru Gerardo Prado MD;  Location: [facility]     " "ESOPHAGOSCOPY, GASTROSCOPY, DUODENOSCOPY (EGD), COMBINED N/A 6/3/2021    Procedure: ESOPHAGOGASTRODUODENOSCOPY (EGD);  Surgeon: Guru Gerardo Prado MD;  Location:  GI     ESOPHAGOSCOPY, GASTROSCOPY, DUODENOSCOPY (EGD), COMBINED N/A 6/23/2022    Procedure: ESOPHAGOGASTRODUODENOSCOPY (EGD);  Surgeon: Amanda Linares MD;  Location: [facility] OR     wisdom teeth           PERTINENT MEDICATIONS:  Current Outpatient Medications   Medication Sig Dispense Refill     albuterol (PROAIR HFA/PROVENTIL HFA/VENTOLIN HFA) 108 (90 Base) MCG/ACT inhaler Inhale 2 puffs into the lungs every 6 hours as needed for shortness of breath / dyspnea or wheezing 18 g 0     B-D SYRINGE LUER-SANTA 1 ML MISC USE TO ADMINISTER ONCE A WEEK       BD DISP NEEDLES 25G X 5/8\" MISC USE FOR ADMINISTRATION OF MEDICATION WEEKLY. SUBSTITUTE AS NEEDED BASED ON MEDICAL INSURANCE COVERAGE       buPROPion (WELLBUTRIN XL) 150 MG 24 hr tablet Take 1 tablet (150 mg) by mouth every morning 30 tablet 3     clotrimazole (LOTRIMIN) 1 % external cream Apply topically 2 times daily for 14 days 30 g 1     escitalopram (LEXAPRO) 20 MG tablet Take 1 tablet (20 mg) by mouth daily 90 tablet 1     famotidine (PEPCID) 20 MG tablet Take 1 tablet (20 mg) by mouth At Bedtime 60 tablet 3     fluocinonide (LIDEX) 0.05 % external ointment        ipratropium - albuterol 0.5 mg/2.5 mg/3 mL (DUONEB) 0.5-2.5 (3) MG/3ML neb solution Inhale 3 mLs into the lungs every 6 hours        LANsoprazole (PREVACID) 30 MG DR capsule Take 1 capsule (30 mg) by mouth 2 times daily 120 capsule 3     omeprazole (PRILOSEC) 20 MG DR capsule Take 20 mg by mouth       prazosin (MINIPRESS) 1 MG capsule TAKE 1 CAPSULE BY MOUTH EVERY NIGHT AT BEDTIME. AFTER 2-3 DAYS, INCREASE TO 2 CAPSULE EVERY NIGHT AT BEDTIME 180 capsule 1     sucralfate (CARAFATE) 1 GM tablet Take 1 tablet (1 g) by mouth 4 times daily (before meals and nightly) 180 tablet 3     testosterone cypionate (DEPOTESTOSTERONE) 200 MG/ML " injection Inject 0.2 mg into the muscle once a week       triamcinolone (ARISTOCORT HP) 0.5 % external cream Apply topically 2 times daily for 14 days 30 g 0     Vitamin D, Cholecalciferol, 25 MCG (1000 UT) TABS Take 1,000 Units by mouth every morning          SOCIAL HISTORY:    Social History     Socioeconomic History     Marital status: Single     Spouse name: Not on file     Number of children: Not on file     Years of education: Not on file     Highest education level: Not on file   Occupational History     Not on file   Tobacco Use     Smoking status: Every Day     Packs/day: 0.00     Years: 5.00     Pack years: 0.00     Types: Cigarettes     Smokeless tobacco: Never     Tobacco comments:     \   Substance and Sexual Activity     Alcohol use: Yes     Alcohol/week: 6.0 - 8.0 standard drinks     Types: 6 - 8 Standard drinks or equivalent per week     Comment: smetimes     Drug use: Yes     Types: Marijuana     Sexual activity: Not Currently     Birth control/protection: Injection   Other Topics Concern     Parent/sibling w/ CABG, MI or angioplasty before 65F 55M? Not Asked   Social History Narrative     Not on file     Social Determinants of Health     Financial Resource Strain: Not on file   Food Insecurity: Not on file   Transportation Needs: Not on file   Physical Activity: Not on file   Stress: Not on file   Social Connections: Not on file   Intimate Partner Violence: Not on file   Housing Stability: Not on file       FAMILY HISTORY:  Family History   Problem Relation Age of Onset     Heart Disease Mother      Substance Abuse Mother         in remission     Substance Abuse Father      Alcoholism Father      Bipolar Disorder Brother      Depression Brother      Anxiety Disorder Brother      Hypertension Maternal Grandmother      Arthritis Maternal Grandmother      Heart Disease Maternal Grandmother      Heart Disease Maternal Uncle      Heart Disease Maternal Uncle      Hodgkin's lymphoma Maternal Grandfather  26.00     Esophageal Cancer Maternal Grandfather          at 54.     Acute Myocardial Infarction No family hx of        PHYSICAL EXAMINATION:  Vitals reviewed  There were no vitals taken for this visit.  Video physical exam  General: Patient appears well in no acute distress.   Skin: No visualized rash or lesions on visualized skin  Eyes: EOMI, no erythema, sclera icterus or discharge noted  Resp: Appears to be breathing comfortably without accessory muscle usage, speaking in full sentences, no cough  MSK: Appears to have normal range of motion based on visualized movements  Neurologic: No apparent tremors, facial movements symmetric  Psych: affect normal, alert and oriented    The rest of a comprehensive physical examination is deferred due to PHE (public health emergency) video restrictions      PERTINENT STUDIES Reviewed in EMR    ASSESSMENT/PLAN:    # GERD  # LA grade C esophagitis  # Dysphagia to solids  Frequent GERD symptoms, with LA grade C esophagitis noted on endoscopy in , with follow-up endoscopy 2022 showing LA grade A esophagitis, on unclear dosage of antisecretory therapy. He is now on both omeprazole 20 mg and lansoprazole 30 mg twice daily with uncontrolled GERD symptoms. He is already scheduled for follow-up EGD in several weeks. We reviewed the following plan:  -- Stop Lansoprazole, increase omeprazole to 40 mg twice daily (pt feels omeprazole historically has worked better)  -- Can add in Gaviscon PRN  -- pH impedence per patient preference, with patient goal to visit with surgery for possible anti-reflux surgery. Tobacco and alcohol use will have to be stopped prior to any surgical procedure.  -- Constipation management, as below.    # Chronic constipation  # Elevated fecal calprotectin  # Elevated serum inflammatory markers  # Possible bowel wall thickening on CT  Previous colonoscopy in  was unrevealing. Following this, a CT showed possible bowel wall thickening. Fecal  calprotectin was mildly elevated, which can occur in the setting of PPI use. He was previously recommended repeat colonoscopy, sheduled for 12/1.  We reviewed constipation management as follows:    -- Increase MiraLAX to 2-3 capfuls daily  -- Pending response to MiraLAX, as well as findings at time of colonoscopy, consider trial of secretagogue    RTC 2 months    Thank you for this consultation. It was a pleasure to participate in the care of this patient; please contact us with any further questions.    Madelyn Gutierrez PA-C    44  minutes spent on the date of the encounter doing chart review, review of test results, patient visit and documentation

## 2022-11-18 NOTE — PATIENT INSTRUCTIONS
"It was a pleasure taking care of you today.  I've included a brief summary of our discussion and care plan from today's visit below.  Please review this information with your primary care provider.  _______________________________________________________________________    My recommendations are summarized as follows:    1) Stop lansoprazole and stop omeprazole 20 mg daily. Instead, start omeprazole 40 mg twice daily, 30-60 minutes prior to meals.  2) Add gaviscon - available over the counter - you want the \"maximum\" or \"extra strength\" version (easy to order from [employer]). Take 1 ounce (2 tablespoons) before meals and before bed, as needed.  3) Order placed for pH testing. Somebody will call you for this procedure.  4) Start MiraLAX 2-3 capfuls daily.   5) Depending on how you feel with the MiraLAX and results of the upcoming colonoscopy, we may want to discuss prescription laxatives at next visit.  6) If at any point you do notice the blood in the vomit or are coughing up blood, and you feel dizzy, lightheaded, have abdominal pain, difficulty breathing, or if the blood is not stopping,please seek care in the emergency room.    Return to GI Clinic in 2 months to review your progress.    ______________________________________________________________________    How do I schedule labs, imaging studies, or procedures that were ordered in clinic today?     Labs: To schedule lab appointment at the [facility], use my chart or call [phone]. If you have a [place] lab closer to home where you are regularly seen you can give them a call.     Procedures: If a colonoscopy, upper endoscopy, breath test, esophageal manometry, or pH impedence was ordered today, our endoscopy team will call you to schedule this. If you have not heard from our endoscopy team within a week, please call [phone] to schedule.     Imaging Studies: If you were scheduled for a CT scan, X-ray, MRI, ultrasound, HIDA scan or other " imaging study, please call [phone] to have this scheduled.     Referral: If a referral to another specialty was ordered, expect a phone call or follow instructions above. If you have not heard from anyone regarding your referral in a week, please call our clinic to check the status.     Who do I call with any questions after my visit?  Please be in touch if there are any further questions that arise following today's visit.  There are multiple ways to contact your gastroenterology care team.      During business hours, you may reach a Gastroenterology nurse at [phone]    To schedule or reschedule an appointment, please call [phone].     You can always send a secure message through [employer].  Me[employer] messages are answered by your nurse or doctor typically within 24 hours.  Please allow extra time on weekends and holidays.      For urgent/emergent questions after business hours, you may reach the on-call GI Fellow by contacting the [facility]  at [phone].     How will I get the results of any tests ordered?    You will receive all of your results.  If you have signed up for [employer], any tests ordered at your visit will be available to you after your physician reviews them.  Typically this takes 1-2 weeks.  If there are urgent results that require a change in your care plan, your physician or nurse will call you to discuss the next steps.      What is [employer]?  Me[employer] is a secure way for you to access all of your healthcare records from the [facility].  It is a web based computer program, so you can sign on to it from any location.  It also allows you to send secure messages to your care team.  I recommend signing up for Me[employer] access if you have not already done so and are comfortable with using a computer.      How to I schedule a follow-up visit?  If you did not schedule a follow-up visit today, please call [phone] to schedule a follow-up office visit.       Sincerely,    Madelyn Gutierrez PA-C  Division of Gastroenterology, Hepatology & Nutrition  [facility]

## 2022-11-20 PROBLEM — R00.0 SINUS TACHYCARDIA: Status: RESOLVED | Noted: 2020-03-06 | Resolved: 2022-11-20

## 2022-11-21 ENCOUNTER — APPOINTMENT (OUTPATIENT)
Dept: CT IMAGING | Facility: CLINIC | Age: 22
End: 2022-11-21
Attending: EMERGENCY MEDICINE
Payer: COMMERCIAL

## 2022-11-21 ENCOUNTER — OFFICE VISIT (OUTPATIENT)
Dept: FAMILY MEDICINE | Facility: CLINIC | Age: 22
End: 2022-11-21
Payer: COMMERCIAL

## 2022-11-21 ENCOUNTER — TELEPHONE (OUTPATIENT)
Dept: GASTROENTEROLOGY | Facility: CLINIC | Age: 22
End: 2022-11-21

## 2022-11-21 ENCOUNTER — HOSPITAL ENCOUNTER (EMERGENCY)
Facility: CLINIC | Age: 22
Discharge: HOME | End: 2022-11-21
Attending: EMERGENCY MEDICINE | Admitting: EMERGENCY MEDICINE
Payer: COMMERCIAL

## 2022-11-21 VITALS
RESPIRATION RATE: 20 BRPM | HEIGHT: 61 IN | DIASTOLIC BLOOD PRESSURE: 90 MMHG | SYSTOLIC BLOOD PRESSURE: 140 MMHG | BODY MASS INDEX: 45.88 KG/M2 | TEMPERATURE: 97.9 F | HEART RATE: 88 BPM | OXYGEN SATURATION: 99 % | WEIGHT: 243 LBS

## 2022-11-21 VITALS
WEIGHT: 243.8 LBS | RESPIRATION RATE: 10 BRPM | OXYGEN SATURATION: 99 % | DIASTOLIC BLOOD PRESSURE: 66 MMHG | SYSTOLIC BLOOD PRESSURE: 108 MMHG | HEART RATE: 136 BPM | HEIGHT: 61 IN | BODY MASS INDEX: 46.03 KG/M2

## 2022-11-21 DIAGNOSIS — R42 LIGHTHEADEDNESS: ICD-10-CM

## 2022-11-21 DIAGNOSIS — F10.920 ALCOHOLIC INTOXICATION WITHOUT COMPLICATION (H): ICD-10-CM

## 2022-11-21 DIAGNOSIS — S00.81XA ABRASION OF FOREHEAD, INITIAL ENCOUNTER: ICD-10-CM

## 2022-11-21 DIAGNOSIS — S06.0XAA CONCUSSION WITH UNKNOWN LOSS OF CONSCIOUSNESS STATUS, INITIAL ENCOUNTER: ICD-10-CM

## 2022-11-21 DIAGNOSIS — R40.0 DROWSINESS: ICD-10-CM

## 2022-11-21 DIAGNOSIS — R00.0 TACHYCARDIA: ICD-10-CM

## 2022-11-21 DIAGNOSIS — S09.90XA TRAUMATIC INJURY OF HEAD, INITIAL ENCOUNTER: Primary | ICD-10-CM

## 2022-11-21 LAB
ANION GAP SERPL CALCULATED.3IONS-SCNC: 10 MMOL/L (ref 5–18)
BUN SERPL-MCNC: 4 MG/DL (ref 8–22)
CALCIUM SERPL-MCNC: 8.5 MG/DL (ref 8.5–10.5)
CHLORIDE BLD-SCNC: 110 MMOL/L (ref 98–107)
CO2 SERPL-SCNC: 23 MMOL/L (ref 22–31)
CREAT SERPL-MCNC: 0.76 MG/DL (ref 0.6–1.3)
ETHANOL SERPL-MCNC: 91 MG/DL
GFR SERPL CREATININE-BSD FRML MDRD: >90 ML/MIN/1.73M2
GLUCOSE BLD-MCNC: 83 MG/DL (ref 70–125)
POTASSIUM BLD-SCNC: 3.7 MMOL/L (ref 3.5–5)
SODIUM SERPL-SCNC: 143 MMOL/L (ref 136–145)

## 2022-11-21 PROCEDURE — 82310 ASSAY OF CALCIUM: CPT | Performed by: EMERGENCY MEDICINE

## 2022-11-21 PROCEDURE — 99285 EMERGENCY DEPT VISIT HI MDM: CPT | Mod: 25

## 2022-11-21 PROCEDURE — 72125 CT NECK SPINE W/O DYE: CPT

## 2022-11-21 PROCEDURE — 82077 ASSAY SPEC XCP UR&BREATH IA: CPT | Performed by: EMERGENCY MEDICINE

## 2022-11-21 PROCEDURE — 70450 CT HEAD/BRAIN W/O DYE: CPT

## 2022-11-21 PROCEDURE — 36415 COLL VENOUS BLD VENIPUNCTURE: CPT | Performed by: EMERGENCY MEDICINE

## 2022-11-21 PROCEDURE — 250N000011 HC RX IP 250 OP 636: Performed by: EMERGENCY MEDICINE

## 2022-11-21 PROCEDURE — 70491 CT SOFT TISSUE NECK W/DYE: CPT

## 2022-11-21 PROCEDURE — 99215 OFFICE O/P EST HI 40 MIN: CPT | Performed by: FAMILY MEDICINE

## 2022-11-21 RX ORDER — ALBUTEROL SULFATE 90 UG/1
2 AEROSOL, METERED RESPIRATORY (INHALATION) EVERY 6 HOURS PRN
Qty: 18 G | Refills: 0 | Status: CANCELLED | OUTPATIENT
Start: 2022-11-21

## 2022-11-21 RX ORDER — IOPAMIDOL 755 MG/ML
90 INJECTION, SOLUTION INTRAVASCULAR ONCE
Status: COMPLETED | OUTPATIENT
Start: 2022-11-21 | End: 2022-11-21

## 2022-11-21 RX ADMIN — IOPAMIDOL 90 ML: 755 INJECTION, SOLUTION INTRAVENOUS at 14:27

## 2022-11-21 ASSESSMENT — ACTIVITIES OF DAILY LIVING (ADL)
ADLS_ACUITY_SCORE: 35
ADLS_ACUITY_SCORE: 35

## 2022-11-21 ASSESSMENT — PAIN SCALES - GENERAL: PAINLEVEL: WORST PAIN (10)
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
## 2022-11-21 NOTE — Clinical Note
Yolanda Vee was seen and treated in our emergency department on 11/21/2022.  He may return to work on 11/28/2022.       If you have any questions or concerns, please don't hesitate to call.      Sam Glaser MD
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
## 2022-11-21 NOTE — TELEPHONE ENCOUNTER
Rescheduled EGD.    Attempted to contact patient regarding upcoming EGD procedure on 12.1.2022 for pre assessment questions. No answer.     Left message to return call to [phone] #4    Discuss at home rapid antigen COVID test 1-2 days prior to procedure.    Pre op exam:   Due to BMI it is required to have a pre-op with PAC.  Number left on pt's voicemail.  Upon chart review pt has a pre-op on 11.21.2022 with Litzy Corona MD.    Arrival time: 1000    Facility location: [facility]    Sedation type: MAC    Indication for procedure: reflux esopahgitis despite PPI, uncontrolled GERD symptoms, pH impedence ON therapy to evaluate ongoing acid exposure despite antisecratory therapy, patient considering anti-reflux surgery.    Yolanda Trinidad RN

## 2022-11-21 NOTE — DISCHARGE INSTRUCTIONS
As we discussed recommend somebody stay with you overnight.  They do not need to wake you up.  Refrain from drugs and alcohol.  Follow-up with your doctor.  You been referred to concussion clinic.
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
## 2022-11-22 ENCOUNTER — TELEPHONE (OUTPATIENT)
Dept: FAMILY MEDICINE | Facility: CLINIC | Age: 22
End: 2022-11-22

## 2022-11-22 ENCOUNTER — TELEPHONE (OUTPATIENT)
Dept: GASTROENTEROLOGY | Facility: CLINIC | Age: 22
End: 2022-11-22

## 2022-11-22 NOTE — TELEPHONE ENCOUNTER
Caller: Kem    Procedure: EGD    Date, Location, and Surgeon of Procedure Cancelled: 12/1/22 [facility]    Ordering Provider:JENNIFER Linares order from 9/23/22    Reason for cancel (please be detailed, any staff messages or encounters to note?): Patient        Rescheduled: Yes     If rescheduled:    Date: 2/16/23   Location: [facility]   Prep Resent: No changes (changes to prep?)   Covid Test Rescheduled: No    Note any change or update to original order/sedation: None         Needs PAC appt for BMI-scheduled on 2/2/23

## 2022-11-22 NOTE — PROGRESS NOTES
Assessment & Plan     Rash  Exact etiology of this rash is unclear.  It does not seem to be responding to treatments for eczematous or other steroid responsive conditions.  Did not respond to oral treatment for scabies.  I recommend that he schedule follow-up visit with dermatology.  Given somewhat annular aperients and isolation to feet only, will treat with commendation of clotrimazole and triamcinolone twice daily for 14 days.  We will try cream instead to see if he tolerates this better than the ointment.  - clotrimazole (LOTRIMIN) 1 % external cream; Apply topically 2 times daily for 14 days  - triamcinolone (ARISTOCORT HP) 0.5 % external cream; Apply topically 2 times daily for 14 days    Moderate episode of recurrent major depressive disorder (H)  Inadequate control is likely due to medication noncompliance.  Additional health concerns may also be contributing.  Resume escitalopram.  Resume prazosin.  Encourage continued follow-up with therapist and with psychiatry.  - escitalopram (LEXAPRO) 20 MG tablet; Take 1 tablet (20 mg) by mouth daily    PTSD (post-traumatic stress disorder)  Refills provided of escitalopram and prazosin.  - escitalopram (LEXAPRO) 20 MG tablet; Take 1 tablet (20 mg) by mouth daily  - prazosin (MINIPRESS) 1 MG capsule; TAKE 1 CAPSULE BY MOUTH EVERY NIGHT AT BEDTIME. AFTER 2-3 DAYS, INCREASE TO 2 CAPSULE EVERY NIGHT AT BEDTIME    Nonsuicidal self-injury (H)  Discussed alternatives to self-harm when urge occurs.  Depression action plan completed today.  Advise follow-up with therapist.  He denies suicidal intention with self-harm.      History of alcoholic gastritis  She will follow-up with gastroenterology as scheduled for upper endoscopy.               Depression Screening Follow Up    PHQ 11/9/2022   PHQ-9 Total Score 18   Q9: Thoughts of better off dead/self-harm past 2 weeks More than half the days     Last PHQ-9 11/9/2022   1.  Little interest or pleasure in doing things 2   2.   Feeling down, depressed, or hopeless 3   3.  Trouble falling or staying asleep, or sleeping too much 3   4.  Feeling tired or having little energy 3   5.  Poor appetite or overeating 3   6.  Feeling bad about yourself 0   7.  Trouble concentrating 0   8.  Moving slowly or restless 2   Q9: Thoughts of better off dead/self-harm past 2 weeks 2   PHQ-9 Total Score 18   Difficulty at work, home, or with people Very difficult               Follow Up      Follow Up Actions Taken  Crisis resource information provided in the After Visit Summary  Referred patient back to mental health provider      No follow-ups on file.   Follow-up Visit   Expected date:  Dec 09, 2022 (Approximate)      Follow Up Appointment Details:     Follow-up with whom?: Me    Follow-Up for what?: Chronic Disease f/u    Chronic Disease f/u: Depression    How?: In Person or Virtual                    Litzy Corona MD  [facility] is a 22 year old, presenting for the following health issues:  Feet issues (Bilat feet swelling, itching, burning, discoloration, feet are always cold even if she is hot, and discomfort.sx x1 month), Need's doctors note for work , Depression (Current medication not working/), and Rx for new nebulizer machine      He has a few different concerns today.  First notes persistent rash on his feet associated with swelling of his feet and intense burning discomfort as though feet are in boiling water.  Was seen by dermatology, initially treated with topical fluocinonide ointment which caused an increase in burning sensation.  At follow-up visit he was treated with ivermectin for potential of scabies as etiology.  This did not help.  He had previously been tested for monkey pox and was negative.  Wondering about next steps.  Finds that it is barely tolerable to wear socks and shoes, better when he is barefoot.    He lost his chronic psychiatric medications including Lexapro and prazosin for  "several months and did not attempt to refill them.  Last night he unfortunately was bullied on social media and he injured himself by causing superficial abrasions on his left anterior thigh.  He shares that he has been 1 full week without alcohol, working with a friend to remain sober together, friend is in [place].  He is enrolled in a program in [place] where he may receive detox if he becomes intoxicated again.  He is not ready to pursue treatment.    Endorses poor asthma control.  Using delta 8 THC, occasionally smoking marijuana, has reduced her intake of cigarettes.  She feels overall her asthma is doing okay.    Continues to have very low appetite, often needs to smoke marijuana in order to eat.  Has follow-up scheduled with gastroenterology mid November and upper endoscopy scheduled in December for follow-up of gastritis and gastric ulcer related to alcohol intake.             Review of Systems         Objective    /82   Pulse 110   Temp 98.2  F (36.8  C) (Oral)   Resp 16   Ht 1.499 m (4' 11\")   Wt 110.6 kg (243 lb 12.8 oz)   SpO2 98%   BMI 49.24 kg/m    Body mass index is 49.24 kg/m .  Physical Exam   Alert, no acute distress.  Affect is appropriate.  Superficial transverse scratches across left anterior thigh without evidence of infection.  Feet with multiple annular hyperpigmented lesions, a few are slightly more erythematous.  There is some fine scale at the periphery.  No open sores.  No blisters.  Do not appreciate any lesions on the plantar surface.                    " Take over the counter pain medication

## 2022-11-22 NOTE — TELEPHONE ENCOUNTER
Told patient that Tylenol is a safe choice to take with a concussion. Advised to take 500-1000mg every 6 hours or 325-650mg every 4 hours.   Advised her that if her pain is not controlled by the Tylenol that she should call back or be seen. Dana Cantrell RN on 11/22/2022 at 3:34 PM

## 2022-11-23 NOTE — TELEPHONE ENCOUNTER
FUTURE VISIT INFORMATION      SURGERY INFORMATION:    Date: 23    Location:  gi    Surgeon:  Alma Velasquez MD    Anesthesia Type:  MAC    Procedure: ESOPHAGOGASTRODUODENOSCOPY (EGD)    RECORDS REQUESTED FROM:       Primary Care Provider: Litzy Corona MD- [facility]    Most recent EKG+ Tracin20

## 2022-11-25 ENCOUNTER — MYC MEDICAL ADVICE (OUTPATIENT)
Dept: CALL CENTER | Age: 22
End: 2022-11-25

## 2023-01-02 ENCOUNTER — HOSPITAL ENCOUNTER (EMERGENCY)
Facility: CLINIC | Age: 23
Discharge: HOME | End: 2023-01-02
Attending: EMERGENCY MEDICINE | Admitting: EMERGENCY MEDICINE
Payer: COMMERCIAL

## 2023-01-02 ENCOUNTER — APPOINTMENT (OUTPATIENT)
Dept: ULTRASOUND IMAGING | Facility: CLINIC | Age: 23
End: 2023-01-02
Attending: NURSE PRACTITIONER
Payer: COMMERCIAL

## 2023-01-02 VITALS
HEART RATE: 85 BPM | OXYGEN SATURATION: 100 % | DIASTOLIC BLOOD PRESSURE: 100 MMHG | SYSTOLIC BLOOD PRESSURE: 152 MMHG | RESPIRATION RATE: 14 BRPM | TEMPERATURE: 98.3 F | WEIGHT: 240 LBS | HEIGHT: 61 IN | BODY MASS INDEX: 45.31 KG/M2

## 2023-01-02 DIAGNOSIS — N90.89 CLITORAL IRRITATION: ICD-10-CM

## 2023-01-02 PROBLEM — F64.0 GENDER DYSPHORIA IN ADULT: Status: ACTIVE | Noted: 2022-06-01

## 2023-01-02 PROBLEM — L70.0 ACNE VULGARIS: Status: ACTIVE | Noted: 2022-12-19

## 2023-01-02 LAB
ALBUMIN UR-MCNC: NEGATIVE MG/DL
ANION GAP SERPL CALCULATED.3IONS-SCNC: 7 MMOL/L (ref 5–18)
APPEARANCE UR: CLEAR
BASOPHILS # BLD AUTO: 0.1 10E3/UL (ref 0–0.2)
BASOPHILS NFR BLD AUTO: 1 %
BILIRUB UR QL STRIP: NEGATIVE
BUN SERPL-MCNC: 6 MG/DL (ref 8–22)
CALCIUM SERPL-MCNC: 8.8 MG/DL (ref 8.5–10.5)
CHLORIDE BLD-SCNC: 112 MMOL/L (ref 98–107)
CO2 SERPL-SCNC: 21 MMOL/L (ref 22–31)
COLOR UR AUTO: COLORLESS
CREAT SERPL-MCNC: 0.71 MG/DL (ref 0.6–1.3)
EOSINOPHIL # BLD AUTO: 0.3 10E3/UL (ref 0–0.7)
EOSINOPHIL NFR BLD AUTO: 3 %
ERYTHROCYTE [DISTWIDTH] IN BLOOD BY AUTOMATED COUNT: 15.4 % (ref 10–15)
GFR SERPL CREATININE-BSD FRML MDRD: >90 ML/MIN/1.73M2
GLUCOSE BLD-MCNC: 92 MG/DL (ref 70–125)
GLUCOSE UR STRIP-MCNC: NEGATIVE MG/DL
HCG SERPL QL: NEGATIVE
HCT VFR BLD AUTO: 43.7 % (ref 35–53)
HGB BLD-MCNC: 14 G/DL (ref 11.7–17.7)
HGB UR QL STRIP: NEGATIVE
IMM GRANULOCYTES # BLD: 0.1 10E3/UL
IMM GRANULOCYTES NFR BLD: 0 %
KETONES UR STRIP-MCNC: NEGATIVE MG/DL
LEUKOCYTE ESTERASE UR QL STRIP: ABNORMAL
LYMPHOCYTES # BLD AUTO: 3.7 10E3/UL (ref 0.8–5.3)
LYMPHOCYTES NFR BLD AUTO: 27 %
MCH RBC QN AUTO: 28.6 PG (ref 26.5–33)
MCHC RBC AUTO-ENTMCNC: 32 G/DL (ref 31.5–36.5)
MCV RBC AUTO: 89 FL (ref 78–100)
MONOCYTES # BLD AUTO: 1.1 10E3/UL (ref 0–1.3)
MONOCYTES NFR BLD AUTO: 8 %
NEUTROPHILS # BLD AUTO: 8.5 10E3/UL (ref 1.6–8.3)
NEUTROPHILS NFR BLD AUTO: 61 %
NITRATE UR QL: NEGATIVE
NRBC # BLD AUTO: 0 10E3/UL
NRBC BLD AUTO-RTO: 0 /100
PH UR STRIP: 7.5 [PH] (ref 5–7)
PLATELET # BLD AUTO: 329 10E3/UL (ref 150–450)
POTASSIUM BLD-SCNC: 4 MMOL/L (ref 3.5–5)
RBC # BLD AUTO: 4.89 10E6/UL (ref 3.8–5.9)
RBC URINE: 0 /HPF
SODIUM SERPL-SCNC: 140 MMOL/L (ref 136–145)
SP GR UR STRIP: 1 (ref 1–1.03)
SQUAMOUS EPITHELIAL: 1 /HPF
UROBILINOGEN UR STRIP-MCNC: <2 MG/DL
WBC # BLD AUTO: 13.8 10E3/UL (ref 4–11)
WBC URINE: 2 /HPF

## 2023-01-02 PROCEDURE — 84703 CHORIONIC GONADOTROPIN ASSAY: CPT | Performed by: NURSE PRACTITIONER

## 2023-01-02 PROCEDURE — 76856 US EXAM PELVIC COMPLETE: CPT

## 2023-01-02 PROCEDURE — 85004 AUTOMATED DIFF WBC COUNT: CPT | Performed by: NURSE PRACTITIONER

## 2023-01-02 PROCEDURE — 80048 BASIC METABOLIC PNL TOTAL CA: CPT | Performed by: NURSE PRACTITIONER

## 2023-01-02 PROCEDURE — 99284 EMERGENCY DEPT VISIT MOD MDM: CPT | Mod: 25

## 2023-01-02 PROCEDURE — 36415 COLL VENOUS BLD VENIPUNCTURE: CPT | Performed by: NURSE PRACTITIONER

## 2023-01-02 PROCEDURE — 81001 URINALYSIS AUTO W/SCOPE: CPT | Performed by: EMERGENCY MEDICINE

## 2023-01-02 ASSESSMENT — ENCOUNTER SYMPTOMS: BACK PAIN: 1

## 2023-01-02 NOTE — ED TRIAGE NOTES
Pt reports vaginal bleeding for the past 3-4 days with pelvic pain and back pain.  Pt reports he (pt transgender and prefers he pronouns) is on Testerone and depo and has not had a menstrual cycle for several years.

## 2023-01-02 NOTE — ED PROVIDER NOTES
EMERGENCY DEPARTMENT ENCOUNTER     NAME: Yolanda Vee   AGE: 22 year old adult   YOB: 2000   MRN: [id number]   EVALUATION DATE & TIME: 1/2/2023  3:49 PM   PCP: Litzy Corona     Chief Complaint   Patient presents with     Abdominal Pain     Vaginal Bleeding   :    FINAL IMPRESSION       1. Clitoral irritation           ED COURSE & MEDICAL DECISION MAKING      Pertinent Labs & Imaging studies reviewed. (See chart for details)   22 year old adult  presents to the Emergency Department for evaluation of pain around the clitoris.  Patient is transgender and on testosterone and Depo-Provera.  No menstrual cycle in several years. Initial Vitals Reviewed. Initial exam notable for generally well-appearing patient, no focal tenderness.  We did discuss that clitorimegaly is a known side effect with testosterone and this may be what is causing the discomfort.  We did a pelvic ultrasound to rule out something like ovarian cyst or torsion, fibroid, intrauterine abnormality and this is completely unremarkable.  Labs do not show pregnancy, UTI, or creatinine abnormality.  Pain is not consistent with something like nephrolithiasis, appendicitis and I do not think we need CT imaging.  Patient is comfortable with the rule out approach and will follow-up with his transgender care clinic to discuss further.        4:09 PM I met with the patient to gather history and to perform my initial exam. I discussed the plan for care while in the Emergency Department.    4:24 PM I discussed the plan for discharge with the patient, and patient is agreeable.  We discussed supportive cares at home and reasons for return to the ER including new or worsening symptoms - all questions and concerns addressed.  Patient to be discharged by RN.      At the conclusion of the encounter I discussed the results of all of the tests and the disposition. The questions were answered. The patient or family acknowledged understanding and was  agreeable with the care plan.     Medical Decision Making    History:    Supplemental history from: N/A    External Record(s) reviewed: Outpatient Record: Reviewed    Work Up:    Chart documentation includes differential considered and any EKGs or imaging interpreted by provider.    In additional to work up documented, I considered the following work up: See chart documentation, if applicable.    External consultation:    Discussion of management with another provider: See chart documentation, if applicable    Complicating factors:    Care impacted by chronic illness: N/A    Care affected by social determinants of health: N/A    Disposition considerations: Discharge. No recommendations on prescription strength medication(s). Admission consideration documented above, if applicable.      MEDICATIONS GIVEN IN THE EMERGENCY:   Medications - No data to display   NEW PRESCRIPTIONS STARTED AT TODAY'S ER VISIT   New Prescriptions    No medications on file     ================================================================   HISTORY OF PRESENT ILLNESS       Patient information was obtained from: Patient   Use of Intrepreter: N/A   Yolanda Vee is a 22 year old adult with history of anemia, asthma, and palpitations who presents for vaginal bleeding.     Patient endorses progressively vaginal bleeding, vaginal pain, and lower back pain for the past few days. Patient notes he notices the blood when he wipes. He states thinking it could be due to the hormones he is taking. He endorses taking testosterone and Depo-Provera. Patient states he has a history of vaginal bleeding even when he is not on his period. Patient denies taking anything for discomfort. Patient endorses an allergy to ibuprofen. Patient denies any other complaints at this time.     ================================================================    REVIEW OF SYSTEMS       Review of Systems   Genitourinary: Positive for vaginal bleeding and vaginal pain.  "  Musculoskeletal: Positive for back pain (lower).   All other systems reviewed and are negative.        PAST HISTORY     PAST MEDICAL HISTORY:   Past Medical History:   Diagnosis Date     Anxiety     therapist: [facility]     Chemical dependency (H)     quit Sept 2021, alcohol. some family hx as well.      Chronic constipation      Constipation      Depression      Depression      Esophageal reflux     controlled well on protonix currently, EGD 6/3/21 w/ some mild esophagitis when alcohol intake was higher (bottle vodka daily).     Heart rate problem     some palpitations in the past w/ anxiety attacks.     Migraines     occ tylenol use.     Morbid obesity (H) 03/06/2020     Obese      Palpitations      Plantar warts      PTSD (post-traumatic stress disorder)      PTSD (post-traumatic stress disorder)      Uncomplicated asthma       PAST SURGICAL HISTORY:   Past Surgical History:   Procedure Laterality Date     COLONOSCOPY N/A 6/3/2021    Procedure: COLONOSCOPY;  Surgeon: Guru Gerardo Prado MD;  Location: [facility]     ESOPHAGOSCOPY, GASTROSCOPY, DUODENOSCOPY (EGD), COMBINED N/A 6/3/2021    Procedure: ESOPHAGOGASTRODUODENOSCOPY (EGD);  Surgeon: Guru Gerardo Prado MD;  Location: [facility]     ESOPHAGOSCOPY, GASTROSCOPY, DUODENOSCOPY (EGD), COMBINED N/A 6/23/2022    Procedure: ESOPHAGOGASTRODUODENOSCOPY (EGD);  Surgeon: Amanda Linares MD;  Location: [facility]     wisdom teeth        CURRENT MEDICATIONS:   albuterol (PROAIR HFA/PROVENTIL HFA/VENTOLIN HFA) 108 (90 Base) MCG/ACT inhaler  B-D SYRINGE LUER-SANTA 1 ML MISC  BD DISP NEEDLES 25G X 5/8\" MISC  buPROPion (WELLBUTRIN XL) 150 MG 24 hr tablet  escitalopram (LEXAPRO) 20 MG tablet  famotidine (PEPCID) 20 MG tablet  fluocinonide (LIDEX) 0.05 % external ointment  ipratropium - albuterol 0.5 mg/2.5 mg/3 mL (DUONEB) 0.5-2.5 (3) MG/3ML neb solution  omeprazole (PRILOSEC) 40 MG DR capsule  prazosin (MINIPRESS) 1 MG capsule  sucralfate (CARAFATE) 1 " "GM tablet  testosterone cypionate (DEPOTESTOSTERONE) 200 MG/ML injection  Vitamin D, Cholecalciferol, 25 MCG (1000 UT) TABS      ALLERGIES:   Allergies   Allergen Reactions     Ibuprofen Hives     Throat gets itchy and sore     Hydrocodone-Acetaminophen Nausea     Penicillins       FAMILY HISTORY:   Family History   Problem Relation Age of Onset     Heart Disease Mother      Substance Abuse Mother         in remission     Substance Abuse Father      Alcoholism Father      Bipolar Disorder Brother      Depression Brother      Anxiety Disorder Brother      Hypertension Maternal Grandmother      Arthritis Maternal Grandmother      Heart Disease Maternal Grandmother      Heart Disease Maternal Uncle      Heart Disease Maternal Uncle      Hodgkin's lymphoma Maternal Grandfather 26.00     Esophageal Cancer Maternal Grandfather          at 54.     Acute Myocardial Infarction No family hx of       SOCIAL HISTORY:   Social History     Socioeconomic History     Marital status: Single   Tobacco Use     Smoking status: Every Day     Packs/day: 0.00     Years: 5.00     Pack years: 0.00     Types: Cigarettes     Smokeless tobacco: Never     Tobacco comments:     Pt does not smoke when it is cold outside   Substance and Sexual Activity     Alcohol use: Yes     Alcohol/week: 6.0 - 8.0 standard drinks     Types: 6 - 8 Standard drinks or equivalent per week     Comment: smetimes     Drug use: Yes     Types: Marijuana     Sexual activity: Not Currently     Birth control/protection: Injection        VITALS  Patient Vitals for the past 24 hrs:   BP Temp Temp src Pulse Resp SpO2 Height Weight   23 1540 (!) 152/100 -- -- 85 14 100 % -- --   23 1341 (!) 151/66 98.3  F (36.8  C) Temporal 96 18 99 % 1.549 m (5' 1\") 108.9 kg (240 lb)        ================================================================    PHYSICAL EXAM     VITAL SIGNS: BP (!) 152/100   Pulse 85   Temp 98.3  F (36.8  C) (Temporal)   Resp 14   Ht 1.549 m " "(5' 1\")   Wt 108.9 kg (240 lb)   LMP  (LMP Unknown)   SpO2 100%   BMI 45.35 kg/m     Constitutional:  Awake, no acute distress   HENT:  Atraumatic, oropharynx without exudate or erythema, membranes moist  Lymph:  No adenopathy  Eyes: EOM intact, PERRL, no injection  Neck: Supple  Respiratory:  Clear to auscultation bilaterally, no wheezes or crackles   Cardiovascular:  Regular rate and rhythm, single S1 and S2   GI:  Soft, nontender, nondistended, no rebound or guarding   Musculoskeletal:  Moves all extremities, no lower extremity edema, no deformities    Skin:  Warm, dry  Neurologic:  Alert and oriented x3, no focal deficits noted       ================================================================  LAB       All pertinent labs reviewed and interpreted.   Labs Ordered and Resulted from Time of ED Arrival to Time of ED Departure   ROUTINE UA WITH MICROSCOPIC REFLEX TO CULTURE - Abnormal       Result Value    Color Urine Colorless      Appearance Urine Clear      Glucose Urine Negative      Bilirubin Urine Negative      Ketones Urine Negative      Specific Gravity Urine 1.002      Blood Urine Negative      pH Urine 7.5 (*)     Protein Albumin Urine Negative      Urobilinogen Urine <2.0      Nitrite Urine Negative      Leukocyte Esterase Urine 75 Salvador/uL (*)     RBC Urine 0      WBC Urine 2      Squamous Epithelials Urine 1     BASIC METABOLIC PANEL - Abnormal    Sodium 140      Potassium 4.0      Chloride 112 (*)     Carbon Dioxide (CO2) 21 (*)     Anion Gap 7      Urea Nitrogen 6 (*)     Creatinine 0.71      Calcium 8.8      Glucose 92      GFR Estimate >90     CBC WITH PLATELETS AND DIFFERENTIAL - Abnormal    WBC Count 13.8 (*)     RBC Count 4.89      Hemoglobin 14.0      Hematocrit 43.7      MCV 89      MCH 28.6      MCHC 32.0      RDW 15.4 (*)     Platelet Count 329      % Neutrophils 61      % Lymphocytes 27      % Monocytes 8      % Eosinophils 3      % Basophils 1      % Immature Granulocytes 0      NRBCs " per 100 WBC 0      Absolute Neutrophils 8.5 (*)     Absolute Lymphocytes 3.7      Absolute Monocytes 1.1      Absolute Eosinophils 0.3      Absolute Basophils 0.1      Absolute Immature Granulocytes 0.1      Absolute NRBCs 0.0     HCG QUALITATIVE PREGNANCY - Normal    hCG Serum Qualitative Negative          ===============================================================  RADIOLOGY       Reviewed all pertinent imaging. Please see official radiology report.   US Pelvis Cmplt w Transvag & Doppler LmtPel Duplex Limited   Final Result   IMPRESSION:     1.  No acute abnormality.                   ================================================================  PROCEDURES         I, Amanda Durán, am serving as a scribe to document services personally performed by Dr. Bedolla based on my observation and the provider's statements to me. I, Tamie Bedolla MD attest that Amanda Durán is acting in a scribe capacity, has observed my performance of the services and has documented them in accordance with my direction.     Tamie Bedolla M.D.   Emergency Medicine   [facility] EMERGENCY ROOM  [address] [address] [address]  [phone]  Dept: [phone]        Tamie Bedolla MD  01/02/23 [id number]

## 2023-01-02 NOTE — DISCHARGE INSTRUCTIONS
As we discussed, fortunately all of the testing looking for an emergency like a problem with the ovary or uterus are normal.  There are no signs of a urine infection.  Based on what you are describing, this is likely clitorimegaly from testosterone which is a known side effect.  I recommend some sits baths to help with the irritated feeling, and call and follow-up with your transgender care clinic.

## 2023-01-27 ENCOUNTER — TELEPHONE (OUTPATIENT)
Dept: FAMILY MEDICINE | Facility: CLINIC | Age: 23
End: 2023-01-27
Payer: COMMERCIAL

## 2023-01-27 NOTE — TELEPHONE ENCOUNTER
Patient is scheduled for a depo shot on wed 2-1-23. Can you verify that there are still more orders in place and if not place if appropriate?

## 2023-02-01 ENCOUNTER — ALLIED HEALTH/NURSE VISIT (OUTPATIENT)
Dept: FAMILY MEDICINE | Facility: CLINIC | Age: 23
End: 2023-02-01
Payer: COMMERCIAL

## 2023-02-01 DIAGNOSIS — Z30.42 DEPO-PROVERA CONTRACEPTIVE STATUS: Primary | ICD-10-CM

## 2023-02-01 PROCEDURE — 96372 THER/PROPH/DIAG INJ SC/IM: CPT | Performed by: FAMILY MEDICINE

## 2023-02-01 PROCEDURE — 99207 PR NO CHARGE NURSE ONLY: CPT

## 2023-02-01 RX ADMIN — MEDROXYPROGESTERONE ACETATE 150 MG: 150 INJECTION, SUSPENSION INTRAMUSCULAR at 09:28

## 2023-02-02 ENCOUNTER — PRE VISIT (OUTPATIENT)
Dept: SURGERY | Facility: CLINIC | Age: 23
End: 2023-02-02

## 2023-02-02 ENCOUNTER — VIRTUAL VISIT (OUTPATIENT)
Dept: SURGERY | Facility: CLINIC | Age: 23
End: 2023-02-02
Payer: COMMERCIAL

## 2023-02-02 ENCOUNTER — ANESTHESIA EVENT (OUTPATIENT)
Dept: GASTROENTEROLOGY | Facility: CLINIC | Age: 23
End: 2023-02-02
Payer: COMMERCIAL

## 2023-02-02 DIAGNOSIS — Z01.818 PREOP EXAMINATION: Primary | ICD-10-CM

## 2023-02-02 PROCEDURE — 99215 OFFICE O/P EST HI 40 MIN: CPT | Performed by: PHYSICIAN ASSISTANT

## 2023-02-02 ASSESSMENT — LIFESTYLE VARIABLES: TOBACCO_USE: 1

## 2023-02-02 ASSESSMENT — ENCOUNTER SYMPTOMS: SEIZURES: 1

## 2023-02-02 NOTE — H&P
"  Pre-Operative H & P     CC:  Preoperative exam to assess for increased cardiopulmonary risk while undergoing surgery and anesthesia.    Date of Encounter: 2/2/2023  Primary Care Physician:  Litzy Corona     Reason for Visit: Gastroesophageal reflux disease with esophagitis and hemorrhage    HPI  Yolanda Vee is a 22 year old adult who presents for pre-operative H & P in preparation for  Procedure Information     Case: [id number] Date/Time: 02/16/23 0830    Procedure: ESOPHAGOGASTRODUODENOSCOPY (EGD) (Esophagus) - Gastroesophageal reflux disease with esophagitis and hemorrhage [K21.01]    Anesthesia type: MAC    Diagnosis: Gastroesophageal reflux disease with esophagitis and hemorrhage [K21.01]    Pre-op diagnosis: Gastroesophageal reflux disease with esophagitis and hemorrhage [K21.01]    Location:  GI 02 /  GI    Providers: Alma Velasquez MD          The patient is a 23 y/o biological female who was evaluated for BRBPR and hematemesis in June 2021, at which time an EGD was notable for LA grade C esophagitis. Repeat EGD 6/2022 showed LA grade A esophagitis. He was recommended lansoprazole 30 mg twice daily, with recommendation for repeat EGD in 3 months. He notes that he is taking omeprazole 20 mg daily and Prevacid 30 mg twice daily. With this, he states he is still having frequent heartburn and reflux symptoms \"all the time\".  Symptoms start with substernal burning, which leads to coughing, which then leads to either regurgitation of either acid and/or food particles. He still can note specks of blood. He now presents for the above procedure.    History was obtained from patient & chart review.     Hx of abnormal bleeding or anti-platelet use: denies    Menstrual history: No LMP recorded. Patient has had an injection.:       Past Medical History  Past Medical History:   Diagnosis Date     Anxiety     therapist: [facility]     Chemical dependency (H)     quit Sept 2021, alcohol. some " family hx as well.      Chronic constipation      Constipation      Depression      Esophageal reflux     controlled well on protonix currently, EGD 6/3/21 w/ some mild esophagitis when alcohol intake was higher (bottle vodka daily).     Heart rate problem     some palpitations in the past w/ anxiety attacks.     Migraines     occ tylenol use.     Morbid obesity (H) 03/06/2020     Obese      Palpitations      Plantar warts      PTSD (post-traumatic stress disorder)      Uncomplicated asthma        Past Surgical History  Past Surgical History:   Procedure Laterality Date     COLONOSCOPY N/A 6/3/2021    Procedure: COLONOSCOPY;  Surgeon: Guru Gerardo Prado MD;  Location: [facility]     ESOPHAGOSCOPY, GASTROSCOPY, DUODENOSCOPY (EGD), COMBINED N/A 6/3/2021    Procedure: ESOPHAGOGASTRODUODENOSCOPY (EGD);  Surgeon: Guru Gerardo Prado MD;  Location: [facility]     ESOPHAGOSCOPY, GASTROSCOPY, DUODENOSCOPY (EGD), COMBINED N/A 6/23/2022    Procedure: ESOPHAGOGASTRODUODENOSCOPY (EGD);  Surgeon: Amanda Linares MD;  Location: [facility] OR     wisdom teeth         Prior to Admission Medications  Current Outpatient Medications   Medication Sig Dispense Refill     albuterol (PROAIR HFA/PROVENTIL HFA/VENTOLIN HFA) 108 (90 Base) MCG/ACT inhaler Inhale 2 puffs into the lungs every 6 hours as needed for shortness of breath / dyspnea or wheezing 18 g 0     buPROPion (WELLBUTRIN XL) 150 MG 24 hr tablet Take 1 tablet (150 mg) by mouth every morning 30 tablet 3     escitalopram (LEXAPRO) 20 MG tablet Take 1 tablet (20 mg) by mouth daily (Patient taking differently: Take 20 mg by mouth every morning) 90 tablet 1     famotidine (PEPCID) 20 MG tablet Take 1 tablet (20 mg) by mouth At Bedtime 60 tablet 3     ipratropium - albuterol 0.5 mg/2.5 mg/3 mL (DUONEB) 0.5-2.5 (3) MG/3ML neb solution Inhale 3 mLs into the lungs every 6 hours as needed       omeprazole (PRILOSEC) 40 MG DR capsule Take 1 capsule (40 mg) by mouth 2  "times daily (before meals) 90 capsule 3     prazosin (MINIPRESS) 1 MG capsule TAKE 1 CAPSULE BY MOUTH EVERY NIGHT AT BEDTIME. AFTER 2-3 DAYS, INCREASE TO 2 CAPSULE EVERY NIGHT AT BEDTIME (Patient taking differently: Take 1 mg by mouth At Bedtime TAKE 1 CAPSULE BY MOUTH EVERY NIGHT AT BEDTIME. AFTER 2-3 DAYS, INCREASE TO 2 CAPSULE EVERY NIGHT AT BEDTIME) 180 capsule 1     sucralfate (CARAFATE) 1 GM tablet Take 1 tablet (1 g) by mouth 4 times daily (before meals and nightly) 180 tablet 3     testosterone cypionate (DEPOTESTOSTERONE) 200 MG/ML injection Inject 0.2 mg into the muscle once a week Friday       Vitamin D, Cholecalciferol, 25 MCG (1000 UT) TABS Take 1,000 Units by mouth every morning        B-D SYRINGE LUER-SANTA 1 ML MISC USE TO ADMINISTER ONCE A WEEK       BD DISP NEEDLES 25G X 5/8\" MISC USE FOR ADMINISTRATION OF MEDICATION WEEKLY. SUBSTITUTE AS NEEDED BASED ON MEDICAL INSURANCE COVERAGE       fluocinonide (LIDEX) 0.05 % external ointment          Allergies  Allergies   Allergen Reactions     Ibuprofen Hives     Throat gets itchy and sore     Hydrocodone-Acetaminophen Nausea     Penicillins        Social History  Social History     Socioeconomic History     Marital status: Single     Spouse name: Not on file     Number of children: Not on file     Years of education: Not on file     Highest education level: Not on file   Occupational History     Not on file   Tobacco Use     Smoking status: Every Day     Packs/day: 0.00     Years: 5.00     Pack years: 0.00     Types: Cigarettes     Smokeless tobacco: Never     Tobacco comments:     Pt does not smoke when it is cold outside   Substance and Sexual Activity     Alcohol use: Yes     Alcohol/week: 6.0 - 8.0 standard drinks     Types: 6 - 8 Standard drinks or equivalent per week     Comment: smetimes     Drug use: Yes     Types: Marijuana     Comment: Smoking PRN     Sexual activity: Not Currently     Birth control/protection: Injection   Other Topics Concern     " Parent/sibling w/ CABG, MI or angioplasty before 65F 55M? Not Asked   Social History Narrative     Not on file     Social Determinants of Health     Financial Resource Strain: Not on file   Food Insecurity: Not on file   Transportation Needs: Not on file   Physical Activity: Not on file   Stress: Not on file   Social Connections: Not on file   Intimate Partner Violence: Not on file   Housing Stability: Not on file       Family History  Family History   Problem Relation Age of Onset     Heart Disease Mother      Substance Abuse Mother         in remission     Substance Abuse Father      Alcoholism Father      Bipolar Disorder Brother      Depression Brother      Anxiety Disorder Brother      Hypertension Maternal Grandmother      Arthritis Maternal Grandmother      Heart Disease Maternal Grandmother      Hodgkin's lymphoma Maternal Grandfather 26     Esophageal Cancer Maternal Grandfather          at 54.     Heart Disease Maternal Uncle      Heart Disease Maternal Uncle      Acute Myocardial Infarction No family hx of      Anesthesia Reaction No family hx of      Deep Vein Thrombosis (DVT) No family hx of        Review of Systems  The complete review of systems is negative other than noted in the HPI or here.     Anesthesia Evaluation   Pt has had prior anesthetic.     No history of anesthetic complications       ROS/MED HX  ENT/Pulmonary: Comment: Last used inhaler 2 months ago      (+) ELIO risk factors, obese, tobacco use, Current use, Intermittent, asthma Treatment: Inhaler prn,      Neurologic: Comment: Take tylenol for headaches      (+) migraines, seizures, last seizure: ,  (-) no CVA   Cardiovascular:       METS/Exercise Tolerance: >4 METS    Hematologic:  - neg hematologic  ROS  (-) history of blood clots and history of blood transfusion   Musculoskeletal:  - neg musculoskeletal ROS     GI/Hepatic: Comment: Hx BRBPR and hematemesis, esophagitis    Hx Caitlin Spivey tear    Chronic constipation      (+)  GERD, Symptomatic,  (-) liver disease   Renal/Genitourinary:  - neg Renal ROS  (-) renal disease   Endo:     (+) Obesity,  (-) Type II DM   Psychiatric/Substance Use: Comment: PTSD    (+) psychiatric history anxiety and depression alcohol abuse Recreational drug usage: Cannabis.    Infectious Disease:  - neg infectious disease ROS     Malignancy:  - neg malignancy ROS     Other:  - neg other ROS          Virtual visit -  No vitals were obtained    Physical Exam  Constitutional: Awake, alert, cooperative, no apparent distress, and appears stated age.  HENT: Normocephalic  Respiratory: non labored breathing   Neurologic: Awake, alert, oriented to name, place and time.   Neuropsychiatric: Calm, cooperative. Normal affect.      Prior Labs/Diagnostic Studies   All labs and imaging personally reviewed       Upper GI Endoscopy 6/23/23  Impression:            - Normal examined duodenum.                          - Normal stomach.                          - LA Grade A esophagitis with no bleeding.                          - No specimens collected.      The patient's records and results personally reviewed by this provider.         Assessment      Yolanda Vee is a 22 year old adult seen as a PAC referral for risk assessment and optimization for anesthesia.    Plan/Recommendations  Pt will be optimized for the proposed procedure.  See below for details on the assessment, risk, and preoperative recommendations    NEUROLOGY  - No history of TIA or CVA   - Hx of seizure in 2014, no recurrence  -Post Op delirium risk factors:  No risk identified    ENT  - No current airway concerns.  Will need to be reassessed day of surgery.  Mallampati: Unable to assess  TM: Unable to assess    CARDIAC  - No history of CAD, Hypertension and Afib  - METS (Metabolic Equivalents)  Patient performs 4 or more METS exercise without symptoms            Total Score: 0      RCRI-Very low risk: Class 1 0.4% complication rate            Total  "Score: 0        PULMONARY  ELIO Low Risk            Total Score: 1    ELIO: BMI over 35 kg/m2      - Asthma, last used inhaler 2 months ago  - Tobacco History      History   Smoking Status     Every Day     Packs/day: 0.00     Years: 5.00     Types: Cigarettes   Smokeless Tobacco     Never       GI  - Hx BRBPR and hematemesis, esophagitis  - Hx Caitlin Spivey tear  - Chronic constipation    PONV Low Risk  Total Score: 1           1 AN PONV: Pt is Female          ENDOCRINE    - BMI: Estimated body mass index is 45.35 kg/m  as calculated from the following:    Height as of 1/2/23: 1.549 m (5' 1\").    Weight as of 1/2/23: 108.9 kg (240 lb).  Class 3 Obesity (BMI > 40)  - No history of Diabetes Mellitus    HEME  VTE Low Risk 0.26%            Total Score: 1    VTE: BMI greater than 39      - No history of abnormal bleeding or antiplatelet use.      PSYCH  - Anxiety, depression, PTSD  - Heavy alcohol use, cannabis use    The patient is aware that the final anesthesia plan will be decided by the assigned anesthesia provider on the date of service.    The patient is optimized for their procedure. AVS with information on surgery time/arrival time, meds and NPO status given by nursing staff. No further diagnostic testing indicated.    Please refer to the physical examination documented by the anesthesiologist in the anesthesia record on the day of surgery.    Video-Visit Details    Type of service:  Video Visit    Provider received verbal consent for a Video Visit from the patient? Yes   Video Start Time: 1116  Video End Time:1138    Originating Location (pt. Location): Home    Distant Location (provider location):  Off-site  Mode of Communication:  Video Conference via [employer]  On the day of service:     Prep time: 14 minutes  Visit time: 22 minutes  Documentation time: 12 minutes  ------------------------------------------  Total time: 48 minutes      Skye Isaacs PA-C  Preoperative Assessment Center  [facility] [place]  [facility]  Phone: [phone]  Fax: [phone]

## 2023-02-02 NOTE — OR NURSING
Was asked by [facility] ANDREA to send medication instructions to Kem Vee for the upcoming GI procedure on 2-16-23. Medication instructions sent via My Chart.

## 2023-02-02 NOTE — PATIENT INSTRUCTIONS
Name:  Yolanda Vee   MRN:  [id number]   :  2000   Today's Date:  2023     GI Lab procedures:    A representative from the GI Lab will contact you regarding date, arrival time, and give you further instructions.      You were seen today in the PAC Clinic.   (Pre-operative Anesthesia Assessment Center)  [facility]  [address]  [address]   phone [phone]    You had a pre-operative assessment done.    Anesthesia recommendations for medications:    Hold Aspirin for 2 days before procedure.  Hold Multivitamins for 7 days before procedure.   Hold Herbal medications and Supplements for 7 days before procedure.  Hold Ibuprofen for 2 days before procedure.   Hold Naproxen for 2 days before procedure.       Please hold the following medications the day of procedure:  Sucralfate (Carafate), Vitamin D,       Please take these medications the day of procedure:  Bupropion (Wellbutrin XL), Escitalopram (Lexapro), Omeprazole (Prilosec)  DUO neb if needed  Albuterol inhaler if needed     Bring Albuterol inhaler.      For questions or appointments, call:  [facility]: [phone], option 2.  Monday through Friday, 8 a.m. to 4:30 p.m.  (If it is after hours, please reach out to the clinic or provider that scheduled your appointment)

## 2023-02-02 NOTE — PROGRESS NOTES
Kem is a 22 year old who is being evaluated via a billable video visit.      How would you like to obtain your AVS? MyChart            Subjective   Kem is a 22 year old;  presenting for the following health issues:  Pre-Op Exam (/)      HPI         Review of Systems     Physical Exam     JENNIFER Nguyen LPN

## 2023-02-06 ENCOUNTER — TELEPHONE (OUTPATIENT)
Dept: GASTROENTEROLOGY | Facility: CLINIC | Age: 23
End: 2023-02-06

## 2023-02-06 NOTE — TELEPHONE ENCOUNTER
Attempted to contact patient regarding upcoming Upper endoscopy (EGD) procedure on 2/16/23 for pre assessment questions. No answer.     Left message to return call to [phone] #4    Discuss Covid policy and designated  policy.    Pre op exam scheduled: Yes had appointment on 2/2/23 - d/t BMI 45.35 and MAC Sedation    Arrival time: 0700. Procedure time: 0830    Facility location: [facility]; [address] [place] [address], [address] Floor, [place] [address]    Sedation type: MAC    Anticoagulants: No    Electronic implanted devices? No    Diabetic? No    Indication for procedure: reflux esopahgitis despite PPI, uncontrolled GERD symptoms, pH impedence ON therapy to evaluate ongoing acid exposure despite antisecratory therapy, patient considering anti-reflux surgery.      Prep instructions sent via aiHit.    Indu Ivy RN  Endoscopy Procedure Pre Assessment RN

## 2023-02-09 NOTE — TELEPHONE ENCOUNTER
Second attempt for pre-assessment prior to upcoming upper endoscopy (EGD)      No answer.  Left message to return call [phone] #4    Ch[employer]Place[employer] message sent.    Lizzie Austin RN  Endoscopy Procedure Pre Assessment RN

## 2023-02-14 NOTE — TELEPHONE ENCOUNTER
Refill & Pharmacy listed below.     Third attempt for pre-assessment prior to upcoming upper endoscopy (EGD)     No answer.  Left message to return call [phone] #4    Elvira Doyle RN  Endoscopy Procedure Pre Assessment RN

## 2023-02-14 NOTE — TELEPHONE ENCOUNTER
Pre assessment questions completed for upcoming Upper endoscopy (EGD) procedure scheduled on 2.16.23    COVID policy reviewed.     Reviewed procedural arrival time 0700 and facility location     Designated  policy reviewed. Instructed to have someone stay 24 hours post procedure.     Anticoagulation/blood thinners? No    Electronic implanted devices? No    Diabetic? No    Reviewed EGD procedure prep instructions.     Patient verbalized understanding and had no questions or concerns at this time.    Lizzie Austin RN  Endoscopy Procedure Pre Assessment RN

## 2023-02-16 ENCOUNTER — HOSPITAL ENCOUNTER (OUTPATIENT)
Facility: CLINIC | Age: 23
Discharge: HOME | End: 2023-02-16
Attending: INTERNAL MEDICINE | Admitting: INTERNAL MEDICINE
Payer: COMMERCIAL

## 2023-02-16 ENCOUNTER — ANESTHESIA (OUTPATIENT)
Dept: GASTROENTEROLOGY | Facility: CLINIC | Age: 23
End: 2023-02-16
Payer: COMMERCIAL

## 2023-02-16 VITALS
DIASTOLIC BLOOD PRESSURE: 82 MMHG | BODY MASS INDEX: 45.31 KG/M2 | HEIGHT: 61 IN | TEMPERATURE: 98.1 F | HEART RATE: 72 BPM | OXYGEN SATURATION: 97 % | SYSTOLIC BLOOD PRESSURE: 127 MMHG | WEIGHT: 240 LBS | RESPIRATION RATE: 14 BRPM

## 2023-02-16 DIAGNOSIS — E66.01 MORBID OBESITY (H): Primary | ICD-10-CM

## 2023-02-16 DIAGNOSIS — Z87.19 HISTORY OF ALCOHOLIC GASTRITIS: ICD-10-CM

## 2023-02-16 LAB — UPPER GI ENDOSCOPY: NORMAL

## 2023-02-16 PROCEDURE — 370N000017 HC ANESTHESIA TECHNICAL FEE, PER MIN: Performed by: INTERNAL MEDICINE

## 2023-02-16 PROCEDURE — 258N000003 HC RX IP 258 OP 636: Performed by: STUDENT IN AN ORGANIZED HEALTH CARE EDUCATION/TRAINING PROGRAM

## 2023-02-16 PROCEDURE — 250N000011 HC RX IP 250 OP 636: Performed by: STUDENT IN AN ORGANIZED HEALTH CARE EDUCATION/TRAINING PROGRAM

## 2023-02-16 PROCEDURE — 43239 EGD BIOPSY SINGLE/MULTIPLE: CPT | Performed by: INTERNAL MEDICINE

## 2023-02-16 PROCEDURE — 88305 TISSUE EXAM BY PATHOLOGIST: CPT | Mod: 26 | Performed by: PATHOLOGY

## 2023-02-16 PROCEDURE — 88305 TISSUE EXAM BY PATHOLOGIST: CPT | Mod: TC | Performed by: INTERNAL MEDICINE

## 2023-02-16 PROCEDURE — 250N000009 HC RX 250: Performed by: STUDENT IN AN ORGANIZED HEALTH CARE EDUCATION/TRAINING PROGRAM

## 2023-02-16 RX ORDER — ONDANSETRON 2 MG/ML
4 INJECTION INTRAMUSCULAR; INTRAVENOUS
Status: DISCONTINUED | OUTPATIENT
Start: 2023-02-16 | End: 2023-02-16 | Stop reason: HOSPADM

## 2023-02-16 RX ORDER — PROCHLORPERAZINE MALEATE 5 MG
10 TABLET ORAL EVERY 6 HOURS PRN
Status: DISCONTINUED | OUTPATIENT
Start: 2023-02-16 | End: 2023-02-16 | Stop reason: HOSPADM

## 2023-02-16 RX ORDER — SODIUM CHLORIDE, SODIUM LACTATE, POTASSIUM CHLORIDE, CALCIUM CHLORIDE 600; 310; 30; 20 MG/100ML; MG/100ML; MG/100ML; MG/100ML
INJECTION, SOLUTION INTRAVENOUS CONTINUOUS PRN
Status: DISCONTINUED | OUTPATIENT
Start: 2023-02-16 | End: 2023-02-16

## 2023-02-16 RX ORDER — NALOXONE HYDROCHLORIDE 0.4 MG/ML
0.4 INJECTION, SOLUTION INTRAMUSCULAR; INTRAVENOUS; SUBCUTANEOUS
Status: DISCONTINUED | OUTPATIENT
Start: 2023-02-16 | End: 2023-02-16 | Stop reason: HOSPADM

## 2023-02-16 RX ORDER — ONDANSETRON 2 MG/ML
INJECTION INTRAMUSCULAR; INTRAVENOUS PRN
Status: DISCONTINUED | OUTPATIENT
Start: 2023-02-16 | End: 2023-02-16

## 2023-02-16 RX ORDER — LIDOCAINE 40 MG/G
CREAM TOPICAL
Status: DISCONTINUED | OUTPATIENT
Start: 2023-02-16 | End: 2023-02-16 | Stop reason: HOSPADM

## 2023-02-16 RX ORDER — ONDANSETRON 2 MG/ML
4 INJECTION INTRAMUSCULAR; INTRAVENOUS EVERY 6 HOURS PRN
Status: DISCONTINUED | OUTPATIENT
Start: 2023-02-16 | End: 2023-02-16 | Stop reason: HOSPADM

## 2023-02-16 RX ORDER — FLUMAZENIL 0.1 MG/ML
0.2 INJECTION, SOLUTION INTRAVENOUS
Status: DISCONTINUED | OUTPATIENT
Start: 2023-02-16 | End: 2023-02-16 | Stop reason: HOSPADM

## 2023-02-16 RX ORDER — LIDOCAINE HYDROCHLORIDE 20 MG/ML
INJECTION, SOLUTION INFILTRATION; PERINEURAL PRN
Status: DISCONTINUED | OUTPATIENT
Start: 2023-02-16 | End: 2023-02-16

## 2023-02-16 RX ORDER — ONDANSETRON 4 MG/1
4 TABLET, ORALLY DISINTEGRATING ORAL EVERY 6 HOURS PRN
Status: DISCONTINUED | OUTPATIENT
Start: 2023-02-16 | End: 2023-02-16 | Stop reason: HOSPADM

## 2023-02-16 RX ORDER — NALOXONE HYDROCHLORIDE 0.4 MG/ML
0.2 INJECTION, SOLUTION INTRAMUSCULAR; INTRAVENOUS; SUBCUTANEOUS
Status: DISCONTINUED | OUTPATIENT
Start: 2023-02-16 | End: 2023-02-16 | Stop reason: HOSPADM

## 2023-02-16 RX ORDER — PROPOFOL 10 MG/ML
INJECTION, EMULSION INTRAVENOUS PRN
Status: DISCONTINUED | OUTPATIENT
Start: 2023-02-16 | End: 2023-02-16

## 2023-02-16 RX ADMIN — PROPOFOL 50 MG: 10 INJECTION, EMULSION INTRAVENOUS at 08:09

## 2023-02-16 RX ADMIN — ONDANSETRON 4 MG: 2 INJECTION INTRAMUSCULAR; INTRAVENOUS at 08:12

## 2023-02-16 RX ADMIN — SODIUM CHLORIDE, POTASSIUM CHLORIDE, SODIUM LACTATE AND CALCIUM CHLORIDE: 600; 310; 30; 20 INJECTION, SOLUTION INTRAVENOUS at 08:07

## 2023-02-16 RX ADMIN — TOPICAL ANESTHETIC 1 EACH: 200 SPRAY DENTAL; PERIODONTAL at 08:07

## 2023-02-16 RX ADMIN — PROPOFOL 100 MG: 10 INJECTION, EMULSION INTRAVENOUS at 08:12

## 2023-02-16 RX ADMIN — LIDOCAINE HYDROCHLORIDE 100 MG: 20 INJECTION, SOLUTION INFILTRATION; PERINEURAL at 08:07

## 2023-02-16 ASSESSMENT — ACTIVITIES OF DAILY LIVING (ADL)
ADLS_ACUITY_SCORE: 35
ADLS_ACUITY_SCORE: 33

## 2023-02-16 ASSESSMENT — LIFESTYLE VARIABLES: TOBACCO_USE: 1

## 2023-02-16 NOTE — ANESTHESIA POSTPROCEDURE EVALUATION
Patient: Yolanda Vee    Procedure: Procedure(s):  ESOPHAGOGASTRODUODENOSCOPY, WITH BIOPSY       Anesthesia Type:  MAC    Note:  Disposition: Outpatient   Postop Pain Control: Uneventful            Sign Out: Well controlled pain   PONV: No   Neuro/Psych: Uneventful            Sign Out: Acceptable/Baseline neuro status   Airway/Respiratory: Uneventful            Sign Out: Acceptable/Baseline resp. status   CV/Hemodynamics: Uneventful            Sign Out: Acceptable CV status; No obvious hypovolemia; No obvious fluid overload   Other NRE: NONE   DID A NON-ROUTINE EVENT OCCUR? No           Electronically Signed By: Lizette Cuevas MD  February 16, 2023  8:37 AM

## 2023-02-16 NOTE — ANESTHESIA CARE TRANSFER NOTE
Patient: Yolanda Vee    Procedure: Procedure(s):  ESOPHAGOGASTRODUODENOSCOPY, WITH BIOPSY       Diagnosis: Gastroesophageal reflux disease with esophagitis and hemorrhage [K21.01]  Diagnosis Additional Information: No value filed.    Anesthesia Type:   MAC     Note:    Oropharynx: oropharynx clear of all foreign objects  Level of Consciousness: awake  Oxygen Supplementation: room air    Independent Airway: airway patency satisfactory and stable  Dentition: dentition unchanged  Vital Signs Stable: post-procedure vital signs reviewed and stable  Report to RN Given: handoff report given  Patient transferred to: Phase II    Handoff Report: Identifed the Patient, Identified the Reponsible Provider, Reviewed the pertinent medical history, Discussed the surgical course, Reviewed Intra-OP anesthesia mangement and issues during anesthesia, Set expectations for post-procedure period and Allowed opportunity for questions and acknowledgement of understanding      Vitals:  Vitals Value Taken Time   BP     Temp     Pulse     Resp     SpO2         Electronically Signed By: ABA Chatman CRNA  February 16, 2023  8:28 AM

## 2023-02-16 NOTE — ANESTHESIA PREPROCEDURE EVALUATION
Anesthesia Pre-Procedure Evaluation    Patient: Yolanda Vee   MRN: [id number] : 2000        Procedure : Procedure(s):  ESOPHAGOGASTRODUODENOSCOPY (EGD)          Past Medical History:   Diagnosis Date     Anxiety     therapist: [facility]     Chemical dependency (H)     quit 2021, alcohol. some family hx as well.      Chronic constipation      Constipation      Depression      Esophageal reflux     controlled well on protonix currently, EGD 6/3/21 w/ some mild esophagitis when alcohol intake was higher (bottle vodka daily).     Heart rate problem     some palpitations in the past w/ anxiety attacks.     Migraines     occ tylenol use.     Morbid obesity (H) 2020     Obese      Palpitations      Plantar warts      PTSD (post-traumatic stress disorder)      Uncomplicated asthma       Past Surgical History:   Procedure Laterality Date     COLONOSCOPY N/A 6/3/2021    Procedure: COLONOSCOPY;  Surgeon: Guru Gerardo Prado MD;  Location: [facility]     ESOPHAGOSCOPY, GASTROSCOPY, DUODENOSCOPY (EGD), COMBINED N/A 6/3/2021    Procedure: ESOPHAGOGASTRODUODENOSCOPY (EGD);  Surgeon: Guru Gerardo Prado MD;  Location: [facility]     ESOPHAGOSCOPY, GASTROSCOPY, DUODENOSCOPY (EGD), COMBINED N/A 2022    Procedure: ESOPHAGOGASTRODUODENOSCOPY (EGD);  Surgeon: Amanda Linares MD;  Location: [facility] OR     wisdom teeth        Allergies   Allergen Reactions     Ibuprofen Hives     Throat gets itchy and sore     Hydrocodone-Acetaminophen Nausea     Penicillins       Social History     Tobacco Use     Smoking status: Every Day     Packs/day: 0.00     Years: 5.00     Pack years: 0.00     Types: Cigarettes     Smokeless tobacco: Never     Tobacco comments:     Pt does not smoke when it is cold outside   Substance Use Topics     Alcohol use: Yes     Alcohol/week: 6.0 - 8.0 standard drinks     Types: 6 - 8 Standard drinks or equivalent per week     Comment: smetimes      Wt Readings from Last  1 Encounters:   01/02/23 108.9 kg (240 lb)        Anesthesia Evaluation   Pt has had prior anesthetic. Type: MAC.    No history of anesthetic complications       ROS/MED HX  ENT/Pulmonary:     (+) tobacco use, asthma     Neurologic:  - neg neurologic ROS     Cardiovascular:  - neg cardiovascular ROS     METS/Exercise Tolerance:     Hematologic:  - neg hematologic  ROS     Musculoskeletal:  - neg musculoskeletal ROS     GI/Hepatic:     (+) GERD,     Renal/Genitourinary:  - neg Renal ROS     Endo:     (+) Obesity,     Psychiatric/Substance Use: Comment: PTSD    (+) psychiatric history anxiety and depression Recreational drug usage: Cannabis.    Infectious Disease:  - neg infectious disease ROS     Malignancy:  - neg malignancy ROS     Other:               OUTSIDE LABS:  CBC:   Lab Results   Component Value Date    WBC 13.8 (H) 01/02/2023    WBC 13.2 (H) 09/29/2022    HGB 14.0 01/02/2023    HGB 13.7 09/29/2022    HCT 43.7 01/02/2023    HCT 43.0 09/29/2022     01/02/2023     09/29/2022     BMP:   Lab Results   Component Value Date     01/02/2023     11/21/2022    POTASSIUM 4.0 01/02/2023    POTASSIUM 3.7 11/21/2022    CHLORIDE 112 (H) 01/02/2023    CHLORIDE 110 (H) 11/21/2022    CO2 21 (L) 01/02/2023    CO2 23 11/21/2022    BUN 6 (L) 01/02/2023    BUN 4 (L) 11/21/2022    CR 0.71 01/02/2023    CR 0.76 11/21/2022    GLC 92 01/02/2023    GLC 83 11/21/2022     COAGS:   Lab Results   Component Value Date    PTT 29 01/26/2022    INR 1.09 01/26/2022    FIBR 473 (H) 01/04/2021     POC:   Lab Results   Component Value Date    BGM 95 06/03/2021    HCG Negative 08/09/2022    HCGS Negative 01/02/2023     HEPATIC:   Lab Results   Component Value Date    ALBUMIN 3.5 01/26/2022    PROTTOTAL 7.7 01/26/2022    ALT 20 01/26/2022    AST 18 01/26/2022    ALKPHOS 82 01/26/2022    BILITOTAL 0.4 01/26/2022     OTHER:   Lab Results   Component Value Date    LACT 0.7 09/21/2021    A1C 5.5 11/08/2021    HIMANSHU 8.8  01/02/2023    PHOS 3.1 01/26/2022    MAG 1.9 01/26/2022    LIPASE 17 01/26/2022    TSH 1.21 01/26/2022    CRP 2.8 (H) 10/13/2022    SED 44 (H) 10/13/2022       Anesthesia Plan    ASA Status:  2   NPO Status:  NPO Appropriate    Anesthesia Type: MAC.     - Reason for MAC: straight local not clinically adequate   Induction: Intravenous.   Maintenance: TIVA.        Consents    Anesthesia Plan(s) and associated risks, benefits, and realistic alternatives discussed. Questions answered and patient/representative(s) expressed understanding.    - Discussed:     - Discussed with:  Patient         Postoperative Care    Pain management: Multi-modal analgesia.   PONV prophylaxis: Background Propofol Infusion     Comments:           H&P reviewed: Unable to attach H&P to encounter due to EHR limitations. H&P Update: appropriate H&P reviewed, patient examined. No interval changes since H&P (within 30 days).         Lizette Cuevas MD

## 2023-02-17 LAB
PATH REPORT.COMMENTS IMP SPEC: NORMAL
PATH REPORT.COMMENTS IMP SPEC: NORMAL
PATH REPORT.FINAL DX SPEC: NORMAL
PATH REPORT.GROSS SPEC: NORMAL
PATH REPORT.MICROSCOPIC SPEC OTHER STN: NORMAL
PATH REPORT.RELEVANT HX SPEC: NORMAL
PHOTO IMAGE: NORMAL

## 2023-04-19 DIAGNOSIS — K21.00 GASTROESOPHAGEAL REFLUX DISEASE WITH ESOPHAGITIS WITHOUT HEMORRHAGE: ICD-10-CM

## 2023-04-19 DIAGNOSIS — K20.80 LOS ANGELES GRADE C ESOPHAGITIS: ICD-10-CM

## 2023-04-21 RX ORDER — OMEPRAZOLE 40 MG/1
40 CAPSULE, DELAYED RELEASE ORAL
Qty: 180 CAPSULE | Refills: 0 | Status: SHIPPED | OUTPATIENT
Start: 2023-04-21 | End: 2023-07-28

## 2023-04-21 NOTE — TELEPHONE ENCOUNTER
Last Clinic Visit: 11/18/2022  [facility] [place]  To follow-up 2 months  Overdue for follow-up  90 day rowena refill sent to the pharmacy - including instructions for patient to call the clinic and schedule an appointment.

## 2023-04-22 ENCOUNTER — HEALTH MAINTENANCE LETTER (OUTPATIENT)
Age: 23
End: 2023-04-22

## 2023-06-16 ENCOUNTER — TELEPHONE (OUTPATIENT)
Dept: FAMILY MEDICINE | Facility: CLINIC | Age: 23
End: 2023-06-16
Payer: COMMERCIAL

## 2023-06-16 DIAGNOSIS — Z30.42 DEPO-PROVERA CONTRACEPTIVE STATUS: Primary | ICD-10-CM

## 2023-06-16 RX ORDER — MEDROXYPROGESTERONE ACETATE 150 MG/ML
150 INJECTION, SUSPENSION INTRAMUSCULAR
Status: ACTIVE | OUTPATIENT
Start: 2023-06-16 | End: 2024-06-10

## 2023-06-21 ENCOUNTER — OFFICE VISIT (OUTPATIENT)
Dept: FAMILY MEDICINE | Facility: CLINIC | Age: 23
End: 2023-06-21
Payer: COMMERCIAL

## 2023-06-21 VITALS
SYSTOLIC BLOOD PRESSURE: 124 MMHG | BODY MASS INDEX: 45.31 KG/M2 | TEMPERATURE: 98 F | WEIGHT: 240 LBS | HEART RATE: 76 BPM | DIASTOLIC BLOOD PRESSURE: 78 MMHG | HEIGHT: 61 IN | RESPIRATION RATE: 16 BRPM

## 2023-06-21 DIAGNOSIS — Z30.42 ENCOUNTER FOR SURVEILLANCE OF INJECTABLE CONTRACEPTIVE: Primary | ICD-10-CM

## 2023-06-21 DIAGNOSIS — L30.4 INTERTRIGO: ICD-10-CM

## 2023-06-21 LAB — HCG UR QL: NEGATIVE

## 2023-06-21 PROCEDURE — 96372 THER/PROPH/DIAG INJ SC/IM: CPT | Mod: JZ | Performed by: FAMILY MEDICINE

## 2023-06-21 PROCEDURE — 81025 URINE PREGNANCY TEST: CPT | Performed by: FAMILY MEDICINE

## 2023-06-21 PROCEDURE — 99213 OFFICE O/P EST LOW 20 MIN: CPT | Mod: 25 | Performed by: FAMILY MEDICINE

## 2023-06-21 RX ORDER — NYSTATIN 100000 [USP'U]/G
POWDER TOPICAL 2 TIMES DAILY PRN
Qty: 60 G | Refills: 3 | Status: SHIPPED | OUTPATIENT
Start: 2023-06-21 | End: 2024-06-06

## 2023-06-21 RX ADMIN — MEDROXYPROGESTERONE ACETATE 150 MG: 150 INJECTION, SUSPENSION INTRAMUSCULAR at 11:19

## 2023-06-21 ASSESSMENT — PAIN SCALES - GENERAL: PAINLEVEL: NO PAIN (0)

## 2023-06-21 NOTE — PROGRESS NOTES
"  Assessment & Plan     Contraceptive management  Pregnancy test negative.  He denies risk of pregnancy.  We discussed potential risks of Depo-Provera administration in the setting of pregnancy.  He would like to proceed with reinitiation of Depo-Provera today.  - HCG qualitative urine; Future  - HCG qualitative urine    Intertrigo  Reviewed measures to prevent recurrence.  Prescription provided for nystatin powder.  - nystatin (MYCOSTATIN) 679596 UNIT/GM external powder; Apply topically 2 times daily as needed (Rash)    Advise follow-up visit to discuss ongoing GI symptoms.  Discussed importance of complete abstinence from alcohol.             Nicotine/Tobacco Cessation:  He reports that he has been smoking cigarettes. He has never used smokeless tobacco.  Nicotine/Tobacco Cessation Plan:   Information offered: Patient not interested at this time      BMI:   Estimated body mass index is 45.35 kg/m  as calculated from the following:    Height as of this encounter: 1.549 m (5' 1\").    Weight as of this encounter: 108.9 kg (240 lb).         Litzy Corona MD  [facility] is a 23 year old, presenting for the following health issues:  Talk about depo injections (Patient late, upt needed) and Not feeling well (Got alcohol poisoning from drinking to much on her birthday, nauseated, vomiting, not eating. Still not feeling well)        11/21/2022    11:07 AM   Additional Questions   Roomed by Aditi Sheridan     It is worked into my schedule today as he is here for Depo-Provera but overdue and needing to restart Depo-Provera.  Using Depo-Provera for primarily menstrual suppression.  He is receiving testosterone treatment under the direction of his gender care provider.  Will be having bilateral mastectomy with Dr. Zarco later this summer is very excited about that.  He is noticing that he will frequently develop a rash or discoloration under his breast, interested in having nystatin " "powder on hand.  Describes recent indiscretion with alcohol on [birth year] for her birthday after minimal eating.  Vomiting next day, and stomach pain intermittently since then.  Known history of recurrent gastritis.               Review of Systems         Objective    /78   Pulse 76   Temp 98  F (36.7  C)   Resp 16   Ht 1.549 m (5' 1\")   Wt 108.9 kg (240 lb)   BMI 45.35 kg/m    Body mass index is 45.35 kg/m .  Physical Exam   Alert and very pleasant.  There is no active intertrigo under breasts currently.                    "

## 2023-07-23 DIAGNOSIS — K21.00 GASTROESOPHAGEAL REFLUX DISEASE WITH ESOPHAGITIS WITHOUT HEMORRHAGE: ICD-10-CM

## 2023-07-23 DIAGNOSIS — K20.80 LOS ANGELES GRADE C ESOPHAGITIS: ICD-10-CM

## 2023-07-23 RX ORDER — OMEPRAZOLE 40 MG/1
40 CAPSULE, DELAYED RELEASE ORAL
Qty: 180 CAPSULE | Refills: 0 | Status: CANCELLED | OUTPATIENT
Start: 2023-07-23

## 2023-07-26 NOTE — TELEPHONE ENCOUNTER
11/18/2022  [facility] [place]   Madelyn Gutierrez PA-C  Gastroenterology     omeprazole (PRILOSEC) 40 MG DR capsule  Last Written Prescription Date:  4/21/23  Last Fill Quantity: 180,   # refills: 0  Last Office Visit : 11/18/22  Future Office visit:  2 months. Upper GI endoscopy with biopsies 2/16/23    Routing refill request to provider for review/approval because:     Follow up past due( 2 months)

## 2023-07-28 DIAGNOSIS — K20.80 LOS ANGELES GRADE C ESOPHAGITIS: ICD-10-CM

## 2023-07-28 DIAGNOSIS — K21.00 GASTROESOPHAGEAL REFLUX DISEASE WITH ESOPHAGITIS WITHOUT HEMORRHAGE: ICD-10-CM

## 2023-07-28 RX ORDER — OMEPRAZOLE 40 MG/1
40 CAPSULE, DELAYED RELEASE ORAL
Qty: 180 CAPSULE | Refills: 0 | Status: ON HOLD | OUTPATIENT
Start: 2023-07-28 | End: 2024-07-02

## 2023-09-19 ENCOUNTER — ALLIED HEALTH/NURSE VISIT (OUTPATIENT)
Dept: FAMILY MEDICINE | Facility: CLINIC | Age: 23
End: 2023-09-19
Payer: COMMERCIAL

## 2023-09-19 DIAGNOSIS — Z30.42 ENCOUNTER FOR SURVEILLANCE OF INJECTABLE CONTRACEPTIVE: Primary | ICD-10-CM

## 2023-09-19 PROCEDURE — 96372 THER/PROPH/DIAG INJ SC/IM: CPT | Performed by: FAMILY MEDICINE

## 2023-09-19 PROCEDURE — 99207 PR NO CHARGE NURSE ONLY: CPT

## 2023-09-19 RX ADMIN — MEDROXYPROGESTERONE ACETATE 150 MG: 150 INJECTION, SUSPENSION INTRAMUSCULAR at 09:59

## 2023-09-23 DIAGNOSIS — F33.1 MODERATE EPISODE OF RECURRENT MAJOR DEPRESSIVE DISORDER (H): ICD-10-CM

## 2023-09-23 DIAGNOSIS — F43.10 PTSD (POST-TRAUMATIC STRESS DISORDER): ICD-10-CM

## 2023-09-25 RX ORDER — ESCITALOPRAM OXALATE 20 MG/1
TABLET ORAL
Qty: 90 TABLET | Refills: 1 | Status: SHIPPED | OUTPATIENT
Start: 2023-09-25 | End: 2024-03-04

## 2023-09-26 PROCEDURE — 99283 EMERGENCY DEPT VISIT LOW MDM: CPT

## 2023-09-27 ENCOUNTER — HOSPITAL ENCOUNTER (EMERGENCY)
Facility: CLINIC | Age: 23
Discharge: HOME | End: 2023-09-27
Attending: EMERGENCY MEDICINE | Admitting: EMERGENCY MEDICINE
Payer: COMMERCIAL

## 2023-09-27 ENCOUNTER — TELEPHONE (OUTPATIENT)
Dept: PHARMACY | Facility: CLINIC | Age: 23
End: 2023-09-27
Payer: COMMERCIAL

## 2023-09-27 VITALS
HEIGHT: 59 IN | HEART RATE: 64 BPM | RESPIRATION RATE: 16 BRPM | TEMPERATURE: 98.5 F | WEIGHT: 239.7 LBS | BODY MASS INDEX: 48.32 KG/M2 | OXYGEN SATURATION: 99 % | DIASTOLIC BLOOD PRESSURE: 72 MMHG | SYSTOLIC BLOOD PRESSURE: 134 MMHG

## 2023-09-27 DIAGNOSIS — T81.30XA WOUND DEHISCENCE: ICD-10-CM

## 2023-09-27 PROCEDURE — 250N000009 HC RX 250: Performed by: EMERGENCY MEDICINE

## 2023-09-27 RX ORDER — GINSENG 100 MG
CAPSULE ORAL ONCE
Status: COMPLETED | OUTPATIENT
Start: 2023-09-27 | End: 2023-09-27

## 2023-09-27 RX ADMIN — BACITRACIN: 500 OINTMENT TOPICAL at 01:05

## 2023-09-27 ASSESSMENT — ACTIVITIES OF DAILY LIVING (ADL): ADLS_ACUITY_SCORE: 35

## 2023-09-27 NOTE — ED TRIAGE NOTES
Patient arrives to the ER with c/o incision site from bee mastectomy opened up. There is about an inch open on the right side. C/o 6/10 pain, denies fevers at home.      Triage Assessment       Row Name 09/26/23 7715       Triage Assessment (Adult)    Airway WDL WDL       Respiratory WDL    Respiratory WDL WDL       Skin Circulation/Temperature WDL    Skin Circulation/Temperature WDL X  incision from bee mastectomy opened about an inch to the right side.       Cardiac WDL    Cardiac WDL WDL       Peripheral/Neurovascular WDL    Peripheral Neurovascular WDL WDL       Cognitive/Neuro/Behavioral WDL    Cognitive/Neuro/Behavioral WDL WDL

## 2023-09-27 NOTE — TELEPHONE ENCOUNTER
MTM Referral Outreach Attempt #1:    Patient referred by [employer] to complete a comprehensive medication therapy management appointment to review her medications.    No answer, I left voice message for patient to call back to schedule appointment. Please assist patient in schedule a MTM new medication review appointment in clinic or virtually depending on patient preference.    Niya Salazar, PharmD  Medication Therapy Management Pharmacist

## 2023-09-27 NOTE — ED NOTES
Bacitracin applied to wound and dressing applied. Discharge instructions discussed with pt and pt family member and all questions answered. Pt agreeable with discharge instructions and all questions answered. VSS. Pt ambulatory at discharge.

## 2023-09-27 NOTE — ED PROVIDER NOTES
EMERGENCY DEPARTMENT ENCOUNTER      NAME: Yolanda Vee  AGE: 23 year old adult  YOB: 2000  MRN: [id number]  EVALUATION DATE & TIME: 9/27/2023 12:15 AM    PCP: Litzy Corona    ED PROVIDER: Jong Limon M.D.      Chief Complaint   Patient presents with    Post-op Problem         FINAL IMPRESSION:  1. Wound dehiscence          ED COURSE & MEDICAL DECISION MAKING:    Pertinent Labs & Imaging studies reviewed. (See chart for details)  23 year old adult presents to the Emergency Department for evaluation of surgical wound dehiscence.  Patient had a mastectomy on 828.  Had dehiscence of the right lateral aspect of the wound.  Does not appear to be infected at this time.  He is healing by secondary intention already.  Discussed with Dr. Encarnacion from plastic surgery.  Would not repair this.  Discussed wound care with the patient.  They will follow-up with her surgeon this week.  I do not think he needs antibiotics at this time.  Will return for any worsening symptoms    12:15 AM I met with the patient to gather history and to perform my initial exam. I discussed the plan for care while in the Emergency Department.   12:40 AM Discussed with Dr. Encarnacion from plastic surgery.     At the conclusion of the encounter I discussed the results of all of the tests and the disposition. The questions were answered. The patient or family acknowledged understanding and was agreeable with the care plan.     Medical Decision Making    History:  Supplemental history from: Documented in chart, if applicable and Family Member/Significant Other  External Record(s) reviewed: Documented in chart, if applicable. and Outpatient Record: plastic surgery note 9/15/23    Work Up:  Chart documentation includes differential considered and any EKGs or imaging independently interpreted by provider, where specified.  In additional to work up documented, I considered the following work up: Documented in chart, if  applicable.    External consultation:  Discussion of management with another provider: Documented in chart, if applicable and Other: Plastic surgery    Complicating factors:  Care impacted by chronic illness: N/A  Care affected by social determinants of health: Access to Medical Care    Disposition considerations: Discharge. No recommendations on prescription strength medication(s). N/A.             MEDICATIONS GIVEN IN THE EMERGENCY:  Medications   bacitracin ointment (has no administration in time range)       NEW PRESCRIPTIONS STARTED AT TODAY'S ER VISIT  New Prescriptions    No medications on file          =================================================================    HPI    Patient information was obtained from: Patient and Mother.     Yolanda Vee is a 23 year old adult with a pertinent history of bilateral mastectomy for Surgery on 8/28/2023 who presents to this ED for evaluation of possible dehiscence of right mastectomy wound.  Patient has had the right aspect of her wound opened up.  It opened up a few days ago.  They did try Steri-Strips.  Steri-Strips came off.  Now having worsening pain.  Some drainage from the area.  Has been bloody to serosanguineous it sounds like.  No toni purulence.  No fevers.  Did follow-up on 9/15 with plastic surgery and was doing well at that time.  Not currently on antibiotics        PAST MEDICAL HISTORY:  Past Medical History:   Diagnosis Date    Anxiety     therapist: [facility]    Chemical dependency (H)     quit Sept 2021, alcohol. some family hx as well.     Chronic constipation     Constipation     Depression     Esophageal reflux     controlled well on protonix currently, EGD 6/3/21 w/ some mild esophagitis when alcohol intake was higher (bottle vodka daily).    Heart rate problem     some palpitations in the past w/ anxiety attacks.    Migraines     occ tylenol use.    Morbid obesity (H) 03/06/2020    Obese     Palpitations     Plantar warts     PTSD  "(post-traumatic stress disorder)     Uncomplicated asthma        PAST SURGICAL HISTORY:  Past Surgical History:   Procedure Laterality Date    COLONOSCOPY N/A 6/3/2021    Procedure: COLONOSCOPY;  Surgeon: Guru Gerardo Prado MD;  Location: [facility]    ESOPHAGOSCOPY, GASTROSCOPY, DUODENOSCOPY (EGD), COMBINED N/A 6/3/2021    Procedure: ESOPHAGOGASTRODUODENOSCOPY (EGD);  Surgeon: Guru Gerardo Prado MD;  Location: [facility]    ESOPHAGOSCOPY, GASTROSCOPY, DUODENOSCOPY (EGD), COMBINED N/A 6/23/2022    Procedure: ESOPHAGOGASTRODUODENOSCOPY (EGD);  Surgeon: Amanda Linares MD;  Location: [facility]    ESOPHAGOSCOPY, GASTROSCOPY, DUODENOSCOPY (EGD), COMBINED N/A 2/16/2023    Procedure: ESOPHAGOGASTRODUODENOSCOPY, WITH BIOPSY;  Surgeon: Alma Velasquez MD;  Location: [facility]    wisdom teeth             CURRENT MEDICATIONS:    Current Facility-Administered Medications   Medication    bacitracin ointment    medroxyPROGESTERone (DEPO-PROVERA) injection 150 mg     Current Outpatient Medications   Medication    albuterol (PROAIR HFA/PROVENTIL HFA/VENTOLIN HFA) 108 (90 Base) MCG/ACT inhaler    B-D SYRINGE LUER-SANTA 1 ML MISC    BD DISP NEEDLES 25G X 5/8\" MISC    buPROPion (WELLBUTRIN XL) 150 MG 24 hr tablet    escitalopram (LEXAPRO) 20 MG tablet    famotidine (PEPCID) 20 MG tablet    fluocinonide (LIDEX) 0.05 % external ointment    ipratropium - albuterol 0.5 mg/2.5 mg/3 mL (DUONEB) 0.5-2.5 (3) MG/3ML neb solution    nystatin (MYCOSTATIN) 665685 UNIT/GM external powder    omeprazole (PRILOSEC) 40 MG DR capsule    prazosin (MINIPRESS) 1 MG capsule    sucralfate (CARAFATE) 1 GM tablet    testosterone cypionate (DEPOTESTOSTERONE) 200 MG/ML injection    Vitamin D, Cholecalciferol, 25 MCG (1000 UT) TABS         ALLERGIES:  Allergies   Allergen Reactions    Ibuprofen Hives     Throat gets itchy and sore    Hydrocodone-Acetaminophen Nausea    Penicillins        FAMILY HISTORY:  Family History   Problem " "Relation Age of Onset    Heart Disease Mother     Substance Abuse Mother         in remission    Substance Abuse Father     Alcoholism Father     Bipolar Disorder Brother     Depression Brother     Anxiety Disorder Brother     Hypertension Maternal Grandmother     Arthritis Maternal Grandmother     Heart Disease Maternal Grandmother     Hodgkin's lymphoma Maternal Grandfather 26    Esophageal Cancer Maternal Grandfather          at 54.    Heart Disease Maternal Uncle     Heart Disease Maternal Uncle     Acute Myocardial Infarction No family hx of     Anesthesia Reaction No family hx of     Deep Vein Thrombosis (DVT) No family hx of        SOCIAL HISTORY:   Social History     Socioeconomic History    Marital status: Single   Tobacco Use    Smoking status: Every Day     Packs/day: 0.00     Years: 5.00     Pack years: 0.00     Types: Cigarettes    Smokeless tobacco: Never    Tobacco comments:     Pt does not smoke when it is cold outside   Vaping Use    Vaping Use: Every day   Substance and Sexual Activity    Alcohol use: Yes     Alcohol/week: 6.0 - 8.0 standard drinks of alcohol     Types: 6 - 8 Standard drinks or equivalent per week     Comment: sometimes    Drug use: Yes     Types: Marijuana     Comment: Smoking PRN    Sexual activity: Not Currently     Birth control/protection: Injection       VITALS:  /76   Pulse 83   Temp 98.5  F (36.9  C) (Oral)   Resp 18   Ht 1.499 m (4' 11\")   Wt 108.7 kg (239 lb 11.2 oz)   SpO2 97%   BMI 48.41 kg/m      PHYSICAL EXAM    Physical Exam  Constitutional:       General: He is not in acute distress.     Appearance: Normal appearance. He is well-developed.   HENT:      Head: Normocephalic and atraumatic.   Eyes:      General: No scleral icterus.     Conjunctiva/sclera: Conjunctivae normal.   Cardiovascular:      Rate and Rhythm: Normal rate.   Pulmonary:      Effort: Pulmonary effort is normal. No respiratory distress.   Chest:      Comments: Bilateral mastectomy " scars with a area approximately 1.5 cm of dehiscence over the right axillary area of the scar.  There is some serosanguineous drainage but no purulence.  Mildly tender.  No erythema.  No ecchymosis.  Abdominal:      General: Abdomen is flat.   Musculoskeletal:      Cervical back: Normal range of motion and neck supple.   Skin:     General: Skin is warm and dry.      Findings: No rash.   Neurological:      Mental Status: He is alert and oriented to person, place, and time.           LAB:  All pertinent labs reviewed and interpreted.  Labs Ordered and Resulted from Time of ED Arrival to Time of ED Departure - No data to display    RADIOLOGY:  Reviewed all pertinent imaging. Please see official radiology report.  No orders to display     dependently reviewed and interpreted the EKG(s) documented above.    PROCEDURES:           Jong Limon M.D.  Emergency Medicine  [facility] EMERGENCY ROOM  [address] [facility] [address] [facility] [id number] [phone]  Dept: [phone]       Jong Limon MD  09/27/23 0048

## 2023-10-06 ENCOUNTER — TELEPHONE (OUTPATIENT)
Dept: PHARMACY | Facility: OTHER | Age: 23
End: 2023-10-06
Payer: COMMERCIAL

## 2023-10-06 NOTE — TELEPHONE ENCOUNTER
Patient was referred for an MTM appointment by their insurance plan.  Calling patient and left a voicemail to see if they had time to review their medications today, or if we could schedule an appointment.  Appointment would be a general review of their medications to make sure they are working well, don't have any serious interactions/aren't causing side effects, and if there are ways to simplify them, make them easier to take, or more affordable.  Patient can call [phone] to schedule an appointment with me, or another MTM pharmacist.    Amanda Encarnacion, Pharm.D.  Medication Therapy Management Pharmacist  [facility]    This is the Subsequent Medicare Annual Wellness Exam, performed 12 months or more after the Initial AWV or the last Subsequent AWV    I have reviewed the patient's medical history in detail and updated the computerized patient record. History     Past Medical History:   Diagnosis Date    Breast cancer, right ([address] Dx 4/12/16    Dr Kolby Beltrán    HTN (hypertension), benign 9/22/2017    Hypertension     Motion sickness     Nausea & vomiting       Past Surgical History:   Procedure Laterality Date    HX CATARACT REMOVAL Bilateral 1997 2005    HX ORTHOPAEDIC Left 2008    part knee replacement    UPPER GI ENDOSCOPY,BIOPSY  6/20/2016         UPPER GI ENDOSCOPY,CTRL BLEED  6/20/2016          Current Outpatient Prescriptions   Medication Sig Dispense Refill    omeprazole (PRILOSEC) 40 mg capsule TAKE ONE CAPSULE BY MOUTH TWICE DAILY 60 Cap 6    atenolol (TENORMIN) 25 mg tablet TAKE 1 TABLET EVERY DAY 90 Tab 3    multivitamin (ONE A DAY) tablet Take 1 Tab by mouth daily.  aspirin delayed-release 81 mg tablet Take  by mouth daily.        Allergies   Allergen Reactions    [place] Cough Drops [Menthol] Swelling     Mouth swelling, had to go to ER    Bee Sting [Sting, Bee] Swelling    Sulfa (Sulfonamide Antibiotics) Hives     Family History   Problem Relation Age of Onset    Heart Disease Mother     Hypertension Mother      Social History   Substance Use Topics    Smoking status: Never Smoker    Smokeless tobacco: Never Used    Alcohol use Yes      Comment:  occasional     Patient Active Problem List   Diagnosis Code    Inversion, nipple right N64.59    Carcinoma of right breast metastatic to lung (HCC) C50.911, C78.00    Acute upper GI bleed K92.2    Acute blood loss anemia D62    Nausea & vomiting R11.2    HTN (hypertension) I10    Pre-syncope R55    Hypovolemia E86.1    Anemia D64.9    Breast cancer, right (HCC) C50.911    Cellulitis L03.90    Duodenal ulcer K26.9    Fatigue R53.83    History of total knee replacement Z96.659    History of uterine fibroid Z86.018    Hyperlipidemia, acquired E78.5    Neuralgia M79.2    Nipple retraction N64.53    Post-menopausal Z78.0    Sialoadenitis K11.20    HTN (hypertension), benign I10       Depression Risk Factor Screening:     PHQ over the last two weeks 10/1/2018   Little interest or pleasure in doing things Not at all   Feeling down, depressed, irritable, or hopeless Not at all   Total Score PHQ 2 0     Alcohol Risk Factor Screening: You do not drink alcohol or very rarely. Functional Ability and Level of Safety:   Hearing Loss  The patient needs further evaluation. She does admit to decreased hearing acuity but declines having this evaluated further at this time. Activities of Daily Living  The home contains: no safety equipment. Patient does total self care    Fall Risk  Fall Risk Assessment, last 12 mths 10/1/2018   Able to walk? Yes   Fall in past 12 months? No       Abuse Screen  Patient is not abused    Cognitive Screening   Evaluation of Cognitive Function:  Has your family/caregiver stated any concerns about your memory: no  Normal    Patient Care Team   Patient Care Team:  Lauri Yates MD as PCP - General (Internal Medicine)  Melania Forrest MD as Physician (Hematology and Oncology)    Assessment/Plan   Education and counseling provided:  Are appropriate based on today's review and evaluation    Diagnoses and all orders for this visit:    1. Medicare annual wellness visit, subsequent    2. Encounter for immunization    3. Carcinoma of right breast metastatic to lung ([facility]    4. Essential hypertension    5.  Screening for depression  -     Depression Screen Annual        Health Maintenance Due   Topic Date Due    DTaP/Tdap/Td series (1 - Tdap) 07/02/1962    Shingrix Vaccine Age 50> (1 of 2) 07/02/1991    GLAUCOMA SCREENING Q2Y  07/02/2006    Bone Densitometry (Dexa) Screening  07/02/2006    Pneumococcal 65+ High/Highest Risk (1 of 2 - PCV13) 07/02/2006    Influenza Age 5 to Adult  08/01/2018    MEDICARE YEARLY EXAM  09/29/2018     The patient is being followed for metastatic breast cancer and remains on therapy every 3 weeks per her hematologist oncologist.  She declines further screening test such as bone density test.  She agrees to a regular dose flu vaccine (declines high-dose or live virus vaccine because of her current chemotherapy). She declines pneumococcal vaccine or Tdap vaccine. Gaston Riedel MD    This note will not be viewable in [address] Adriana Gonzalez is a 68 y.o. female and presents with Annual Wellness Visit (room 2)  . Subjective: This is 5 presents today for annual wellness visit and follow-up of hypertension and peptic ulcer disease. She remains on Tenormin daily and Prilosec which she only takes as needed. She remains on aspirin 81 mg daily. She climbs multiple screening test and immunizations as noted above. Review of Systems  Constitutional: negative for fevers, chills, anorexia and weight loss  Eyes:   negative for visual disturbance and irritation  ENT:   negative for tinnitus,sore throat,nasal congestion,ear pains. hoarseness  Respiratory:  negative for cough, hemoptysis, dyspnea,wheezing  CV:   negative for chest pain, palpitations, lower extremity edema  GI:   negative for nausea, vomiting, diarrhea, mild intermittent abdominal discomfort   Endo:               negative for polyuria,polydipsia,polyphagia,heat intolerance  Genitourinary: negative for frequency, dysuria and hematuria  Integumentary: negative for rash and pruritus  Hematologic:  negative for easy bruising and gum/nose bleeding  Musculoskel: negative for myalgias, arthralgias, back pain, muscle weakness, joint pain  Neurological:  negative for headaches, dizziness, vertigo, memory problems and gait   Behavl/Psych: negative for feelings of anxiety, depression, mood changes    Past Medical History:   Diagnosis Date  Breast cancer, right ([facility] [address] Dx 4/12/16    Dr Fern Rodriguez    HTN (hypertension), benign 9/22/2017    Hypertension     Motion sickness     Nausea & vomiting      Past Surgical History:   Procedure Laterality Date    HX CATARACT REMOVAL Bilateral 1997 2005    HX ORTHOPAEDIC Left 2008    part knee replacement    UPPER GI ENDOSCOPY,BIOPSY  6/20/2016         UPPER GI ENDOSCOPY,CTRL BLEED  6/20/2016          Social History     Social History    Marital status:      Spouse name: N/A    Number of children: N/A    Years of education: N/A     Social History Main Topics    Smoking status: Never Smoker    Smokeless tobacco: Never Used    Alcohol use Yes      Comment:  occasional    Drug use: No    Sexual activity: Not Asked     Other Topics Concern    None     Social History Narrative     Family History   Problem Relation Age of Onset    Heart Disease Mother     Hypertension Mother      Current Outpatient Prescriptions   Medication Sig Dispense Refill    omeprazole (PRILOSEC) 40 mg capsule TAKE ONE CAPSULE BY MOUTH TWICE DAILY 60 Cap 6    atenolol (TENORMIN) 25 mg tablet TAKE 1 TABLET EVERY DAY 90 Tab 3    multivitamin (ONE A DAY) tablet Take 1 Tab by mouth daily.  aspirin delayed-release 81 mg tablet Take  by mouth daily.        Allergies   Allergen Reactions    [place] Cough Drops [Menthol] Swelling     Mouth swelling, had to go to ER    Bee Sting [Sting, Bee] Swelling    Sulfa (Sulfonamide Antibiotics) Hives       Objective:  Visit Vitals    /83 (BP 1 Location: Left arm, BP Patient Position: Sitting)    Pulse 69    Temp 98.6 °F (37 °C) (Oral)    Resp 16    Ht 5' 4\" (1.626 m)    Wt 187 lb (84.8 kg)    SpO2 96%    BMI 32.1 kg/m2     Physical Exam:   General appearance - alert, well appearing, and in no distress  Mental status - alert, oriented to person, place, and time  EYE-ENE, EOMI, fundi normal, corneas normal, no foreign bodies  ENT-ENT exam normal, no neck nodes or sinus tenderness  Nose - normal and patent, no erythema, discharge or polyps  Mouth - mucous membranes moist, pharynx normal without lesions  Neck - supple, no significant adenopathy   Chest - clear to auscultation, no wheezes, rales or rhonchi, symmetric air entry   Heart - normal rate, regular rhythm, normal S1, S2, no murmurs, rubs, clicks or gallops   Abdomen - soft, nontender, nondistended, no masses or organomegaly  Lymph- no adenopathy palpable  Ext-peripheral pulses normal, no pedal edema, no clubbing or cyanosis  Skin-Warm and dry. no hyperpigmentation, vitiligo, or suspicious lesions  Neuro -alert, oriented, normal speech, no focal findings or movement disorder noted  Musculoskeletal- FROM, no bony abnormalities, no point tenderness  Breast -deferred  Pelvic -deferred    No results found for this visit on 10/01/18. All results for lab orders may not have been returned by the time this encountered was closed. Assessment/Plan:    Orders Placed This Encounter    Depression Screen Annual    Influenza virus vaccine (QUADRIVALENT PRES FREE SYRINGE) IM (33021)       Problem List Items Addressed This Visit     Carcinoma of right breast metastatic to lung ([facility] [address]    HTN (hypertension)      Other Visit Diagnoses     Medicare annual wellness visit, subsequent    -  Primary    Encounter for immunization        Relevant Orders    INFLUENZA VIRUS VAC QUAD,SPLIT,PRESV FREE SYRINGE IM (Completed)    Screening for depression        Relevant Orders    DEPRESSION SCREEN ANNUAL          Patient Instructions     Medicare Wellness Visit, Female     The best way to live healthy is to have a lifestyle where you eat a well-balanced diet, exercise regularly, limit alcohol use, and quit all forms of tobacco/nicotine, if applicable. Regular preventive services are another way to keep healthy.  Preventive services (vaccines, screening tests, monitoring & exams) can help personalize your care plan, which helps you manage your own care. Screening tests can find health problems at the earliest stages, when they are easiest to treat. [facility] follows the current, evidence-based guidelines published by the [facility] ([facility] [place]) when recommending preventive services for our patients. Because we follow these guidelines, sometimes recommendations change over time as research supports it. (For example, mammograms used to be recommended annually. Even though Medicare will still pay for an annual mammogram, the newer guidelines recommend a mammogram every two years for women of average risk.)  Of course, you and your doctor may decide to screen more often for some diseases, based on your risk and your health status. Preventive services for you include:  - Medicare offers their members a free annual wellness visit, which is time for you and your primary care provider to discuss and plan for your preventive service needs. Take advantage of this benefit every year!  -All adults over the age of 72 should receive the recommended pneumonia vaccines. Current USPSTF guidelines recommend a series of two vaccines for the best pneumonia protection.   -All adults should have a flu vaccine yearly and a tetanus vaccine every 10 years. All adults age 61 and older should receive a shingles vaccine once in their lifetime.    -A bone mass density test is recommended when a woman turns 65 to screen for osteoporosis. This test is only recommended one time, as a screening. Some providers will use this same test as a disease monitoring tool if you already have osteoporosis.   -All adults age 38-68 who are overweight should have a diabetes screening test once every three years.   -Other screening tests and preventive services for persons with diabetes include: an eye exam to screen for diabetic retinopathy, a kidney function test, a foot exam, and stricter control over your cholesterol.   -Cardiovascular screening for adults with routine risk involves an electrocardiogram (ECG) at intervals determined by your doctor.   -Colorectal cancer screenings should be done for adults age 54-65 with no increased risk factors for colorectal cancer. There are a number of acceptable methods of screening for this type of cancer. Each test has its own benefits and drawbacks. Discuss with your doctor what is most appropriate for you during your annual wellness visit. The different tests include: colonoscopy (considered the best screening method), a fecal occult blood test, a fecal DNA test, and sigmoidoscopy. -Breast cancer screenings are recommended every other year for women of normal risk, age 54-69.  -Cervical cancer screenings for women over age 72 are only recommended with certain risk factors.   -All adults born between [facility] should be screened once for Hepatitis C. Here is a list of your current Health Maintenance items (your personalized list of preventive services) with a due date:  Health Maintenance Due   Topic Date Due    DTaP/Tdap/Td  (1 - Tdap) 07/02/1962    Shingles Vaccine (1 of 2) 07/02/1991    Glaucoma Screening   07/02/2006    Bone Mineral Density   07/02/2006    Pneumococcal Vaccine (1 of 2 - PCV13) 07/02/2006    Flu Vaccine  08/01/2018    Annual Well Visit  09/29/2018           Medicare Wellness Visit, Female     The best way to live healthy is to have a lifestyle where you eat a well-balanced diet, exercise regularly, limit alcohol use, and quit all forms of tobacco/nicotine, if applicable. Regular preventive services are another way to keep healthy. Preventive services (vaccines, screening tests, monitoring & exams) can help personalize your care plan, which helps you manage your own care. Screening tests can find health problems at the earliest stages, when they are easiest to treat.    [facility] follows the current, evidence-based guidelines published by the [employer] (USPSTF) when recommending preventive services for our patients. Because we follow these guidelines, sometimes recommendations change over time as research supports it. (For example, mammograms used to be recommended annually. Even though Medicare will still pay for an annual mammogram, the newer guidelines recommend a mammogram every two years for women of average risk.)  Of course, you and your doctor may decide to screen more often for some diseases, based on your risk and your health status. Preventive services for you include:  - Medicare offers their members a free annual wellness visit, which is time for you and your primary care provider to discuss and plan for your preventive service needs. Take advantage of this benefit every year!  -All adults over the age of 72 should receive the recommended pneumonia vaccines. Current USPSTF guidelines recommend a series of two vaccines for the best pneumonia protection.   -All adults should have a flu vaccine yearly and a tetanus vaccine every 10 years. All adults age 61 and older should receive a shingles vaccine once in their lifetime.    -A bone mass density test is recommended when a woman turns 65 to screen for osteoporosis. This test is only recommended one time, as a screening. Some providers will use this same test as a disease monitoring tool if you already have osteoporosis. -All adults age 38-68 who are overweight should have a diabetes screening test once every three years.   -Other screening tests and preventive services for persons with diabetes include: an eye exam to screen for diabetic retinopathy, a kidney function test, a foot exam, and stricter control over your cholesterol.   -Cardiovascular screening for adults with routine risk involves an electrocardiogram (ECG) at intervals determined by your doctor.   -Colorectal cancer screenings should be done for adults age 54-65 with no increased risk factors for colorectal cancer.   There are a number of acceptable methods of screening for this type of cancer. Each test has its own benefits and drawbacks. Discuss with your doctor what is most appropriate for you during your annual wellness visit. The different tests include: colonoscopy (considered the best screening method), a fecal occult blood test, a fecal DNA test, and sigmoidoscopy. -Breast cancer screenings are recommended every other year for women of normal risk, age 54-69.  -Cervical cancer screenings for women over age 72 are only recommended with certain risk factors.   -All adults born between [facility] should be screened once for Hepatitis C. Here is a list of your current Health Maintenance items (your personalized list of preventive services) with a due date:  Health Maintenance Due   Topic Date Due    DTaP/Tdap/Td  (1 - Tdap) 07/02/1962    Shingles Vaccine (1 of 2) 07/02/1991    Glaucoma Screening   07/02/2006    Bone Mineral Density   07/02/2006    Pneumococcal Vaccine (1 of 2 - PCV13) 07/02/2006    Flu Vaccine  08/01/2018    Annual Well Visit  09/29/2018         All Medicare beneficiaries aged 48 and older are covered; however, when a beneficiary is at high risk, there is no minimum age required to receive a screening colonoscopy or a barium enema rendered as an alternative to a screening colonoscopy. The following are the coverage criteria for each colorectal cancer screening test/procedure. Screening FOBT  Medicare provides coverage of a screening FOBT annually (i.e., at least 11 months have passed following the month in which the last covered screening FOBT was performed) for beneficiaries aged 48 and older. This screening requires a written order from the beneficiarys attending physician. NOTE: Medicare will only provide coverage for one FOBT per year: either HCPCS code  or CPT code 97660, but not both.     Screening Colonoscopy  For Beneficiaries at [address] [place] [address] for Developing Colorectal Cancer  Medicare provides coverage of a screening colonoscopy (HCPCS code ) once every 2 years for beneficiaries at high risk for developing colorectal cancer (i.e., at least 23 months have passed following the month in which the last covered screening colonoscopy [HCPCS code ] was performed). For Beneficiaries Not at [address] for Developing Colorectal Cancer  Medicare provides coverage of a screening colonoscopy (HCPCS code ) for beneficiaries who do not meet the criteria for being at high risk for developing colorectal cancer once every 10 years (i.e., at least 119 months have passed following the month in which the last covered screening colonoscopy [HCPCS code ] was performed). If the beneficiary otherwise qualifies to have a covered screening colonoscopy (HCPCS code ) based on the above but has had a covered screening flexible sigmoidoscopy (HCPCS code ), then Medicare may cover a screening colonoscopy (HCPCS code ) only after at least 47 months have passed following the month in which the last covered screening flexible sigmoidoscopy (HCPCS code ) was performed. Follow-up Disposition:  Return in about 6 months (around 4/1/2019) for follow up. I have reviewed with the patient details of the assessment and plan and all questions were answered. Relevent patient education was performed. The most recent lab findings were reviewed with the patient. An After Visit Summary was printed and given to the patient.       William Vasquez MD

## 2023-10-24 ENCOUNTER — NURSE TRIAGE (OUTPATIENT)
Dept: NURSING | Facility: CLINIC | Age: 23
End: 2023-10-24
Payer: COMMERCIAL

## 2023-10-24 NOTE — TELEPHONE ENCOUNTER
"Triage call:    Patient's mom calling w/ concerns of patient's constant rectal bleeding.  Patient gave mom verbal consent.    Mom reports patient has seen yesterday for in the [facility] room for hemorrhoid discomfort.  Mom reports the staff \"lanced a blood clot\" & the patient has been bleeding ever since.    Mom reports patient has applied 10 minutes of direct pressure to the bleeding site, but denies that it is effective to control the bleeding.    Patient reports the bleeding is constant & significant.    Per protocol, it is advised that the patient go to ED now.    Rehana Moran RN on 10/24/2023 at 10:37 AM   Reason for Disposition   SEVERE rectal bleeding (large blood clots; constant or on and off bleeding)    Additional Information   Negative: Passed out (i.e., fainted, collapsed and was not responding)   Negative: Shock suspected (e.g., cold/pale/clammy skin, too weak to stand, low BP, rapid pulse)   Negative: Vomiting red blood or black (coffee ground) material   Negative: Sounds like a life-threatening emergency to the triager    Protocols used: Rectal Bleeding-A-OH    "

## 2023-10-27 ENCOUNTER — TELEPHONE (OUTPATIENT)
Dept: PHARMACY | Facility: OTHER | Age: 23
End: 2023-10-27
Payer: COMMERCIAL

## 2023-10-27 NOTE — TELEPHONE ENCOUNTER
MTM referral from: Patient's insurance     MTM referral outreach attempt #[id number] on October 27, 2023       Outcome: Left message    Luciana Montague, PharmD  MTM Pharmacist  [facility]

## 2023-12-27 ENCOUNTER — OFFICE VISIT (OUTPATIENT)
Dept: FAMILY MEDICINE | Facility: CLINIC | Age: 23
End: 2023-12-27
Payer: COMMERCIAL

## 2023-12-27 ENCOUNTER — NURSE TRIAGE (OUTPATIENT)
Dept: NURSING | Facility: CLINIC | Age: 23
End: 2023-12-27
Payer: COMMERCIAL

## 2023-12-27 ENCOUNTER — ANCILLARY PROCEDURE (OUTPATIENT)
Dept: GENERAL RADIOLOGY | Facility: CLINIC | Age: 23
End: 2023-12-27
Attending: FAMILY MEDICINE
Payer: COMMERCIAL

## 2023-12-27 VITALS
SYSTOLIC BLOOD PRESSURE: 123 MMHG | RESPIRATION RATE: 16 BRPM | HEART RATE: 74 BPM | OXYGEN SATURATION: 98 % | DIASTOLIC BLOOD PRESSURE: 84 MMHG | TEMPERATURE: 97.8 F

## 2023-12-27 DIAGNOSIS — R68.83 CHILLS: ICD-10-CM

## 2023-12-27 DIAGNOSIS — R11.2 NAUSEA AND VOMITING, UNSPECIFIED VOMITING TYPE: ICD-10-CM

## 2023-12-27 DIAGNOSIS — R10.84 ABDOMINAL PAIN, GENERALIZED: ICD-10-CM

## 2023-12-27 DIAGNOSIS — R10.84 ABDOMINAL PAIN, GENERALIZED: Primary | ICD-10-CM

## 2023-12-27 LAB
ALBUMIN SERPL BCG-MCNC: 4.1 G/DL (ref 3.5–5.2)
ALP SERPL-CCNC: 86 U/L (ref 40–150)
ALT SERPL W P-5'-P-CCNC: 26 U/L (ref 0–70)
ANION GAP SERPL CALCULATED.3IONS-SCNC: 10 MMOL/L (ref 7–15)
AST SERPL W P-5'-P-CCNC: 23 U/L (ref 0–45)
BASOPHILS # BLD AUTO: 0 10E3/UL (ref 0–0.2)
BASOPHILS NFR BLD AUTO: 0 %
BILIRUB SERPL-MCNC: 0.3 MG/DL
BUN SERPL-MCNC: 6.7 MG/DL (ref 6–20)
CALCIUM SERPL-MCNC: 9.5 MG/DL (ref 8.6–10)
CHLORIDE SERPL-SCNC: 104 MMOL/L (ref 98–107)
CREAT SERPL-MCNC: 0.92 MG/DL (ref 0.51–1.17)
DEPRECATED HCO3 PLAS-SCNC: 25 MMOL/L (ref 22–29)
EGFRCR SERPLBLD CKD-EPI 2021: 89 ML/MIN/1.73M2
EOSINOPHIL # BLD AUTO: 0.2 10E3/UL (ref 0–0.7)
EOSINOPHIL NFR BLD AUTO: 2 %
ERYTHROCYTE [DISTWIDTH] IN BLOOD BY AUTOMATED COUNT: 14.2 % (ref 10–15)
FLUAV AG SPEC QL IA: NEGATIVE
FLUBV AG SPEC QL IA: NEGATIVE
GLUCOSE SERPL-MCNC: 89 MG/DL (ref 70–99)
HCT VFR BLD AUTO: 46 % (ref 35–53)
HGB BLD-MCNC: 15.4 G/DL (ref 11.7–17.7)
IMM GRANULOCYTES # BLD: 0 10E3/UL
IMM GRANULOCYTES NFR BLD: 0 %
LYMPHOCYTES # BLD AUTO: 2.2 10E3/UL (ref 0.8–5.3)
LYMPHOCYTES NFR BLD AUTO: 20 %
MCH RBC QN AUTO: 29.7 PG (ref 26.5–33)
MCHC RBC AUTO-ENTMCNC: 33.5 G/DL (ref 31.5–36.5)
MCV RBC AUTO: 89 FL (ref 78–100)
MONOCYTES # BLD AUTO: 1.2 10E3/UL (ref 0–1.3)
MONOCYTES NFR BLD AUTO: 11 %
NEUTROPHILS # BLD AUTO: 7 10E3/UL (ref 1.6–8.3)
NEUTROPHILS NFR BLD AUTO: 66 %
PLATELET # BLD AUTO: 297 10E3/UL (ref 150–450)
POTASSIUM SERPL-SCNC: 4.2 MMOL/L (ref 3.4–5.3)
PROT SERPL-MCNC: 7.4 G/DL (ref 6.4–8.3)
RBC # BLD AUTO: 5.18 10E6/UL (ref 3.8–5.9)
SODIUM SERPL-SCNC: 139 MMOL/L (ref 135–145)
WBC # BLD AUTO: 10.7 10E3/UL (ref 4–11)

## 2023-12-27 PROCEDURE — 99215 OFFICE O/P EST HI 40 MIN: CPT | Performed by: FAMILY MEDICINE

## 2023-12-27 PROCEDURE — 74019 RADEX ABDOMEN 2 VIEWS: CPT | Mod: TC | Performed by: STUDENT IN AN ORGANIZED HEALTH CARE EDUCATION/TRAINING PROGRAM

## 2023-12-27 PROCEDURE — 80053 COMPREHEN METABOLIC PANEL: CPT | Performed by: FAMILY MEDICINE

## 2023-12-27 PROCEDURE — 36415 COLL VENOUS BLD VENIPUNCTURE: CPT | Performed by: FAMILY MEDICINE

## 2023-12-27 PROCEDURE — 87635 SARS-COV-2 COVID-19 AMP PRB: CPT | Performed by: FAMILY MEDICINE

## 2023-12-27 PROCEDURE — 85025 COMPLETE CBC W/AUTO DIFF WBC: CPT | Performed by: FAMILY MEDICINE

## 2023-12-27 PROCEDURE — 87804 INFLUENZA ASSAY W/OPTIC: CPT | Performed by: FAMILY MEDICINE

## 2023-12-27 RX ORDER — TESTOSTERONE 20.25 MG/1.25G
GEL TOPICAL
Status: ON HOLD | COMMUNITY
Start: 2023-12-20 | End: 2024-07-02

## 2023-12-27 RX ORDER — ONDANSETRON 4 MG/1
4 TABLET, ORALLY DISINTEGRATING ORAL ONCE
Status: COMPLETED | OUTPATIENT
Start: 2023-12-27 | End: 2023-12-27

## 2023-12-27 RX ORDER — ONDANSETRON 4 MG/1
TABLET, FILM COATED ORAL
Qty: 30 TABLET | Refills: 0 | Status: ON HOLD | OUTPATIENT
Start: 2023-12-27 | End: 2024-07-02

## 2023-12-27 RX ADMIN — ONDANSETRON 4 MG: 4 TABLET, ORALLY DISINTEGRATING ORAL at 13:33

## 2023-12-27 NOTE — TELEPHONE ENCOUNTER
"Kem has been vomiting now for two days and gave consent to communicate with mom.  Vomiting started last night.  Patient is vomiting 7 times a day.   Fever is present and started same time as vomiting.  Diarrhea slight.  Grand children were present and were sick with the same symptoms.  Temp is currently 98.5.   Abdominal pain is severe \"9\".   Patient states that pain is not severe now more with vomiting.  Patient is vomiting a green color.  Mary Pittman is requesting a message be sent to his pcp/clinic.  Please phone patient.    Nurse Triage SBAR    Is this a 2nd Level Triage? YES, LICENSED PRACTITIONER REVIEW IS REQUIRED    Situation: Vomiting/abdominal pain with vomiting and vomiting green color.    Background: Patient has been vomiting since early am on 12/26/2023.  Denies fever.      Assessment: Patient is hydrated and is vomiting 7 times a day.  Patient is not eating.  Patient states that he did notice green in his vomiting.  Mary Pittman states that at times Yolanda is constipated.      Protocol Recommended Disposition:   Go To Office Now    Recommendation: Please phone mayr Pittman/patient.       Routed to provider    Does the patient meet one of the following criteria for ADS visit consideration? 16+ years old, with an [facility] PCP     TIP  Providers, please consider if this condition is appropriate for management at one of our Acute and Diagnostic Services sites.     If patient is a good candidate, please use dotphrase <dot>triageresponse and select Refer to ADS to document.        Reason for Disposition   Vomiting contains bile (green color)    Additional Information   Negative: Shock suspected (e.g., cold/pale/clammy skin, too weak to stand, low BP, rapid pulse)   Negative: Difficult to awaken or acting confused (e.g., disoriented, slurred speech)   Negative: Sounds like a life-threatening emergency to the triager   Negative: Vomiting red blood or black (coffee ground) material   Negative: Vomiting and hernia is " more painful or swollen than usual   Negative: Recent head injury (within 3 days)   Negative: Recent abdominal injury (within 7 days)   Negative: Insulin-dependent diabetes and glucose > 240 mg/dL (13 mmol/L)   Negative: Severe pain in one eye   Negative: SEVERE vomiting (e.g., 6 or more times/day)  (Exception: Patient sounds well, is drinking liquids, does not sound dehydrated, and vomiting has lasted less than 24 hours.)   Negative: MODERATE vomiting (e.g., 3 - 5 times/day) and age > 60 years   Negative: Constant abdominal pain lasting > 2 hours   Negative: High-risk adult (e.g., brain tumor, V-P shunt, hernia)   Negative: Drinking very little and has signs of dehydration (e.g., no urine > 12 hours, very dry mouth, very lightheaded)   Negative: Patient sounds very sick or weak to the triager   Negative: Vomiting and abdomen looks much more swollen than usual    Protocols used: Vomiting-A-OH

## 2023-12-27 NOTE — PATIENT INSTRUCTIONS
Small sips of water, apple juice, pedialyte, diluted gatorade etc as tolerated for hydration. A spoonful or sip at a time.   Advance slowly as tolerated.     Zofran 4-8mg every 8 hours or so as needed for nausea. These pills need to be swallowed (not the dissolvable kind).     Rest  Steam, nasal saline, humidifier.   Tylenol for headache and abdominal pain.     To the ER if not able to keep fluids down. Or high fevers or increased abdominal pain.   No work until no vomiting or fever for 24 hours and feeling better.   Earliest return to work would be Friday 12/29/23. Note written.     Labs are normal so far, no significant signs of dehydration so I think we can safely try to rehydrate slowly at home as above.

## 2023-12-27 NOTE — LETTER
[facility]  [address] [address]  Phone: [phone]  Fax: [phone]    December 27, 2023        Yolanda Vee  [address]  [place] [address]          To whom it may concern:    RE: Yolanda Vee    Patient was seen and treated today at our clinic for an acute illness. She was brought to the clinic by her mother who had to miss work.     Please contact me for questions or concerns.      Sincerely,      Gayle Dye

## 2023-12-27 NOTE — TELEPHONE ENCOUNTER
Writer called and spoke to both the patient and his mother, Toya, verbal CTC today, 12/27/23, regarding nurse triage from 12/27/23.    Writer recommended that the patient be seen in the ED or [facility] as there are no available appointments left through the end of this week.    Recommendation Follow Up:   Reached patient/caregiver. Informed of recommendations. Toya verbalized understanding and agrees with the plan.          Toya stated she will bring the patient over to the [facility] for evaluation right now as they are just opening.    Denies other questions or concerns at this time.    Yisel Conde, RN, BSN  [facility]

## 2023-12-27 NOTE — LETTER
[facility]  [address] [address]  Phone: [phone]  Fax: [phone]    December 27, 2023        Yolanda Vee  [address]  [place] [address]          To whom it may concern:    RE: Yolanda Vee    Patient was seen and treated today at our clinic for an acute illness. She may return to work when no fever or vomiting for 24 hours and feeling better. The earliest return would be Friday 12/29/23.     Please contact me for questions or concerns.      Sincerely,      Gayle Dye

## 2023-12-28 LAB — SARS-COV-2 RNA RESP QL NAA+PROBE: NEGATIVE

## 2023-12-28 NOTE — PROGRESS NOTES
"Assessment/Plan:   1. Nausea and vomiting, unspecified vomiting type  - ondansetron (ZOFRAN ODT) ODT tab 4 mg  - ondansetron (ZOFRAN) 4 MG tablet; 4mg every 6-8 hours if needed for nausea and vomiting. If needed, may increase to 8mg every 8-12 hours if needed for nausea.  Dispense: 30 tablet; Refill: 0  2. Abdominal pain, generalized  - XR Abdomen 2 Views; Future  - CBC with platelets and differential  - Comprehensive metabolic panel (BMP + Alb, Alk Phos, ALT, AST, Total. Bili, TP)  3. Chills  - Influenza A & B Antigen - Clinic Collect  - Symptomatic COVID-19 Virus (Coronavirus) by PCR Nose    Onset of nausea vomiting sweats chills congestion headache cough sore throat and abdominal pain fairly abruptly 12/25/2023.  Known \"stomach flu\" exposure.  Symptoms do sound like onset of influenza.  We will check for that along with COVID.  Zofran was given in the office which did help with her nausea little bit however she did not care at all for the taste of the dissolvable tab.  She reports no bowel movement or passing gas in the setting of continuous abdominal pain and bloating, nausea and vomiting.  We obtained an abdominal x-ray upright and flat to rule out small bowel obstruction which was viewed by me.  There was air throughout the colon and small bowel with no dilated loops which look good to me.  Radiologist confirmed no small bowel obstruction.  CBC and CMP were obtained as well and both normal/reassuring.  I suspect viral illness both respiratory and GI.  No sign of secondary bacterial infections or significant dehydration.    Small sips of water, apple juice, pedialyte, diluted gatorade etc as tolerated for hydration. A spoonful or sip at a time.   Advance slowly as tolerated.   Zofran 4-8mg every 8 hours or so as needed for nausea. These pills need to be swallowed (not the dissolvable kind).   Rest  Steam, nasal saline, humidifier.   Tylenol for headache and abdominal pain.   To the ER if not able to keep fluids " down. Or high fevers or increased abdominal pain.   No work until no vomiting or fever for 24 hours and feeling better.   Earliest return to work would be Friday 12/29/23. Note written.     Labs are normal so far, no significant signs of dehydration so I think we can safely try to rehydrate slowly at home as above.    I discussed red flag symptoms, return precautions to clinic/ER and follow up care with patient/parent.  Expected clinical course, symptomatic cares advised. Questions answered. Patient/parent amenable with plan.  Time: total time on day of visit 43 minutes spent in chart review, obtaining patient history and physical exam, reviewing labs and xray, medication management, shared decision making and coordination of care.       Subjective:     Yolanda Vee is a 23 year old adult who presents for evaluation of abdominal pain.  She was awakened at 3 AM on 12/25/2023 with vomiting fever nasal congestion cough sore throat and abdominal pain.  The abdominal pain has bee constant since that time.  It started in the lower abdomen and now is all over.  Pain ranges from 6/10 to 9/10.  She feels bloated.  She had 1 episode of diarrhea on 12/25/2023 and has had no bowel movements since that time.  She denies passing any gas since that time.  She has continued to have nausea and vomiting.  She has felt fatigued and achy, she is having chills and sweats.  She has headaches and is not sleeping well due to all of this discomfort.  Minimal cough.  She does have history of asthma which she feels is stable.  She is a little bit wheezy but her treatments help.  She was exposed to a viral stomach flu.  She works in childcare.    Allergies   Allergen Reactions    Ibuprofen Hives     Throat gets itchy and sore    Hydrocodone-Acetaminophen Nausea    Penicillins      Current Outpatient Medications   Medication    ondansetron (ZOFRAN) 4 MG tablet    Testosterone 1.62 % GEL    albuterol (PROAIR HFA/PROVENTIL HFA/VENTOLIN  "HFA) 108 (90 Base) MCG/ACT inhaler    B-D SYRINGE LUER-SANTA 1 ML MISC    BD DISP NEEDLES 25G X 5/8\" MISC    buPROPion (WELLBUTRIN XL) 150 MG 24 hr tablet    escitalopram (LEXAPRO) 20 MG tablet    famotidine (PEPCID) 20 MG tablet    fluocinonide (LIDEX) 0.05 % external ointment    ipratropium - albuterol 0.5 mg/2.5 mg/3 mL (DUONEB) 0.5-2.5 (3) MG/3ML neb solution    nystatin (MYCOSTATIN) 095521 UNIT/GM external powder    omeprazole (PRILOSEC) 40 MG DR capsule    prazosin (MINIPRESS) 1 MG capsule    sucralfate (CARAFATE) 1 GM tablet    testosterone cypionate (DEPOTESTOSTERONE) 200 MG/ML injection    Vitamin D, Cholecalciferol, 25 MCG (1000 UT) TABS     Current Facility-Administered Medications   Medication    medroxyPROGESTERone (DEPO-PROVERA) injection 150 mg     Patient Active Problem List   Diagnosis    Constipation    Alcohol use disorder, moderate, dependence (H)    Morbid obesity (H)    Vitamin D deficiency    Tobacco use    Periumbilical pain    PTSD (post-traumatic stress disorder)    Nicotine use disorder    Neutrophilia    Moderate episode of recurrent major depressive disorder (H)    Marijuana use    Hypochromic anemia    Chronic nasal congestion    Chronic idiopathic constipation    Anxiety    GI bleed    History of alcoholic gastritis    Acne vulgaris    Gender dysphoria in adult       Objective:     /84   Pulse 74   Temp 97.8  F (36.6  C) (Oral)   Resp 16   SpO2 98%     Physical    General Appearance: Alert, pleasant, no distress, Afebrile vital signs stable  Head: Normocephalic, without obvious abnormality, atraumatic  Eyes: Conjunctivae are normal.   Ears: Normal TMs and external ear canals, both ears  Nose: Mild congestion.  Throat: Throat is red posteriorly.  No exudate.  No vesicular lesions  Neck: No adenopathy  Lungs: Clear to auscultation bilaterally, respirations unlabored  Heart: Regular rate and rhythm  Abdomen: Soft, diffusely tender but no significant guarding, mildly distended but " "not tympanitic, no masses, no organomegaly, bowel sounds are active  Back: No CVA tenderness with percussion  Extremities: No lower extremity edema  Skin: Skin color, texture, turgor normal, no rashes or lesions  Psychiatric: Patient has a normal mood and affect.         Results for orders placed or performed in visit on 12/27/23   XR Abdomen 2 Views     Status: None    Narrative    EXAM: XR ABDOMEN 2 VIEWS  LOCATION: [facility]  DATE: 12/27/2023    INDICATION: abdominal pain and bloating, generalized, vomiting everything for 3 days, no passing gas, rule out obstruction  COMPARISON: CTA chest, abdomen and pelvis 01/26/2022      Impression    IMPRESSION: The bowel gas pattern is nonobstructive. No pneumatosis or pneumoperitoneum. No suspicious calcifications. No acute osseous abnormality.   Results for orders placed or performed in visit on 12/27/23   Comprehensive metabolic panel (BMP + Alb, Alk Phos, ALT, AST, Total. Bili, TP)     Status: Normal   Result Value Ref Range    Sodium 139 135 - 145 mmol/L    Potassium 4.2 3.4 - 5.3 mmol/L    Carbon Dioxide (CO2) 25 22 - 29 mmol/L    Anion Gap 10 7 - 15 mmol/L    Urea Nitrogen 6.7 6.0 - 20.0 mg/dL    Creatinine 0.92 0.51 - 1.17 mg/dL    GFR Estimate 89 >60 mL/min/1.73m2    Calcium 9.5 8.6 - 10.0 mg/dL    Chloride 104 98 - 107 mmol/L    Glucose 89 70 - 99 mg/dL    Alkaline Phosphatase 86 40 - 150 U/L    AST 23 0 - 45 U/L    ALT 26 0 - 70 U/L    Protein Total 7.4 6.4 - 8.3 g/dL    Albumin 4.1 3.5 - 5.2 g/dL    Bilirubin Total 0.3 <=1.2 mg/dL    Narrative    The generation of reference intervals for this test is currently based on binary male or female sex. If the electronic health record information indicates another gender identity or if Legal Sex is recorded as \"Unknown\", both male and female reference intervals are provided where applicable, and should be considered according to the individual's appropriate clinical context.   CBC with platelets " "and differential     Status: None   Result Value Ref Range    WBC Count 10.7 4.0 - 11.0 10e3/uL    RBC Count 5.18 3.80 - 5.90 10e6/uL    Hemoglobin 15.4 11.7 - 17.7 g/dL    Hematocrit 46.0 35.0 - 53.0 %    MCV 89 78 - 100 fL    MCH 29.7 26.5 - 33.0 pg    MCHC 33.5 31.5 - 36.5 g/dL    RDW 14.2 10.0 - 15.0 %    Platelet Count 297 150 - 450 10e3/uL    % Neutrophils 66 %    % Lymphocytes 20 %    % Monocytes 11 %    % Eosinophils 2 %    % Basophils 0 %    % Immature Granulocytes 0 %    Absolute Neutrophils 7.0 1.6 - 8.3 10e3/uL    Absolute Lymphocytes 2.2 0.8 - 5.3 10e3/uL    Absolute Monocytes 1.2 0.0 - 1.3 10e3/uL    Absolute Eosinophils 0.2 0.0 - 0.7 10e3/uL    Absolute Basophils 0.0 0.0 - 0.2 10e3/uL    Absolute Immature Granulocytes 0.0 <=0.4 10e3/uL    Narrative    The generation of reference intervals for this test is currently based on binary male or female sex. If the electronic health record information indicates another gender identity or if Legal Sex is recorded as \"Unknown\", both male and female reference intervals are provided where applicable, and should be considered according to the individual's appropriate clinical context.   Influenza A & B Antigen - Clinic Collect     Status: Normal    Specimen: Nose; Swab   Result Value Ref Range    Influenza A antigen Negative Negative    Influenza B antigen Negative Negative    Narrative    Test results must be correlated with clinical data. If necessary, results should be confirmed by a molecular assay or viral culture.   CBC with platelets and differential     Status: None    Narrative    The following orders were created for panel order CBC with platelets and differential.  Procedure                               Abnormality         Status                     ---------                               -----------         ------                     CBC with platelets and d...[[id number]]                      Final result                 Please view results for these " tests on the individual orders.       This note has been dictated in part using voice recognition software.  Any grammatical or context distortions are unintentional and inherent to the software.  Please feel free to contact me directly for clarification if needed.

## 2024-01-10 ENCOUNTER — VIRTUAL VISIT (OUTPATIENT)
Dept: GASTROENTEROLOGY | Facility: CLINIC | Age: 24
End: 2024-01-10
Payer: COMMERCIAL

## 2024-01-10 VITALS — WEIGHT: 235 LBS | BODY MASS INDEX: 47.46 KG/M2

## 2024-01-10 DIAGNOSIS — K59.09 CHRONIC CONSTIPATION: ICD-10-CM

## 2024-01-10 DIAGNOSIS — R13.10 DYSPHAGIA, UNSPECIFIED TYPE: ICD-10-CM

## 2024-01-10 DIAGNOSIS — K21.00 GASTROESOPHAGEAL REFLUX DISEASE WITH ESOPHAGITIS WITHOUT HEMORRHAGE: Primary | ICD-10-CM

## 2024-01-10 DIAGNOSIS — K20.80 LOS ANGELES GRADE A ESOPHAGITIS: ICD-10-CM

## 2024-01-10 PROCEDURE — 99417 PROLNG OP E/M EACH 15 MIN: CPT | Mod: 95 | Performed by: PHYSICIAN ASSISTANT

## 2024-01-10 PROCEDURE — 99215 OFFICE O/P EST HI 40 MIN: CPT | Mod: 95 | Performed by: PHYSICIAN ASSISTANT

## 2024-01-10 ASSESSMENT — PAIN SCALES - GENERAL: PAINLEVEL: SEVERE PAIN (6)

## 2024-01-10 NOTE — PATIENT INSTRUCTIONS
It was a pleasure taking care of you today.  I've included a brief summary of our discussion and care plan from today's visit below.  Please review this information with your primary care provider.  _______________________________________________________________________     My recommendations are summarized as follows:     -- continue omeprazole 40mg twice daily. This is best taken on an empty stomach about 30 minutes before a meal   -- you can continue carafate as needed  -- Try Reflux Gourmet which can be purchased online. This can taken as needed after meals and at bedtime  -- schedule further esophageal testing (pH impedence and manometry testing)  -- referral to surgeon to review anti-reflux surgery   -- increase miralax to 2 - 3 capfuls daily to manage constipation   -- A colonoscopy was previously ordered for possible colitis however not completed. We should reconsider in the future depending on your symptoms.     Follow up with General GI in ~ 3 months, sooner if needed.    ______________________________________________________________________     How do I schedule labs, imaging studies, or procedures that were ordered in clinic today?      Labs: To schedule lab appointment you can contact your local [facility] or call [phone] to schedule at any convenient [facility] location.     Procedures: If a colonoscopy, upper endoscopy, breath test, esophageal manometry, or pH impedence was ordered today, our endoscopy team will call you to schedule this. If you have not heard from our endoscopy team within a week, please call [phone] to schedule.      Imaging Studies: If you were scheduled for a CT scan, X-ray, MRI, ultrasound, HIDA scan or other imaging study, please call [phone] to have this scheduled.      Referral: If a referral to another specialty was ordered, expect a phone call or follow instructions above. If you have not heard from anyone regarding your referral in a week,  please call our clinic to check the status.      Who do I call with any questions after my visit?  Please be in touch if there are any further questions that arise following today's visit.  There are multiple ways to contact your gastroenterology care team.       During business hours, you may reach a Gastroenterology nurse at [phone]     To schedule or reschedule an appointment, please call [phone].      You can always send a secure message through HandInScan.  HandInScan messages are answered by your nurse or doctor typically within 24 hours.  Please allow extra time on weekends and holidays.       For urgent/emergent questions after business hours, you may reach the on-call GI Fellow by contacting the [facility]  at [phone].     How will I get the results of any tests ordered?    You will receive all of your results.  If you have signed up for [facility], any tests ordered at your visit will be available to you after your physician reviews them.  Typically this takes 1-2 weeks.  If there are urgent results that require a change in your care plan, your physician or nurse will call you to discuss the next steps.       What is HandInScan?  HandInScan is a secure way for you to access all of your healthcare records from the [facility].  It is a web based computer program, so you can sign on to it from any location.  It also allows you to send secure messages to your care team.  I recommend signing up for HandInScan access if you have not already done so and are comfortable with using a computer.       How to I schedule a follow-up visit?  If you did not schedule a follow-up visit today, please call [phone] to schedule a follow-up office visit.      Jayla Locke PA-C  Division of Gastroenterology, Hepatology and Nutrition   [facility]

## 2024-01-10 NOTE — PROGRESS NOTES
GI FOLLOW UP VISIT       REASON FOR VISIT: FOLLOW UP     HPI: Yolanda Vee is 23 year old adult who identifies as male (pronouns he/him) with pmhx of depression, PTSD, gastritis secondary to alcohol use, chronic leukocytosis, tobacco use, GERD with history of LA garde C esophagitis, presenting to GI clinic for follow-up.     Kem was previously seen by several providers in our GI department. This is my first visit with the patient. Patient last seen by Madelyn Gutierrez PA-C in November 2022. Briefly, he initially presented with report of BRBPR and hematemesis. EGD June 2021 was notable for LA grade C esophagitis. Breath testing was performed and notable for increased methane and hydrogen, and was given a course of Rifaximin without change in symptoms. He was seen in the ED 1/26/2022 with ongoing symptoms, with CTA showing possible mild colonic wall thickening. Urine tox screen was positive for opiates and cannabinoids. At prior follow-up April 2022, he noted lower abdominal pelvic pain that is constant, as well as frequent, constant GERD symptoms despite omeprazole 20 mg daily and carafate daily. He reported dysphagia with steak and lettuce as well as intermittent hematemesis. Repeat EGD 6/2022 with LA grade A esophagitis. He was recommended lansoprazole 30 mg twice daily, with recommendation for repeat EGD in 3 months. He was still having symptoms despite lansoprazole at last visit in Nov 2022 and was actually additionally taking omeprazole. He was advised to discontinue the two different PPI's and instead started on high dose omeprazole 40mg twice daily. He had a repeat EGD in February 2023 which continued to note LA Grade A esophagitis. Biopsies with mild reactive gastropathy and mild active inflammation. No h pylori.     He returns today with continued reflux/heartburn symptoms. Additionally having emesis. No hematemesis. He does continue omeprazole 40mg BID and takes carafate at night time which only  "helps temporarily. Continues to have intermittent dysphagia and has stomach pain/cramping. Pt is interested in reflux surgery at this point.     Patient also reports having constipation. Having small stools infrequently, despite taking miralax 1 capful daily and dulcolax once a week.     As mentioned above, there was findings of possible mild colonic wall thickening at ED in 1/26/2022. Stool tests notable for elevated fecal calprotectin at 92.3, with unremarkable testing for chronic enteric pathogens. Celiac serologies negative. Colonoscopy was previously ordered but not completed.      ALLERGIES:  Ibuprofen, Hydrocodone-acetaminophen, and Penicillins      PERTINENT MEDICATIONS:  Current Outpatient Medications   Medication    ondansetron (ZOFRAN) 4 MG tablet    sucralfate (CARAFATE) 1 GM tablet    Testosterone 1.62 % GEL    albuterol (PROAIR HFA/PROVENTIL HFA/VENTOLIN HFA) 108 (90 Base) MCG/ACT inhaler    B-D SYRINGE LUER-SANTA 1 ML MISC    BD DISP NEEDLES 25G X 5/8\" MISC    buPROPion (WELLBUTRIN XL) 150 MG 24 hr tablet    escitalopram (LEXAPRO) 20 MG tablet    famotidine (PEPCID) 20 MG tablet    fluocinonide (LIDEX) 0.05 % external ointment    ipratropium - albuterol 0.5 mg/2.5 mg/3 mL (DUONEB) 0.5-2.5 (3) MG/3ML neb solution    nystatin (MYCOSTATIN) 425244 UNIT/GM external powder    omeprazole (PRILOSEC) 40 MG DR capsule    prazosin (MINIPRESS) 1 MG capsule    testosterone cypionate (DEPOTESTOSTERONE) 200 MG/ML injection    Vitamin D, Cholecalciferol, 25 MCG (1000 UT) TABS     Current Facility-Administered Medications   Medication    medroxyPROGESTERone (DEPO-PROVERA) injection 150 mg        PHYSICAL EXAMINATION:  Constitutional: aaox3, cooperative, pleasant, not dyspneic/diaphoretic, no acute distress  GENERAL: Healthy, alert and no distress  EYES: Eyes grossly normal to inspection.  No discharge or erythema, or obvious scleral/conjunctival abnormalities.  RESP: No audible wheeze, cough, or visible cyanosis.  No " visible retractions or increased work of breathing.    SKIN: Visible skin clear. No significant rash, abnormal pigmentation or lesions.  NEURO: Cranial nerves grossly intact.  Mentation and speech appropriate for age.  PSYCH: Mentation appears normal, affect normal/bright, judgement and insight intact, normal speech and appearance well-groomed.    ASSESSMENT/PLAN:    Yolanda Vee is a 23 year old adult who presents for follow up.     #GERD  #[place] grade A esophagitis  #Dysphagia   Patient with frequent GERD symptoms, with hx of LA Grade C esophagitis on EGD in 2021. Follow up EGD in June 2022 with LA Grade A esophagitis. Repeat EGD in February 2023 also continued to show grade A esophagitis despite double dose PPI. Continues double dose PPI at this time without relief. Using carafate at bedtime with temporary relief. Pt interested in surgical mgmt due to uncontrolled reflux. Will refer to general surgery to discuss. In the interim will plan for 24 hr pH impedence and esophageal manometry. Can try reflux gourmet as needed for heartburn.   -- continue high dose PPI and carafate prn  -- trial reflux gourmet available online  --pH impedence and manometry  -- referral to surgery    #Constipation   #Elevated fecal calpro  #Possible bowel wall thickening on CT   Previous colonoscopy in 2021 was unrevealing. Following this, a CT showed possible bowel wall thickening. Fecal calprotectin was mildly elevated, which can occur in the setting of PPI use. He was previously recommended repeat colonoscopy which was scheduled in December 2022 however not completed. Patient without diarrheal symptoms. He does however have constipation with very infrequent BM's despite miralax 1 capful daily. Advised to increase to 2-3 capfuls daily. Consider secretagogue if no improvement after miralax. Should consider repeating colonoscopy given prior CT findings of colitis in 2021, although current symptoms of constipation not  consistent.       Thank you for this consultation.  It was a pleasure to participate in the care of this patient; please contact us with any further questions.        This note was created with voice recognition software, and while reviewed for accuracy, typos may remain.       I spent a total of 57 minutes on the day of the visit.   Time spent by me doing chart review, history and exam, documentation and further activities per the note      Jayla Locke PA-C  Division of Gastroenterology, Hepatology and Nutrition   [facility]            Video-Visit Details    Video Start Time: 1:29 PM    Type of service:  Video Visit    Video End Time:1:41 PM    Originating Location (pt. Location): Home    Distant Location (provider location):  Off-site    Platform used for Video Visit: Rachid

## 2024-01-10 NOTE — NURSING NOTE
Is the patient currently in the state of [place]? YES    Visit mode:VIDEO    If the visit is dropped, the patient can be reconnected by: VIDEO VISIT: Text to cell phone:   Telephone Information:   Mobile [phone]       Will anyone else be joining the visit? NO  (If patient encounters technical issues they should call [phone])    How would you like to obtain your AVS? MyChart    Are changes needed to the allergy or medication list? No    Reason for visit: Video Visit (Recheck)    Yisel PETERSON

## 2024-01-16 ENCOUNTER — TELEPHONE (OUTPATIENT)
Dept: GASTROENTEROLOGY | Facility: CLINIC | Age: 24
End: 2024-01-16
Payer: COMMERCIAL

## 2024-01-16 NOTE — TELEPHONE ENCOUNTER
Non-Invasive GI Procedure Scheduling Questionnaire    Have you had a positive Covid test in the last 14 days?  No    Are you active on [employer]?   Yes    What insurance is in the chart?  Other:  [employer]    Ordering/Referring Provider: liz mujica   (If ordering provider performs procedure, schedule with ordering provider unless otherwise instructed. )    (Females) Are you currently pregnant? No - Okay to continue scheduling.    Have you ever had or are you awaiting a heart or lung transplant? No - Schedule next available.    Do you need assistance transferring? No - Continue scheduling.    Do you take acid reflux medication or proton-pump inhibitors (PPI)? Yes [Manometry with/without PH] - Please consult with your ordering provider for holding instructions. ( Scheduled for more than 14 days out.)        Patient Reminders:  Please inform patients to review instructions sent via [employer] or letter two weeks before procedure.  The Manometry exam may take up to 90 minutes.  The Breath Test exam may take up to 4 hours.

## 2024-01-24 ENCOUNTER — OFFICE VISIT (OUTPATIENT)
Dept: SURGERY | Facility: CLINIC | Age: 24
End: 2024-01-24
Attending: PHYSICIAN ASSISTANT
Payer: COMMERCIAL

## 2024-01-24 ENCOUNTER — TELEPHONE (OUTPATIENT)
Dept: SURGERY | Facility: CLINIC | Age: 24
End: 2024-01-24

## 2024-01-24 VITALS — WEIGHT: 235 LBS | HEIGHT: 59 IN | BODY MASS INDEX: 47.37 KG/M2

## 2024-01-24 DIAGNOSIS — K21.00 GASTROESOPHAGEAL REFLUX DISEASE WITH ESOPHAGITIS WITHOUT HEMORRHAGE: Primary | ICD-10-CM

## 2024-01-24 PROCEDURE — 99204 OFFICE O/P NEW MOD 45 MIN: CPT | Performed by: SURGERY

## 2024-01-24 NOTE — LETTER
Pre-op Physical: Dr. Preciado will do your pre op physical the day of the procedure    Surgery Date: 3/21/2024     Location: [address], [place] [address]    Approximate Arrival Time: 11:30 am  (Unless instructed differently by the pre-op call nurse)     Post op Appointment: 4/4/2024 at   9:30 am  with  Dr. Preciado . [facility] [address], [place] [address].    Pre-Surgical Tasks:     Review all medications with your primary care or prescribing physician; they will advise you which meds to stop and when, and when you can resume taking.  Certain medications like blood thinners and weight loss medications need to be stopped in advance of surgery to proceed safely.      Blood thinners including but not exclusive to drugs like Xarelto, Eliquis, Warfarin and Aspirin, should be stopped five days before surgery, if your prescribing provider agrees. Follow your provider's advice on stopping blood thinners because they know you best.  If you are unsure if your medication is a blood thinner, ask your prescribing provider.    Weight loss medications: There are multiple medications being used for weight management and diabetes today, and the list is growing.  Phentermine, Ozempic, Wegovy, Trulicity, and other similar medications need to be stopped one week before surgery to avoid being cancelled.  Victoza and Saxenda can be continued longer but must be stopped one full day before surgery.  Please ask your prescribing provider for advice.    Diabetic medications: in addition to the medications talked about above that are used for either weight loss or diabetes, some people are on insulin that may require adjustment.  Please discuss managing diabetic medications with your prescribing doctor as these medications may require modification prior to surgery.     Fasting instructions will be provided by the pre-op nurse who  will call you 1-3 days before surgery.  Typically, we advise normal food up to 8 hours before you arrive for surgery. Clear liquids only from then until 2 hours before you arrive surgery, then nothing at all by mouth.  The nurse will review your specific instructions with you at the call.      Smoking impacts your body's ability to heal properly so we advise patients to quit if possible before surgery.  Plastic Surgery patients are required to be nicotine free for at least 8 weeks before surgery.      You will need an adult to drive you home and stay with you 24 hours after surgery. Public transportation or Medical Van Services are not permitted.    Visitor restrictions are subject to change, please verify with the pre-op nurse when they call how many people are permitted to accompany you.    We always encourage you to notify your insurance any time you have medical tests or procedures scheduled including surgery. The number is usually right on the back of your insurance card. To obtain pricing for surgery, please call  [facility] of [facility] at [phone] or email [email or web address].        Call our office if you have any questions! Thank you!     Rajani Abebe MA  [facility]   (General Surgery/ ENT/ Plastics)  Direct Office: [phone]

## 2024-01-24 NOTE — PROGRESS NOTES
HPI: Yolanda Vee is a 23 year old adult referred to see me by Litzy Corona for evaluation of gastroesophageal reflux disease.  He notes  a several year history of gastroesophageal reflux disease described as a difficult time laying flat at night, she does have coughing as well as waterbrash.  Additionally, she has intermittent regurgitation when laying flat as well.  Her GERD HR Q OL is 60.  She has had 2 endoscopies which demonstrate Douglass classification a esophagitis but her biopsies are relatively unremarkable.  She has been placed on high-dose PPIs with very little resolution of her symptoms.      Allergies:Ibuprofen, Hydrocodone-acetaminophen, and Penicillins    Past Medical History:   Diagnosis Date    Anxiety     therapist: [facility]    Chemical dependency (H)     quit Sept 2021, alcohol. some family hx as well.     Chronic constipation     Constipation     Depression     Esophageal reflux     controlled well on protonix currently, EGD 6/3/21 w/ some mild esophagitis when alcohol intake was higher (bottle vodka daily).    Heart rate problem     some palpitations in the past w/ anxiety attacks.    Migraines     occ tylenol use.    Morbid obesity (H) 03/06/2020    Obese     Palpitations     Plantar warts     PTSD (post-traumatic stress disorder)     Uncomplicated asthma        Past Surgical History:   Procedure Laterality Date    COLONOSCOPY N/A 6/3/2021    Procedure: COLONOSCOPY;  Surgeon: Guru Gerardo Prado MD;  [facility]    ESOPHAGOSCOPY, GASTROSCOPY, DUODENOSCOPY (EGD), COMBINED N/A 6/3/2021    Procedure: ESOPHAGOGASTRODUODENOSCOPY (EGD);  Surgeon: Guru Gerardo Prado MD;  Location: [facility]    ESOPHAGOSCOPY, GASTROSCOPY, DUODENOSCOPY (EGD), COMBINED N/A 6/23/2022    Procedure: ESOPHAGOGASTRODUODENOSCOPY (EGD);  Surgeon: Amanda Linares MD;  Location: [facility]    ESOPHAGOSCOPY, GASTROSCOPY, DUODENOSCOPY (EGD), COMBINED N/A 2/16/2023    Procedure:  "ESOPHAGOGASTRODUODENOSCOPY, WITH BIOPSY;  Surgeon: Alma Velasquez MD;  Location:  GI    wisdom teeth         CURRENT MEDS:    Current Outpatient Medications:     albuterol (PROAIR HFA/PROVENTIL HFA/VENTOLIN HFA) 108 (90 Base) MCG/ACT inhaler, Inhale 2 puffs into the lungs every 6 hours as needed for shortness of breath / dyspnea or wheezing, Disp: 18 g, Rfl: 0    B-D SYRINGE LUER-SANTA 1 ML MISC, , Disp: , Rfl:     BD DISP NEEDLES 25G X 5/8\" MISC, , Disp: , Rfl:     buPROPion (WELLBUTRIN XL) 150 MG 24 hr tablet, Take 1 tablet (150 mg) by mouth every morning, Disp: 30 tablet, Rfl: 3    escitalopram (LEXAPRO) 20 MG tablet, TAKE 1 TABLET(20 MG) BY MOUTH DAILY, Disp: 90 tablet, Rfl: 1    famotidine (PEPCID) 20 MG tablet, Take 1 tablet (20 mg) by mouth At Bedtime, Disp: 60 tablet, Rfl: 3    fluocinonide (LIDEX) 0.05 % external ointment, , Disp: , Rfl:     ipratropium - albuterol 0.5 mg/2.5 mg/3 mL (DUONEB) 0.5-2.5 (3) MG/3ML neb solution, Inhale 3 mLs into the lungs every 6 hours as needed, Disp: , Rfl:     nystatin (MYCOSTATIN) 505589 UNIT/GM external powder, Apply topically 2 times daily as needed (Rash), Disp: 60 g, Rfl: 3    omeprazole (PRILOSEC) 40 MG DR capsule, Take 1 capsule (40 mg) by mouth 2 times daily (before meals) For more refills,schedule an appointment at [phone], Disp: 180 capsule, Rfl: 0    ondansetron (ZOFRAN) 4 MG tablet, 4mg every 6-8 hours if needed for nausea and vomiting. If needed, may increase to 8mg every 8-12 hours if needed for nausea., Disp: 30 tablet, Rfl: 0    prazosin (MINIPRESS) 1 MG capsule, TAKE 1 CAPSULE BY MOUTH EVERY NIGHT AT BEDTIME. AFTER 2-3 DAYS, INCREASE TO 2 CAPSULE EVERY NIGHT AT BEDTIME, Disp: 180 capsule, Rfl: 1    sucralfate (CARAFATE) 1 GM tablet, Take 1 tablet (1 g) by mouth 4 times daily (before meals and nightly), Disp: 180 tablet, Rfl: 3    Testosterone 1.62 % GEL, APPLY 3 PUMPS TOPICALLY TO THE AFFECTED AREA DAILY, Disp: , Rfl:     testosterone " cypionate (DEPOTESTOSTERONE) 200 MG/ML injection, Inject 0.2 mg into the muscle once a week Friday, Disp: , Rfl:     Vitamin D, Cholecalciferol, 25 MCG (1000 UT) TABS, Take 1,000 Units by mouth every morning, Disp: , Rfl:     Current Facility-Administered Medications:     medroxyPROGESTERone (DEPO-PROVERA) injection 150 mg, 150 mg, Intramuscular, Q90 Days, Litzy Corona MD, 150 mg at 23 0959    Family History   Problem Relation Age of Onset    Heart Disease Mother     Substance Abuse Mother         in remission    Substance Abuse Father     Alcoholism Father     Bipolar Disorder Brother     Depression Brother     Anxiety Disorder Brother     Hypertension Maternal Grandmother     Arthritis Maternal Grandmother     Heart Disease Maternal Grandmother     Hodgkin's lymphoma Maternal Grandfather 26    Esophageal Cancer Maternal Grandfather          at 54.    Heart Disease Maternal Uncle     Heart Disease Maternal Uncle     Acute Myocardial Infarction No family hx of     Anesthesia Reaction No family hx of     Deep Vein Thrombosis (DVT) No family hx of         reports that he has been smoking cigarettes. He has never used smokeless tobacco. He reports current alcohol use of about 6.0 - 8.0 standard drinks of alcohol per week. He reports current drug use. Drug: Marijuana.    Review of Systems:  The 12 system review is within normal limits except for as mentioned above.  General ROS: No complaints or constitutional symptoms  Ophthalmic ROS: No complaints of visual changes  Skin: No complaints or symptoms   Endocrine: No complaints or symptoms  Hematologic/Lymphatic: No symptoms or complaints  Psychiatric: No symptoms or complaints  Respiratory ROS: no cough, shortness of breath, or wheezing  Cardiovascular ROS: no chest pain or dyspnea on exertion  Gastrointestinal ROS: As per HPI  Genito-Urinary ROS: no dysuria, trouble voiding, or hematuria  Musculoskeletal ROS: no joint or muscle pain  Neurological ROS: no  "TIA or stroke symptoms      EXAM:  Ht 1.499 m (4' 11\")   Wt 106.6 kg (235 lb)   Breastfeeding No   BMI 47.46 kg/m    GENERAL: Well developed adult, No acute distress, pleasant and conversant   EYES: Pupils equal, round and reactive, no scleral icterus  ABDOMEN: Soft, non-tender, no masses, non-distended  SKIN: Pink, warm and dry, no obvious rashes or lesions   NEURO:No focal deficits, ambulatory  MUSCULOSKELETAL:No obvious deformities, no swelling, normal appearing      LABS:  Lab Results   Component Value Date    WBC 10.7 12/27/2023    WBC 9.9 06/21/2013    HGB 15.4 12/27/2023    HGB 11.8 06/21/2013    HCT 46.0 12/27/2023    HCT 37.1 06/21/2013    MCV 89 12/27/2023    MCV 87 06/21/2013     12/27/2023     06/21/2013     INR/Prothrombin Time  @LABRCNTIP(NA,K,CL,co2,bun,creatinine,labglom,glucose,calcium)@  Lab Results   Component Value Date    ALT 26 12/27/2023    AST 23 12/27/2023    ALKPHOS 86 12/27/2023       Assessment/Plan:   Yolanda Vee is a 23 year old adult with signs and symptoms consistent with gastroesophageal reflux disease.  I have explained the pathophysiology of GERD in detail as well as the surgical versus non-operative management strategies.      At this point we will proceed with continued initial work-up.  This will include repeat endoscopy as well as an esophagram.  Once these are complete we will reconvene and discuss plans moving forward.  In the meantime we did discuss dietary lifestyle management.  She will continue on her PPIs for now.    Patrick Preciado, DO FACS  [phone]  [facility] Department of Surgery  "

## 2024-01-24 NOTE — TELEPHONE ENCOUNTER
Spoke with patient today regarding surgery scheduling     Went over details/instructions.    Surgery Letter sent via [employer]  (Please see LETTERS TAB in chart to retrieve a copy of this letter)

## 2024-01-24 NOTE — LETTER
1/24/2024         RE: Yolanda Vee  [address]  [facility] [address]        Dear Colleague,    Thank you for referring your patient, Yolanda Vee, to the [facility] [place]. Please see a copy of my visit note below.    HPI: Yolanda Vee is a 23 year old adult referred to see me by Litzy Corona for evaluation of gastroesophageal reflux disease.  He notes  a several year history of gastroesophageal reflux disease described as a difficult time laying flat at night, she does have coughing as well as waterbrash.  Additionally, she has intermittent regurgitation when laying flat as well.  Her GERD HR Q OL is 60.  She has had 2 endoscopies which demonstrate Millard classification a esophagitis but her biopsies are relatively unremarkable.  She has been placed on high-dose PPIs with very little resolution of her symptoms.      Allergies:Ibuprofen, Hydrocodone-acetaminophen, and Penicillins    Past Medical History:   Diagnosis Date     Anxiety     therapist: [facility]     Chemical dependency (H)     quit Sept 2021, alcohol. some family hx as well.      Chronic constipation      Constipation      Depression      Esophageal reflux     controlled well on protonix currently, EGD 6/3/21 w/ some mild esophagitis when alcohol intake was higher (bottle vodka daily).     Heart rate problem     some palpitations in the past w/ anxiety attacks.     Migraines     occ tylenol use.     Morbid obesity (H) 03/06/2020     Obese      Palpitations      Plantar warts      PTSD (post-traumatic stress disorder)      Uncomplicated asthma        Past Surgical History:   Procedure Laterality Date     COLONOSCOPY N/A 6/3/2021    Procedure: COLONOSCOPY;  Surgeon: Guru Gerardo Prado MD;  Location:  GI     ESOPHAGOSCOPY, GASTROSCOPY, DUODENOSCOPY (EGD), COMBINED N/A 6/3/2021    Procedure: ESOPHAGOGASTRODUODENOSCOPY (EGD);  Surgeon: Guru Johan  "Gerardo Gregory MD;  Location:  GI     ESOPHAGOSCOPY, GASTROSCOPY, DUODENOSCOPY (EGD), COMBINED N/A 6/23/2022    Procedure: ESOPHAGOGASTRODUODENOSCOPY (EGD);  Surgeon: Amanda Linares MD;  Location: [facility] OR     ESOPHAGOSCOPY, GASTROSCOPY, DUODENOSCOPY (EGD), COMBINED N/A 2/16/2023    Procedure: ESOPHAGOGASTRODUODENOSCOPY, WITH BIOPSY;  Surgeon: Alma Velasquez MD;  Location:  GI     wisdom teeth         CURRENT MEDS:    Current Outpatient Medications:      albuterol (PROAIR HFA/PROVENTIL HFA/VENTOLIN HFA) 108 (90 Base) MCG/ACT inhaler, Inhale 2 puffs into the lungs every 6 hours as needed for shortness of breath / dyspnea or wheezing, Disp: 18 g, Rfl: 0     B-D SYRINGE LUER-SANTA 1 ML MISC, , Disp: , Rfl:      BD DISP NEEDLES 25G X 5/8\" MISC, , Disp: , Rfl:      buPROPion (WELLBUTRIN XL) 150 MG 24 hr tablet, Take 1 tablet (150 mg) by mouth every morning, Disp: 30 tablet, Rfl: 3     escitalopram (LEXAPRO) 20 MG tablet, TAKE 1 TABLET(20 MG) BY MOUTH DAILY, Disp: 90 tablet, Rfl: 1     famotidine (PEPCID) 20 MG tablet, Take 1 tablet (20 mg) by mouth At Bedtime, Disp: 60 tablet, Rfl: 3     fluocinonide (LIDEX) 0.05 % external ointment, , Disp: , Rfl:      ipratropium - albuterol 0.5 mg/2.5 mg/3 mL (DUONEB) 0.5-2.5 (3) MG/3ML neb solution, Inhale 3 mLs into the lungs every 6 hours as needed, Disp: , Rfl:      nystatin (MYCOSTATIN) 926175 UNIT/GM external powder, Apply topically 2 times daily as needed (Rash), Disp: 60 g, Rfl: 3     omeprazole (PRILOSEC) 40 MG DR capsule, Take 1 capsule (40 mg) by mouth 2 times daily (before meals) For more refills,schedule an appointment at [phone], Disp: 180 capsule, Rfl: 0     ondansetron (ZOFRAN) 4 MG tablet, 4mg every 6-8 hours if needed for nausea and vomiting. If needed, may increase to 8mg every 8-12 hours if needed for nausea., Disp: 30 tablet, Rfl: 0     prazosin (MINIPRESS) 1 MG capsule, TAKE 1 CAPSULE BY MOUTH EVERY NIGHT AT BEDTIME. AFTER 2-3 DAYS, " INCREASE TO 2 CAPSULE EVERY NIGHT AT BEDTIME, Disp: 180 capsule, Rfl: 1     sucralfate (CARAFATE) 1 GM tablet, Take 1 tablet (1 g) by mouth 4 times daily (before meals and nightly), Disp: 180 tablet, Rfl: 3     Testosterone 1.62 % GEL, APPLY 3 PUMPS TOPICALLY TO THE AFFECTED AREA DAILY, Disp: , Rfl:      testosterone cypionate (DEPOTESTOSTERONE) 200 MG/ML injection, Inject 0.2 mg into the muscle once a week Friday, Disp: , Rfl:      Vitamin D, Cholecalciferol, 25 MCG (1000 UT) TABS, Take 1,000 Units by mouth every morning, Disp: , Rfl:     Current Facility-Administered Medications:      medroxyPROGESTERone (DEPO-PROVERA) injection 150 mg, 150 mg, Intramuscular, Q90 Days, Litzy Corona MD, 150 mg at 23 0957    Family History   Problem Relation Age of Onset     Heart Disease Mother      Substance Abuse Mother         in remission     Substance Abuse Father      Alcoholism Father      Bipolar Disorder Brother      Depression Brother      Anxiety Disorder Brother      Hypertension Maternal Grandmother      Arthritis Maternal Grandmother      Heart Disease Maternal Grandmother      Hodgkin's lymphoma Maternal Grandfather 26     Esophageal Cancer Maternal Grandfather          at 54.     Heart Disease Maternal Uncle      Heart Disease Maternal Uncle      Acute Myocardial Infarction No family hx of      Anesthesia Reaction No family hx of      Deep Vein Thrombosis (DVT) No family hx of         reports that he has been smoking cigarettes. He has never used smokeless tobacco. He reports current alcohol use of about 6.0 - 8.0 standard drinks of alcohol per week. He reports current drug use. Drug: Marijuana.    Review of Systems:  The 12 system review is within normal limits except for as mentioned above.  General ROS: No complaints or constitutional symptoms  Ophthalmic ROS: No complaints of visual changes  Skin: No complaints or symptoms   Endocrine: No complaints or symptoms  Hematologic/Lymphatic: No symptoms  "or complaints  Psychiatric: No symptoms or complaints  Respiratory ROS: no cough, shortness of breath, or wheezing  Cardiovascular ROS: no chest pain or dyspnea on exertion  Gastrointestinal ROS: As per HPI  Genito-Urinary ROS: no dysuria, trouble voiding, or hematuria  Musculoskeletal ROS: no joint or muscle pain  Neurological ROS: no TIA or stroke symptoms      EXAM:  Ht 1.499 m (4' 11\")   Wt 106.6 kg (235 lb)   Breastfeeding No   BMI 47.46 kg/m    GENERAL: Well developed adult, No acute distress, pleasant and conversant   EYES: Pupils equal, round and reactive, no scleral icterus  ABDOMEN: Soft, non-tender, no masses, non-distended  SKIN: Pink, warm and dry, no obvious rashes or lesions   NEURO:No focal deficits, ambulatory  MUSCULOSKELETAL:No obvious deformities, no swelling, normal appearing      LABS:  Lab Results   Component Value Date    WBC 10.7 12/27/2023    WBC 9.9 06/21/2013    HGB 15.4 12/27/2023    HGB 11.8 06/21/2013    HCT 46.0 12/27/2023    HCT 37.1 06/21/2013    MCV 89 12/27/2023    MCV 87 06/21/2013     12/27/2023     06/21/2013     INR/Prothrombin Time  @LABRCNTIP(NA,K,CL,co2,bun,creatinine,labglom,glucose,calcium)@  Lab Results   Component Value Date    ALT 26 12/27/2023    AST 23 12/27/2023    ALKPHOS 86 12/27/2023       Assessment/Plan:   Yolanda Vee is a 23 year old adult with signs and symptoms consistent with gastroesophageal reflux disease.  I have explained the pathophysiology of GERD in detail as well as the surgical versus non-operative management strategies.      At this point we will proceed with continued initial work-up.  This will include repeat endoscopy as well as an esophagram.  Once these are complete we will reconvene and discuss plans moving forward.  In the meantime we did discuss dietary lifestyle management.  She will continue on her PPIs for now.    Patrick Preciado DO FACS  [phone]  [facility] Department of Surgery      Again, thank you for " allowing me to participate in the care of your patient.        Sincerely,        Patrick Preciado, DO

## 2024-02-07 ENCOUNTER — TELEPHONE (OUTPATIENT)
Dept: SURGERY | Facility: CLINIC | Age: 24
End: 2024-02-07
Payer: COMMERCIAL

## 2024-02-07 NOTE — TELEPHONE ENCOUNTER
Patient called in to confirm procedure date. They have not been able to find the letter that was sent via [employer]. Copy of the letter was sent to the patient as a message for their review.  Patient had no further questions at the time of the call, invited patient to call back if they do have any questions.

## 2024-02-08 ENCOUNTER — HOSPITAL ENCOUNTER (OUTPATIENT)
Dept: RADIOLOGY | Facility: CLINIC | Age: 24
Discharge: HOME | End: 2024-02-08
Attending: SURGERY | Admitting: SURGERY
Payer: COMMERCIAL

## 2024-02-08 ENCOUNTER — TELEPHONE (OUTPATIENT)
Dept: GASTROENTEROLOGY | Facility: CLINIC | Age: 24
End: 2024-02-08
Payer: COMMERCIAL

## 2024-02-08 DIAGNOSIS — K21.00 GASTROESOPHAGEAL REFLUX DISEASE WITH ESOPHAGITIS WITHOUT HEMORRHAGE: ICD-10-CM

## 2024-02-08 PROCEDURE — 74220 X-RAY XM ESOPHAGUS 1CNTRST: CPT

## 2024-02-08 NOTE — TELEPHONE ENCOUNTER
Per inbasket message from [facility] Patient can be overbooked on 3/5. Sent inbasket message to Marion to let her know that the patient is ok with this. Check in time is 1:30PM

## 2024-02-28 PROBLEM — K21.00 GASTROESOPHAGEAL REFLUX DISEASE WITH ESOPHAGITIS WITHOUT HEMORRHAGE: Status: ACTIVE | Noted: 2024-01-24

## 2024-03-01 ENCOUNTER — PATIENT OUTREACH (OUTPATIENT)
Dept: GASTROENTEROLOGY | Facility: CLINIC | Age: 24
End: 2024-03-01
Payer: COMMERCIAL

## 2024-03-01 PROBLEM — K92.2 GI BLEED: Status: RESOLVED | Noted: 2022-01-26 | Resolved: 2024-03-01

## 2024-03-01 PROBLEM — R09.81 CHRONIC NASAL CONGESTION: Status: RESOLVED | Noted: 2019-05-16 | Resolved: 2024-03-01

## 2024-03-01 PROBLEM — L70.0 ACNE VULGARIS: Status: RESOLVED | Noted: 2022-12-19 | Resolved: 2024-03-01

## 2024-03-01 PROBLEM — R10.33 PERIUMBILICAL PAIN: Status: RESOLVED | Noted: 2017-12-07 | Resolved: 2024-03-01

## 2024-03-01 NOTE — TELEPHONE ENCOUNTER
Reached out to pt to go over information and instructions for upcoming esophageal manometry and 24hr pH testing. Left detailed vm with call back number.

## 2024-03-04 ENCOUNTER — OFFICE VISIT (OUTPATIENT)
Dept: FAMILY MEDICINE | Facility: CLINIC | Age: 24
End: 2024-03-04
Payer: COMMERCIAL

## 2024-03-04 VITALS
TEMPERATURE: 98.4 F | RESPIRATION RATE: 20 BRPM | SYSTOLIC BLOOD PRESSURE: 116 MMHG | BODY MASS INDEX: 45.73 KG/M2 | HEIGHT: 61 IN | DIASTOLIC BLOOD PRESSURE: 70 MMHG | WEIGHT: 242.2 LBS | HEART RATE: 100 BPM | OXYGEN SATURATION: 95 %

## 2024-03-04 DIAGNOSIS — F43.10 PTSD (POST-TRAUMATIC STRESS DISORDER): ICD-10-CM

## 2024-03-04 DIAGNOSIS — F10.20 ALCOHOL USE DISORDER, MODERATE, DEPENDENCE (H): ICD-10-CM

## 2024-03-04 DIAGNOSIS — R63.5 ABNORMAL WEIGHT GAIN: ICD-10-CM

## 2024-03-04 DIAGNOSIS — Z11.59 NEED FOR HEPATITIS C SCREENING TEST: ICD-10-CM

## 2024-03-04 DIAGNOSIS — D72.828 NEUTROPHILIA: ICD-10-CM

## 2024-03-04 DIAGNOSIS — Z87.19 HISTORY OF ALCOHOLIC GASTRITIS: ICD-10-CM

## 2024-03-04 DIAGNOSIS — E55.9 VITAMIN D DEFICIENCY: ICD-10-CM

## 2024-03-04 DIAGNOSIS — E66.01 MORBID OBESITY (H): ICD-10-CM

## 2024-03-04 DIAGNOSIS — Z72.0 TOBACCO USE: ICD-10-CM

## 2024-03-04 DIAGNOSIS — F12.90 MARIJUANA USE: ICD-10-CM

## 2024-03-04 DIAGNOSIS — D50.9 HYPOCHROMIC ANEMIA: ICD-10-CM

## 2024-03-04 DIAGNOSIS — Z12.4 CERVICAL CANCER SCREENING: ICD-10-CM

## 2024-03-04 DIAGNOSIS — F41.9 ANXIETY: ICD-10-CM

## 2024-03-04 DIAGNOSIS — K59.04 CHRONIC IDIOPATHIC CONSTIPATION: ICD-10-CM

## 2024-03-04 DIAGNOSIS — F64.0 GENDER DYSPHORIA IN ADULT: ICD-10-CM

## 2024-03-04 DIAGNOSIS — F33.1 MODERATE EPISODE OF RECURRENT MAJOR DEPRESSIVE DISORDER (H): ICD-10-CM

## 2024-03-04 DIAGNOSIS — Z00.00 ROUTINE PHYSICAL EXAMINATION: Primary | ICD-10-CM

## 2024-03-04 DIAGNOSIS — Z11.3 SCREENING FOR STDS (SEXUALLY TRANSMITTED DISEASES): ICD-10-CM

## 2024-03-04 DIAGNOSIS — K21.00 GASTROESOPHAGEAL REFLUX DISEASE WITH ESOPHAGITIS WITHOUT HEMORRHAGE: ICD-10-CM

## 2024-03-04 LAB
ALBUMIN SERPL BCG-MCNC: 4 G/DL (ref 3.5–5.2)
ALP SERPL-CCNC: 87 U/L (ref 40–150)
ALT SERPL W P-5'-P-CCNC: 18 U/L (ref 0–70)
ANION GAP SERPL CALCULATED.3IONS-SCNC: 10 MMOL/L (ref 7–15)
AST SERPL W P-5'-P-CCNC: 15 U/L (ref 0–45)
BASOPHILS # BLD AUTO: 0.1 10E3/UL (ref 0–0.2)
BASOPHILS NFR BLD AUTO: 1 %
BILIRUB SERPL-MCNC: 0.2 MG/DL
BUN SERPL-MCNC: 15.6 MG/DL (ref 6–20)
CALCIUM SERPL-MCNC: 9.1 MG/DL (ref 8.6–10)
CHLORIDE SERPL-SCNC: 108 MMOL/L (ref 98–107)
CREAT SERPL-MCNC: 0.84 MG/DL (ref 0.51–1.17)
DEPRECATED HCO3 PLAS-SCNC: 25 MMOL/L (ref 22–29)
EGFRCR SERPLBLD CKD-EPI 2021: >90 ML/MIN/1.73M2
EOSINOPHIL # BLD AUTO: 0.3 10E3/UL (ref 0–0.7)
EOSINOPHIL NFR BLD AUTO: 3 %
ERYTHROCYTE [DISTWIDTH] IN BLOOD BY AUTOMATED COUNT: 14.7 % (ref 10–15)
FERRITIN SERPL-MCNC: 67 NG/ML (ref 6–409)
GLUCOSE SERPL-MCNC: 89 MG/DL (ref 70–99)
HCT VFR BLD AUTO: 42 % (ref 35–53)
HCV AB SERPL QL IA: NONREACTIVE
HGB BLD-MCNC: 13.8 G/DL (ref 11.7–17.7)
IMM GRANULOCYTES # BLD: 0 10E3/UL
IMM GRANULOCYTES NFR BLD: 0 %
LYMPHOCYTES # BLD AUTO: 2.7 10E3/UL (ref 0.8–5.3)
LYMPHOCYTES NFR BLD AUTO: 25 %
MCH RBC QN AUTO: 29.6 PG (ref 26.5–33)
MCHC RBC AUTO-ENTMCNC: 32.9 G/DL (ref 31.5–36.5)
MCV RBC AUTO: 90 FL (ref 78–100)
MONOCYTES # BLD AUTO: 0.7 10E3/UL (ref 0–1.3)
MONOCYTES NFR BLD AUTO: 7 %
NEUTROPHILS # BLD AUTO: 7 10E3/UL (ref 1.6–8.3)
NEUTROPHILS NFR BLD AUTO: 65 %
PLATELET # BLD AUTO: 301 10E3/UL (ref 150–450)
POTASSIUM SERPL-SCNC: 4.4 MMOL/L (ref 3.4–5.3)
PROT SERPL-MCNC: 6.9 G/DL (ref 6.4–8.3)
RBC # BLD AUTO: 4.66 10E6/UL (ref 3.8–5.9)
SODIUM SERPL-SCNC: 143 MMOL/L (ref 135–145)
TSH SERPL DL<=0.005 MIU/L-ACNC: 0.86 UIU/ML (ref 0.3–4.2)
VIT D+METAB SERPL-MCNC: 9 NG/ML (ref 20–50)
WBC # BLD AUTO: 10.8 10E3/UL (ref 4–11)

## 2024-03-04 PROCEDURE — 99214 OFFICE O/P EST MOD 30 MIN: CPT | Mod: 25 | Performed by: FAMILY MEDICINE

## 2024-03-04 PROCEDURE — 80053 COMPREHEN METABOLIC PANEL: CPT | Performed by: FAMILY MEDICINE

## 2024-03-04 PROCEDURE — 82306 VITAMIN D 25 HYDROXY: CPT | Performed by: FAMILY MEDICINE

## 2024-03-04 PROCEDURE — 99395 PREV VISIT EST AGE 18-39: CPT | Mod: 25 | Performed by: FAMILY MEDICINE

## 2024-03-04 PROCEDURE — 90472 IMMUNIZATION ADMIN EACH ADD: CPT | Performed by: FAMILY MEDICINE

## 2024-03-04 PROCEDURE — 86803 HEPATITIS C AB TEST: CPT | Performed by: FAMILY MEDICINE

## 2024-03-04 PROCEDURE — 36415 COLL VENOUS BLD VENIPUNCTURE: CPT | Performed by: FAMILY MEDICINE

## 2024-03-04 PROCEDURE — 90471 IMMUNIZATION ADMIN: CPT | Performed by: FAMILY MEDICINE

## 2024-03-04 PROCEDURE — 96127 BRIEF EMOTIONAL/BEHAV ASSMT: CPT | Performed by: FAMILY MEDICINE

## 2024-03-04 PROCEDURE — 84443 ASSAY THYROID STIM HORMONE: CPT | Performed by: FAMILY MEDICINE

## 2024-03-04 PROCEDURE — 85025 COMPLETE CBC W/AUTO DIFF WBC: CPT | Performed by: FAMILY MEDICINE

## 2024-03-04 PROCEDURE — 90715 TDAP VACCINE 7 YRS/> IM: CPT | Performed by: FAMILY MEDICINE

## 2024-03-04 PROCEDURE — 90677 PCV20 VACCINE IM: CPT | Performed by: FAMILY MEDICINE

## 2024-03-04 PROCEDURE — 82728 ASSAY OF FERRITIN: CPT | Performed by: FAMILY MEDICINE

## 2024-03-04 RX ORDER — ESCITALOPRAM OXALATE 20 MG/1
TABLET ORAL
Qty: 90 TABLET | Refills: 1 | Status: ON HOLD | OUTPATIENT
Start: 2024-03-04 | End: 2024-07-02

## 2024-03-04 SDOH — HEALTH STABILITY: PHYSICAL HEALTH: ON AVERAGE, HOW MANY DAYS PER WEEK DO YOU ENGAGE IN MODERATE TO STRENUOUS EXERCISE (LIKE A BRISK WALK)?: 1 DAY

## 2024-03-04 ASSESSMENT — ANXIETY QUESTIONNAIRES
GAD7 TOTAL SCORE: 14
GAD7 TOTAL SCORE: 14
1. FEELING NERVOUS, ANXIOUS, OR ON EDGE: MORE THAN HALF THE DAYS
8. IF YOU CHECKED OFF ANY PROBLEMS, HOW DIFFICULT HAVE THESE MADE IT FOR YOU TO DO YOUR WORK, TAKE CARE OF THINGS AT HOME, OR GET ALONG WITH OTHER PEOPLE?: VERY DIFFICULT
6. BECOMING EASILY ANNOYED OR IRRITABLE: NEARLY EVERY DAY
7. FEELING AFRAID AS IF SOMETHING AWFUL MIGHT HAPPEN: SEVERAL DAYS
5. BEING SO RESTLESS THAT IT IS HARD TO SIT STILL: MORE THAN HALF THE DAYS
GAD7 TOTAL SCORE: 14
2. NOT BEING ABLE TO STOP OR CONTROL WORRYING: MORE THAN HALF THE DAYS
7. FEELING AFRAID AS IF SOMETHING AWFUL MIGHT HAPPEN: SEVERAL DAYS
4. TROUBLE RELAXING: MORE THAN HALF THE DAYS
3. WORRYING TOO MUCH ABOUT DIFFERENT THINGS: MORE THAN HALF THE DAYS
IF YOU CHECKED OFF ANY PROBLEMS ON THIS QUESTIONNAIRE, HOW DIFFICULT HAVE THESE PROBLEMS MADE IT FOR YOU TO DO YOUR WORK, TAKE CARE OF THINGS AT HOME, OR GET ALONG WITH OTHER PEOPLE: VERY DIFFICULT

## 2024-03-04 ASSESSMENT — COLUMBIA-SUICIDE SEVERITY RATING SCALE - C-SSRS
3. IN THE PAST MONTH, HAVE YOU BEEN THINKING ABOUT HOW YOU MIGHT KILL YOURSELF?: NO
2. IN THE PAST MONTH, HAVE YOU ACTUALLY HAD ANY THOUGHTS OF KILLING YOURSELF?: NO
1. WITHIN THE PAST MONTH, HAVE YOU WISHED YOU WERE DEAD OR WISHED YOU COULD GO TO SLEEP AND NOT WAKE UP?: NO

## 2024-03-04 ASSESSMENT — PATIENT HEALTH QUESTIONNAIRE - PHQ9
SUM OF ALL RESPONSES TO PHQ QUESTIONS 1-9: 18
SUM OF ALL RESPONSES TO PHQ QUESTIONS 1-9: 18
10. IF YOU CHECKED OFF ANY PROBLEMS, HOW DIFFICULT HAVE THESE PROBLEMS MADE IT FOR YOU TO DO YOUR WORK, TAKE CARE OF THINGS AT HOME, OR GET ALONG WITH OTHER PEOPLE: VERY DIFFICULT

## 2024-03-04 ASSESSMENT — SOCIAL DETERMINANTS OF HEALTH (SDOH): HOW OFTEN DO YOU GET TOGETHER WITH FRIENDS OR RELATIVES?: NEVER

## 2024-03-04 ASSESSMENT — PAIN SCALES - GENERAL: PAINLEVEL: NO PAIN (0)

## 2024-03-04 NOTE — PATIENT INSTRUCTIONS
Preventive Care Advice   This is general advice given by our system to help you stay healthy. However, your care team may have specific advice just for you. Please talk to your care team about your preventive care needs.  Nutrition  Eat 5 or more servings of fruits and vegetables each day.  Try wheat bread, brown rice and whole grain pasta (instead of white bread, rice, and pasta).  Get enough calcium and vitamin D. Check the label on foods and aim for 100% of the RDA (recommended daily allowance).  Lifestyle  Exercise at least 150 minutes each week   (30 minutes a day, 5 days a week).  Do muscle strengthening activities 2 days a week. These help control your weight and prevent disease.  No smoking.  Wear sunscreen to prevent skin cancer.  Have a dental exam and cleaning every 6 months.  Yearly exams  See your health care team every year to talk about:  Any changes in your health.  Any medicines your care team has prescribed.  Preventive care, family planning, and ways to prevent chronic diseases.  Shots (vaccines)   HPV shots (up to age 26), if you've never had them before.  Hepatitis B shots (up to age 59), if you've never had them before.  COVID-19 shot: Get this shot when it's due.  Flu shot: Get a flu shot every year.  Tetanus shot: Get a tetanus shot every 10 years.  Pneumococcal, hepatitis A, and RSV shots: Ask your care team if you need these based on your risk.  Shingles shot (for age 50 and up).  General health tests  Diabetes screening:  Starting at age 35, Get screened for diabetes at least every 3 years.  If you are younger than age 35, ask your care team if you should be screened for diabetes.  Cholesterol test: At age 39, start having a cholesterol test every 5 years, or more often if advised.  Bone density scan (DEXA): At age 50, ask your care team if you should have this scan for osteoporosis (brittle bones).  Hepatitis C: Get tested at least once in your life.  STIs (sexually transmitted  infections)  Before age 24: Ask your care team if you should be screened for STIs.  After age 24: Get screened for STIs if you're at risk. You are at risk for STIs (including HIV) if:  You are sexually active with more than one person.  You don't use condoms every time.  You or a partner was diagnosed with a sexually transmitted infection.  If you are at risk for HIV, ask about PrEP medicine to prevent HIV.  Get tested for HIV at least once in your life, whether you are at risk for HIV or not.  Cancer screening tests  Cervical cancer screening: If you have a cervix, begin getting regular cervical cancer screening tests at age 21. Most people who have regular screenings with normal results can stop after age 65. Talk about this with your provider.  Breast cancer scan (mammogram): If you've ever had breasts, begin having regular mammograms starting at age 40. This is a scan to check for breast cancer.  Colon cancer screening: It is important to start screening for colon cancer at age 45.  Have a colonoscopy test every 10 years (or more often if you're at risk) Or, ask your provider about stool tests like a FIT test every year or Cologuard test every 3 years.  To learn more about your testing options, visit: https://www.waygum/244455.pdf.  For help making a decision, visit: https://bit.ly/xx25863.  Prostate cancer screening test: If you have a prostate and are age 55 to 69, ask your provider if you would benefit from a yearly prostate cancer screening test.  Lung cancer screening: If you are a current or former smoker age 50 to 80, ask your care team if ongoing lung cancer screenings are right for you.  For informational purposes only. Not to replace the advice of your health care provider. Copyright   2023 [employer]. All rights reserved. Clinically reviewed by the [facility] Transitions Program. [employer] 859794 - REV 01/24.    Relationships for Good Health  Relationships are important for  our health and happiness. Social isolation, loneliness and lack of support are bad for your health. Studies show that loneliness can harm health and limit your life span as much as high blood pressure and smoking.   Take some time to reflect on your relationships. Then answer these questions:  Are there people in your life that cause you stress or drain your energy? What can you do to set limits?  ________________________________________________________________________________________________________________________________________________________________________________________________________________________________________________________________________________________________________________________________________________  Who do you enjoy spending time with? Who can you go to for support?  ________________________________________________________________________________________________________________________________________________________________________________________________________________________________________________________________________________________________________________________________________________  What can you do to improve your relationships with others?  __________________________________________________________________________________________________________________________________________________________________________________________________________________  ______________________________________________________________________________________________________________________________  What do you like most about your relationships with others?  ________________________________________________________________________________________________________________________________________________________________________________________________________________________________________________________________________________________________________________________________________________  My  goal: ______________________________________________________________________  I will ______________________________________________________________________________________________________________________________________________________________________________________________    For informational purposes only. Not to replace the advice of your health care provider. Copyright   2018 [facility]. All rights reserved. Clinically reviewed by [facility]  Team. [employer] 502204 - Rev 04/21.    Learning About Stress  What is stress?     Stress is your body's response to a hard situation. Your body can have a physical, emotional, or mental response. Stress is a fact of life for most people, and it affects everyone differently. What causes stress for you may not be stressful for someone else.  A lot of things can cause stress. You may feel stress when you go on a job interview, take a test, or run a race. This kind of short-term stress is normal and even useful. It can help you if you need to work hard or react quickly. For example, stress can help you finish an important job on time.  Long-term stress is caused by ongoing stressful situations or events. Examples of long-term stress include long-term health problems, ongoing problems at work, or conflicts in your family. Long-term stress can harm your health.  How does stress affect your health?  When you are stressed, your body responds as though you are in danger. It makes hormones that speed up your heart, make you breathe faster, and give you a burst of energy. This is called the fight-or-flight stress response. If the stress is over quickly, your body goes back to normal and no harm is done.  But if stress happens too often or lasts too long, it can have bad effects. Long-term stress can make you more likely to get sick, and it can make symptoms of some diseases worse. If you tense up when you are stressed, you may develop neck, shoulder, or low  back pain. Stress is linked to high blood pressure and heart disease.  Stress also harms your emotional health. It can make you dugan, tense, or depressed. Your relationships may suffer, and you may not do well at work or school.  What can you do to manage stress?  You can try these things to help manage stress:   Do something active. Exercise or activity can help reduce stress. Walking is a great way to get started. Even everyday activities such as housecleaning or yard work can help.  Try yoga or dorita chi. These techniques combine exercise and meditation. You may need some training at first to learn them.  Do something you enjoy. For example, listen to music or go to a movie. Practice your hobby or do volunteer work.  Meditate. This can help you relax, because you are not worrying about what happened before or what may happen in the future.  Do guided imagery. Imagine yourself in any setting that helps you feel calm. You can use online videos, books, or a teacher to guide you.  Do breathing exercises. For example:  From a standing position, bend forward from the waist with your knees slightly bent. Let your arms dangle close to the floor.  Breathe in slowly and deeply as you return to a standing position. Roll up slowly and lift your head last.  Hold your breath for just a few seconds in the standing position.  Breathe out slowly and bend forward from the waist.  Let your feelings out. Talk, laugh, cry, and express anger when you need to. Talking with supportive friends or family, a counselor, or a nicola leader about your feelings is a healthy way to relieve stress. Avoid discussing your feelings with people who make you feel worse.  Write. It may help to write about things that are bothering you. This helps you find out how much stress you feel and what is causing it. When you know this, you can find better ways to cope.  What can you do to prevent stress?  You might try some of these things to help prevent  "stress:  Manage your time. This helps you find time to do the things you want and need to do.  Get enough sleep. Your body recovers from the stresses of the day while you are sleeping.  Get support. Your family, friends, and community can make a difference in how you experience stress.  Limit your news feed. Avoid or limit time on social media or news that may make you feel stressed.  Do something active. Exercise or activity can help reduce stress. Walking is a great way to get started.  Where can you learn more?  Go to https://www.KartMe.net/patiented  Enter N032 in the search box to learn more about \"Learning About Stress.\"  Current as of: February 26, 2023               Content Version: 13.8    7912-6356 [employer].   Care instructions adapted under license by your healthcare professional. If you have questions about a medical condition or this instruction, always ask your healthcare professional. [employer] disclaims any warranty or liability for your use of this information.      Learning About Depression Screening  What is depression screening?  Depression screening is a way to see if you have depression symptoms. It may be done by a doctor or counselor. It's often part of a routine checkup. That's because your mental health is just as important as your physical health.  Depression is a mental health condition that affects how you feel, think, and act. You may:  Have less energy.  Lose interest in your daily activities.  Feel sad and grouchy for a long time.  Depression is very common. It affects people of all ages.  Many things can lead to depression. Some people become depressed after they have a stroke or find out they have a major illness like cancer or heart disease. The death of a loved one or a breakup may lead to depression. It can run in families. Most experts believe that a combination of inherited genes and stressful life events can cause it.  What happens during " "screening?  You may be asked to fill out a form about your depression symptoms. You and the doctor will discuss your answers. The doctor may ask you more questions to learn more about how you think, act, and feel.  What happens after screening?  If you have symptoms of depression, your doctor will talk to you about your options.  Doctors usually treat depression with medicines or counseling. Often, combining the two works best. Many people don't get help because they think that they'll get over the depression on their own. But people with depression may not get better unless they get treatment.  The cause of depression is not well understood. There may be many factors involved. But if you have depression, it's not your fault.  A serious symptom of depression is thinking about death or suicide. If you or someone you care about talks about this or about feeling hopeless, get help right away.  It's important to know that depression can be treated. Medicine, counseling, and self-care may help.  Where can you learn more?  Go to https://www.[employer].net/patiented  Enter T185 in the search box to learn more about \"Learning About Depression Screening.\"  Current as of: June 25, 2023               Content Version: 13.8    7472-0021 [employer].   Care instructions adapted under license by your healthcare professional. If you have questions about a medical condition or this instruction, always ask your healthcare professional. [employer] disclaims any warranty or liability for your use of this information.      Learning About Alcohol Use Disorder  What is alcohol use disorder?  Alcohol use disorder means that a person drinks alcohol even though it causes harm to themselves or others. It can range from mild to severe. The more symptoms of this disorder you have, the more severe it may be. People who have it may find it hard to control their use of alcohol.  People who have this disorder may argue " with others about how much they're drinking. Their job may be affected because of drinking. They may drink when it's dangerous or illegal, such as when they drive. They also may have a strong need, or craving, to drink. They may feel like they must drink just to get by. Their drinking may increase their risk of getting hurt or being in a car crash.  Over time, drinking too much alcohol may cause health problems. These may include high blood pressure, liver problems, or problems with digestion.  What are the symptoms?  Maybe you've wondered about your alcohol habits or how to tell if your drinking is becoming a problem.  Here are some of the symptoms of alcohol use disorder. You may have it if you have two or more of the following symptoms:  You drink larger amounts of alcohol than you ever meant to. Or you've been drinking for a longer time than you ever meant to.  You can't cut down or control your use. Or you constantly wish you could cut down.  You spend a lot of time getting or drinking alcohol or recovering from its effects.  You have strong cravings for alcohol.  You can no longer do your main jobs at work, at school, or at home.  You keep drinking alcohol, even though your use hurts your relationships.  You have stopped doing important activities because of your alcohol use.  You drink alcohol in situations where doing so is dangerous.  You keep drinking alcohol even though you know it's causing health problems.  You need more and more alcohol to get the same effect, or you get less effect from the same amount over time. This is called tolerance.  You have uncomfortable symptoms when you stop drinking alcohol or use less. This is called withdrawal.  Alcohol use disorder can range from mild to severe. The more symptoms you have, the more severe the disorder may be.  You might not realize that your drinking is a problem. You might not drink large amounts when you drink. Or you might go for days or weeks between  "drinking episodes. But even if you don't drink very often, your drinking could still be harmful and put you at risk.  How is alcohol use disorder treated?  Getting help is up to you. But you don't have to do it alone. There are many people and kinds of treatments that can help.  Treatment for alcohol use disorder can include:  Group therapy, one or more types of counseling, and alcohol education.  Medicines that help to:  Reduce withdrawal symptoms and help you safely stop drinking.  Reduce cravings for alcohol.  Support groups. These groups include Alcoholics Anonymous and [facility] (Self-Management and Recovery Training).  Some people are able to stop or cut back on drinking with help from a counselor. People who have moderate to severe alcohol use disorder may need medical treatment. They may need to stay in a hospital or treatment center.  You may have a treatment team to help you. This team may include a psychologist or psychiatrist, counselors, doctors, social workers, nurses, and a . A  helps plan and manage your treatment.  Follow-up care is a key part of your treatment and safety. Be sure to make and go to all appointments, and call your doctor if you are having problems. It's also a good idea to know your test results and keep a list of the medicines you take.  Where can you learn more?  Go to https://www.healthZomazz.net/patiented  Enter H758 in the search box to learn more about \"Learning About Alcohol Use Disorder.\"  Current as of: March 21, 2023               Content Version: 13.8    3936-8975 [employer].   Care instructions adapted under license by your healthcare professional. If you have questions about a medical condition or this instruction, always ask your healthcare professional. [employer] disclaims any warranty or liability for your use of this information.      Substance Use Disorder: Care Instructions  Overview     You can improve your " life and health by stopping your use of alcohol or drugs. When you don't drink or use drugs, you may feel and sleep better. You may get along better with your family, friends, and coworkers. There are medicines and programs that can help with substance use disorder.  How can you care for yourself at home?  Here are some ways to help you stay sober and prevent relapse.  If you have been given medicine to help keep you sober or reduce your cravings, be sure to take it exactly as prescribed.  Talk to your doctor about programs that can help you stop using drugs or drinking alcohol.  Do not keep alcohol or drugs in your home.  Plan ahead. Think about what you'll say if other people ask you to drink or use drugs. Try not to spend time with people who drink or use drugs.  Use the time and money spent on drinking or drugs to do something that's important to you.  Preventing a relapse  Have a plan to deal with relapse. Learn to recognize changes in your thinking that lead you to drink or use drugs. Get help before you start to drink or use drugs again.  Try to stay away from situations, friends, or places that may lead you to drink or use drugs.  If you feel the need to drink alcohol or use drugs again, seek help right away. Call a trusted friend or family member. Some people get support from organizations such as Narcotics Anonymous or [employer] or from treatment facilities.  If you relapse, get help as soon as you can. Some people make a plan with another person that outlines what they want that person to do for them if they relapse. The plan usually includes how to handle the relapse and who to notify in case of relapse.  Don't give up. Remember that a relapse doesn't mean that you have failed. Use the experience to learn the triggers that lead you to drink or use drugs. Then quit again. Recovery is a lifelong process. Many people have several relapses before they are able to quit for good.  Follow-up care is a key  "part of your treatment and safety. Be sure to make and go to all appointments, and call your doctor if you are having problems. It's also a good idea to know your test results and keep a list of the medicines you take.  When should you call for help?   Call 911  anytime you think you may need emergency care. For example, call if you or someone else:    Has overdosed or has withdrawal signs. Be sure to tell the emergency workers that you are or someone else is using or trying to quit using drugs. Overdose or withdrawal signs may include:  Losing consciousness.  Seizure.  Seeing or hearing things that aren't there (hallucinations).     Is thinking or talking about suicide or harming others.   Where to get help 24 hours a day, 7 days a week   If you or someone you know talks about suicide, self-harm, a mental health crisis, a substance use crisis, or any other kind of emotional distress, get help right away. You can:    Call the Suicide and Crisis Lifeline at 988.     Call 8-639-343-ZMIM (1-761.244.9149).     Text HOME to 711280 to access the Crisis Text Line.   Consider saving these numbers in your phone.  Go to [employer] for more information or to chat online.  Call your doctor now or seek immediate medical care if:    You are having withdrawal symptoms. These may include nausea or vomiting, sweating, shakiness, and anxiety.   Watch closely for changes in your health, and be sure to contact your doctor if:    You have a relapse.     You need more help or support to stop.   Where can you learn more?  Go to https://www.The miqi.cn.net/patiented  Enter H573 in the search box to learn more about \"Substance Use Disorder: Care Instructions.\"  Current as of: March 21, 2023               Content Version: 13.8    7568-7625 [employer].   Care instructions adapted under license by your healthcare professional. If you have questions about a medical condition or this instruction, always ask your healthcare " professional. [employer] disclaims any warranty or liability for your use of this information.

## 2024-03-04 NOTE — LETTER
March 4, 2024      Yolanda L Mariusz  [address] [address]        To Whom It May Concern,       Kem Vee is under my care in management of his medical conditions.  It is in my opinion that Kem should have an emotional support animal as part of his comprehensive care plan.      Sincerely,        Litzy Corona MD

## 2024-03-04 NOTE — PROGRESS NOTES
Preventive Care Visit  [facility]  Litzy Corona MD, Family Medicine  Mar 4, 2024    Assessment & Plan     Routine physical examination  Encouraged healthy lifestyle habits including regular exercise, healthy eating habits, and adequate calcium and vitamin D intake.  Discussed vaccinations, he is agreeable to PCV 20 and Tdap today.  Discussed Pap smear screening, he declines.  Agreeable to hepatitis C and HIV screening.  Gonorrhea and Chlamydia screening declined today.  Declines contraception.    Cervical cancer screening  Discussed importance of cervical cancer screening, patient declines    Screening for STDs (sexually transmitted diseases)  Agreeable to HIV and hepatitis C screening.  Screening for gonorrhea, chlamydia declined.    Need for hepatitis C screening test  - Hepatitis C Screen Reflex to HCV RNA Quant and Genotype; Future  - Hepatitis C Screen Reflex to HCV RNA Quant and Genotype    Chronic idiopathic constipation  Stable and controlled with hydration and coffee.    Gastroesophageal reflux disease with esophagitis without hemorrhage  History of alcoholic gastritis  Overall well-controlled with reduction in alcohol intake, no evidence of bleeding.  Will check kidney and liver function, CBC today.  Will be receiving future endoscopy and follow-up of gastritis.  - Comprehensive metabolic panel; Future  - CBC with Platelets & Differential; Future  - Comprehensive metabolic panel  - CBC with Platelets & Differential    Hypochromic anemia  Will reassess with CBC and ferritin.  This was previously due to bleeding gastritis.  - CBC with Platelets & Differential; Future  - Ferritin; Future  - CBC with Platelets & Differential  - Ferritin    Neutrophilia  Will reassess today with CBC with differential.    Alcohol use disorder, moderate, dependence (H)  Encouraged abstinence.  Congratulated him on reduction in alcohol intake.    Tobacco use  Encouraged cessation, assistance  "declined.    Marijuana use  Encouraged cessation, assistance declined.    Moderate episode of recurrent major depressive disorder (H)  PTSD (post-traumatic stress disorder)  Anxiety  Patient endorses some suicidal thoughts in the past, denies current suicidal ideation or significant risk.  We discussed support systems available.  Referral is made to psychiatry, discussed importance of medication compliance, suspect discontinue of medication could be contributing to significant current fatigue.  Advise initiation of escitalopram 10 mg daily, increasing back to previous dose of 20 mg daily.  Remain off bupropion for now as that may have been disrupting sleep.  Advised that S-Citalopram be taken at the same time as testosterone, which he has been consistent in taking.  Depression action plan completed today, reviewed and discussed crisis resources and measures to remain safe.  - escitalopram (LEXAPRO) 20 MG tablet; Take one half tab daily for 1 week, then 1 full tab daily.  - Adult Mental Health  Referral; Future    Gender dysphoria in adult  He currently feels adequately supported under care of [facility] clinic and on testosterone treatments.    Morbid obesity (H)  Encouraged efforts at healthy lifestyle habits.    Vitamin D deficiency  Encouraged daily supplement.  Will check vitamin D level.  - Vitamin D Deficiency; Future  - Vitamin D Deficiency    Abnormal weight gain  Will check TSH today to ensure thyroid dysfunction is not contributing.  - TSH with free T4 reflex; Future  - TSH with free T4 reflex              BMI  Estimated body mass index is 45.49 kg/m  as calculated from the following:    Height as of this encounter: 1.554 m (5' 1.18\").    Weight as of this encounter: 109.9 kg (242 lb 3.2 oz).       Depression Screening Follow Up        3/4/2024    10:16 AM   PHQ   PHQ-9 Total Score 18   Q9: Thoughts of better off dead/self-harm past 2 weeks Several days   F/U: Thoughts of suicide or self-harm No "   F/U: Safety concerns No               3/4/2024    11:14 AM   C-SSRS (Brief [place])   Within the last month, have you wished you were dead or wished you could go to sleep and not wake up? No   Within the last month, have you had any actual thoughts of killing yourself? No   Within the last month, have you been thinking about how you might do this? No         Follow Up        Follow Up Actions Taken  Crisis resource information provided in the After Visit Summary  Referred patient back to mental health provider  Mental Health Referral placed    Discussed the following ways the patient can remain in a safe environment:  remove alcohol, remove drugs, and be around others  Counseling  Appropriate preventive services were discussed with this patient, including applicable screening as appropriate for fall prevention, nutrition, physical activity, Tobacco-use cessation, weight loss and cognition.  Checklist reviewing preventive services available has been given to the patient.  Reviewed patient's diet, addressing concerns and/or questions.   He is at risk for lack of exercise and has been provided with information to increase physical activity for the benefit of his well-being.   Patient is at risk for social isolation and has been provided with information about the benefit of social connection.   The patient was instructed to see the dentist every 6 months.   The patient reports drinking more than one alcoholic drink per day and sometimes engages in binge or excessive drinking. The patient was counseled and given information about possible harmful effects of excessive alcohol intake as well as where to get help for alcohol problems. The patient's PHQ-9 score is consistent with moderate depression. He was provided with information regarding depression.           Shahriar Brownlee is a 23 year old, presenting for the following:  Physical, Referral (Therapy), and Letter Request (For emotional support animal)         3/4/2024    10:21 AM   Additional Questions   Roomed by Teressa WYLIE CMA            Here for routine preventive care visit.  History of GERD and gastritis with bleeding due to gastritis.  Has upper endoscopy scheduled next month.  Has reduced intake of alcohol to perhaps 3 drinks every 2 weeks, significantly improved from previous, but has not eliminated alcohol altogether.  Continues to use tobacco and marijuana.  No further evidence of bleeding.  Due for follow-up of anemia.  Constipation improved with intake of 1 cup of coffee daily.  Continued struggles with depression and anxiety.  Difficulty remembering to take medications, has not followed up with psychiatry in the last 1 to 2 years and decided to stop bupropion and escitalopram about 2 months ago as he was frequently forgetting to take it.  Remains on prazosin which is helpful for sleep and PTSD management.  Endorses suicidal ideation on questionnaire, denies intent, plan, active suicidal ideation.  Started with a new therapist last week, will follow-up again this week.  Working with gender clinic through [facility], successful bilateral mastectomy last year, pleased with results thus far though some ongoing discomfort in right axilla that is being addressed by that team.  Tolerating testosterone well.  No menses on this therapy and pleased with this.  Struggling with weight gain despite reduction in appetite overall.  Due for follow-up of elevated white blood cell count.                3/4/2024   General Health   How would you rate your overall physical health? (!) POOR   Feel stress (tense, anxious, or unable to sleep) Rather much   (!) STRESS CONCERN      3/4/2024   Nutrition   Three or more servings of calcium each day? (!) NO   Diet: I don't know   How many servings of fruit and vegetables per day? (!) 0-1   How many sweetened beverages each day? (!) 3         3/4/2024   Exercise   Days per week of moderate/strenous exercise 1 day   (!)  EXERCISE CONCERN      3/4/2024   Social Factors   Frequency of gathering with friends or relatives Never   (!) SOCIAL CONNECTIONS CONCERN      3/4/2024   Dental   Dentist two times every year? (!) NO          Today's PHQ-9 Score:       3/4/2024    10:16 AM   PHQ-9 SCORE   PHQ-9 Total Score MyChart 18 (Moderately severe depression)   PHQ-9 Total Score 18         3/4/2024   Substance Use   Alcohol more than 3/day or more than 7/wk Yes   How often do you have a drink containing alcohol Monthly or less   How many alcohol drinks on typical day 3 or 4   How often do you have 5+ drinks at one occasion Weekly   Audit 2/3 Score 4     Social History     Tobacco Use    Smoking status: Every Day     Packs/day: 0.00     Years: 5.00     Additional pack years: 0.00     Total pack years: 0.00     Types: Cigarettes    Smokeless tobacco: Never    Tobacco comments:     Pt does not smoke when it is cold outside   Vaping Use    Vaping Use: Some days    Substances: Nicotine, THC   Substance Use Topics    Alcohol use: Yes     Alcohol/week: 6.0 - 8.0 standard drinks of alcohol     Types: 6 - 8 Standard drinks or equivalent per week     Comment: sometimes    Drug use: Yes     Types: Marijuana     Comment: Smoking PRN         History of abnormal Pap smear: NO - age 21-29 PAP every 3 years recommended              No data to display                 Reviewed and updated as needed this visit by Provider                    Past Medical History:   Diagnosis Date    Anxiety     therapist: [facility]    Chemical dependency (H)     quit Sept 2021, alcohol. some family hx as well.     Chronic constipation     Constipation     Depression     Esophageal reflux     controlled well on protonix currently, EGD 6/3/21 w/ some mild esophagitis when alcohol intake was higher (bottle vodka daily).    Heart rate problem     some palpitations in the past w/ anxiety attacks.    Migraines     occ tylenol use.    Morbid obesity (H) 03/06/2020    Obese     Palpitations      Plantar warts     PTSD (post-traumatic stress disorder)     Uncomplicated asthma      Past Surgical History:   Procedure Laterality Date    COLONOSCOPY N/A 6/3/2021    Procedure: COLONOSCOPY;  Surgeon: Guru Gerardo Prado MD;  Location: [facility]    ESOPHAGOSCOPY, GASTROSCOPY, DUODENOSCOPY (EGD), COMBINED N/A 6/3/2021    Procedure: ESOPHAGOGASTRODUODENOSCOPY (EGD);  Surgeon: Guru Gerardo Prado MD;  Location: [facility]    ESOPHAGOSCOPY, GASTROSCOPY, DUODENOSCOPY (EGD), COMBINED N/A 6/23/2022    Procedure: ESOPHAGOGASTRODUODENOSCOPY (EGD);  Surgeon: Amanda Linares MD;  [place] [facility] [place]    ESOPHAGOSCOPY, GASTROSCOPY, DUODENOSCOPY (EGD), COMBINED N/A 2/16/2023    Procedure: ESOPHAGOGASTRODUODENOSCOPY, WITH BIOPSY;  Surgeon: Alma Velasquez MD;  Location:  GI    wisdom teeth       OB History   No obstetric history on file.     Lab work is in process  Labs reviewed in EPIC  BP Readings from Last 3 Encounters:   03/04/24 116/70   12/27/23 123/84   09/27/23 134/72    Wt Readings from Last 3 Encounters:   03/04/24 109.9 kg (242 lb 3.2 oz)   01/24/24 106.6 kg (235 lb)   01/10/24 106.6 kg (235 lb)                  Patient Active Problem List   Diagnosis    Alcohol use disorder, moderate, dependence (H)    Morbid obesity (H)    Vitamin D deficiency    Tobacco use    PTSD (post-traumatic stress disorder)    Nicotine use disorder    Neutrophilia    Moderate episode of recurrent major depressive disorder (H)    Marijuana use    Hypochromic anemia    Chronic idiopathic constipation    Anxiety    History of alcoholic gastritis    Gender dysphoria in adult    Gastroesophageal reflux disease with esophagitis without hemorrhage     Past Surgical History:   Procedure Laterality Date    COLONOSCOPY N/A 6/3/2021    Procedure: COLONOSCOPY;  Surgeon: Guru Gerardo Prado MD;  Location: [facility]    ESOPHAGOSCOPY, GASTROSCOPY, DUODENOSCOPY (EGD), COMBINED N/A 6/3/2021    Procedure:  "ESOPHAGOGASTRODUODENOSCOPY (EGD);  Surgeon: Guru Gerardo Prado MD;  Location:  GI    ESOPHAGOSCOPY, GASTROSCOPY, DUODENOSCOPY (EGD), COMBINED N/A 2022    Procedure: ESOPHAGOGASTRODUODENOSCOPY (EGD);  Surgeon: Amanda Linares MD;  [place] [facility] [place]    ESOPHAGOSCOPY, GASTROSCOPY, DUODENOSCOPY (EGD), COMBINED N/A 2023    Procedure: ESOPHAGOGASTRODUODENOSCOPY, WITH BIOPSY;  Surgeon: Alma Velasquez MD;  Location:  GI    wisdom teeth         Social History     Tobacco Use    Smoking status: Every Day     Packs/day: 0.00     Years: 5.00     Additional pack years: 0.00     Total pack years: 0.00     Types: Cigarettes    Smokeless tobacco: Never    Tobacco comments:     Pt does not smoke when it is cold outside   Substance Use Topics    Alcohol use: Yes     Alcohol/week: 6.0 - 8.0 standard drinks of alcohol     Types: 6 - 8 Standard drinks or equivalent per week     Comment: sometimes     Family History   Problem Relation Age of Onset    Heart Disease Mother     Substance Abuse Mother         in remission    Substance Abuse Father     Alcoholism Father     Bipolar Disorder Brother     Depression Brother     Anxiety Disorder Brother     Hypertension Maternal Grandmother     Arthritis Maternal Grandmother     Heart Disease Maternal Grandmother     Hodgkin's lymphoma Maternal Grandfather 26    Esophageal Cancer Maternal Grandfather          at 54.    Heart Disease Maternal Uncle     Heart Disease Maternal Uncle     Acute Myocardial Infarction No family hx of     Anesthesia Reaction No family hx of     Deep Vein Thrombosis (DVT) No family hx of          Current Outpatient Medications   Medication Sig Dispense Refill    albuterol (PROAIR HFA/PROVENTIL HFA/VENTOLIN HFA) 108 (90 Base) MCG/ACT inhaler Inhale 2 puffs into the lungs every 6 hours as needed for shortness of breath / dyspnea or wheezing 18 g 0    B-D SYRINGE LUER-SANTA 1 ML MISC       BD DISP NEEDLES 25G X 5/8\" MISC       " buPROPion (WELLBUTRIN XL) 150 MG 24 hr tablet Take 1 tablet (150 mg) by mouth every morning 30 tablet 3    escitalopram (LEXAPRO) 20 MG tablet Take one half tab daily for 1 week, then 1 full tab daily. 90 tablet 1    famotidine (PEPCID) 20 MG tablet Take 1 tablet (20 mg) by mouth At Bedtime 60 tablet 3    fluocinonide (LIDEX) 0.05 % external ointment       ipratropium - albuterol 0.5 mg/2.5 mg/3 mL (DUONEB) 0.5-2.5 (3) MG/3ML neb solution Inhale 3 mLs into the lungs every 6 hours as needed      nystatin (MYCOSTATIN) 876193 UNIT/GM external powder Apply topically 2 times daily as needed (Rash) 60 g 3    omeprazole (PRILOSEC) 40 MG DR capsule Take 1 capsule (40 mg) by mouth 2 times daily (before meals) For more refills,schedule an appointment at [phone] 180 capsule 0    ondansetron (ZOFRAN) 4 MG tablet 4mg every 6-8 hours if needed for nausea and vomiting. If needed, may increase to 8mg every 8-12 hours if needed for nausea. 30 tablet 0    prazosin (MINIPRESS) 1 MG capsule TAKE 1 CAPSULE BY MOUTH EVERY NIGHT AT BEDTIME. AFTER 2-3 DAYS, INCREASE TO 2 CAPSULE EVERY NIGHT AT BEDTIME 180 capsule 1    sucralfate (CARAFATE) 1 GM tablet Take 1 tablet (1 g) by mouth 4 times daily (before meals and nightly) 180 tablet 3    Testosterone 1.62 % GEL APPLY 3 PUMPS TOPICALLY TO THE AFFECTED AREA DAILY      testosterone cypionate (DEPOTESTOSTERONE) 200 MG/ML injection Inject 0.2 mg into the muscle once a week Friday      Vitamin D, Cholecalciferol, 25 MCG (1000 UT) TABS Take 1,000 Units by mouth every morning      vitamin D3 (CHOLECALCIFEROL) 1.25 MG (34605 UT) capsule Take 1 capsule (50,000 Units) by mouth every 7 days 8 capsule 0     Allergies   Allergen Reactions    Ibuprofen Hives     Throat gets itchy and sore    Hydrocodone-Acetaminophen Nausea    Penicillins      Recent Labs   Lab Test 03/04/24  1134 12/27/23  1431 01/27/22  0400 01/26/22  1629 11/08/21  1014 09/21/21  0421 05/16/21  0139 04/06/21  1322   A1C  --   --    "--   --  5.5  --   --   --    ALT 18 26  --  20 22  --  18 16   CR 0.84 0.92   < > 0.73 0.69   < > 0.81 0.70   GFRESTIMATED >90 89   < > >90 >90   < > >60 >60   GFRESTBLACK  --   --   --   --   --   --  >60 >60   POTASSIUM 4.4 4.2   < > 3.6 4.2   < > 3.5 3.6   TSH 0.86  --   --  1.21 1.36  --   --   --     < > = values in this interval not displayed.          Review of Systems  Constitutional, neuro, ENT, endocrine, pulmonary, cardiac, gastrointestinal, genitourinary, musculoskeletal, integument and psychiatric systems are negative, except as otherwise noted.     Objective    Exam  /70 (BP Location: Left arm, Patient Position: Sitting, Cuff Size: Adult Large)   Pulse 100   Temp 98.4  F (36.9  C) (Temporal)   Resp 20   Ht 1.554 m (5' 1.18\")   Wt 109.9 kg (242 lb 3.2 oz)   SpO2 95%   BMI 45.49 kg/m     Estimated body mass index is 45.49 kg/m  as calculated from the following:    Height as of this encounter: 1.554 m (5' 1.18\").    Weight as of this encounter: 109.9 kg (242 lb 3.2 oz).    Physical Exam  GENERAL: alert and no distress  EYES: Eyes grossly normal to inspection, PERRL and conjunctivae and sclerae normal  HENT: ear canals and TM's normal, nose and mouth without ulcers or lesions  NECK: no adenopathy, no asymmetry, masses, or scars  RESP: lungs clear to auscultation - no rales, rhonchi or wheezes  BREAST: Previous mastectomy bilaterally, scar tissue noted without abnormality or tenderness.  CV: regular rate and rhythm, normal S1 S2, no S3 or S4, no murmur, click or rub, no peripheral edema  ABDOMEN: soft, nontender, no hepatosplenomegaly, no masses and bowel sounds normal  MS: no gross musculoskeletal defects noted, no edema  SKIN: no suspicious lesions or rashes  NEURO: Normal strength and tone, mentation intact and speech normal  PSYCH: mentation appears normal, affect normal/bright      Signed Electronically by: Litzy Corona MD    "

## 2024-03-04 NOTE — LETTER
March 4, 2024      Yolanda Vee  [address] [facility] [address]        To Whom It May Concern,       Kem Vee is under my care in management of her medical conditions.  It is in my opinion that Kem should have an emotional support animal as part of her comprehensive care plan.      Sincerely,        Litzy Corona MD

## 2024-03-04 NOTE — LETTER
My Depression Action Plan  Name: Yolanda Vee   Date of Birth 2000  Date: 3/4/2024    My doctor: Litzy Corona   My clinic: [facility]  [address] [facility] [address]  [facility] [address]  [phone]            GREEN    ZONE   Good Control    What it looks like:   Things are going generally well. You have normal ups and downs. You may even feel depressed from time to time, but bad moods usually last less than a day.   What you need to do:  Continue to care for yourself (see self care plan)  Check your depression survival kit and update it as needed  Follow your physician s recommendations including any medication.  Do not stop taking medication unless you consult with your physician first.             YELLOW         ZONE Getting Worse    What it looks like:   Depression is starting to interfere with your life.   It may be hard to get out of bed; you may be starting to isolate yourself from others.  Symptoms of depression are starting to last most all day and this has happened for several days.   You may have suicidal thoughts but they are not constant.   What you need to do:     Call your care team. Your response to treatment will improve if you keep your care team informed of your progress. Yellow periods are signs an adjustment may need to be made.     Continue your self-care.  Just get dressed and ready for the day.  Don't give yourself time to talk yourself out of it.    Talk to someone in your support network.    Open up your Depression Self-Care Plan/Wellness Kit.             RED    ZONE Medical Alert - Get Help    What it looks like:   Depression is seriously interfering with your life.   You may experience these or other symptoms: You can t get out of bed most days, can t work or engage in other necessary activities, you have trouble taking care of basic hygiene, or basic responsibilities, thoughts of suicide or death that will not go away, self-injurious  behavior.     What you need to do:  Call your care team and request a same-day appointment. If they are not available (weekends or after hours) call your local crisis line, emergency room or 911.          Depression Self-Care Plan / Wellness Kit    Many people find that medication and therapy are helpful treatments for managing depression. In addition, making small changes to your everyday life can help to boost your mood and improve your wellbeing. Below are some tips for you to consider. Be sure to talk with your medical provider and/or behavioral health consultant if your symptoms are worsening or not improving.     Sleep   Sleep hygiene  means all of the habits that support good, restful sleep. It includes maintaining a consistent bedtime and wake time, using your bedroom only for sleeping or sex, and keeping the bedroom dark and free of distractions like a computer, smartphone, or television.     Develop a Healthy Routine  Maintain good hygiene. Get out of bed in the morning, make your bed, brush your teeth, take a shower, and get dressed. Don t spend too much time viewing media that makes you feel stressed. Find time to relax each day.    Exercise  Get some form of exercise every day. This will help reduce pain and release endorphins, the  feel good  chemicals in your brain. It can be as simple as just going for a walk or doing some gardening, anything that will get you moving.      Diet  Strive to eat healthy foods, including fruits and vegetables. Drink plenty of water. Avoid excessive sugar, caffeine, alcohol, and other mood-altering substances.     Stay Connected with Others  Stay in touch with friends and family members.    Manage Your Mood  Try deep breathing, massage therapy, biofeedback, or meditation. Take part in fun activities when you can. Try to find something to smile about each day.     Psychotherapy  Be open to working with a therapist if your provider recommends it.     Medication  Be sure to  take your medication as prescribed. Most anti-depressants need to be taken every day. It usually takes several weeks for medications to work. Not all medicines work for all people. It is important to follow-up with your provider to make sure you have a treatment plan that is working for you. Do not stop your medication abruptly without first discussing it with your provider.    Crisis Resources   These hotlines are for both adults and children. They and are open 24 hours a day, 7 days a week unless noted otherwise.    National Suicide Prevention Lifeline   988 or 0-288-695-PHAE (4572)    Crisis Text Line    www.crisistextline.org  Text HOME to 260479 from anywhere in the [place], anytime, about any type of crisis. A live, trained crisis counselor will receive the text and respond quickly.    [place] Lifeline for LGBTQ Youth  A national crisis intervention and suicide lifeline for LGBTQ youth under 25. Provides a safe place to talk without judgement. Call 1-776.988.9865; text START to 889497 or visit www.thetrevorproject.org to talk to a trained counselor.    For [facility] crisis numbers, visit the [facility] website at:  https://[place].gov/dhs/people-we-serve/adults/health-care/mental-health/resources/crisis-contacts.jsp

## 2024-03-05 ENCOUNTER — OFFICE VISIT (OUTPATIENT)
Dept: GASTROENTEROLOGY | Facility: CLINIC | Age: 24
End: 2024-03-05
Payer: COMMERCIAL

## 2024-03-05 VITALS
DIASTOLIC BLOOD PRESSURE: 82 MMHG | OXYGEN SATURATION: 100 % | WEIGHT: 242 LBS | HEART RATE: 69 BPM | BODY MASS INDEX: 45.69 KG/M2 | SYSTOLIC BLOOD PRESSURE: 137 MMHG | RESPIRATION RATE: 16 BRPM | TEMPERATURE: 97.3 F | HEIGHT: 61 IN

## 2024-03-05 DIAGNOSIS — K21.00 GASTROESOPHAGEAL REFLUX DISEASE WITH ESOPHAGITIS WITHOUT HEMORRHAGE: Primary | ICD-10-CM

## 2024-03-05 DIAGNOSIS — E55.9 VITAMIN D DEFICIENCY: Primary | ICD-10-CM

## 2024-03-05 PROCEDURE — 91037 ESOPH IMPED FUNCTION TEST: CPT | Mod: 52 | Performed by: INTERNAL MEDICINE

## 2024-03-05 PROCEDURE — 91010 ESOPHAGUS MOTILITY STUDY: CPT | Mod: 52 | Performed by: INTERNAL MEDICINE

## 2024-03-05 ASSESSMENT — PAIN SCALES - GENERAL: PAINLEVEL: NO PAIN (0)

## 2024-03-05 NOTE — PROGRESS NOTES
Non-Invasive GI Procedure Visit    Yolanda Vee is a 23 year old adult with history of Gastroesophageal reflux disease with esophagitis without hemorrhage.   Patient stated reason for procedure: Dysphagia, Regurgitation, and GERD      COVID-19 PCR Results          12/27/2023    13:47   COVID-19 PCR Results   SARS CoV2 PCR Negative      COVID-19 Antibody Results, Testing for Immunity           No data to display                Pre-Procedure Assessment  Patient presents to clinic today for Esophageal Manometry Study with pH    Referring Provider: Jayla BRIGGS  Patient has undergone previous endoscopy.    Does patient report taking a PPI (omeprazole, pantoprazole, rabeprazole, lansoprazole, esomeprazole, dexlansoprazole)? Yes. Is patient taking a PPI twice daily? Yes  Does patient report taking a H2 blocker (ranitidine, or famotidine)? Yes  Does patient report taking opioids? No  Patient reported that last food and/or drink was last consumed 12 hours ago.  Esophageal Questionnaire(s) Completed: Yes - Esophageal Questionnaire(s)    BEDQ Questionnaire      3/5/2024     2:00 PM   BEDQ Questionnaire: How Often Have You Had the Following?   Trouble eating solid food (meat, bread, vegetables) 1   Trouble eating soft foods (yogurt, jello, pudding) 0   Trouble swallowing liquids 0   Pain while swallowing 0   Coughing or choking while swallowing foods or liquids 5   Total Score: 6         3/5/2024     2:00 PM   BEDQ Questionnaire: Discomfort/Pain Ratings   Eating solid food (meat, bread, vegetables) 0   Eating soft foods (yogurt, jello, pudding) 0   Drinking liquid 0   Total Score: 0       Eckardt Questionnaire      3/5/2024     2:00 PM   Eckardt Questionnaire   Dysphagia 1   Regurgitation 1   Retrosternal Pain 0   Weight Loss (kg) 0   Total Score:  2       Promis 10 Questionnaire      11/6/2021     1:17 AM 3/5/2024     2:00 PM   PROMIS 10 FLOWSHEET DATA   In general, would you say your health is: 2 4   In general,  "would you say your quality of life is: 2 5   In general, how would you rate your physical health? 2 5   In general, how would you rate your mental health, including your mood and your ability to think? 3 4   In general, how would you rate your satisfaction with your social activities and relationships? 3 4   In general, please rate how well you carry out your usual social activities and roles. (This includes activities at home, at work and in your community, and responsibilities as a parent, child, spouse, employee, friend, etc.) 3 5   To what extent are you able to carry out your everyday physical activities such as walking, climbing stairs, carrying groceries, or moving a chair? 3 5   In the past 7 days, how often have you been bothered by emotional problems such as feeling anxious, depressed, or irritable? 2 5   In the past 7 days, how would you rate your fatigue on average? 1 3   In the past 7 days, how would you rate your pain on average, where 0 means no pain, and 10 means worst imaginable pain? 0 0   Mental health question re-calculation - no clinical value 4 1   Physical health question re-calculation - no clinical value 5 3   Pain question re-calculation - no clinical value 5 5   Global Mental Health Score 12 14   Global Physical Health Score 15 18   PROMIS TOTAL - SUBSCORES 27 32   .    Patient Hx  Patient's history, medications and allergies were reviewed.     Height: 5' 1\"   Weight: 242 lbs 0 oz    Patient Active Problem List    Diagnosis Date Noted    Gastroesophageal reflux disease with esophagitis without hemorrhage 01/24/2024     Priority: Medium    Gender dysphoria in adult 06/01/2022     Priority: Medium     Formatting of this note might be different from the original.  Gender Services consult on 5/31/2022      History of alcoholic gastritis 01/27/2022     Priority: Medium    Morbid obesity (H) 09/15/2021     Priority: Medium    Alcohol use disorder, moderate, dependence (H) 04/06/2021     " "Priority: Medium    Nicotine use disorder 02/25/2021     Priority: Medium    Moderate episode of recurrent major depressive disorder (H) 02/25/2021     Priority: Medium    Marijuana use 02/25/2021     Priority: Medium    PTSD (post-traumatic stress disorder) 02/07/2021     Priority: Medium    Hypochromic anemia 01/04/2021     Priority: Medium    Anxiety 12/01/2020     Priority: Medium    Tobacco use 05/16/2019     Priority: Medium    Chronic idiopathic constipation 05/16/2019     Priority: Medium    Vitamin D deficiency 02/24/2015     Priority: Medium    Neutrophilia 02/24/2015     Priority: Medium      Prior to Admission medications    Medication Sig Start Date End Date Taking? Authorizing Provider   albuterol (PROAIR HFA/PROVENTIL HFA/VENTOLIN HFA) 108 (90 Base) MCG/ACT inhaler Inhale 2 puffs into the lungs every 6 hours as needed for shortness of breath / dyspnea or wheezing 11/3/21   Cleve Ferraro PA-C B-D [facility]  9/19/22   Reported, Patient   BD DISP NEEDLES 25G X 5/8\" MISC  9/19/22   Reported, Patient   buPROPion (WELLBUTRIN XL) 150 MG 24 hr tablet Take 1 tablet (150 mg) by mouth every morning 11/9/21   Maris Bhandari APRN CNP   escitalopram (LEXAPRO) 20 MG tablet Take one half tab daily for 1 week, then 1 full tab daily. 3/4/24   Litzy Corona MD   famotidine (PEPCID) 20 MG tablet Take 1 tablet (20 mg) by mouth At Bedtime 6/23/22   Amanda Lniares MD   fluocinonide (LIDEX) 0.05 % external ointment  10/5/22   Reported, Patient   ipratropium - albuterol 0.5 mg/2.5 mg/3 mL (DUONEB) 0.5-2.5 (3) MG/3ML neb solution Inhale 3 mLs into the lungs every 6 hours as needed 10/23/20   Reported, Patient   nystatin (MYCOSTATIN) 485095 UNIT/GM external powder Apply topically 2 times daily as needed (Rash) 6/21/23   Litzy Corona MD   omeprazole (PRILOSEC) 40 MG DR capsule Take 1 capsule (40 mg) by mouth 2 times daily (before meals) For more refills,schedule an appointment at " [phone] 7/28/23   Madelyn Gutierrez PA-C   ondansetron (ZOFRAN) 4 MG tablet 4mg every 6-8 hours if needed for nausea and vomiting. If needed, may increase to 8mg every 8-12 hours if needed for nausea. 12/27/23   Gayle Silvestre MD   prazosin (MINIPRESS) 1 MG capsule TAKE 1 CAPSULE BY MOUTH EVERY NIGHT AT BEDTIME. AFTER 2-3 DAYS, INCREASE TO 2 CAPSULE EVERY NIGHT AT BEDTIME 11/9/22   Litzy Corona MD   sucralfate (CARAFATE) 1 GM tablet Take 1 tablet (1 g) by mouth 4 times daily (before meals and nightly) 7/14/21   Amanda Linares MD   Testosterone 1.62 % GEL APPLY 3 PUMPS TOPICALLY TO THE AFFECTED AREA DAILY 12/20/23   Reported, Patient   testosterone cypionate (DEPOTESTOSTERONE) 200 MG/ML injection Inject 0.2 mg into the muscle once a week Friday    Reported, Patient   Vitamin D, Cholecalciferol, 25 MCG (1000 UT) TABS Take 1,000 Units by mouth every morning    Reported, Patient   vitamin D3 (CHOLECALCIFEROL) 1.25 MG (51885 UT) capsule Take 1 capsule (50,000 Units) by mouth every 7 days 3/5/24   Litzy Corona MD     Allergies   Allergen Reactions    Ibuprofen Hives     Throat gets itchy and sore    Hydrocodone-Acetaminophen Nausea    Penicillins      Past Medical History:   Diagnosis Date    Anxiety     therapist: [facility]    Chemical dependency (H)     quit Sept 2021, alcohol. some family hx as well.     Chronic constipation     Constipation     Depression     Esophageal reflux     controlled well on protonix currently, EGD 6/3/21 w/ some mild esophagitis when alcohol intake was higher (bottle vodka daily).    Heart rate problem     some palpitations in the past w/ anxiety attacks.    Migraines     occ tylenol use.    Morbid obesity (H) 03/06/2020    Obese     Palpitations     Plantar warts     PTSD (post-traumatic stress disorder)     Uncomplicated asthma      Past Surgical History:   Procedure Laterality Date    COLONOSCOPY N/A 6/3/2021    Procedure: COLONOSCOPY;  Surgeon:  Guru Gerardo Prado MD;  Location: [facility]    ESOPHAGOSCOPY, GASTROSCOPY, DUODENOSCOPY (EGD), COMBINED N/A 6/3/2021    Procedure: ESOPHAGOGASTRODUODENOSCOPY (EGD);  Surgeon: Guru Gerardo Prado MD;  Location: [facility]    ESOPHAGOSCOPY, GASTROSCOPY, DUODENOSCOPY (EGD), COMBINED N/A 2022    Procedure: ESOPHAGOGASTRODUODENOSCOPY (EGD);  Surgeon: Amanda Linares MD;  [place] [facility] [place]    ESOPHAGOSCOPY, GASTROSCOPY, DUODENOSCOPY (EGD), COMBINED N/A 2023    Procedure: ESOPHAGOGASTRODUODENOSCOPY, WITH BIOPSY;  Surgeon: Alma Velasquez MD;  Location:  GI    wisdom teeth       Family History   Problem Relation Age of Onset    Heart Disease Mother     Substance Abuse Mother         in remission    Substance Abuse Father     Alcoholism Father     Bipolar Disorder Brother     Depression Brother     Anxiety Disorder Brother     Hypertension Maternal Grandmother     Arthritis Maternal Grandmother     Heart Disease Maternal Grandmother     Hodgkin's lymphoma Maternal Grandfather 26    Esophageal Cancer Maternal Grandfather          at 54.    Heart Disease Maternal Uncle     Heart Disease Maternal Uncle     Acute Myocardial Infarction No family hx of     Anesthesia Reaction No family hx of     Deep Vein Thrombosis (DVT) No family hx of      Social History     Tobacco Use    Smoking status: Every Day     Packs/day: 0.00     Years: 5.00     Additional pack years: 0.00     Total pack years: 0.00     Types: Cigarettes    Smokeless tobacco: Never    Tobacco comments:     Pt does not smoke when it is cold outside   Substance Use Topics    Alcohol use: Yes     Alcohol/week: 6.0 - 8.0 standard drinks of alcohol     Types: 6 - 8 Standard drinks or equivalent per week     Comment: sometimes        Pre-Procedure Education & Consent  Procedure education was provided to: Patient  Teaching method: Explanation  Barriers to learning: No Barrier    Patient indicated understanding of  pre-procedure instruction and appropriate consent was obtained and documented.    ____________________________________________________________________  Pt unable to tolerate placement of manometry catheter.  Declined further attempts and testing at this time.       Marion Archer RN on 3/5/2024 at 2:03 PM

## 2024-03-05 NOTE — RESULT ENCOUNTER NOTE
Your vitamin D level is low.  This can cause you to feel tired, can worsen depression, and can worsen your bone density.  Let's bring your level up quickly with high dose vitamin D 50,000 units WEEKLY for 8 weeks.  We should then recheck your level (a lab only visit).   Normal kidney and liver function.  No signs of hepatitis C infection on routine screening.  Normal blood counts and iron stores.  Normal thyroid balance.

## 2024-03-12 ENCOUNTER — TELEPHONE (OUTPATIENT)
Dept: FAMILY MEDICINE | Facility: CLINIC | Age: 24
End: 2024-03-12
Payer: COMMERCIAL

## 2024-03-12 DIAGNOSIS — E55.9 VITAMIN D DEFICIENCY: Primary | ICD-10-CM

## 2024-03-12 RX ORDER — ERGOCALCIFEROL 1.25 MG/1
50000 CAPSULE, LIQUID FILLED ORAL WEEKLY
Qty: 8 CAPSULE | Refills: 0 | Status: SHIPPED | OUTPATIENT
Start: 2024-03-12 | End: 2024-05-01

## 2024-03-12 NOTE — TELEPHONE ENCOUNTER
Writer called the patient and relayed to him the pharmacy's request to change his vitamin D prescription from vitamin D3 to vitamin D2, as the latter is covered by insurance, as well as that the PCP sent in the new vitamin D2 prescription to his preferred pharmacy today, 3/12/24-see telephone encounters from 3/12/24    Patient verbalized understanding and agrees with the plan.    Denies other questions or concerns at this time.    Yisel Conde RN, BSN  [facility]

## 2024-03-12 NOTE — TELEPHONE ENCOUNTER
General Call      Reason for Call:  med alternative    What are your questions or concerns:  pharm calling to request alternative to medication: vitamin D3 (CHOLECALCIFEROL) 1.25 MG (42323 UT) capsule   Pharmacy states insurance will not cover Vitamin D3, but will cover Vitamin D2. Please advise if this alternative is OK

## 2024-04-04 ENCOUNTER — ANESTHESIA EVENT (OUTPATIENT)
Dept: SURGERY | Facility: AMBULATORY SURGERY CENTER | Age: 24
End: 2024-04-04
Payer: COMMERCIAL

## 2024-04-09 ENCOUNTER — HOSPITAL ENCOUNTER (OUTPATIENT)
Facility: AMBULATORY SURGERY CENTER | Age: 24
Discharge: HOME | End: 2024-04-09
Attending: SURGERY
Payer: COMMERCIAL

## 2024-04-09 ENCOUNTER — ANESTHESIA (OUTPATIENT)
Dept: SURGERY | Facility: AMBULATORY SURGERY CENTER | Age: 24
End: 2024-04-09
Payer: COMMERCIAL

## 2024-04-09 VITALS
RESPIRATION RATE: 16 BRPM | HEART RATE: 65 BPM | DIASTOLIC BLOOD PRESSURE: 66 MMHG | OXYGEN SATURATION: 99 % | SYSTOLIC BLOOD PRESSURE: 126 MMHG | HEIGHT: 61 IN | BODY MASS INDEX: 46.26 KG/M2 | WEIGHT: 245 LBS | TEMPERATURE: 97.2 F

## 2024-04-09 DIAGNOSIS — K21.00 GASTROESOPHAGEAL REFLUX DISEASE WITH ESOPHAGITIS WITHOUT HEMORRHAGE: ICD-10-CM

## 2024-04-09 LAB — UPPER GI ENDOSCOPY: NORMAL

## 2024-04-09 RX ORDER — LIDOCAINE 40 MG/G
CREAM TOPICAL
Status: DISCONTINUED | OUTPATIENT
Start: 2024-04-09 | End: 2024-04-10 | Stop reason: HOSPADM

## 2024-04-09 RX ORDER — SODIUM CHLORIDE, SODIUM LACTATE, POTASSIUM CHLORIDE, CALCIUM CHLORIDE 600; 310; 30; 20 MG/100ML; MG/100ML; MG/100ML; MG/100ML
INJECTION, SOLUTION INTRAVENOUS CONTINUOUS
Status: DISCONTINUED | OUTPATIENT
Start: 2024-04-09 | End: 2024-04-10 | Stop reason: HOSPADM

## 2024-04-09 RX ORDER — OXYCODONE HYDROCHLORIDE 5 MG/1
5 TABLET ORAL
Status: DISCONTINUED | OUTPATIENT
Start: 2024-04-09 | End: 2024-04-10 | Stop reason: HOSPADM

## 2024-04-09 RX ORDER — PROPOFOL 10 MG/ML
INJECTION, EMULSION INTRAVENOUS PRN
Status: DISCONTINUED | OUTPATIENT
Start: 2024-04-09 | End: 2024-04-09

## 2024-04-09 RX ORDER — PROPOFOL 10 MG/ML
INJECTION, EMULSION INTRAVENOUS CONTINUOUS PRN
Status: DISCONTINUED | OUTPATIENT
Start: 2024-04-09 | End: 2024-04-09

## 2024-04-09 RX ORDER — FENTANYL CITRATE 0.05 MG/ML
25 INJECTION, SOLUTION INTRAMUSCULAR; INTRAVENOUS
Status: DISCONTINUED | OUTPATIENT
Start: 2024-04-09 | End: 2024-04-10 | Stop reason: HOSPADM

## 2024-04-09 RX ORDER — ONDANSETRON 2 MG/ML
4 INJECTION INTRAMUSCULAR; INTRAVENOUS EVERY 30 MIN PRN
Status: DISCONTINUED | OUTPATIENT
Start: 2024-04-09 | End: 2024-04-10 | Stop reason: HOSPADM

## 2024-04-09 RX ORDER — LIDOCAINE HYDROCHLORIDE 20 MG/ML
INJECTION, SOLUTION INFILTRATION; PERINEURAL PRN
Status: DISCONTINUED | OUTPATIENT
Start: 2024-04-09 | End: 2024-04-09

## 2024-04-09 RX ORDER — NALOXONE HYDROCHLORIDE 0.4 MG/ML
0.1 INJECTION, SOLUTION INTRAMUSCULAR; INTRAVENOUS; SUBCUTANEOUS
Status: DISCONTINUED | OUTPATIENT
Start: 2024-04-09 | End: 2024-04-10 | Stop reason: HOSPADM

## 2024-04-09 RX ORDER — OXYCODONE HYDROCHLORIDE 10 MG/1
10 TABLET ORAL
Status: DISCONTINUED | OUTPATIENT
Start: 2024-04-09 | End: 2024-04-10 | Stop reason: HOSPADM

## 2024-04-09 RX ORDER — ONDANSETRON 4 MG/1
4 TABLET, ORALLY DISINTEGRATING ORAL EVERY 30 MIN PRN
Status: DISCONTINUED | OUTPATIENT
Start: 2024-04-09 | End: 2024-04-10 | Stop reason: HOSPADM

## 2024-04-09 RX ADMIN — PROPOFOL 200 MCG/KG/MIN: 10 INJECTION, EMULSION INTRAVENOUS at 08:45

## 2024-04-09 RX ADMIN — PROPOFOL 20 MG: 10 INJECTION, EMULSION INTRAVENOUS at 08:46

## 2024-04-09 RX ADMIN — PROPOFOL 50 MG: 10 INJECTION, EMULSION INTRAVENOUS at 08:45

## 2024-04-09 RX ADMIN — LIDOCAINE HYDROCHLORIDE 5 ML: 20 INJECTION, SOLUTION INFILTRATION; PERINEURAL at 08:45

## 2024-04-09 RX ADMIN — SODIUM CHLORIDE, SODIUM LACTATE, POTASSIUM CHLORIDE, CALCIUM CHLORIDE: 600; 310; 30; 20 INJECTION, SOLUTION INTRAVENOUS at 07:52

## 2024-04-09 ASSESSMENT — LIFESTYLE VARIABLES: TOBACCO_USE: 1

## 2024-04-09 NOTE — ANESTHESIA PREPROCEDURE EVALUATION
Anesthesia Pre-Procedure Evaluation    Patient: Yolanda Vee   MRN: [id number] : 2000        Procedure : Procedure(s):  ESOPHAGOGASTRODUODENOSCOPY, WITH BIOPSY          Past Medical History:   Diagnosis Date    Anxiety     therapist: [facility]    Chemical dependency (H)     quit 2021, alcohol. some family hx as well.     Chronic constipation     Constipation     Depression     Esophageal reflux     controlled well on protonix currently, EGD 6/3/21 w/ some mild esophagitis when alcohol intake was higher (bottle vodka daily).    Heart rate problem     some palpitations in the past w/ anxiety attacks.    Hiatal hernia     Migraines     occ tylenol use.    Morbid obesity (H) 2020    Obese     Palpitations     Plantar warts     PTSD (post-traumatic stress disorder)     Seizures (H)     had when she was 15    Uncomplicated asthma       Past Surgical History:   Procedure Laterality Date    COLONOSCOPY N/A 6/3/2021    Procedure: COLONOSCOPY;  Surgeon: Guru Gerardo Prado MD;  Location:  GI    ESOPHAGOSCOPY, GASTROSCOPY, DUODENOSCOPY (EGD), COMBINED N/A 6/3/2021    Procedure: ESOPHAGOGASTRODUODENOSCOPY (EGD);  Surgeon: Guru Gerardo Prado MD;  [facility]    ESOPHAGOSCOPY, GASTROSCOPY, DUODENOSCOPY (EGD), COMBINED N/A 2022    Procedure: ESOPHAGOGASTRODUODENOSCOPY (EGD);  Surgeon: Amanda Linares MD;  [place] [facility]    ESOPHAGOSCOPY, GASTROSCOPY, DUODENOSCOPY (EGD), COMBINED N/A 2023    Procedure: ESOPHAGOGASTRODUODENOSCOPY, WITH BIOPSY;  Surgeon: Alma Velasquez MD;  [facility]    wisdom teeth        Allergies   Allergen Reactions    Ibuprofen Hives     Throat gets itchy and sore    Hydrocodone-Acetaminophen Nausea    Penicillins       Social History     Tobacco Use    Smoking status: Some Days     Packs/day: 1.00     Years: 5.00     Additional pack years: 0.00     Total pack years: 5.00     Types: Cigarettes    Smokeless  tobacco: Never    Tobacco comments:     Pt does not smoke when it is cold outside   Substance Use Topics    Alcohol use: Yes     Alcohol/week: 6.0 - 8.0 standard drinks of alcohol     Types: 6 - 8 Standard drinks or equivalent per week     Comment: every day drink      Wt Readings from Last 1 Encounters:   04/09/24 111.1 kg (245 lb)        Anesthesia Evaluation   Pt has had prior anesthetic. Type: General and MAC.    No history of anesthetic complications       ROS/MED HX  ENT/Pulmonary:  - neg pulmonary ROS   (+)                tobacco use,     Intermittent, asthma  Treatment: Inhaler prn,                 Neurologic:  - neg neurologic ROS     Cardiovascular:  - neg cardiovascular ROS     METS/Exercise Tolerance: >4 METS    Hematologic:  - neg hematologic  ROS     Musculoskeletal:  - neg musculoskeletal ROS     GI/Hepatic:  - neg GI/hepatic ROS   (+) GERD, Symptomatic,                  Renal/Genitourinary:  - neg Renal ROS     Endo:  - neg endo ROS   (+)               Obesity (bmi 46),       Psychiatric/Substance Use:  - neg psychiatric ROS   (+) psychiatric history (PTSD) anxiety and depression alcohol abuse  Recreational drug usage: Cannabis.    Infectious Disease:  - neg infectious disease ROS     Malignancy:  - neg malignancy ROS     Other:  - neg other ROS          Physical Exam    Airway        Mallampati: II   TM distance: > 3 FB   Neck ROM: full   Mouth opening: > 3 cm    Respiratory Devices and Support         Dental       (+) Minor Abnormalities - some fillings, tiny chips      Cardiovascular   cardiovascular exam normal          Pulmonary   pulmonary exam normal                OUTSIDE LABS:  CBC:   Lab Results   Component Value Date    WBC 10.8 03/04/2024    WBC 10.7 12/27/2023    HGB 13.8 03/04/2024    HGB 15.4 12/27/2023    HCT 42.0 03/04/2024    HCT 46.0 12/27/2023     03/04/2024     12/27/2023     BMP:   Lab Results   Component Value Date     03/04/2024     12/27/2023     POTASSIUM 4.4 03/04/2024    POTASSIUM 4.2 12/27/2023    CHLORIDE 108 (H) 03/04/2024    CHLORIDE 104 12/27/2023    CO2 25 03/04/2024    CO2 25 12/27/2023    BUN 15.6 03/04/2024    BUN 6.7 12/27/2023    CR 0.84 03/04/2024    CR 0.92 12/27/2023    GLC 89 03/04/2024    GLC 89 12/27/2023     COAGS:   Lab Results   Component Value Date    PTT 29 01/26/2022    INR 1.09 01/26/2022    FIBR 473 (H) 01/04/2021     POC:   Lab Results   Component Value Date    BGM 95 06/03/2021    HCG Negative 06/21/2023    HCGS Negative 01/02/2023     HEPATIC:   Lab Results   Component Value Date    ALBUMIN 4.0 03/04/2024    PROTTOTAL 6.9 03/04/2024    ALT 18 03/04/2024    AST 15 03/04/2024    ALKPHOS 87 03/04/2024    BILITOTAL 0.2 03/04/2024     OTHER:   Lab Results   Component Value Date    LACT 0.7 09/21/2021    A1C 5.5 11/08/2021    HIMANSHU 9.1 03/04/2024    PHOS 3.1 01/26/2022    MAG 1.9 01/26/2022    LIPASE 17 01/26/2022    TSH 0.86 03/04/2024    CRP 2.8 (H) 10/13/2022    SED 44 (H) 10/13/2022       Anesthesia Plan    ASA Status:  3    NPO Status:  NPO Appropriate    Anesthesia Type: MAC.     - Reason for MAC: immobility needed, straight local not clinically adequate   Induction: Propofol.   Maintenance: TIVA.        Consents    Anesthesia Plan(s) and associated risks, benefits, and realistic alternatives discussed. Questions answered and patient/representative(s) expressed understanding.     - Discussed:     - Discussed with:  Patient            Postoperative Care    Pain management: Multi-modal analgesia.   PONV prophylaxis: Ondansetron (or other 5HT-3), Dexamethasone or Solumedrol     Comments:    Other Comments: propofol    Reviewed anesthetic options and risks. Patient agrees to proceed.            Marko Martinez MD    I have reviewed the pertinent notes and labs in the chart from the past 30 days and (re)examined the patient.  Any updates or changes from those notes are reflected in this note.              # Severe Obesity: Estimated  "body mass index is 46.32 kg/m  as calculated from the following:    Height as of this encounter: 1.549 m (5' 0.98\").    Weight as of this encounter: 111.1 kg (245 lb).      "

## 2024-04-09 NOTE — ANESTHESIA CARE TRANSFER NOTE
Patient: Yolanda Vee    Procedure: Procedure(s):  ESOPHAGOGASTRODUODENOSCOPY, WITH BIOPSY       Diagnosis: Gastroesophageal reflux disease with esophagitis without hemorrhage [K21.00]  Diagnosis Additional Information: No value filed.    Anesthesia Type:   MAC     Note:    Oropharynx: oropharynx clear of all foreign objects  Level of Consciousness: drowsy  Oxygen Supplementation: face mask  Level of Supplemental Oxygen (L/min / FiO2): 8  Independent Airway: airway patency satisfactory and stable  Dentition: dentition unchanged  Vital Signs Stable: post-procedure vital signs reviewed and stable  Report to RN Given: handoff report given  Patient transferred to: Phase II    Handoff Report: Identifed the Patient, Identified the Reponsible Provider, Reviewed the pertinent medical history, Discussed the surgical course, Reviewed Intra-OP anesthesia mangement and issues during anesthesia, Set expectations for post-procedure period and Allowed opportunity for questions and acknowledgement of understanding      Vitals:  Vitals Value Taken Time   /54 04/09/24 0857   Temp 96.8  F (36  C) 04/09/24 0857   Pulse 75 04/09/24 0857   Resp 14 04/09/24 0857   SpO2 100 % 04/09/24 0857       Electronically Signed By: ABA [place]arillo CRNA  April 9, 2024  8:58 AM

## 2024-04-09 NOTE — PROGRESS NOTES
Offered patient pregnancy test.  Explained there are risks associated with anesthesia and pregnancy.  Patient verbalizes understanding, denies possibility of pregnancy and declines pregnancy test.    Orlando Puentes RN on 4/9/2024 at 7:43 AM

## 2024-04-09 NOTE — ANESTHESIA POSTPROCEDURE EVALUATION
Patient: Yolanda Vee    Procedure: Procedure(s):  ESOPHAGOGASTRODUODENOSCOPY, WITH BIOPSY       Anesthesia Type:  MAC    Note:  Disposition: Outpatient   Postop Pain Control: Uneventful            Sign Out: Well controlled pain   PONV: No   Neuro/Psych: Uneventful            Sign Out: Acceptable/Baseline neuro status   Airway/Respiratory: Uneventful            Sign Out: Acceptable/Baseline resp. status   CV/Hemodynamics: Uneventful            Sign Out: Acceptable CV status; No obvious hypovolemia; No obvious fluid overload   Other NRE: NONE   DID A NON-ROUTINE EVENT OCCUR? No           Last vitals:  Vitals Value Taken Time   /60 04/09/24 0908   Temp 96.8  F (36  C) 04/09/24 0857   Pulse 66 04/09/24 0932   Resp 16 04/09/24 0906   SpO2 98 % 04/09/24 0932   Vitals shown include unfiled device data.    Electronically Signed By: Marko Martinez MD  April 9, 2024  9:35 AM

## 2024-04-09 NOTE — DISCHARGE INSTRUCTIONS
If you have any questions or concerns regarding your procedure, please contact Dr. Preciado, his office number is [phone].     Discharge Instructions:    Take you medications as directed and follow up with you primary provider as scheduled.   Follow these care guidelines during your recovery for the next 24 hours.   If you have any questions or concerns please contact the provider that performed your procedure.     You were given medicine for sedation:  You have been given medicines during your procedure that might make you sleepy and weak. To prevent problems:    *Rest for the rest of the day after you are home. You should be back to your normal activity tomorrow.  *For the next 24 hours:   -Do not drink alcoholic beverages.   -Do not make any important decisions or sign any legal forms.   -Do not work around machinery or power equipment.     The medicines used for sedation may make you feel nauseated.   *Start with clear liquids, such as tea, jell-o, broth and ginger ale. As you feel better you may add soft foods such as pudding and ice cream.   *When you no longer feel nauseated, you may try your normal diet.     You should be back to eating your normal after 24 hours.     Call if you have any of these problems:  *Fever of 101 degree F or 38 degrees C  *Coughing up bright red blood or coffee ground-like material   *Black stool or blood in your bowel movements  *Nausea with vomiting that does not ease after a few hours.  *Abdominal pain or bloating  *Fainting

## 2024-04-09 NOTE — H&P
HPI  23 year old year old adult who presents for EGD    Allergies:Ibuprofen, Hydrocodone-acetaminophen, and Penicillins    Past Medical History:   Diagnosis Date    Anxiety     therapist: [facility]    Chemical dependency (H)     quit Sept 2021, alcohol. some family hx as well.     Chronic constipation     Constipation     Depression     Esophageal reflux     controlled well on protonix currently, EGD 6/3/21 w/ some mild esophagitis when alcohol intake was higher (bottle vodka daily).    Heart rate problem     some palpitations in the past w/ anxiety attacks.    Hiatal hernia     Migraines     occ tylenol use.    Morbid obesity (H) 03/06/2020    Obese     Palpitations     Plantar warts     PTSD (post-traumatic stress disorder)     Seizures (H)     had when she was 15    Uncomplicated asthma        Past Surgical History:   Procedure Laterality Date    COLONOSCOPY N/A 6/3/2021    Procedure: COLONOSCOPY;  Surgeon: Guru Gerardo Prado MD;  Location:  GI    ESOPHAGOSCOPY, GASTROSCOPY, DUODENOSCOPY (EGD), COMBINED N/A 6/3/2021    Procedure: ESOPHAGOGASTRODUODENOSCOPY (EGD);  Surgeon: Guru Gerardo Prado MD;  Location: [facility]    ESOPHAGOSCOPY, GASTROSCOPY, DUODENOSCOPY (EGD), COMBINED N/A 6/23/2022    Procedure: ESOPHAGOGASTRODUODENOSCOPY (EGD);  Surgeon: Amanda Linares MD;  Location: [facility] OR    ESOPHAGOSCOPY, GASTROSCOPY, DUODENOSCOPY (EGD), COMBINED N/A 2/16/2023    Procedure: ESOPHAGOGASTRODUODENOSCOPY, WITH BIOPSY;  Surgeon: Alma Velasquez MD;  [facility]    wisdom teeth           CURRENT MEDS:    Current Outpatient Medications:     albuterol (PROAIR HFA/PROVENTIL HFA/VENTOLIN HFA) 108 (90 Base) MCG/ACT inhaler, Inhale 2 puffs into the lungs every 6 hours as needed for shortness of breath / dyspnea or wheezing, Disp: 18 g, Rfl: 0    buPROPion (WELLBUTRIN XL) 150 MG 24 hr tablet, Take 1 tablet (150 mg) by mouth every morning, Disp: 30 tablet, Rfl: 3    escitalopram  "(LEXAPRO) 20 MG tablet, Take one half tab daily for 1 week, then 1 full tab daily., Disp: 90 tablet, Rfl: 1    famotidine (PEPCID) 20 MG tablet, Take 1 tablet (20 mg) by mouth At Bedtime, Disp: 60 tablet, Rfl: 3    ondansetron (ZOFRAN) 4 MG tablet, 4mg every 6-8 hours if needed for nausea and vomiting. If needed, may increase to 8mg every 8-12 hours if needed for nausea., Disp: 30 tablet, Rfl: 0    prazosin (MINIPRESS) 1 MG capsule, TAKE 1 CAPSULE BY MOUTH EVERY NIGHT AT BEDTIME. AFTER 2-3 DAYS, INCREASE TO 2 CAPSULE EVERY NIGHT AT BEDTIME, Disp: 180 capsule, Rfl: 1    sucralfate (CARAFATE) 1 GM tablet, Take 1 tablet (1 g) by mouth 4 times daily (before meals and nightly), Disp: 180 tablet, Rfl: 3    Testosterone 1.62 % GEL, APPLY 3 PUMPS TOPICALLY TO THE AFFECTED AREA DAILY, Disp: , Rfl:     testosterone cypionate (DEPOTESTOSTERONE) 200 MG/ML injection, Inject 0.2 mg into the muscle once a week Friday, Disp: , Rfl:     Vitamin D, Cholecalciferol, 25 MCG (1000 UT) TABS, Take 1,000 Units by mouth every morning, Disp: , Rfl:     vitamin D2 (ERGOCALCIFEROL) 30678 units (1250 mcg) capsule, Take 1 capsule (50,000 Units) by mouth once a week for 8 doses, Disp: 8 capsule, Rfl: 0    B-D SYRINGE LUER-SANTA 1 ML MISC, , Disp: , Rfl:     BD DISP NEEDLES 25G X 5/8\" MISC, , Disp: , Rfl:     fluocinonide (LIDEX) 0.05 % external ointment, , Disp: , Rfl:     ipratropium - albuterol 0.5 mg/2.5 mg/3 mL (DUONEB) 0.5-2.5 (3) MG/3ML neb solution, Inhale 3 mLs into the lungs every 6 hours as needed, Disp: , Rfl:     nystatin (MYCOSTATIN) 383667 UNIT/GM external powder, Apply topically 2 times daily as needed (Rash), Disp: 60 g, Rfl: 3    omeprazole (PRILOSEC) 40 MG DR capsule, Take 1 capsule (40 mg) by mouth 2 times daily (before meals) For more refills,schedule an appointment at [phone], Disp: 180 capsule, Rfl: 0    Current Facility-Administered Medications:     lactated ringers infusion, , Intravenous, Continuous, Tori Perez, " "MD    lidocaine (LMX4) kit, , Topical, Q1H PRN, Tori Perez MD    lidocaine 1 % 0.1-1 mL, 0.1-1 mL, Other, Q1H PRN, Tori Perez MD    medroxyPROGESTERone (DEPO-PROVERA) injection 150 mg, 150 mg, Intramuscular, Q90 Days, Litzy Corona MD, 150 mg at 09/19/23 0959    sodium chloride (PF) 0.9% PF flush 3 mL, 3 mL, Intracatheter, Q8H, Tori Perez MD    sodium chloride (PF) 0.9% PF flush 3 mL, 3 mL, Intracatheter, q1 min prn, Tori Perez MD    FAMHX-reviewed     reports that he has been smoking cigarettes. He has a 5 pack-year smoking history. He has never used smokeless tobacco. He reports current alcohol use of about 6.0 - 8.0 standard drinks of alcohol per week. He reports current drug use. Drug: Marijuana.    Review of Systems:  The 12 point review of systems  is within normal limits except for as mentioned above in the HPI.  General ROS: No complaints or constitutional symptoms  Ophthalmic ROS: No complaints of visual changes  Skin: No complaints or symptoms   Endocrine: No complaints or symptoms  Hematologic/Lymphatic: No symptoms or complaints  Psychiatric: No symptoms or complaints  Respiratory ROS: no cough, shortness of breath, or wheezing  Cardiovascular ROS: no chest pain or dyspnea on exertion  Gastrointestinal ROS: As per HPI  Genito-Urinary ROS: no dysuria, trouble voiding, or hematuria  Musculoskeletal ROS: no joint or muscle pain  Neurological ROS: no TIA or stroke symptoms      EXAM:  /68   Temp 97.1  F (36.2  C) (Temporal)   Resp 16   Ht 1.549 m (5' 0.98\")   Wt 111.1 kg (245 lb)   SpO2 95%   BMI 46.32 kg/m    GENERAL: Well developed adult, No acute distress, pleasant and conversant   EYES: Pupils equal, round and reactive, no scleral icterus  ABDOMEN: Soft  Lungs-clear to auscultation bilaterally  Cardiac-regular rate and rhythm  SKIN: Pink, warm and dry, no obvious rashes or lesions   NEURO:No focal deficits, ambulatory  MUSCULOSKELETAL:No obvious deformities, no swelling, " "normal appearing      LABS:  Lab Results   Component Value Date    WBC 10.8 03/04/2024    WBC 9.9 06/21/2013    HGB 13.8 03/04/2024    HGB 11.8 06/21/2013    HCT 42.0 03/04/2024    HCT 37.1 06/21/2013    MCV 90 03/04/2024    MCV 87 06/21/2013     03/04/2024     06/21/2013     INR/Prothrombin Time  No results for input(s): \"NA\", \"CO2\", \"BUN\", \"CREATININE\", \"GLUCOSE\" in the last 168 hours.    Invalid input(s): \"K\", \"CL\", \"LABGLOM\", \"CALCIUM\"  Lab Results   Component Value Date    ALT 18 03/04/2024    AST 15 03/04/2024    ALKPHOS 87 03/04/2024         Assessment/Plan:   Yolanda Vee is a 23 year old adult with GERD  Plan for EGD      Gian Preciado D.O., FACS  [phone]  "

## 2024-04-19 ENCOUNTER — VIRTUAL VISIT (OUTPATIENT)
Dept: PSYCHIATRY | Facility: CLINIC | Age: 24
End: 2024-04-19
Attending: FAMILY MEDICINE
Payer: COMMERCIAL

## 2024-04-19 DIAGNOSIS — F12.90 MARIJUANA USE: ICD-10-CM

## 2024-04-19 DIAGNOSIS — F33.1 MODERATE EPISODE OF RECURRENT MAJOR DEPRESSIVE DISORDER (H): Primary | ICD-10-CM

## 2024-04-19 DIAGNOSIS — F43.10 PTSD (POST-TRAUMATIC STRESS DISORDER): ICD-10-CM

## 2024-04-19 DIAGNOSIS — F10.20 ALCOHOL USE DISORDER, MODERATE, DEPENDENCE (H): ICD-10-CM

## 2024-04-19 DIAGNOSIS — F41.9 ANXIETY: ICD-10-CM

## 2024-04-19 PROCEDURE — 99204 OFFICE O/P NEW MOD 45 MIN: CPT | Mod: 95 | Performed by: NURSE PRACTITIONER

## 2024-04-19 ASSESSMENT — ANXIETY QUESTIONNAIRES
8. IF YOU CHECKED OFF ANY PROBLEMS, HOW DIFFICULT HAVE THESE MADE IT FOR YOU TO DO YOUR WORK, TAKE CARE OF THINGS AT HOME, OR GET ALONG WITH OTHER PEOPLE?: EXTREMELY DIFFICULT
IF YOU CHECKED OFF ANY PROBLEMS ON THIS QUESTIONNAIRE, HOW DIFFICULT HAVE THESE PROBLEMS MADE IT FOR YOU TO DO YOUR WORK, TAKE CARE OF THINGS AT HOME, OR GET ALONG WITH OTHER PEOPLE: EXTREMELY DIFFICULT
GAD7 TOTAL SCORE: 12
4. TROUBLE RELAXING: NEARLY EVERY DAY
1. FEELING NERVOUS, ANXIOUS, OR ON EDGE: SEVERAL DAYS
7. FEELING AFRAID AS IF SOMETHING AWFUL MIGHT HAPPEN: NOT AT ALL
3. WORRYING TOO MUCH ABOUT DIFFERENT THINGS: NOT AT ALL
5. BEING SO RESTLESS THAT IT IS HARD TO SIT STILL: NEARLY EVERY DAY
GAD7 TOTAL SCORE: 12
2. NOT BEING ABLE TO STOP OR CONTROL WORRYING: MORE THAN HALF THE DAYS
GAD7 TOTAL SCORE: 12
7. FEELING AFRAID AS IF SOMETHING AWFUL MIGHT HAPPEN: NOT AT ALL
6. BECOMING EASILY ANNOYED OR IRRITABLE: NEARLY EVERY DAY

## 2024-04-19 ASSESSMENT — PATIENT HEALTH QUESTIONNAIRE - PHQ9
SUM OF ALL RESPONSES TO PHQ QUESTIONS 1-9: 20
SUM OF ALL RESPONSES TO PHQ QUESTIONS 1-9: 20
10. IF YOU CHECKED OFF ANY PROBLEMS, HOW DIFFICULT HAVE THESE PROBLEMS MADE IT FOR YOU TO DO YOUR WORK, TAKE CARE OF THINGS AT HOME, OR GET ALONG WITH OTHER PEOPLE: EXTREMELY DIFFICULT

## 2024-04-19 ASSESSMENT — PAIN SCALES - GENERAL: PAINLEVEL: NO PAIN (0)

## 2024-04-19 NOTE — PROGRESS NOTES
Virtual Visit Details    Type of service:  Video Visit   Video Start Time: 11:06 AM  Video End Time: 11:48 AM    Originating Location (pt. Location): Home    Distant Location (provider location):  Off-site  Platform used for Video Visit: Rachid

## 2024-04-19 NOTE — Clinical Note
Thank you for the Psychiatry referral to the [facility] Psychiatry Service (CCPS). Our psychiatry providers act as a specialty service for Primary Care Providers in the [facility] who seek to optimize medications for unstable patients.  Once medications have been optimized, our providers discharge the patient back to the referring Primary Care Provider for ongoing medication management.  This type of system allows our providers to serve a high volume of patients.   Please see my Impression and Plan. I will continue to work with them in stabilizing their mood. I did make a referral to Addiction Medicine as well per patient request.  If you have any questions or concerns, please let me know. Thank you again!  ABA Pena, PURNIMA-BC Collaborative Care Psychiatry Service (CCPS) [facility]

## 2024-04-19 NOTE — PATIENT INSTRUCTIONS
**For crisis resources, please see the information at the end of this document**     Thank you for coming to the [facility].    TREATMENT PLAN:    Medications:   CONTINUE escitalopram 20 mg every day per PCP. No script needed.  CONTINUE prazosin 2 mg at bedtime per PCP. No script needed.  Continue  all other medications per primary care provider.     Consults / Referrals:   Continue therapy with established provider.  Referral made to Addiction Medicine. [facility] will call you to coordinate your care as prescribed by your provider. If you don't hear from a representative within 2 business days, please call [phone].     Follow-up:  Schedule an appointment with me in 4 weeks or sooner as needed.  Call [facility] at [phone] to schedule.  Follow up with primary care provider as planned or sooner if needed for acute medical concerns.  Call the psychiatric nurse line with medication questions or concerns at [phone].  Ecochlorhart may be used to communicate with your provider, but this is not intended to be used for emergencies.    Psychoeducation:  Side effects for SSRI: sexual dysfunction, decreased appetite, increased appetite, nausea, diarrhea, constipation, dry mouth, insomnia, sedation, agitation, tremors, headaches, dizziness, sweating, bruising and rare bleeding, discontinuation syndrome, rare hyponatremia, rare induction of david, suicidal ideations, and serotonin syndrome.      Financial Assistance [phone]  [employer] [employer] Billing [phone]  [facility] B[facility], [facility]: [phone]  [facility] Billing [phone]  Medical Records [phone]  [facility] Patient Bill of Rights [email or web address]       MENTAL HEALTH CRISIS RESOURCES:  For a emergency help, please call 911 or go to the nearest Emergency Department.     Emergency Walk-In Options:   EmPATH Unit @  [facility] ([place]): [phone] - Specialized mental health emergency area designed to be calming  [facility] ([place]): [phone]  [facility] ([place]): [phone]  [facility] ([place]): [phone]    [facility] Crisis Information:   [place]: [phone]  [place]: [phone]  [place] ([place]) - Adult: [phone]     Child: [phone]  [place] - Adult: [phone]     Child: [phone]  [place]: [phone]  List of all [facility] resources:   https://[place].gov/dhs/people-we-serve/adults/health-care/mental-health/resources/crisis-contacts.jsp    National Crisis Information:   National Suicide & Crisis Lifeline: Call 988        For online chat options, visit https://suicidepreventionlifeline.org/chat/  Poison Control Center: 1-199-338-7914  Poison Control Center: 8-594-331-3012  Trans Lifeline: 2-695-199-8841 - Hotline for transgender people of all ages  The [place] [employer]: 7-244-604-0280 - Hotline for LGBT youth     For Non-Emergency Support:   Fast Tracker: Mental Health & Substance Use Disorder Resources -   https://www.fasttrackermn.org/       Again thank you for choosing [facility] [place] [facility] and please let us know how we can best partner with you to improve you and your family's health.    You may be receiving a survey regarding this appointment. We would love to have your feedback, both positive and negative. The survey is done by an external company, so your answers are anonymous.        Patient Education   Collaborative Care Psychiatry Service  What to Expect  Here's what to expect from your Collaborative Care Psychiatry Service (CCPS).   About CCPS  CCPS means 2 people work together to help you get better. You'll meet with a behavioral health clinician and a psychiatric doctor. A behavioral health clinician helps people with mental health problems by talking with them. A psychiatric doctor  "helps people by giving them medicine.  How it works  At every visit, you'll see the behavioral health clinician (BHC) first. They'll talk with you about how you're doing and teach you how to feel better.   Then you'll see the psychiatric doctor. This doctor can help you deal with troubling thoughts and feelings by giving you medicine. They'll make sure you know the plan for your care.   CCPS usually takes 3 to 6 visits. If you need more visits, we may have you start seeing a different psychiatric doctor for ongoing care.  If you have any questions or concerns, we'll be glad to talk with you.  About visits  Be open  At your visits, please talk openly about your problems. It may feel hard, but it's the best way for us to help you.  Cancelling visits  If you can't come to your visit, please call us right away at [phone]. If you don't cancel at least 24 hours (1 full day) before your visit, that's \"late cancellation.\"  Being late to visits  Being very late is the same as not showing up. You will be a \"no show\" if:  Your appointment starts with a BHC, and you're more than 15 minutes late for a 30-minute (half hour) visit. This will also cancel your appointment with the psychiatric doctor.  Your appointment is with a psychiatric doctor only, and you're more than 15 minutes late for a 30-minute (half hour) visit.  Your appointment is with a psychiatric doctor only, and you're more than 30 minutes late for a 60-minute (full hour) visit.  If you cancel late or don't show up 2 times within 6 months, we may end your care.   Getting help between visits  If you need help between visits, you can call us Monday to Friday from 8 a.m. to 4:30 p.m. at [phone].  Emergency care  Call 911 or go to the nearest emergency department if your life or someone else's life is in danger.  Call 068 anytime to reach the national Suicide and Crisis hotline.  Medicine refills  To refill your medicine, call your pharmacy. You can also " call [facility]'s Behavioral Access at [phone], Monday to Friday, 8 a.m. to 4:30 p.m. It can take 1 to 3 business days to get a refill.   Forms, letters, and tests  You may have papers to fill out, like FMLA, short-term disability, and workability. We can help you with these forms at your visits, but you must have an appointment. You may need more than 1 visit for this, to be in an intensive therapy program, or both.  Before we can give you medicine for ADHD, we may refer you to get tested for it or confirm it another way.  We may not be able to give you an emotional support animal letter.  We don't do mental health checks ordered by the court.   We don't do mental health testing, but we can refer you to get tested.   Thank you for choosing us for your care.  For informational purposes only. Not to replace the advice of your health care provider. Copyright   2022 Health system. All rights reserved. [employer] [id number] - 12/22.

## 2024-04-19 NOTE — PROGRESS NOTES
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 current medications, although escitalopram just increased 2 weeks ago. Overall patient reports tolerating well and denies any sx of activation or worsened SI. Does endorse history of SIB, and recent self-bruising. Denies SI/HI, denies any current or recent psychotic features. Does endorse high cannabis use and alcohol use and is interested in meeting with Addiction Medicine provider - referral will be sent today. Discussed medication options, including lamotrigine or SGA as mood stabilizing medication / depression augmentation but patient denies desire to change medication at this time given recent escitalopram increase. Encouraged to continue in therapy. Recommending follow-up with this provider in 4 weeks or sooner as needed. Patient agreeable to plan.     Medication side effects and alternatives reviewed. Health promotion activities recommended and reviewed today. All questions addressed. Education and counseling completed regarding risks and benefits of medications and psychotherapy options. Recommend therapy for additional support.     Treatment Plan:  CONTINUE escitalopram 20 mg every day per PCP. No script needed.  CONTINUE prazosin 2 mg at bedtime per PCP. No script  needed.  Continue  all other medications per primary care provider.   Continue therapy with established provider.  Referral made to Addiction Medicine. [facility] will call you to coordinate your care as prescribed by your provider. If you don't hear from a representative within 2 business days, please call [phone].   Safety plan reviewed. To the Emergency Department as needed or call after hours crisis line at [phone] or [phone]. [place] Crisis Text Line: Text [place] to 577347 or  Suicide LifeLine Chat: suicidepreventionlifeline.org/chat  Schedule an appointment with me in 4 weeks or sooner as needed.  Call [facility] at [phone] to schedule.  Follow up with primary care provider as planned or sooner if needed for acute medical concerns.  Call the psychiatric nurse line with medication questions or concerns at [phone].  MyChart may be used to communicate with your provider, but this is not intended to be used for emergencies.    Patient Education:  Medication side effects and alternatives reviewed. Health promotion activities recommended and reviewed today. All questions addressed. Education and counseling completed regarding risks and benefits of medications and psychotherapy options.  Consent provided by patient/guardian  Call the psychiatric nurse line with medication questions or concerns at [phone].  MyChart may be used to communicate with your provider, but this is not intended to be used for emergencies.  BLACK BOX WARNING: Discussed the Food and Drug Administration (FDA) requires that all antidepressants carry a warning that some children, adolescents and young adults may be at increased risk of suicide when taking antidepressants. Anyone taking an antidepressant should be watched closely for worsening depression or unusual behavior especially in the first few weeks after starting an SSRI. Keep in mind, antidepressants are more likely to reduce suicide  risk in the long run by improving mood.   HARM REDUCTION:  Discussions regarding effects of mood altering substances, alcohol and cannabis, on mood and that approach is harm reduction, will continue to prescribe meds as they work to cut back use.    Medlineplus.gov is information for patients.  It is run by the [employer] and it contains information about all disorders, diseases and all medications.      Side effects for SSRI: sexual dysfunction, decreased appetite, increased appetite, nausea, diarrhea, constipation, dry mouth, insomnia, sedation, agitation, tremors, headaches, dizziness, sweating, bruising and rare bleeding, discontinuation syndrome, rare hyponatremia, rare induction of david, suicidal ideations, and serotonin syndrome.      Community Resources:    National Suicide Prevention Lifeline: 907.177.4861 (TTY: 529.237.9347). Call anytime for help.  (www.suicidepreventionlifeline.org)  National [place] on Mental Illness (www.yamil.org): 930.597.3338 or 553-031-2504.   Mental Health Association (www.mentalhealth.org): 598.407.3230 or 668-597-3161.  [place] Crisis Text Line: Text [place] to 286521  Suicide LifeLine Chat: suicide[employer]ha [employer].org/chat    Administrative Billing:     Level of Medical Decision Making:   - At least 1 chronic problem that is not stable  - Engaged in prescription drug management during visit (discussed any medication benefits, side effects, alternatives, etc.)           Patient Status:  [facility] MD/DO/NP/PA providers offer care a specialty service for Primary Care Providers in the [facility] that seek to optimize psychotropic medications for unstable patients.  Once medications have been optimized, our providers discharge the patient back to the referring Primary Care Provider for ongoing medication management.  This type of system allows our providers to serve a high volume of patients.   Patient will continue to be seen for ongoing consultation and  stabilization.    Signed:   Sita Larson, MSN, APRN, PMHNP-BC  [facility] ([facility])  [facility]    Chart documentation done in part with Dragon Voice Recognition software.  Although reviewed after completion, some word and grammatical errors may remain.

## 2024-04-19 NOTE — NURSING NOTE
Is the patient currently in the state of [place]? YES    Visit mode:VIDEO    If the visit is dropped, the patient can be reconnected by: VIDEO VISIT: Text to cell phone:   Telephone Information:   Mobile [phone]       Will anyone else be joining the visit? NO  (If patient encounters technical issues they should call [phone])    How would you like to obtain your AVS? MyChart    Are changes needed to the allergy or medication list? No    Are refills needed on medications prescribed by this physician? NO    Reason for visit: Consult    Dariusz PETERSON

## 2024-04-25 ENCOUNTER — TELEPHONE (OUTPATIENT)
Dept: SURGERY | Facility: CLINIC | Age: 24
End: 2024-04-25

## 2024-04-25 ENCOUNTER — OFFICE VISIT (OUTPATIENT)
Dept: SURGERY | Facility: CLINIC | Age: 24
End: 2024-04-25
Payer: COMMERCIAL

## 2024-04-25 DIAGNOSIS — K44.9 HIATAL HERNIA: Primary | ICD-10-CM

## 2024-04-25 PROCEDURE — 99212 OFFICE O/P EST SF 10 MIN: CPT | Performed by: SURGERY

## 2024-04-25 NOTE — LETTER
Pre-op Physical: 6/6/2024 at 7:20am with Dr Corona    Surgery Date: 7/2/2024     Location: [id number] [place] [address]    Approximate Arrival Time: 6:00am  (Unless instructed differently by the pre-op call nurse)     Post op Appointment: 7/11/2024 at 9:00am with  [facility]OODE [facility]   [facility] [address], [place] [id number].       Pre-Surgical Tasks:     Review all medications with your primary care or prescribing physician; they will advise you which meds to stop and when, and when you can resume taking.  Certain medications like blood thinners and weight loss medications need to be stopped in advance of surgery to proceed safely.      Blood thinners including but not exclusive to drugs like Xarelto, Eliquis, Warfarin and Aspirin, should be stopped five days before surgery, if your prescribing provider agrees. Follow your provider's advice on stopping blood thinners because they know you best.  If you are unsure if your medication is a blood thinner, ask your prescribing provider.    Weight loss medications: There are multiple medications being used for weight management and diabetes today, and the list is growing.  Phentermine, Ozempic, Wegovy, Trulicity, and other similar medications need to be stopped one week before surgery to avoid being cancelled.  Victoza and Saxenda can be continued longer but must be stopped one full day before surgery.  Please ask your prescribing provider for advice.    Diabetic medications: in addition to the medications talked about above that are used for either weight loss or diabetes, some people are on insulin that may require adjustment.  Please discuss managing diabetic medications with your prescribing doctor as these medications may require modification prior to surgery.     Please shower the evening before and morning of surgery with Hibiclens soap.  [facility]  Pharmacy can provide this to you at no cost, or it can be found at your local pharmacy.     Fasting instructions will be provided by the pre-op nurse who will call you 1-3 days before surgery.  Typically, we advise normal food up to 8 hours before you arrive for surgery. Clear liquids only from then until 2 hours before you arrive surgery, then nothing at all by mouth.  The nurse will review your specific instructions with you at the call.      Smoking impacts your body's ability to heal properly so we advise patients to quit if possible before surgery.  Plastic Surgery patients are required to be nicotine free for at least 8 weeks before surgery.      You will need an adult to drive you home and stay with you 24 hours after surgery. Public transportation or Medical Van Services are not permitted.    Visitor restrictions are subject to change, please verify with the pre-op nurse when they call how many people are permitted to accompany you.    We always encourage you to notify your insurance any time you have medical tests or procedures scheduled including surgery. The number is usually right on the back of your insurance card. To obtain pricing for surgery, please call  [employer] [facility] Cost of Care at [phone] or email [email or web address].        Call our office if you have any questions! Thank you!     Salvador Venegas  Complex   [facility]  [phone]

## 2024-04-25 NOTE — LETTER
4/25/2024         RE: Yolanda Vee  [address]  [facility] [address]        Dear Colleague,    Thank you for referring your patient, Yolanda Vee, to the [facility] [place]. Please see a copy of my visit note below.     HPI: Yolanda Vee is here for follow up to discuss her endoscopy.  She continues to have reflux symptoms and is not very happy.    Allergies, Medications, Social History, Past Medical History and Past Surgical History were reviewed and are noted in the chart.    There were no vitals taken for this visit.  There is no height or weight on file to calculate BMI.      EXAM:   GENERAL: Appears well           Assessment/Plan: Yolanda Vee continues to hvave gerd, and in conjunction with having a hiatal hernia repair she would like to pursue the surgical options.  Risk benefits of surgery were explained and she has been consented to proceed.          Gian Preciado DO [facility]  [phone]      Again, thank you for allowing me to participate in the care of your patient.        Sincerely,        Patrick Preciado DO

## 2024-04-25 NOTE — PROGRESS NOTES
HPI: Yolanda Vee is here for follow up to discuss her endoscopy.  She continues to have reflux symptoms and is not very happy.    Allergies, Medications, Social History, Past Medical History and Past Surgical History were reviewed and are noted in the chart.    There were no vitals taken for this visit.  There is no height or weight on file to calculate BMI.      EXAM:   GENERAL: Appears well           Assessment/Plan: Yolanda Vee continues to hvave gerd, and in conjunction with having a hiatal hernia repair she would like to pursue the surgical options.  Risk benefits of surgery were explained and she has been consented to proceed.          Gian Preciado DO [facility]  [phone]

## 2024-04-25 NOTE — TELEPHONE ENCOUNTER
Spoke with patient today regarding surgery scheduling      Went over details/instructions.    Surgery Letter sent via  given in clinic.  (Please see LETTERS TAB in chart to retrieve a copy of this letter)

## 2024-06-02 PROBLEM — D50.9 HYPOCHROMIC ANEMIA: Status: RESOLVED | Noted: 2021-01-04 | Resolved: 2024-06-02

## 2024-06-02 PROBLEM — F17.200 NICOTINE USE DISORDER: Status: RESOLVED | Noted: 2021-02-25 | Resolved: 2024-06-02

## 2024-06-06 ENCOUNTER — OFFICE VISIT (OUTPATIENT)
Dept: FAMILY MEDICINE | Facility: CLINIC | Age: 24
End: 2024-06-06
Payer: COMMERCIAL

## 2024-06-06 VITALS
DIASTOLIC BLOOD PRESSURE: 73 MMHG | TEMPERATURE: 98.3 F | HEART RATE: 98 BPM | HEIGHT: 61 IN | BODY MASS INDEX: 45.41 KG/M2 | RESPIRATION RATE: 20 BRPM | WEIGHT: 240.5 LBS | SYSTOLIC BLOOD PRESSURE: 117 MMHG | OXYGEN SATURATION: 96 %

## 2024-06-06 DIAGNOSIS — E55.9 VITAMIN D DEFICIENCY: ICD-10-CM

## 2024-06-06 DIAGNOSIS — E66.813 CLASS 3 SEVERE OBESITY WITH SERIOUS COMORBIDITY AND BODY MASS INDEX (BMI) OF 45.0 TO 49.9 IN ADULT, UNSPECIFIED OBESITY TYPE (H): ICD-10-CM

## 2024-06-06 DIAGNOSIS — K21.00 GASTROESOPHAGEAL REFLUX DISEASE WITH ESOPHAGITIS WITHOUT HEMORRHAGE: ICD-10-CM

## 2024-06-06 DIAGNOSIS — F41.9 ANXIETY: ICD-10-CM

## 2024-06-06 DIAGNOSIS — Z72.0 TOBACCO USE: ICD-10-CM

## 2024-06-06 DIAGNOSIS — K44.9 HIATAL HERNIA: ICD-10-CM

## 2024-06-06 DIAGNOSIS — F10.20 ALCOHOL USE DISORDER, MODERATE, DEPENDENCE (H): ICD-10-CM

## 2024-06-06 DIAGNOSIS — J45.20 MILD INTERMITTENT ASTHMA WITHOUT COMPLICATION: ICD-10-CM

## 2024-06-06 DIAGNOSIS — E66.01 CLASS 3 SEVERE OBESITY WITH SERIOUS COMORBIDITY AND BODY MASS INDEX (BMI) OF 45.0 TO 49.9 IN ADULT, UNSPECIFIED OBESITY TYPE (H): ICD-10-CM

## 2024-06-06 DIAGNOSIS — F43.10 PTSD (POST-TRAUMATIC STRESS DISORDER): ICD-10-CM

## 2024-06-06 DIAGNOSIS — F33.1 MODERATE EPISODE OF RECURRENT MAJOR DEPRESSIVE DISORDER (H): ICD-10-CM

## 2024-06-06 DIAGNOSIS — Z01.818 PREOP GENERAL PHYSICAL EXAM: Primary | ICD-10-CM

## 2024-06-06 DIAGNOSIS — F12.90 MARIJUANA USE: ICD-10-CM

## 2024-06-06 LAB
ERYTHROCYTE [DISTWIDTH] IN BLOOD BY AUTOMATED COUNT: 14.6 % (ref 10–15)
HCT VFR BLD AUTO: 45.7 % (ref 35–53)
HGB BLD-MCNC: 14.7 G/DL (ref 11.7–17.7)
MCH RBC QN AUTO: 29.2 PG (ref 26.5–33)
MCHC RBC AUTO-ENTMCNC: 32.2 G/DL (ref 31.5–36.5)
MCV RBC AUTO: 91 FL (ref 78–100)
PLATELET # BLD AUTO: 296 10E3/UL (ref 150–450)
RBC # BLD AUTO: 5.03 10E6/UL (ref 3.8–5.9)
WBC # BLD AUTO: 12.1 10E3/UL (ref 4–11)

## 2024-06-06 PROCEDURE — 99214 OFFICE O/P EST MOD 30 MIN: CPT | Performed by: FAMILY MEDICINE

## 2024-06-06 PROCEDURE — 82306 VITAMIN D 25 HYDROXY: CPT | Performed by: FAMILY MEDICINE

## 2024-06-06 PROCEDURE — 80053 COMPREHEN METABOLIC PANEL: CPT | Performed by: FAMILY MEDICINE

## 2024-06-06 PROCEDURE — 96127 BRIEF EMOTIONAL/BEHAV ASSMT: CPT | Performed by: FAMILY MEDICINE

## 2024-06-06 PROCEDURE — 85027 COMPLETE CBC AUTOMATED: CPT | Performed by: FAMILY MEDICINE

## 2024-06-06 PROCEDURE — 36415 COLL VENOUS BLD VENIPUNCTURE: CPT | Performed by: FAMILY MEDICINE

## 2024-06-06 ASSESSMENT — PAIN SCALES - GENERAL: PAINLEVEL: NO PAIN (0)

## 2024-06-06 ASSESSMENT — PATIENT HEALTH QUESTIONNAIRE - PHQ9: SUM OF ALL RESPONSES TO PHQ QUESTIONS 1-9: 15

## 2024-06-06 NOTE — PROGRESS NOTES
Preoperative Evaluation  [facility]  [address]  [facility] [address]  Phone: [phone]  Fax: [phone]  Primary Provider: Litzy Corona MD  Pre-op Performing Provider: Litzy Corona MD  Jun 6, 2024 6/6/2024   Surgical Information   What procedure is being done? Repair of hernia and esophagus    Repair of hernia and esophagus   Facility or Hospital where procedure/surgery [facility] be performed[facility]   Who is doing the procedure / surgery? orion jean   Date of surgery / procedure: 7/2/24   Time of surgery / procedure: 9:05   Where do you plan to recover after surgery? at home with family     Fax number for surgical facility: Note does not need to be faxed, will be available electronically in Epic.    Assessment & Plan     The proposed surgical procedure is considered LOW risk.    Preop general physical exam  Surgical risks include tobacco use, alcohol use, obesity, controlled asthma.  He is at risk of alcohol withdrawal, would monitor for this postoperatively.  He may proceed with surgery as scheduled without further clinical clarification or evaluation and may proceed with choice of anesthesia.  Will check CBC and comprehensive metabolic panel prior to surgery.  - CBC with platelets; Future  - Comprehensive metabolic panel; Future    Gastroesophageal reflux disease with esophagitis without hemorrhage  Hiatal hernia  Inadequate controlled with medications.  To proceed with surgery.    Class 3 severe obesity with serious comorbidity and body mass index (BMI) of 45.0 to 49.9 in adult, unspecified obesity type (H)  Encourage continued efforts at healthy lifestyle habits.  Referral made to comprehensive weight management team.  - Adult Comprehensive Weight Management  Referral; Future    Alcohol use disorder, moderate, dependence (H)  Assistance with alcohol cessation declined.  Advised cessation.  Discussed risks of impaired healing  and recurrence of symptoms with ongoing alcohol use.    Marijuana use  Advise discontinuation.    Tobacco use  Advised discontinuation, he plans to quit over the next week and declined assistance.    Moderate episode of recurrent major depressive disorder (H)  PTSD (post-traumatic stress disorder)  Anxiety  Inadequate control.  Continue escitalopram.  Advised scheduling follow-up visit with psychiatry.    Intermittent asthma  Albuterol as needed, controlled.      - No identified additional risk factors other than previously addressed    Antiplatelet or Anticoagulation Medication Instructions   - Patient is on no antiplatelet or anticoagulation medications.    Additional Medication Instructions  Take all scheduled medications on the day of surgery    Recommendation  Approval given to proceed with proposed procedure, without further diagnostic evaluation.        Subjective   Kem is a 23 year old, presenting for the following:  Pre-Op Exam (pre op - hernia repair - [facility] - 7/2/24)    HPI related to upcoming procedure: Persisting reflux symptoms despite maximum doses of PPI and sucralfate, known hiatal hernia, previous esophagitis and duodenitis, requiring further treatments.  History of depression, anxiety, and PTSD, resumed Lexapro in March, due to follow-up with psychiatry but needing to reschedule.  Ongoing struggles with nightmares have not improved with prazosin, needing alternative options.  Asthma stable.        6/6/2024   Pre-Op Questionnaire   Have you ever had a heart attack or stroke? No   Have you ever had surgery on your heart or blood vessels, such as a stent placement, a coronary artery bypass, or surgery on an artery in your head, neck, heart, or legs? No   Do you have chest pain with activity? No   Do you have a history of heart failure? No   Do you currently have a cold, bronchitis or symptoms of other infection? No   Do you have a cough, shortness of breath, or wheezing? (!) YES when needing albuterol,  overall well-controlled   Do you or anyone in your family have previous history of blood clots? No   Do you or does anyone in your family have a serious bleeding problem such as prolonged bleeding following surgeries or cuts? No   Have you ever had problems with anemia or been told to take iron pills? No   Have you had any abnormal blood loss such as black, tarry or bloody stools, or abnormal vaginal bleeding? No   Have you ever had a blood transfusion? No   Are you willing to have a blood transfusion if it is medically needed before, during, or after your surgery? Yes   Have you or any of your relatives ever had problems with anesthesia? No   Do you have sleep apnea, excessive snoring or daytime drowsiness? No   Do you have any artifical heart valves or other implanted medical devices like a pacemaker, defibrillator, or continuous glucose monitor? No   Do you have artificial joints? No   Are you allergic to latex? No       Preoperative Review of    reviewed - controlled substances reflected in medication list.          Patient Active Problem List    Diagnosis Date Noted    Hiatal hernia 06/06/2024     Priority: Medium    Mild intermittent asthma without complication 06/06/2024     Priority: Medium    Gastroesophageal reflux disease with esophagitis without hemorrhage 01/24/2024     Priority: Medium    Gender dysphoria in adult 06/01/2022     Priority: Medium     Formatting of this note might be different from the original.  Gender Services consult on 5/31/2022      History of alcoholic gastritis 01/27/2022     Priority: Medium    Morbid obesity (H) 09/15/2021     Priority: Medium    Alcohol use disorder, moderate, dependence (H) 04/06/2021     Priority: Medium    Moderate episode of recurrent major depressive disorder (H) 02/25/2021     Priority: Medium    Marijuana use 02/25/2021     Priority: Medium    PTSD (post-traumatic stress disorder) 02/07/2021     Priority: Medium    Anxiety 12/01/2020     Priority:  Medium    Tobacco use 05/16/2019     Priority: Medium    Chronic idiopathic constipation 05/16/2019     Priority: Medium    Vitamin D deficiency 02/24/2015     Priority: Medium      Past Medical History:   Diagnosis Date    Anxiety     therapist: [facility]    Chemical dependency (H)     quit Sept 2021, alcohol. some family hx as well.     Chronic constipation     Constipation     Depression     Esophageal reflux     controlled well on protonix currently, EGD 6/3/21 w/ some mild esophagitis when alcohol intake was higher (bottle vodka daily).    Heart rate problem     some palpitations in the past w/ anxiety attacks.    Hiatal hernia     Migraines     occ tylenol use.    Morbid obesity (H) 03/06/2020    Obese     Palpitations     Plantar warts     PTSD (post-traumatic stress disorder)     Seizures (H)     had when she was 15    Uncomplicated asthma      Past Surgical History:   Procedure Laterality Date    COLONOSCOPY N/A 06/03/2021    Procedure: COLONOSCOPY;  Surgeon: Guru Gerardo Prado MD;  Location: [facility]    ESOPHAGOSCOPY, GASTROSCOPY, DUODENOSCOPY (EGD), COMBINED N/A 06/03/2021    Procedure: ESOPHAGOGASTRODUODENOSCOPY (EGD);  Surgeon: Guru Gerardo Prado MD;  Location: [facility]    ESOPHAGOSCOPY, GASTROSCOPY, DUODENOSCOPY (EGD), COMBINED N/A 06/23/2022    Procedure: ESOPHAGOGASTRODUODENOSCOPY (EGD);  Surgeon: Amanda Linares MD;  [place] [facility] [place] [facility]    ESOPHAGOSCOPY, GASTROSCOPY, DUODENOSCOPY (EGD), COMBINED N/A 02/16/2023    Procedure: ESOPHAGOGASTRODUODENOSCOPY, WITH BIOPSY;  Surgeon: Alma Velasquez MD;  Location: [facility]    ESOPHAGOSCOPY, GASTROSCOPY, DUODENOSCOPY (EGD), COMBINED N/A 04/09/2024    Procedure: ESOPHAGOGASTRODUODENOSCOPY, WITH BIOPSY;  Surgeon: Patrick Preicado DO;  Location: [facility]    MASTECTOMY, BILATERAL Bilateral 08/28/2023    wisdom teeth       Current Outpatient Medications   Medication Sig Dispense Refill    albuterol (PROAIR  "HFA/PROVENTIL HFA/VENTOLIN HFA) 108 (90 Base) MCG/ACT inhaler Inhale 2 puffs into the lungs every 6 hours as needed for shortness of breath / dyspnea or wheezing 18 g 0    B-D SYRINGE LUER-SANTA 1 ML MISC       BD DISP NEEDLES 25G X 5/8\" MISC       buPROPion (WELLBUTRIN XL) 150 MG 24 hr tablet Take 1 tablet (150 mg) by mouth every morning 30 tablet 3    escitalopram (LEXAPRO) 20 MG tablet Take one half tab daily for 1 week, then 1 full tab daily. 90 tablet 1    famotidine (PEPCID) 20 MG tablet Take 1 tablet (20 mg) by mouth At Bedtime 60 tablet 3    omeprazole (PRILOSEC) 40 MG DR capsule Take 1 capsule (40 mg) by mouth 2 times daily (before meals) For more refills,schedule an appointment at [phone] 180 capsule 0    ondansetron (ZOFRAN) 4 MG tablet 4mg every 6-8 hours if needed for nausea and vomiting. If needed, may increase to 8mg every 8-12 hours if needed for nausea. 30 tablet 0    prazosin (MINIPRESS) 1 MG capsule TAKE 1 CAPSULE BY MOUTH EVERY NIGHT AT BEDTIME. AFTER 2-3 DAYS, INCREASE TO 2 CAPSULE EVERY NIGHT AT BEDTIME 180 capsule 1    Testosterone 1.62 % GEL APPLY 3 PUMPS TOPICALLY TO THE AFFECTED AREA DAILY      testosterone cypionate (DEPOTESTOSTERONE) 200 MG/ML injection Inject 0.2 mg into the muscle once a week Friday      Vitamin D, Cholecalciferol, 25 MCG (1000 UT) TABS Take 1,000 Units by mouth every morning         Allergies   Allergen Reactions    Ibuprofen Hives     Throat gets itchy and sore    Hydrocodone-Acetaminophen Nausea    Penicillins         Social History     Tobacco Use    Smoking status: Some Days     Current packs/day: 1.00     Average packs/day: 1 pack/day for 5.0 years (5.0 ttl pk-yrs)     Types: Cigarettes    Smokeless tobacco: Never    Tobacco comments:     Pt does not smoke when it is cold outside   Substance Use Topics    Alcohol use: Yes     Alcohol/week: 6.0 - 8.0 standard drinks of alcohol     Types: 6 - 8 Standard drinks or equivalent per week     Comment: every day drink " "    Family History   Problem Relation Age of Onset    Heart Disease Mother     Substance Abuse Mother         in remission    Substance Abuse Father     Alcoholism Father     Bipolar Disorder Brother     Depression Brother     Anxiety Disorder Brother     Hypertension Maternal Grandmother     Arthritis Maternal Grandmother     Heart Disease Maternal Grandmother     Hodgkin's lymphoma Maternal Grandfather 26    Esophageal Cancer Maternal Grandfather          at 54.    Heart Disease Maternal Uncle     Heart Disease Maternal Uncle     Acute Myocardial Infarction No family hx of     Anesthesia Reaction No family hx of     Deep Vein Thrombosis (DVT) No family hx of      History   Drug Use    Types: Marijuana     Comment: Smoking PRN             Review of Systems  Constitutional, neuro, ENT, endocrine, pulmonary, cardiac, gastrointestinal, genitourinary, musculoskeletal, integument and psychiatric systems are negative, except as otherwise noted.    Objective    /73 (BP Location: Right arm, Patient Position: Sitting, Cuff Size: Adult Large)   Pulse 98   Temp 98.3  F (36.8  C) (Oral)   Resp 20   Ht 1.542 m (5' 0.71\")   Wt 109.1 kg (240 lb 8 oz)   SpO2 96%   BMI 45.88 kg/m     Estimated body mass index is 45.88 kg/m  as calculated from the following:    Height as of this encounter: 1.542 m (5' 0.71\").    Weight as of this encounter: 109.1 kg (240 lb 8 oz).  Physical Exam  GENERAL: alert and no distress  EYES: Eyes grossly normal to inspection, PERRL and conjunctivae and sclerae normal  HENT: ear canals and TM's normal, nose and mouth without ulcers or lesions  NECK: no adenopathy, no asymmetry, masses, or scars  RESP: lungs clear to auscultation - no rales, rhonchi or wheezes  CV: regular rate and rhythm, normal S1 S2, no S3 or S4, no murmur, click or rub, no peripheral edema  ABDOMEN: soft, nontender, no hepatosplenomegaly, no masses and bowel sounds normal  MS: no gross musculoskeletal defects noted, no " edema  SKIN: no suspicious lesions or rashes  NEURO: Normal strength and tone, mentation intact and speech normal  PSYCH: mentation appears normal, affect normal/bright    Recent Labs   Lab Test 03/04/24  1134 12/27/23  1440 12/27/23  1431   HGB 13.8 15.4  --     297  --      --  139   POTASSIUM 4.4  --  4.2   CR 0.84  --  0.92        Diagnostics  Labs pending at this time.  Results will be reviewed when available.   No EKG required, no history of coronary heart disease, significant arrhythmia, peripheral arterial disease or other structural heart disease.    Revised Cardiac Risk Index (RCRI)  The patient has the following serious cardiovascular risks for perioperative complications:   - No serious cardiac risks = 0 points     RCRI Interpretation: 0 points: Class I (very low risk - 0.4% complication rate)         Signed Electronically by: Litzy Corona MD  Copy of this evaluation report is provided to requesting physician.

## 2024-06-07 LAB
ALBUMIN SERPL BCG-MCNC: 4.1 G/DL (ref 3.5–5.2)
ALP SERPL-CCNC: 84 U/L (ref 40–150)
ALT SERPL W P-5'-P-CCNC: 29 U/L (ref 0–70)
ANION GAP SERPL CALCULATED.3IONS-SCNC: 11 MMOL/L (ref 7–15)
AST SERPL W P-5'-P-CCNC: 19 U/L (ref 0–45)
BILIRUB SERPL-MCNC: 0.7 MG/DL
BUN SERPL-MCNC: 9.2 MG/DL (ref 6–20)
CALCIUM SERPL-MCNC: 9.2 MG/DL (ref 8.6–10)
CHLORIDE SERPL-SCNC: 106 MMOL/L (ref 98–107)
CREAT SERPL-MCNC: 0.94 MG/DL (ref 0.51–1.17)
DEPRECATED HCO3 PLAS-SCNC: 23 MMOL/L (ref 22–29)
EGFRCR SERPLBLD CKD-EPI 2021: 87 ML/MIN/1.73M2
GLUCOSE SERPL-MCNC: 79 MG/DL (ref 70–99)
POTASSIUM SERPL-SCNC: 4.6 MMOL/L (ref 3.4–5.3)
PROT SERPL-MCNC: 7 G/DL (ref 6.4–8.3)
SODIUM SERPL-SCNC: 140 MMOL/L (ref 135–145)
VIT D+METAB SERPL-MCNC: 8 NG/ML (ref 20–50)

## 2024-06-09 DIAGNOSIS — E55.9 VITAMIN D DEFICIENCY: Primary | ICD-10-CM

## 2024-06-09 NOTE — RESULT ENCOUNTER NOTE
Normal kidney and liver function and electrolytes.  White blood cell count remains mildly elevated as it has been in the past, but stable.  You may proceed with your surgery as scheduled.  Your vitamin D level remains very low.  This can cause you to feel tired, can worsen your immune system's function, and can worsen your bone density.  After your surgery and when you are able to take capsules again, I would like you to take a course of high-dose vitamin D WEEKLY for 8 weeks to bring this level up.  We should then recheck your vitamin D level once the 8-week course is complete.

## 2024-07-02 ENCOUNTER — ANESTHESIA EVENT (OUTPATIENT)
Dept: SURGERY | Facility: CLINIC | Age: 24
End: 2024-07-02
Payer: COMMERCIAL

## 2024-07-02 ENCOUNTER — ANESTHESIA (OUTPATIENT)
Dept: SURGERY | Facility: CLINIC | Age: 24
End: 2024-07-02
Payer: COMMERCIAL

## 2024-07-02 ENCOUNTER — HOSPITAL ENCOUNTER (OUTPATIENT)
Facility: CLINIC | Age: 24
Discharge: HOME | End: 2024-07-02
Attending: SURGERY | Admitting: SURGERY
Payer: COMMERCIAL

## 2024-07-02 VITALS
DIASTOLIC BLOOD PRESSURE: 64 MMHG | OXYGEN SATURATION: 96 % | SYSTOLIC BLOOD PRESSURE: 137 MMHG | BODY MASS INDEX: 45.78 KG/M2 | TEMPERATURE: 97.7 F | HEART RATE: 72 BPM | WEIGHT: 240 LBS | RESPIRATION RATE: 16 BRPM

## 2024-07-02 DIAGNOSIS — K44.9 HIATAL HERNIA: Primary | ICD-10-CM

## 2024-07-02 PROCEDURE — 999N000141 HC STATISTIC PRE-PROCEDURE NURSING ASSESSMENT: Performed by: SURGERY

## 2024-07-02 PROCEDURE — 250N000025 HC SEVOFLURANE, PER MIN: Performed by: SURGERY

## 2024-07-02 PROCEDURE — 258N000003 HC RX IP 258 OP 636: Performed by: NURSE ANESTHETIST, CERTIFIED REGISTERED

## 2024-07-02 PROCEDURE — 258N000003 HC RX IP 258 OP 636: Performed by: ANESTHESIOLOGY

## 2024-07-02 PROCEDURE — 360N000080 HC SURGERY LEVEL 7, PER MIN: Performed by: SURGERY

## 2024-07-02 PROCEDURE — 250N000013 HC RX MED GY IP 250 OP 250 PS 637: Performed by: ANESTHESIOLOGY

## 2024-07-02 PROCEDURE — 250N000011 HC RX IP 250 OP 636: Performed by: ANESTHESIOLOGY

## 2024-07-02 PROCEDURE — 370N000017 HC ANESTHESIA TECHNICAL FEE, PER MIN: Performed by: SURGERY

## 2024-07-02 PROCEDURE — 250N000011 HC RX IP 250 OP 636: Performed by: SURGERY

## 2024-07-02 PROCEDURE — C1781 MESH (IMPLANTABLE): HCPCS | Performed by: SURGERY

## 2024-07-02 PROCEDURE — 250N000009 HC RX 250: Performed by: NURSE ANESTHETIST, CERTIFIED REGISTERED

## 2024-07-02 PROCEDURE — 250N000011 HC RX IP 250 OP 636: Performed by: NURSE ANESTHETIST, CERTIFIED REGISTERED

## 2024-07-02 PROCEDURE — 272N000001 HC OR GENERAL SUPPLY STERILE: Performed by: SURGERY

## 2024-07-02 PROCEDURE — S2900 ROBOTIC SURGICAL SYSTEM: HCPCS | Performed by: SURGERY

## 2024-07-02 PROCEDURE — 43282 LAP PARAESOPH HER RPR W/MESH: CPT | Performed by: SURGERY

## 2024-07-02 PROCEDURE — 710N000012 HC RECOVERY PHASE 2, PER MINUTE: Performed by: SURGERY

## 2024-07-02 PROCEDURE — 710N000010 HC RECOVERY PHASE 1, LEVEL 2, PER MIN: Performed by: SURGERY

## 2024-07-02 DEVICE — ENFORM PREPERITONEAL 10CMX10CM BIOMATERIAL
Type: IMPLANTABLE DEVICE | Site: ABDOMEN | Status: FUNCTIONAL
Brand: GORE ENFORM PREPERITONEAL BIOMATERIAL

## 2024-07-02 RX ORDER — OXYCODONE HYDROCHLORIDE 5 MG/1
5 TABLET ORAL
Status: DISCONTINUED | OUTPATIENT
Start: 2024-07-02 | End: 2024-07-02 | Stop reason: HOSPADM

## 2024-07-02 RX ORDER — LIDOCAINE 40 MG/G
CREAM TOPICAL
Status: DISCONTINUED | OUTPATIENT
Start: 2024-07-02 | End: 2024-07-02 | Stop reason: HOSPADM

## 2024-07-02 RX ORDER — NALOXONE HYDROCHLORIDE 0.4 MG/ML
0.1 INJECTION, SOLUTION INTRAMUSCULAR; INTRAVENOUS; SUBCUTANEOUS
Status: DISCONTINUED | OUTPATIENT
Start: 2024-07-02 | End: 2024-07-02 | Stop reason: HOSPADM

## 2024-07-02 RX ORDER — ONDANSETRON 4 MG/1
4 TABLET, ORALLY DISINTEGRATING ORAL EVERY 30 MIN PRN
Status: DISCONTINUED | OUTPATIENT
Start: 2024-07-02 | End: 2024-07-02 | Stop reason: HOSPADM

## 2024-07-02 RX ORDER — TRAMADOL HYDROCHLORIDE 50 MG/1
50 TABLET ORAL EVERY 6 HOURS PRN
Qty: 10 TABLET | Refills: 0 | Status: SHIPPED | OUTPATIENT
Start: 2024-07-02 | End: 2024-07-05

## 2024-07-02 RX ORDER — PRAZOSIN HYDROCHLORIDE 1 MG/1
1 CAPSULE ORAL AT BEDTIME
COMMUNITY

## 2024-07-02 RX ORDER — PROPOFOL 10 MG/ML
INJECTION, EMULSION INTRAVENOUS PRN
Status: DISCONTINUED | OUTPATIENT
Start: 2024-07-02 | End: 2024-07-02

## 2024-07-02 RX ORDER — LIDOCAINE HYDROCHLORIDE 10 MG/ML
INJECTION, SOLUTION INFILTRATION; PERINEURAL PRN
Status: DISCONTINUED | OUTPATIENT
Start: 2024-07-02 | End: 2024-07-02

## 2024-07-02 RX ORDER — DEXAMETHASONE SODIUM PHOSPHATE 4 MG/ML
INJECTION, SOLUTION INTRA-ARTICULAR; INTRALESIONAL; INTRAMUSCULAR; INTRAVENOUS; SOFT TISSUE PRN
Status: DISCONTINUED | OUTPATIENT
Start: 2024-07-02 | End: 2024-07-02

## 2024-07-02 RX ORDER — HYDROMORPHONE HCL IN WATER/PF 6 MG/30 ML
0.4 PATIENT CONTROLLED ANALGESIA SYRINGE INTRAVENOUS EVERY 5 MIN PRN
Status: DISCONTINUED | OUTPATIENT
Start: 2024-07-02 | End: 2024-07-02 | Stop reason: HOSPADM

## 2024-07-02 RX ORDER — FENTANYL CITRATE 50 UG/ML
50 INJECTION, SOLUTION INTRAMUSCULAR; INTRAVENOUS EVERY 5 MIN PRN
Status: DISCONTINUED | OUTPATIENT
Start: 2024-07-02 | End: 2024-07-02 | Stop reason: HOSPADM

## 2024-07-02 RX ORDER — GABAPENTIN 100 MG/1
100 CAPSULE ORAL 3 TIMES DAILY
Qty: 30 CAPSULE | Refills: 0 | Status: SHIPPED | OUTPATIENT
Start: 2024-07-02

## 2024-07-02 RX ORDER — DEXAMETHASONE SODIUM PHOSPHATE 10 MG/ML
4 INJECTION, SOLUTION INTRAMUSCULAR; INTRAVENOUS
Status: DISCONTINUED | OUTPATIENT
Start: 2024-07-02 | End: 2024-07-02 | Stop reason: HOSPADM

## 2024-07-02 RX ORDER — DEXAMETHASONE SODIUM PHOSPHATE 4 MG/ML
4 INJECTION, SOLUTION INTRA-ARTICULAR; INTRALESIONAL; INTRAMUSCULAR; INTRAVENOUS; SOFT TISSUE
Status: DISCONTINUED | OUTPATIENT
Start: 2024-07-02 | End: 2024-07-02 | Stop reason: HOSPADM

## 2024-07-02 RX ORDER — SIMETHICONE 80 MG
80 TABLET,CHEWABLE ORAL ONCE
Status: COMPLETED | OUTPATIENT
Start: 2024-07-02 | End: 2024-07-02

## 2024-07-02 RX ORDER — BUPIVACAINE HYDROCHLORIDE 2.5 MG/ML
INJECTION, SOLUTION INFILTRATION; PERINEURAL
Status: DISCONTINUED
Start: 2024-07-02 | End: 2024-07-02 | Stop reason: HOSPADM

## 2024-07-02 RX ORDER — FENTANYL CITRATE 50 UG/ML
100 INJECTION, SOLUTION INTRAMUSCULAR; INTRAVENOUS ONCE
Status: DISCONTINUED | OUTPATIENT
Start: 2024-07-02 | End: 2024-07-02 | Stop reason: HOSPADM

## 2024-07-02 RX ORDER — FENTANYL CITRATE 50 UG/ML
100 INJECTION, SOLUTION INTRAMUSCULAR; INTRAVENOUS
Status: COMPLETED | OUTPATIENT
Start: 2024-07-02 | End: 2024-07-02

## 2024-07-02 RX ORDER — HYDROMORPHONE HCL IN WATER/PF 6 MG/30 ML
0.2 PATIENT CONTROLLED ANALGESIA SYRINGE INTRAVENOUS EVERY 5 MIN PRN
Status: DISCONTINUED | OUTPATIENT
Start: 2024-07-02 | End: 2024-07-02 | Stop reason: HOSPADM

## 2024-07-02 RX ORDER — SODIUM CHLORIDE, SODIUM LACTATE, POTASSIUM CHLORIDE, CALCIUM CHLORIDE 600; 310; 30; 20 MG/100ML; MG/100ML; MG/100ML; MG/100ML
INJECTION, SOLUTION INTRAVENOUS CONTINUOUS
Status: DISCONTINUED | OUTPATIENT
Start: 2024-07-02 | End: 2024-07-02 | Stop reason: HOSPADM

## 2024-07-02 RX ORDER — BUPIVACAINE HYDROCHLORIDE 2.5 MG/ML
INJECTION, SOLUTION EPIDURAL; INFILTRATION; INTRACAUDAL PRN
Status: DISCONTINUED | OUTPATIENT
Start: 2024-07-02 | End: 2024-07-02 | Stop reason: HOSPADM

## 2024-07-02 RX ORDER — FENTANYL CITRATE 50 UG/ML
25 INJECTION, SOLUTION INTRAMUSCULAR; INTRAVENOUS EVERY 5 MIN PRN
Status: DISCONTINUED | OUTPATIENT
Start: 2024-07-02 | End: 2024-07-02 | Stop reason: HOSPADM

## 2024-07-02 RX ORDER — OXYCODONE HYDROCHLORIDE 5 MG/1
10 TABLET ORAL
Status: COMPLETED | OUTPATIENT
Start: 2024-07-02 | End: 2024-07-02

## 2024-07-02 RX ORDER — HYDROCODONE BITARTRATE AND ACETAMINOPHEN 5; 325 MG/1; MG/1
1 TABLET ORAL
Status: CANCELLED | OUTPATIENT
Start: 2024-07-02

## 2024-07-02 RX ORDER — ONDANSETRON 2 MG/ML
4 INJECTION INTRAMUSCULAR; INTRAVENOUS EVERY 30 MIN PRN
Status: DISCONTINUED | OUTPATIENT
Start: 2024-07-02 | End: 2024-07-02 | Stop reason: HOSPADM

## 2024-07-02 RX ORDER — DOCUSATE SODIUM 100 MG/1
100 CAPSULE, LIQUID FILLED ORAL 2 TIMES DAILY
Qty: 30 CAPSULE | Refills: 0 | Status: SHIPPED | OUTPATIENT
Start: 2024-07-02

## 2024-07-02 RX ORDER — ESCITALOPRAM OXALATE 20 MG/1
20 TABLET ORAL DAILY
COMMUNITY

## 2024-07-02 RX ADMIN — HYDROMORPHONE HYDROCHLORIDE 0.4 MG: 0.2 INJECTION, SOLUTION INTRAMUSCULAR; INTRAVENOUS; SUBCUTANEOUS at 14:09

## 2024-07-02 RX ADMIN — DEXMEDETOMIDINE HYDROCHLORIDE 12 MCG: 100 INJECTION, SOLUTION INTRAVENOUS at 12:10

## 2024-07-02 RX ADMIN — HYDROMORPHONE HYDROCHLORIDE 1 MG: 1 INJECTION, SOLUTION INTRAMUSCULAR; INTRAVENOUS; SUBCUTANEOUS at 11:33

## 2024-07-02 RX ADMIN — LIDOCAINE HYDROCHLORIDE 40 MG: 10 INJECTION, SOLUTION INFILTRATION; PERINEURAL at 11:13

## 2024-07-02 RX ADMIN — HYDROMORPHONE HYDROCHLORIDE 0.4 MG: 0.2 INJECTION, SOLUTION INTRAMUSCULAR; INTRAVENOUS; SUBCUTANEOUS at 14:00

## 2024-07-02 RX ADMIN — MIDAZOLAM 2 MG: 1 INJECTION INTRAMUSCULAR; INTRAVENOUS at 11:03

## 2024-07-02 RX ADMIN — SODIUM CHLORIDE, POTASSIUM CHLORIDE, SODIUM LACTATE AND CALCIUM CHLORIDE: 600; 310; 30; 20 INJECTION, SOLUTION INTRAVENOUS at 10:57

## 2024-07-02 RX ADMIN — FENTANYL CITRATE 50 MCG: 50 INJECTION, SOLUTION INTRAMUSCULAR; INTRAVENOUS at 13:41

## 2024-07-02 RX ADMIN — FENTANYL CITRATE 100 MCG: 50 INJECTION INTRAMUSCULAR; INTRAVENOUS at 11:13

## 2024-07-02 RX ADMIN — SIMETHICONE 80 MG: 80 TABLET, CHEWABLE ORAL at 15:30

## 2024-07-02 RX ADMIN — OXYCODONE HYDROCHLORIDE 10 MG: 5 TABLET ORAL at 15:16

## 2024-07-02 RX ADMIN — FENTANYL CITRATE 50 MCG: 50 INJECTION, SOLUTION INTRAMUSCULAR; INTRAVENOUS at 13:46

## 2024-07-02 RX ADMIN — SODIUM CHLORIDE, POTASSIUM CHLORIDE, SODIUM LACTATE AND CALCIUM CHLORIDE: 600; 310; 30; 20 INJECTION, SOLUTION INTRAVENOUS at 12:13

## 2024-07-02 RX ADMIN — PROPOFOL 200 MG: 10 INJECTION, EMULSION INTRAVENOUS at 11:13

## 2024-07-02 RX ADMIN — HYDROMORPHONE HYDROCHLORIDE 0.4 MG: 0.2 INJECTION, SOLUTION INTRAMUSCULAR; INTRAVENOUS; SUBCUTANEOUS at 13:53

## 2024-07-02 RX ADMIN — ROCURONIUM BROMIDE 50 MG: 10 INJECTION, SOLUTION INTRAVENOUS at 11:13

## 2024-07-02 RX ADMIN — ROCURONIUM BROMIDE 20 MG: 10 INJECTION, SOLUTION INTRAVENOUS at 12:50

## 2024-07-02 RX ADMIN — HYDROMORPHONE HYDROCHLORIDE 0.4 MG: 0.2 INJECTION, SOLUTION INTRAMUSCULAR; INTRAVENOUS; SUBCUTANEOUS at 14:25

## 2024-07-02 RX ADMIN — DEXMEDETOMIDINE HYDROCHLORIDE 20 MCG: 100 INJECTION, SOLUTION INTRAVENOUS at 13:25

## 2024-07-02 RX ADMIN — ROCURONIUM BROMIDE 30 MG: 10 INJECTION, SOLUTION INTRAVENOUS at 12:07

## 2024-07-02 RX ADMIN — SUGAMMADEX 200 MG: 100 INJECTION, SOLUTION INTRAVENOUS at 13:22

## 2024-07-02 RX ADMIN — DEXAMETHASONE SODIUM PHOSPHATE 10 MG: 4 INJECTION, SOLUTION INTRA-ARTICULAR; INTRALESIONAL; INTRAMUSCULAR; INTRAVENOUS; SOFT TISSUE at 11:25

## 2024-07-02 ASSESSMENT — LIFESTYLE VARIABLES: TOBACCO_USE: 1

## 2024-07-02 ASSESSMENT — ACTIVITIES OF DAILY LIVING (ADL)
ADLS_ACUITY_SCORE: 32

## 2024-07-02 NOTE — INTERVAL H&P NOTE
I have reviewed the surgical (or preoperative) H&P that is linked to this encounter, and examined the patient. There are no significant changes    Plan for Procedure(s):  FUNDOPLICATION, partial with hiatal hernia repair with mesh, ROBOT-ASSISTED, LAPAROSCOPIC, USING DA ESPERANZA XI     Gian Preciado [facility]  [phone]

## 2024-07-02 NOTE — PHARMACY-ADMISSION MEDICATION HISTORY
Pharmacist Admission Medication History    Admission medication history is complete. The information provided in this note is only as accurate as the sources available at the time of the update.    Information Source(s): Patient and CareEverywhere/SureScripts via in-person    Pertinent Information:      Changes made to PTA medication list:  Added: None  Deleted: Omerpazole, Wellbutrin  Changed: None    Allergies reviewed with patient and updates made in EHR: yes    Medication History Completed By: Marcelo Gold [facility] 7/2/2024 10:57 AM    PTA Med List   Medication Sig Last Dose    albuterol (PROAIR HFA/PROVENTIL HFA/VENTOLIN HFA) 108 (90 Base) MCG/ACT inhaler Inhale 2 puffs into the lungs every 6 hours as needed for shortness of breath / dyspnea or wheezing prn at prn    escitalopram (LEXAPRO) 20 MG tablet Take 20 mg by mouth daily 7/1/2024 at am    famotidine (PEPCID) 20 MG tablet Take 1 tablet (20 mg) by mouth At Bedtime 7/1/2024 at pm    prazosin (MINIPRESS) 1 MG capsule Take 1 mg by mouth at bedtime 7/1/2024 at pm    Vitamin D, Cholecalciferol, 25 MCG (1000 UT) TABS Take 1,000 Units by mouth every morning 7/1/2024 at am

## 2024-07-02 NOTE — ANESTHESIA PREPROCEDURE EVALUATION
Anesthesia Pre-Procedure Evaluation    Patient: Yolanda Vee   MRN: [id number] : 2000        Procedure : Procedure(s):  ESOPHAGOGASTRODUODENOSCOPY, WITH BIOPSY          Past Medical History:   Diagnosis Date    Anxiety     therapist: [facility]    Chemical dependency (H)     quit 2021, alcohol. some family hx as well.     Chronic constipation     Constipation     Depression     Esophageal reflux     controlled well on protonix currently, EGD 6/3/21 w/ some mild esophagitis when alcohol intake was higher (bottle vodka daily).    Heart rate problem     some palpitations in the past w/ anxiety attacks.    Hiatal hernia     Migraines     occ tylenol use.    Morbid obesity (H) 2020    Obese     Palpitations     Plantar warts     PTSD (post-traumatic stress disorder)     Seizures (H)     had when she was 15    Uncomplicated asthma       Past Surgical History:   Procedure Laterality Date    COLONOSCOPY N/A 2021    Procedure: COLONOSCOPY;  Surgeon: Guru Gerardo Prado MD;  Location: [facility]    ESOPHAGOSCOPY, GASTROSCOPY, DUODENOSCOPY (EGD), COMBINED N/A 2021    Procedure: ESOPHAGOGASTRODUODENOSCOPY (EGD);  Surgeon: Guru Gerardo Prado MD;  Location: [facility]    ESOPHAGOSCOPY, GASTROSCOPY, DUODENOSCOPY (EGD), COMBINED N/A 2022    Procedure: ESOPHAGOGASTRODUODENOSCOPY (EGD);  Surgeon: Amanda Linares MD;  [place] [facility] [place]    ESOPHAGOSCOPY, GASTROSCOPY, DUODENOSCOPY (EGD), COMBINED N/A 2023    Procedure: ESOPHAGOGASTRODUODENOSCOPY, WITH BIOPSY;  Surgeon: Alma Velasquez MD;  Location: [facility]    ESOPHAGOSCOPY, GASTROSCOPY, DUODENOSCOPY (EGD), COMBINED N/A 2024    Procedure: ESOPHAGOGASTRODUODENOSCOPY, WITH BIOPSY;  Surgeon: Patrick Preciado DO;  Location: [facility]    MASTECTOMY, BILATERAL Bilateral 2023    wisdom teeth        Allergies   Allergen Reactions    Ibuprofen Hives     Throat gets itchy and sore     Hydrocodone-Acetaminophen Nausea    Penicillins       Social History     Tobacco Use    Smoking status: Some Days     Current packs/day: 1.00     Average packs/day: 1 pack/day for 5.0 years (5.0 ttl pk-yrs)     Types: Cigarettes    Smokeless tobacco: Never    Tobacco comments:     Pt does not smoke when it is cold outside   Substance Use Topics    Alcohol use: Yes     Alcohol/week: 6.0 - 8.0 standard drinks of alcohol     Types: 6 - 8 Standard drinks or equivalent per week     Comment: 5/week      Wt Readings from Last 1 Encounters:   06/06/24 109.1 kg (240 lb 8 oz)        Anesthesia Evaluation   Pt has had prior anesthetic. Type: General and MAC.    No history of anesthetic complications       ROS/MED HX  ENT/Pulmonary:  - neg pulmonary ROS   (+)                tobacco use,     Intermittent, asthma  Treatment: Inhaler prn,                 Neurologic:  - neg neurologic ROS     Cardiovascular:  - neg cardiovascular ROS     METS/Exercise Tolerance: >4 METS    Hematologic:  - neg hematologic  ROS     Musculoskeletal:  - neg musculoskeletal ROS     GI/Hepatic:  - neg GI/hepatic ROS   (+) GERD, Symptomatic,                  Renal/Genitourinary:  - neg Renal ROS     Endo:  - neg endo ROS   (+)               Obesity (bmi 46),       Psychiatric/Substance Use:  - neg psychiatric ROS   (+) psychiatric history (PTSD) anxiety and depression alcohol abuse  Recreational drug usage: Cannabis.    Infectious Disease:  - neg infectious disease ROS     Malignancy:  - neg malignancy ROS     Other:  - neg other ROS          Physical Exam    Airway        Mallampati: II   TM distance: > 3 FB   Neck ROM: full   Mouth opening: > 3 cm    Respiratory Devices and Support         Dental       (+) Minor Abnormalities - some fillings, tiny chips      Cardiovascular   cardiovascular exam normal          Pulmonary   pulmonary exam normal                OUTSIDE LABS:  CBC:   Lab Results   Component Value Date    WBC 12.1 (H) 06/06/2024    WBC 10.8  03/04/2024    HGB 14.7 06/06/2024    HGB 13.8 03/04/2024    HCT 45.7 06/06/2024    HCT 42.0 03/04/2024     06/06/2024     03/04/2024     BMP:   Lab Results   Component Value Date     06/06/2024     03/04/2024    POTASSIUM 4.6 06/06/2024    POTASSIUM 4.4 03/04/2024    CHLORIDE 106 06/06/2024    CHLORIDE 108 (H) 03/04/2024    CO2 23 06/06/2024    CO2 25 03/04/2024    BUN 9.2 06/06/2024    BUN 15.6 03/04/2024    CR 0.94 06/06/2024    CR 0.84 03/04/2024    GLC 79 06/06/2024    GLC 89 03/04/2024     COAGS:   Lab Results   Component Value Date    PTT 29 01/26/2022    INR 1.09 01/26/2022    FIBR 473 (H) 01/04/2021     POC:   Lab Results   Component Value Date    BGM 95 06/03/2021    HCG Negative 06/21/2023    HCGS Negative 01/02/2023     HEPATIC:   Lab Results   Component Value Date    ALBUMIN 4.1 06/06/2024    PROTTOTAL 7.0 06/06/2024    ALT 29 06/06/2024    AST 19 06/06/2024    ALKPHOS 84 06/06/2024    BILITOTAL 0.7 06/06/2024     OTHER:   Lab Results   Component Value Date    LACT 0.7 09/21/2021    A1C 5.5 11/08/2021    HIMANSHU 9.2 06/06/2024    PHOS 3.1 01/26/2022    MAG 1.9 01/26/2022    LIPASE 17 01/26/2022    TSH 0.86 03/04/2024    CRP 2.8 (H) 10/13/2022    SED 44 (H) 10/13/2022       Anesthesia Plan    ASA Status:  3    NPO Status:  NPO Appropriate    Anesthesia Type: General.     - Airway: ETT              Consents    Anesthesia Plan(s) and associated risks, benefits, and realistic alternatives discussed. Questions answered and patient/representative(s) expressed understanding.     - Discussed:     - Discussed with:  Patient            Postoperative Care    Pain management: Multi-modal analgesia.   PONV prophylaxis: Ondansetron (or other 5HT-3), Dexamethasone or Solumedrol     Comments:               Nolan Jacobo MD    I have reviewed the pertinent notes and labs in the chart from the past 30 days and (re)examined the patient.  Any updates or changes from those notes are reflected in  "this note.              # Severe Obesity: Estimated body mass index is 45.88 kg/m  as calculated from the following:    Height as of 6/6/24: 1.542 m (5' 0.71\").    Weight as of 6/6/24: 109.1 kg (240 lb 8 oz).      "

## 2024-07-02 NOTE — LETTER
[facility] JAYDA/PHASE II  [address] [address]  Phone: [phone]  Fax: [phone]    July 2, 2024        Yolanda Vee  [address] [place] [address]          To whom it may concern:    RE: Yolanda Vee    Patient may return to work 1-2 weeks with the following:  Light duty-unable to lift more than 20 pounds    Please contact me for questions or concerns.      Sincerely,      Patrick Preciado, DO

## 2024-07-02 NOTE — DISCHARGE INSTRUCTIONS
Eating Habits     Dietary practices such as how much and how often you eat as well as the types of foods you eat will be very important.  The following information is a guide to help you recover through the postoperative time frame.  These are only guidelines and it is important to understand that everyone is different in their recovery.  It may be easy or somewhat difficult to advance your diet depending on the amount of inflammation present and how your body heals.  DO NOT GET FRUSTRATED.  Be patient.      There are two different dietary recommendations based on the type of anti-reflux surgery you have had (see below).  One is for a partial fundoplication and the other is for the Linx device.      The following are some guidelines to help minimize post-operative discomfort as well as help you recover after your surgical procedure.       1. Eat frequent, small meals, approximately 6-8 times a day      2. Avoid using straws, chewing gum or tobacco as this may cause you to swallow air which will lead to bloating and nausea     3. Walk frequently and eat sitting up.  Avoid lying down after eating as this may increase discomfort      4. Follow the recommended types of foods you can eat with each week following your surgery     5. In the first 2 weeks, you should avoid  sticky  foods such as crackers, breads,      6. Take small bits, chew your food well     7. You may drink fluids throughout the day in between meals as you normally would.           Foods to Avoid          In the recovery period, it is important to try to avoid these foods as they may cause discomfort, nausea and pain.  Eventually, once you have recovered completely, you should be able to resume a regular diet.                          Things To Consider After Surgery          How does the partial wrap affect my eating habits?     Once your stomach is partially wrapped around your esophagus, it can be difficult for normal sized food to move  through the narrowed portion of the esophagus where it joins the stomach.  This is normal and will take some time for the inflammation and swelling to go away.  During this time, eating like you did before surgery, will be difficult.           What sort of eating habits should I have after my surgery?     We recommend beginning with room temperature liquids or smoothies for the first 1-2 weeks.  Avoid carbonated drinks.  You may try thicker liquids or yogurt in small amounts.  After the first two weeks, you may begin to increase the size of the foods you eat to include scrambles eggs or soft noodles.           What types of foods or liquids may irritate me?     Try to avoid cold liquids as these may cause your esophagus to become irritate or spasm.  Avoid carbonated drinks, caffeine and alcohol for the firs several weeks after surgery          What other symptoms will I have after my surgery and for how long?     Immediately after surgery you may have shoulder pain.  This is from the gas used during and should resolve in 2-3 days.  Walking will help with absorption of the gas.  You may have some soreness in the abdominal muscles and back.  This is also normal and will eventually resolve.  Walking and moving around will help with this as well.

## 2024-07-02 NOTE — ANESTHESIA PROCEDURE NOTES
Airway       Patient location during procedure: OR       Procedure Start/Stop Times: 7/2/2024 11:16 AM  Staff -        CRNA: Indu Beyer APRN CRNA       Performed By: CRNA  Consent for Airway        Urgency: elective  Indications and Patient Condition       Indications for airway management: robert-procedural       Induction type:intravenous       Mask difficulty assessment: 1 - vent by mask    Final Airway Details       Final airway type: endotracheal airway       Successful airway: ETT - single  Endotracheal Airway Details        ETT size (mm): 7.0       Cuffed: yes       Successful intubation technique: direct laryngoscopy       DL Blade Type: Lopez 2       Grade View of Cords: 1       Adjucts: stylet       Position: Right       Measured from: lips       Secured at (cm): 22       Bite block used: None    Post intubation assessment        Placement verified by: capnometry, equal breath sounds and chest rise        Number of attempts at approach: 1       Number of other approaches attempted: 0       Secured with: tape       Ease of procedure: easy       Dentition: Intact and Unchanged       Dental guard used and removed. Dental Guard Type: Standard White.    Medication(s) Administered   Medication Administration Time: 7/2/2024 11:16 AM

## 2024-07-02 NOTE — OP NOTE
Name:  Yolanda Vee  PCP:  Litzy Corona  Procedure Date:  7/2/2024      Procedure(s):  FUNDOPLICATION, partial with hiatal hernia repair with mesh, ROBOT-ASSISTED, LAPAROSCOPIC, USING DA ESPERANZA XI    Pre-Procedure Diagnosis:  Hiatal hernia [K44.9]     Post-Procedure Diagnosis:        Surgeon(s):  Patrick Preciado DO    Circulator: Meliton Noland RN  Scrub Person: La Dhaliwal; Yolanda Ferrell; Gucci Domínguez    Anesthesia Type:  GET      Findings:  Lateral hernia    Operative Report:    The Patient was taken back to the operating room and placed in a supine position.  Endotracheal intubation was performed.  The abdomen was prepped and draped in a sterile fashion.  I  made an 8 mm incision.  I then establish pneumoperitoneum using a Veress needle technique.  Once this was complete I placed an 8 mm trocar with a 5 mm 30 degree camera into the abdomen.  I scrutinized the surrounding structures for any injuries upon entry none were found.  Next, I placed a Arpan liver retractor via a 5 mm subxiphoid incision.  This was secured to the bed in the standard fashion.  I then placed 3 additional 8 mm trochars in the abdomen.  One in the right upper quadrant and 2 in the left upper quadrant. Enform absorbable mesh was placed into the abdomen.   The robot was docked in the standard fashion once the patient was placed in reverse Trendelenburg.    I then proceeded to address the hiatus.  I took down the pars lucidum and spared the vessels and gastrohepatic ligament.  I then entered the avascular plane between the hernia sac and the crura and circumferentially dissected out the hernia sac using vessel sealing device.  Once I came to the left jocelynn I then turned my attention to the greater curvature of the stomach.  I took down the short gastrics with the vessel sealing device all the way up to the angle of His.  I then created a retroesophageal window and placed 1/4 inch Penrose through this for esophageal  retraction.  During this dissection, I had to preserve a fairly large hepatogastric vessel that was in the ligament.  This took quite some time to navigate around but the vessel and the nerve bundle was preserved.  The posterior sac was also dissected free as was the small lipoma in the retroesophageal space.  I ensured a wide and deep mediastinal dissection of the periesophageal tissue taking care as to preserve the vagus nerves.  Once this was complete I then reapproximated the left and right crura with a 0 nonabsorbable V lock suture in a running fashion.  I then placed the previously fashioned  Enform mesh into the retroesophageal window and sutured this in place for extra added support with additional 2-0 Vicryl sutures.  The mesh was wrapped around the esophagus without stricturing or narrowing in a key hole fashion.  Once this was in place I then created a posterior toupee fundoplication.  This was done with adequate abdominal esophageal length and no evidence of gastric or esophageal retraction back into the chest cavity.  The fundoplication was completed with 0 Ethibond sutures in interrupted fashion.  Once this was complete all nonabsorbable material was removed.  The robot was undocked and local anesthetic was injected into all port sites.  The liver tractor was removed. I then removed all trochars and reapproximated skin edges with 4-0 Monocryl suture.  The abdomen was cleaned and all wounds were cleaned and covered with Steri-Strips and Band-Aids.  The Shukla catheter was removed the patient was extubated sent to the PACU to undergo recovery.  All lap counts and needle counts were correct at the end of the procedure.    Estimated Blood Loss:   10cc    Specimens:    * No specimens in log *       Drains:        Complications:    None    Patrick Preciado DO

## 2024-07-02 NOTE — ANESTHESIA POSTPROCEDURE EVALUATION
Patient: Yolanda Vee    Procedure: Procedure(s):  FUNDOPLICATION, partial with hiatal hernia repair with mesh, ROBOT-ASSISTED, LAPAROSCOPIC, USING DA ESPERANZA XI       Anesthesia Type:  General    Note:  Disposition: Inpatient   Postop Pain Control:             Sign Out: Well controlled pain   PONV: No   Neuro/Psych:             Sign Out: Acceptable/Baseline neuro status   Airway/Respiratory:             Sign Out: Acceptable/Baseline resp. status   CV/Hemodynamics:             Sign Out: Acceptable CV status   Other NRE: NONE   DID A NON-ROUTINE EVENT OCCUR?            Last vitals:  Vitals Value Taken Time   /68 07/02/24 1401   Temp 36.4  C (97.6  F) 07/02/24 1332   Pulse 94 07/02/24 1408   Resp 21 07/02/24 1408   SpO2 94 % 07/02/24 1408   Vitals shown include unfiled device data.    Electronically Signed By: Nolan Jacobo MD  July 2, 2024  2:10 PM

## 2024-07-02 NOTE — ANESTHESIA CARE TRANSFER NOTE
Patient: Yolanda Vee    Procedure: Procedure(s):  FUNDOPLICATION, partial with hiatal hernia repair with mesh, ROBOT-ASSISTED, LAPAROSCOPIC, USING DA ESPERANZA XI       Diagnosis: Hiatal hernia [K44.9]  Diagnosis Additional Information: No value filed.    Anesthesia Type:   General     Note:    Oropharynx: oropharynx clear of all foreign objects  Level of Consciousness: drowsy  Oxygen Supplementation: face mask  Level of Supplemental Oxygen (L/min / FiO2): 10  Independent Airway: airway patency satisfactory and stable  Dentition: dentition unchanged  Vital Signs Stable: post-procedure vital signs reviewed and stable  Report to RN Given: handoff report given  Patient transferred to: PACU    Handoff Report: Identifed the Patient, Identified the Reponsible Provider, Reviewed the pertinent medical history, Discussed the surgical course, Reviewed Intra-OP anesthesia mangement and issues during anesthesia, Set expectations for post-procedure period and Allowed opportunity for questions and acknowledgement of understanding      Vitals:  Vitals Value Taken Time   /69 07/02/24 1332   Temp     Pulse 97 07/02/24 1334   Resp 26 07/02/24 1334   SpO2 98 % 07/02/24 1334   Vitals shown include unfiled device data.    Electronically Signed By: ABA Kee CRNA  July 2, 2024  1:35 PM

## 2024-07-02 NOTE — LETTER
"[facility] JAYDA/PHASE II  [address] [address]  Phone: [phone]  Fax: [phone]      REPORT OF WORK ABILITY    NOTE TO EMPLOYEE: You must promptly provide a copy of this report to your  employer or worker's compensation insurer, and Qualified Rehabilitation Consultant.    Date: 7/2/2024                     Employee Name: Yolanda Vee         YOB: 2000  Medical Record Number: [id number]   Soc.Sec.No: [id number]  Employer: None                Date of Injury: ***  Managed Care Organization / Insurance Company Name: [id number]    Diagnosis: ***  Work Related: {:[id number]::\"yes\"}     MMI: {:[id number]}   Permanent Partial Disability(PPD) likely: {:[id number]::\"unknown\"}    EMPLOYEE IS ABLE TO WORK: {:[id number]}     RESTRICTIONS IF ANY:     {[place] RESTRICTIONS:506646}    OTHER RESTRICTIONS: {:100438}    TREATMENT PLAN/NOTES: {[place] WORKABILITY ACTIVITIES:485642}.      {:689381}/***  "

## 2024-07-09 ENCOUNTER — HOSPITAL ENCOUNTER (EMERGENCY)
Facility: CLINIC | Age: 24
Discharge: HOME | End: 2024-07-10
Attending: EMERGENCY MEDICINE | Admitting: EMERGENCY MEDICINE
Payer: COMMERCIAL

## 2024-07-09 DIAGNOSIS — R07.81 PLEURITIC CHEST PAIN: ICD-10-CM

## 2024-07-09 DIAGNOSIS — L03.311 CELLULITIS OF LEFT ABDOMINAL WALL: ICD-10-CM

## 2024-07-09 PROCEDURE — 85007 BL SMEAR W/DIFF WBC COUNT: CPT | Performed by: EMERGENCY MEDICINE

## 2024-07-09 PROCEDURE — 96376 TX/PRO/DX INJ SAME DRUG ADON: CPT

## 2024-07-09 PROCEDURE — 96365 THER/PROPH/DIAG IV INF INIT: CPT | Mod: 59

## 2024-07-09 PROCEDURE — 36415 COLL VENOUS BLD VENIPUNCTURE: CPT | Performed by: EMERGENCY MEDICINE

## 2024-07-09 PROCEDURE — 93005 ELECTROCARDIOGRAM TRACING: CPT | Performed by: EMERGENCY MEDICINE

## 2024-07-09 PROCEDURE — 99285 EMERGENCY DEPT VISIT HI MDM: CPT | Mod: 25

## 2024-07-09 PROCEDURE — 84484 ASSAY OF TROPONIN QUANT: CPT | Performed by: EMERGENCY MEDICINE

## 2024-07-09 PROCEDURE — 96375 TX/PRO/DX INJ NEW DRUG ADDON: CPT

## 2024-07-09 PROCEDURE — 85027 COMPLETE CBC AUTOMATED: CPT | Performed by: EMERGENCY MEDICINE

## 2024-07-09 PROCEDURE — 80053 COMPREHEN METABOLIC PANEL: CPT | Performed by: EMERGENCY MEDICINE

## 2024-07-09 ASSESSMENT — COLUMBIA-SUICIDE SEVERITY RATING SCALE - C-SSRS
1. IN THE PAST MONTH, HAVE YOU WISHED YOU WERE DEAD OR WISHED YOU COULD GO TO SLEEP AND NOT WAKE UP?: NO
2. HAVE YOU ACTUALLY HAD ANY THOUGHTS OF KILLING YOURSELF IN THE PAST MONTH?: NO
6. HAVE YOU EVER DONE ANYTHING, STARTED TO DO ANYTHING, OR PREPARED TO DO ANYTHING TO END YOUR LIFE?: NO

## 2024-07-10 ENCOUNTER — APPOINTMENT (OUTPATIENT)
Dept: CT IMAGING | Facility: CLINIC | Age: 24
End: 2024-07-10
Attending: EMERGENCY MEDICINE
Payer: COMMERCIAL

## 2024-07-10 VITALS
OXYGEN SATURATION: 96 % | BODY MASS INDEX: 43.8 KG/M2 | HEART RATE: 61 BPM | DIASTOLIC BLOOD PRESSURE: 67 MMHG | WEIGHT: 232 LBS | HEIGHT: 61 IN | RESPIRATION RATE: 16 BRPM | TEMPERATURE: 98.1 F | SYSTOLIC BLOOD PRESSURE: 120 MMHG

## 2024-07-10 PROBLEM — L03.311 CELLULITIS OF LEFT ABDOMINAL WALL: Status: ACTIVE | Noted: 2024-07-10

## 2024-07-10 LAB
ALBUMIN SERPL BCG-MCNC: 3.7 G/DL (ref 3.5–5.2)
ALBUMIN UR-MCNC: 10 MG/DL
ALP SERPL-CCNC: 71 U/L (ref 40–150)
ALT SERPL W P-5'-P-CCNC: 50 U/L (ref 0–70)
ANION GAP SERPL CALCULATED.3IONS-SCNC: 8 MMOL/L (ref 7–15)
APPEARANCE UR: CLEAR
AST SERPL W P-5'-P-CCNC: 22 U/L (ref 0–45)
ATRIAL RATE - MUSE: 83 BPM
BACTERIA #/AREA URNS HPF: ABNORMAL /HPF
BASOPHILS # BLD MANUAL: 0 10E3/UL (ref 0–0.2)
BASOPHILS NFR BLD MANUAL: 0 %
BILIRUB SERPL-MCNC: 0.2 MG/DL
BILIRUB UR QL STRIP: NEGATIVE
BUN SERPL-MCNC: 13.2 MG/DL (ref 6–20)
CALCIUM SERPL-MCNC: 9.2 MG/DL (ref 8.6–10)
CHLORIDE SERPL-SCNC: 105 MMOL/L (ref 98–107)
COLOR UR AUTO: YELLOW
CREAT SERPL-MCNC: 0.86 MG/DL (ref 0.51–1.17)
DEPRECATED HCO3 PLAS-SCNC: 27 MMOL/L (ref 22–29)
DIASTOLIC BLOOD PRESSURE - MUSE: NORMAL MMHG
EGFRCR SERPLBLD CKD-EPI 2021: >90 ML/MIN/1.73M2
EOSINOPHIL # BLD MANUAL: 0.2 10E3/UL (ref 0–0.7)
EOSINOPHIL NFR BLD MANUAL: 1 %
ERYTHROCYTE [DISTWIDTH] IN BLOOD BY AUTOMATED COUNT: 14.6 % (ref 10–15)
GLUCOSE SERPL-MCNC: 95 MG/DL (ref 70–99)
GLUCOSE UR STRIP-MCNC: NEGATIVE MG/DL
HCT VFR BLD AUTO: 43.9 % (ref 35–53)
HGB BLD-MCNC: 14.3 G/DL (ref 11.7–17.7)
HGB UR QL STRIP: NEGATIVE
INTERPRETATION ECG - MUSE: NORMAL
KETONES UR STRIP-MCNC: NEGATIVE MG/DL
LEUKOCYTE ESTERASE UR QL STRIP: ABNORMAL
LYMPHOCYTES # BLD MANUAL: 4.9 10E3/UL (ref 0.8–5.3)
LYMPHOCYTES NFR BLD MANUAL: 27 %
MCH RBC QN AUTO: 29.5 PG (ref 26.5–33)
MCHC RBC AUTO-ENTMCNC: 32.6 G/DL (ref 31.5–36.5)
MCV RBC AUTO: 91 FL (ref 78–100)
MONOCYTES # BLD MANUAL: 0.9 10E3/UL (ref 0–1.3)
MONOCYTES NFR BLD MANUAL: 5 %
MUCOUS THREADS #/AREA URNS LPF: PRESENT /LPF
NEUTROPHILS # BLD MANUAL: 12.1 10E3/UL (ref 1.6–8.3)
NEUTROPHILS NFR BLD MANUAL: 67 %
NITRATE UR QL: NEGATIVE
NRBC # BLD AUTO: 0 10E3/UL
NRBC BLD AUTO-RTO: 0 /100
P AXIS - MUSE: 37 DEGREES
PH UR STRIP: 5.5 [PH] (ref 5–7)
PLAT MORPH BLD: ABNORMAL
PLATELET # BLD AUTO: 330 10E3/UL (ref 150–450)
POTASSIUM SERPL-SCNC: 3.9 MMOL/L (ref 3.4–5.3)
PR INTERVAL - MUSE: 128 MS
PROT SERPL-MCNC: 6.8 G/DL (ref 6.4–8.3)
QRS DURATION - MUSE: 80 MS
QT - MUSE: 356 MS
QTC - MUSE: 418 MS
R AXIS - MUSE: 23 DEGREES
RBC # BLD AUTO: 4.84 10E6/UL (ref 3.8–5.9)
RBC MORPH BLD: ABNORMAL
RBC URINE: 1 /HPF
SODIUM SERPL-SCNC: 140 MMOL/L (ref 135–145)
SP GR UR STRIP: 1.03 (ref 1–1.03)
SQUAMOUS EPITHELIAL: 2 /HPF
SYSTOLIC BLOOD PRESSURE - MUSE: NORMAL MMHG
T AXIS - MUSE: -4 DEGREES
TROPONIN T SERPL HS-MCNC: <6 NG/L
UROBILINOGEN UR STRIP-MCNC: 2 MG/DL
VENTRICULAR RATE- MUSE: 83 BPM
WBC # BLD AUTO: 18.1 10E3/UL (ref 4–11)
WBC URINE: 7 /HPF

## 2024-07-10 PROCEDURE — 74176 CT ABD & PELVIS W/O CONTRAST: CPT

## 2024-07-10 PROCEDURE — 87086 URINE CULTURE/COLONY COUNT: CPT | Performed by: EMERGENCY MEDICINE

## 2024-07-10 PROCEDURE — 250N000011 HC RX IP 250 OP 636: Performed by: EMERGENCY MEDICINE

## 2024-07-10 PROCEDURE — 81001 URINALYSIS AUTO W/SCOPE: CPT | Performed by: EMERGENCY MEDICINE

## 2024-07-10 PROCEDURE — 250N000013 HC RX MED GY IP 250 OP 250 PS 637: Performed by: EMERGENCY MEDICINE

## 2024-07-10 PROCEDURE — 71275 CT ANGIOGRAPHY CHEST: CPT

## 2024-07-10 RX ORDER — HYDROMORPHONE HYDROCHLORIDE 1 MG/ML
0.5 INJECTION, SOLUTION INTRAMUSCULAR; INTRAVENOUS; SUBCUTANEOUS
Status: COMPLETED | OUTPATIENT
Start: 2024-07-10 | End: 2024-07-10

## 2024-07-10 RX ORDER — OXYCODONE HYDROCHLORIDE 5 MG/1
10 TABLET ORAL ONCE
Status: COMPLETED | OUTPATIENT
Start: 2024-07-10 | End: 2024-07-10

## 2024-07-10 RX ORDER — OXYCODONE HYDROCHLORIDE 5 MG/1
5 TABLET ORAL EVERY 6 HOURS PRN
Qty: 12 TABLET | Refills: 0 | Status: SHIPPED | OUTPATIENT
Start: 2024-07-10 | End: 2024-07-13

## 2024-07-10 RX ORDER — HYDROMORPHONE HCL IN WATER/PF 6 MG/30 ML
0.2 PATIENT CONTROLLED ANALGESIA SYRINGE INTRAVENOUS
Status: COMPLETED | OUTPATIENT
Start: 2024-07-10 | End: 2024-07-10

## 2024-07-10 RX ORDER — CEFAZOLIN SODIUM 2 G/100ML
2 INJECTION, SOLUTION INTRAVENOUS ONCE
Status: COMPLETED | OUTPATIENT
Start: 2024-07-10 | End: 2024-07-10

## 2024-07-10 RX ORDER — IOPAMIDOL 755 MG/ML
75 INJECTION, SOLUTION INTRAVASCULAR ONCE
Status: COMPLETED | OUTPATIENT
Start: 2024-07-10 | End: 2024-07-10

## 2024-07-10 RX ADMIN — CEFAZOLIN SODIUM 2 G: 2 INJECTION, SOLUTION INTRAVENOUS at 02:29

## 2024-07-10 RX ADMIN — HYDROMORPHONE HYDROCHLORIDE 0.5 MG: 1 INJECTION, SOLUTION INTRAMUSCULAR; INTRAVENOUS; SUBCUTANEOUS at 01:11

## 2024-07-10 RX ADMIN — OXYCODONE 10 MG: 5 TABLET ORAL at 02:29

## 2024-07-10 RX ADMIN — IOPAMIDOL 75 ML: 755 INJECTION, SOLUTION INTRAVENOUS at 01:02

## 2024-07-10 RX ADMIN — HYDROMORPHONE HYDROCHLORIDE 0.2 MG: 0.2 INJECTION, SOLUTION INTRAMUSCULAR; INTRAVENOUS; SUBCUTANEOUS at 00:38

## 2024-07-10 ASSESSMENT — ENCOUNTER SYMPTOMS
SHORTNESS OF BREATH: 1
BACK PAIN: 1

## 2024-07-10 ASSESSMENT — ACTIVITIES OF DAILY LIVING (ADL)
ADLS_ACUITY_SCORE: 36

## 2024-07-10 NOTE — ED NOTES
AIDET performed, white board updated for rounding. Patient updated on plan of care. Patient's pain assessed. Call light within reach, bed in low position, side rails up. Visitor at bedside: 1 (Mother)    Attempted peripheral IV x 1 unsuccessful.

## 2024-07-10 NOTE — ED TRIAGE NOTES
PT is coming in tonight after seeing his PCP today in clinic for chest pain. PCP did labs and called to say d-dimer was elevated to   3.0 and WBC was 16.5. PCP wanted PT have a CT scan done. PT had hernia surgery on 7/2/24.          Triage Assessment (Adult)       Row Name 07/09/24 2318          Triage Assessment    Airway WDL WDL        Respiratory WDL    Respiratory WDL rhythm/pattern     Rhythm/Pattern, Respiratory shortness of breath        Cardiac WDL    Cardiac WDL chest pain        Chest Pain Assessment    Chest Pain Location midsternal     Chest Pain Radiation shoulder;neck        Peripheral/Neurovascular WDL    Peripheral Neurovascular WDL WDL        Cognitive/Neuro/Behavioral WDL    Cognitive/Neuro/Behavioral WDL WDL

## 2024-07-10 NOTE — ED PROVIDER NOTES
NAME: Yolanda Vee  AGE: 24 year old adult  YOB: 2000  MRN: [id number]  EVALUATION DATE & TIME: 2024 11:28 PM    PCP: Litzy Corona    ED PROVIDER: Shaan Leo M.D.      Chief Complaint   Patient presents with    Abnormal Labs         FINAL IMPRESSION:  1. Cellulitis of left abdominal wall    2. Pleuritic chest pain        MEDICAL DECISION MAKIN:33 PM I met with the patient, obtained history, performed an initial exam, and discussed options and plan for diagnostics and treatment here in the ED.   Patient was clinically assessed and consented to treatment. After assessment, medical decision making and workup were discussed with the patient. The patient was agreeable to plan for testing, workup, and treatment.  Pertinent Labs & Imaging studies reviewed. (See chart for details)       Medical Decision Making  Obtained supplemental history:Supplemental history obtained?: Documented in chart  Reviewed external records: External records reviewed?: Documented in chart  Care impacted by chronic illness:Mental Health  Care significantly affected by social determinants of health:Access to Medical Care  Did you consider but not order tests?: Work up considered but not performed and documented in chart, if applicable  Did you interpret images independently?: Independent interpretation of ECG and images noted in documentation, when applicable.  Consultation discussion with other provider:Did you involve another provider (consultant, , pharmacy, etc.)?: No  Discharge. I prescribed additional prescription strength medication(s) as charted. See documentation for any additional details.    Yolanda Vee is a 24 year old adult who presents with abnormal labs and chest pain.   Differential diagnosis includes but not limited to pleuritis, aspiration pneumonia, atelectasis, postoperative pain, pulmonary embolism, acute coronary syndrome, postoperative infection.  Patient is   recovering from recent hernia surgery as well as Nissen fundoplication.  Patient has been recovering well but having intermittent pleuritic chest pain since surgery which could be just postoperative and referred from diaphragmatic irritation following the Nissen fundoplication.  Patient had labs and chest x-ray done from clinic and concerning for elevated D-dimer was sent for the ER.  Patient's labs so far showed elevated white blood cell count earlier today of 16 and now 18 here in the ER.  Additionally D-dimer elevated in the clinic will plan for CTA.  Other labs unremarkable for elevated troponin or acute abnormality of EKG.  Pain medication given to help with some of the pleuritic pain intermittently and patient sent for CTA which was negative for any pulmonary embolism or acute effusion but did show some postoperative air in the mediastinum following the fundoplication repair which is likely consistent with postoperative as stated and could be causing some pleuritic discomfort intermittently.  Additionally CT of the abdomen was obtained as she did have some redness over the left trocar site which was found to be cellulitis likely and  prescription for Keflex had already been called in by clinic physician but will give patient a dose of Ancef here.  No sign findings of intra-abdominal infection or wall abscess following discussion with patient she will be discharged home to continue the antibiotics as prescribed and follow-up with her surgeon.    0 minutes of critical care time    MEDICATIONS GIVEN IN THE EMERGENCY:  Medications   HYDROmorphone (DILAUDID) injection 0.2 mg (0.2 mg Intravenous $Given 7/10/24 0038)   iopamidol (ISOVUE-370) solution 75 mL (75 mLs Intravenous $Given 7/10/24 0102)   HYDROmorphone (PF) (DILAUDID) injection 0.5 mg (0.5 mg Intravenous $Given 7/10/24 0111)   ceFAZolin (ANCEF) 2 g in 100 mL D5W intermittent infusion (0 g Intravenous Stopped 7/10/24 0250)   oxyCODONE (ROXICODONE) tablet 10  mg (10 mg Oral $Given 7/10/24 0229)       NEW PRESCRIPTIONS STARTED AT TODAY'S ER VISIT:  Discharge Medication List as of 7/10/2024  2:50 AM        START taking these medications    Details   oxyCODONE (ROXICODONE) 5 MG tablet Take 1 tablet (5 mg) by mouth every 6 hours as needed for pain, Disp-12 tablet, R-0, Local Print                =================================================================    HPI    Patient information was obtained from: Patient    Use of : N/A      Yolanda Vee is a 24 year old adult with a past medical history of seizures, anxiety, and PTSD, who presents with an abnormal lab.    Per chart review, the patient saw her primary care provider in office today. He was reporting chest pain and mild shortness of breath.  He had a D-dimer done which was 3.01 and WBC of 16.5.  He was referred to the ED for CT scan. She did have a Keflex script ordered.  He also was seen here on 7/2/24 for a hital hernia repair by Dr. Preciado.      The patient reports here after having a elevated d-dimer in clinic today.  He has had chest pain, upper back pain, and mild shortness of breath today.  The patient does have a history of asthma but has not been wheezing lately.  The patient did have a CXR in office earlier tonight, which was negative.   The patient does note some mild redness to abdominal surgical wound and is seeing the surgeon on 7/11/24 for follow-up.The patient has not been taking antibiotics.  No other complaints at this time.      REVIEW OF SYSTEMS   Review of Systems   Respiratory:  Positive for shortness of breath.    Cardiovascular:  Positive for chest pain.   Musculoskeletal:  Positive for back pain (upper).   Skin:         Positive for surgical wound to abdomen with mild redness   All other systems reviewed and are negative.       PAST MEDICAL HISTORY:  Past Medical History:   Diagnosis Date    Anxiety     therapist: [facility]    Chemical dependency (H)     quit Sept 2021,  alcohol. some family hx as well.     Chronic constipation     Constipation     Depression     Esophageal reflux     controlled well on protonix currently, EGD 6/3/21 w/ some mild esophagitis when alcohol intake was higher (bottle vodka daily).    Heart rate problem     some palpitations in the past w/ anxiety attacks.    Hiatal hernia     Migraines     occ tylenol use.    Morbid obesity (H) 03/06/2020    Obese     Palpitations     Plantar warts     PTSD (post-traumatic stress disorder)     Seizures (H)     had when she was 15    Uncomplicated asthma        PAST SURGICAL HISTORY:  Past Surgical History:   Procedure Laterality Date    COLONOSCOPY N/A 06/03/2021    Procedure: COLONOSCOPY;  Surgeon: Guru Gerardo Prado MD;  Location: [facility]    ESOPHAGOSCOPY, GASTROSCOPY, DUODENOSCOPY (EGD), COMBINED N/A 06/03/2021    Procedure: ESOPHAGOGASTRODUODENOSCOPY (EGD);  Surgeon: Guru Gerardo Prado MD;  Location: [facility]    ESOPHAGOSCOPY, GASTROSCOPY, DUODENOSCOPY (EGD), COMBINED N/A 06/23/2022    Procedure: ESOPHAGOGASTRODUODENOSCOPY (EGD);  Surgeon: Amanda Linares MD;  Location: [facility]    ESOPHAGOSCOPY, GASTROSCOPY, DUODENOSCOPY (EGD), COMBINED N/A 02/16/2023    Procedure: ESOPHAGOGASTRODUODENOSCOPY, WITH BIOPSY;  Surgeon: Alma Velasquez MD;  Location: [facility]    ESOPHAGOSCOPY, GASTROSCOPY, DUODENOSCOPY (EGD), COMBINED N/A 04/09/2024    Procedure: ESOPHAGOGASTRODUODENOSCOPY, WITH BIOPSY;  Surgeon: Patrick Preciado DO;  Location: [facility]    LAPAROSCOPIC NISSEN FUNDOPLICATION N/A 7/2/2024    Procedure: FUNDOPLICATION, partial with hiatal hernia repair with mesh, ROBOT-ASSISTED, LAPAROSCOPIC, USING DA ESPERANZA XI;  Surgeon: Patrick Preciado DO;  Location: [facility]    MASTECTOMY, BILATERAL Bilateral 08/28/2023    wisdom teeth         CURRENT MEDICATIONS:    No current facility-administered medications for this encounter.    Current Outpatient Medications:      "oxyCODONE (ROXICODONE) 5 MG tablet, Take 1 tablet (5 mg) by mouth every 6 hours as needed for pain, Disp: 12 tablet, Rfl: 0    albuterol (PROAIR HFA/PROVENTIL HFA/VENTOLIN HFA) 108 (90 Base) MCG/ACT inhaler, Inhale 2 puffs into the lungs every 6 hours as needed for shortness of breath / dyspnea or wheezing, Disp: 18 g, Rfl: 0    B-D SYRINGE LUER-SANTA 1 ML MISC, , Disp: , Rfl:     BD DISP NEEDLES 25G X 5/8\" MISC, , Disp: , Rfl:     docusate sodium (COLACE) 100 MG capsule, Take 1 capsule (100 mg) by mouth 2 times daily, Disp: 30 capsule, Rfl: 0    escitalopram (LEXAPRO) 20 MG tablet, Take 20 mg by mouth daily, Disp: , Rfl:     famotidine (PEPCID) 20 MG tablet, Take 1 tablet (20 mg) by mouth At Bedtime, Disp: 60 tablet, Rfl: 3    gabapentin (NEURONTIN) 100 MG capsule, Take 1 capsule (100 mg) by mouth 3 times daily, Disp: 30 capsule, Rfl: 0    prazosin (MINIPRESS) 1 MG capsule, Take 1 mg by mouth at bedtime, Disp: , Rfl:     Vitamin D, Cholecalciferol, 25 MCG (1000 UT) TABS, Take 1,000 Units by mouth every morning, Disp: , Rfl:     vitamin D3 (CHOLECALCIFEROL) 1.25 MG (09394 UT) capsule, Take 1 capsule (50,000 Units) by mouth once a week WAIT TO START UNTIL AFTER SURGERY AND WHEN OK WITH SURGEON, Disp: 8 capsule, Rfl: 0    ALLERGIES:  Allergies   Allergen Reactions    Ibuprofen Hives     Throat gets itchy and sore    Hydrocodone-Acetaminophen Nausea    Penicillins        FAMILY HISTORY:  Family History   Problem Relation Age of Onset    Heart Disease Mother     Substance Abuse Mother         in remission    Substance Abuse Father     Alcoholism Father     Bipolar Disorder Brother     Depression Brother     Anxiety Disorder Brother     Hypertension Maternal Grandmother     Arthritis Maternal Grandmother     Heart Disease Maternal Grandmother     Hodgkin's lymphoma Maternal Grandfather 26    Esophageal Cancer Maternal Grandfather          at 54.    Heart Disease Maternal Uncle     Heart Disease Maternal Uncle     Acute " Myocardial Infarction No family hx of     Anesthesia Reaction No family hx of     Deep Vein Thrombosis (DVT) No family hx of        SOCIAL HISTORY:   Social History     Socioeconomic History    Marital status: Single   Tobacco Use    Smoking status: Some Days     Current packs/day: 1.00     Average packs/day: 1 pack/day for 5.0 years (5.0 ttl pk-yrs)     Types: Cigarettes    Smokeless tobacco: Never    Tobacco comments:     Pt does not smoke when it is cold outside   Vaping Use    Vaping status: Some Days    Substances: Nicotine, THC   Substance and Sexual Activity    Alcohol use: Yes     Alcohol/week: 6.0 - 8.0 standard drinks of alcohol     Types: 6 - 8 Standard drinks or equivalent per week     Comment: 5/week    Drug use: Yes     Types: Marijuana     Comment: Smoking PRN    Sexual activity: Not Currently     Birth control/protection: Injection     Social Determinants of Health     Financial Resource Strain: Low Risk  (3/4/2024)    Financial Resource Strain     Within the past 12 months, have you or your family members you live with been unable to get utilities (heat, electricity) when it was really needed?: No   Food Insecurity: Low Risk  (3/4/2024)    Food Insecurity     Within the past 12 months, did you worry that your food would run out before you got money to buy more?: No     Within the past 12 months, did the food you bought just not last and you didn t have money to get more?: No   Transportation Needs: Low Risk  (3/4/2024)    Transportation Needs     Within the past 12 months, has lack of transportation kept you from medical appointments, getting your medicines, non-medical meetings or appointments, work, or from getting things that you need?: No   Physical Activity: Unknown (3/4/2024)    Exercise Vital Sign     Days of Exercise per Week: 1 day   Stress: Stress Concern Present (3/4/2024)    [employer] [place] [employer] - Occupational Stress Questionnaire     Feeling of Stress : Rather much  "  Social Connections: Unknown (3/4/2024)    Social Connection and Isolation Panel [NHANES]     Frequency of Social Gatherings with Friends and Family: Never   Interpersonal Safety: Unknown (6/26/2024)    Received from [facility]    Humiliation, Afraid, Rape, and Kick questionnaire     Emotionally Abused: Patient declined     Physically Abused: Patient declined     Sexually Abused: Patient declined   Housing Stability: Low Risk  (3/4/2024)    Housing Stability     Do you have housing? : Yes     Are you worried about losing your housing?: No       PHYSICAL EXAM:    Vitals: /67   Pulse 61   Temp 98.1  F (36.7  C) (Oral)   Resp 16   Ht 1.549 m (5' 1\")   Wt 105.2 kg (232 lb)   SpO2 96%   BMI 43.84 kg/m     Physical Exam  Vitals and nursing note reviewed.   Constitutional:       General: He is not in acute distress.     Appearance: Normal appearance. He is obese. He is not ill-appearing or toxic-appearing.   HENT:      Head: Normocephalic.   Cardiovascular:      Rate and Rhythm: Normal rate and regular rhythm.      Heart sounds: Normal heart sounds.   Pulmonary:      Effort: Pulmonary effort is normal. No respiratory distress.      Breath sounds: Normal breath sounds. No stridor. No wheezing, rhonchi or rales.   Chest:      Chest wall: No tenderness.   Abdominal:      General: Abdomen is flat. There is no distension.      Palpations: Abdomen is soft. There is no mass.      Tenderness: There is abdominal tenderness (At surgical trocar sites). There is no right CVA tenderness, left CVA tenderness or guarding.      Hernia: No hernia is present.       Musculoskeletal:         General: No tenderness.      Cervical back: Normal range of motion.      Right lower leg: No edema.      Left lower leg: No edema.   Skin:     General: Skin is warm and dry.      Coloration: Skin is not jaundiced.   Neurological:      General: No focal deficit present.      Mental Status: He is alert.   Psychiatric:         Behavior: " Behavior normal.           LAB:  All pertinent labs reviewed and interpreted.  Labs Ordered and Resulted from Time of ED Arrival to Time of ED Departure   CBC WITH PLATELETS AND DIFFERENTIAL - Abnormal       Result Value    WBC Count 18.1 (*)     RBC Count 4.84      Hemoglobin 14.3      Hematocrit 43.9      MCV 91      MCH 29.5      MCHC 32.6      RDW 14.6      Platelet Count 330      NRBCs per 100 WBC 0      Absolute NRBCs 0.0     MANUAL DIFFERENTIAL - Abnormal    % Neutrophils 67      % Lymphocytes 27      % Monocytes 5      % Eosinophils 1      % Basophils 0      Absolute Neutrophils 12.1 (*)     Absolute Lymphocytes 4.9      Absolute Monocytes 0.9      Absolute Eosinophils 0.2      Absolute Basophils 0.0      RBC Morphology Confirmed RBC Indices      Platelet Assessment        Value: Automated Count Confirmed. Platelet morphology is normal.   ROUTINE UA WITH MICROSCOPIC REFLEX TO CULTURE - Abnormal    Color Urine Yellow      Appearance Urine Clear      Glucose Urine Negative      Bilirubin Urine Negative      Ketones Urine Negative      Specific Gravity Urine 1.035 (*)     Blood Urine Negative      pH Urine 5.5      Protein Albumin Urine 10 (*)     Urobilinogen Urine 2.0 (*)     Nitrite Urine Negative      Leukocyte Esterase Urine 250 Salvador/uL (*)     Bacteria Urine Few (*)     Mucus Urine Present (*)     RBC Urine 1      WBC Urine 7 (*)     Squamous Epithelials Urine 2 (*)    COMPREHENSIVE METABOLIC PANEL - Normal    Sodium 140      Potassium 3.9      Carbon Dioxide (CO2) 27      Anion Gap 8      Urea Nitrogen 13.2      Creatinine 0.86      GFR Estimate >90      Calcium 9.2      Chloride 105      Glucose 95      Alkaline Phosphatase 71      AST 22      ALT 50      Protein Total 6.8      Albumin 3.7      Bilirubin Total 0.2     TROPONIN T, HIGH SENSITIVITY - Normal    Troponin T, High Sensitivity <6     URINE CULTURE       RADIOLOGY:  CT Abdomen Pelvis w/o Contrast   Final Result   IMPRESSION:    1.  Mild fat  stranding in the anterior abdominal wall from recent laparoscopic surgery. No fluid collections.         CT Chest Pulmonary Embolism w Contrast   Final Result   IMPRESSION:      1.  No acute findings in the chest, including pulmonary embolism or pneumonia.      2.  Postsurgical changes of recent Nissen fundoplication with punctate postoperative gas in the mediastinum.          EKG:   Performed at: 11:19 PM on July 9, 2024  Impression: Sinus rhythm with sinus arrhythmia, mild LVH, no signs of acute ST elevation ischemia, no atrial fibrillation.  Rate: 83 bpm  Rhythm: Sinus rhythm  QRS Interval: 80 ms  QTc Interval: 418 ms  Comparison: Comparison prior EKG from July 5, 2020 with no acute ischemic changes.  I have independently reviewed and interpreted the EKG(s) documented above.     PROCEDURES:   Procedures       I, Cleve Mann, am serving as a scribe to document services personally performed by Dr. Shaan Leo  based on my observation and the provider's statements to me. I, Shaan Leo MD attest that Cleve Mann is acting in a scribe capacity, has observed my performance of the services and has documented them in accordance with my direction.      Shaan Leo M.D.  Emergency Medicine  [facility] Emergency Department      Shaan Leo MD  07/10/24 0356

## 2024-07-10 NOTE — DISCHARGE INSTRUCTIONS
Commend continuing recovery from surgery and  the prescription for cephalexin written on July 9.  You did receive a dose of antibiotics in the ER.  Continue the cephalexin as prescribed.  Recommend follow-up with general surgery for wound check of the area of redness on the left side.  Continue to monitor the sharp chest pain however  expect this to improve as surgical sites heal.

## 2024-07-11 ENCOUNTER — OFFICE VISIT (OUTPATIENT)
Dept: SURGERY | Facility: CLINIC | Age: 24
End: 2024-07-11
Payer: COMMERCIAL

## 2024-07-11 VITALS — HEIGHT: 61 IN | BODY MASS INDEX: 43.43 KG/M2 | WEIGHT: 230 LBS

## 2024-07-11 DIAGNOSIS — Z98.890 POST-OPERATIVE STATE: Primary | ICD-10-CM

## 2024-07-11 LAB — BACTERIA UR CULT: NORMAL

## 2024-07-11 PROCEDURE — 99024 POSTOP FOLLOW-UP VISIT: CPT | Performed by: SURGERY

## 2024-07-11 RX ORDER — CEPHALEXIN 500 MG/1
CAPSULE ORAL
COMMUNITY
Start: 2024-07-09 | End: 2024-08-15

## 2024-07-11 RX ORDER — POLYETHYLENE GLYCOL 3350 17 G/17G
POWDER, FOR SOLUTION ORAL
COMMUNITY
Start: 2024-07-09

## 2024-07-11 NOTE — PROGRESS NOTES
"     HPI: Yolanda Vee is here for follow up after her hiatal hernia repair.  She is doing relatively tolerating liquids.  However for what ever reason, she had  her blood drawn as an outpatient and was told to go to the emergency room.  At that time CT scan was done which demonstrated intact partial fundoplication.  She denies any fevers, chills or any other symptoms.    Allergies, Medications, Social History, Past Medical History and Past Surgical History were reviewed and are noted in the chart.    Ht 1.549 m (5' 1\")   Wt 104.3 kg (230 lb)   BMI 43.46 kg/m    Body mass index is 43.46 kg/m .      EXAM:   GENERAL: Appears well  Abdomen-well-healed port site incisions with Steri-Strips intact    Incision/Surgical Site 07/02/24 Lower Abdomen (Active)       Assessment/Plan: Yolanda Vee continues to do well. His wound is healing and overall progressing well.  I will have her continue with her liquid diet and slowly advance to protein shakes over the next several weeks.  She will follow-up with me in 2 weeks for ongoing evaluation to ensure she is progressing well.        Gian Preciado DO [facility]  [phone]  "

## 2024-07-11 NOTE — LETTER
"7/11/2024      Yolanda Vee  [address]  [place] [address]      Dear Colleague,    Thank you for referring your patient, Yolanda Vee, to the [facility] [place]. Please see a copy of my visit note below.         HPI: Yolanda Vee is here for follow up after her hiatal hernia repair.  She is doing relatively tolerating liquids.  However for what ever reason, she had  her blood drawn as an outpatient and was told to go to the emergency room.  At that time CT scan was done which demonstrated intact partial fundoplication.  She denies any fevers, chills or any other symptoms.    Allergies, Medications, Social History, Past Medical History and Past Surgical History were reviewed and are noted in the chart.    Ht 1.549 m (5' 1\")   Wt 104.3 kg (230 lb)   BMI 43.46 kg/m    Body mass index is 43.46 kg/m .      EXAM:   GENERAL: Appears well  Abdomen-well-healed port site incisions with Steri-Strips intact    Incision/Surgical Site 07/02/24 Lower Abdomen (Active)       Assessment/Plan: Yolanda Vee continues to do well. His wound is healing and overall progressing well.  I will have her continue with her liquid diet and slowly advance to protein shakes over the next several weeks.  She will follow-up with me in 2 weeks for ongoing evaluation to ensure she is progressing well.        Gian Preciado DO, FACS  [facility] Surgery  [phone]      Again, thank you for allowing me to participate in the care of your patient.        Sincerely,        Patrick Preciado, DO  "

## 2024-08-01 ENCOUNTER — OFFICE VISIT (OUTPATIENT)
Dept: SURGERY | Facility: CLINIC | Age: 24
End: 2024-08-01
Payer: COMMERCIAL

## 2024-08-01 VITALS — HEIGHT: 61 IN | WEIGHT: 226 LBS | BODY MASS INDEX: 42.67 KG/M2

## 2024-08-01 DIAGNOSIS — Z98.890 POST-OPERATIVE STATE: Primary | ICD-10-CM

## 2024-08-01 PROCEDURE — 99024 POSTOP FOLLOW-UP VISIT: CPT | Performed by: SURGERY

## 2024-08-01 NOTE — LETTER
"8/1/2024      Yolanda Vee  [address]  [place] [address]      Dear Colleague,    Thank you for referring your patient, Yolanda Vee, to the [facility]RICS [facility] [place]. Please see a copy of my visit note below.     HPI: Yolanda Vee is here for follow up to ensure that he is improving in terms of her diet.  he is able to tolerate protein shakes at this point but still has difficult time with solid foods.    Allergies, Medications, Social History, Past Medical History and Past Surgical History were reviewed and are noted in the chart.    Ht 1.549 m (5' 1\")   Wt 102.5 kg (226 lb)   BMI 42.70 kg/m    Body mass index is 42.7 kg/m .      EXAM:   GENERAL: Appears well      Incision/Surgical Site 07/02/24 Lower Abdomen (Active)   Incision Assessment WDL 07/02/24 1604   Dressing Other (Comment) 07/02/24 1420   Incision Drainage Amount None 07/02/24 1604   Incision Care Ice applied 07/02/24 1604   Dressing Intervention Clean, dry, intact 07/02/24 1604       Assessment/Plan: Yolanda Vee continues to do well.  At this point I will have him continue with dietary management strategies.  He will follow-up with me in the next 2 weeks for ongoing evaluation.  We will slowly advance to scrambled eggs and soft noodles for the time being.        Gian Preciado DO, FACS  [facility]  [phone]      Again, thank you for allowing me to participate in the care of your patient.        Sincerely,        Patrick Preciado, DO  "

## 2024-08-12 NOTE — PROGRESS NOTES
" HPI: Yolanda Vee is here for follow up to ensure that he is improving in terms of her diet.  he is able to tolerate protein shakes at this point but still has difficult time with solid foods.    Allergies, Medications, Social History, Past Medical History and Past Surgical History were reviewed and are noted in the chart.    Ht 1.549 m (5' 1\")   Wt 102.5 kg (226 lb)   BMI 42.70 kg/m    Body mass index is 42.7 kg/m .      EXAM:   GENERAL: Appears well      Incision/Surgical Site 07/02/24 Lower Abdomen (Active)   Incision Assessment WDL 07/02/24 1604   Dressing Other (Comment) 07/02/24 1420   Incision Drainage Amount None 07/02/24 1604   Incision Care Ice applied 07/02/24 1604   Dressing Intervention Clean, dry, intact 07/02/24 1604       Assessment/Plan: Yolanda Vee continues to do well.  At this point I will have him continue with dietary management strategies.  He will follow-up with me in the next 2 weeks for ongoing evaluation.  We will slowly advance to scrambled eggs and soft noodles for the time being.        Gian Preciado DO [facility] Surgery  [phone]  "

## 2024-08-15 ENCOUNTER — OFFICE VISIT (OUTPATIENT)
Dept: SURGERY | Facility: CLINIC | Age: 24
End: 2024-08-15
Payer: COMMERCIAL

## 2024-08-15 DIAGNOSIS — Z98.890 POST-OPERATIVE STATE: Primary | ICD-10-CM

## 2024-08-15 PROCEDURE — 99024 POSTOP FOLLOW-UP VISIT: CPT | Performed by: SURGERY

## 2024-08-21 ENCOUNTER — TELEPHONE (OUTPATIENT)
Dept: SURGERY | Facility: CLINIC | Age: 24
End: 2024-08-21
Payer: COMMERCIAL

## 2024-08-21 NOTE — TELEPHONE ENCOUNTER
Spoke with Kem who has concerns with difficulty eating the past week. She had a burrito bowl without meat and experienced regurgitation of the food and phlegm immediately after eating. She also reports an incident of throwing up after eating potatoes. Since both of these episodes, she complains of generalized abdominal pain, a burning sensation in her throat, and mild nausea. She states that she has been eating slowly, in small portions using a baby spoon, and chewing thoroughly. She is frustrated that there seems to be no pattern of what foods she has difficulty keeping down. Reviewed last office visit form 8/15/24 with her when she was tolerating noodles and pork. She reports having good days and bad days since her surgery on 7/2/24. She is no longer taking Pepcid. I recommended eating liquid or soft foods today that will not aggravate her symptoms. I will send a message to Dr. Preciado for further recommendations and call her back.

## 2024-11-10 NOTE — H&P (VIEW-ONLY)
Preoperative Evaluation  [facility]  [address]  [facility] [address]  Phone: [phone]  Fax: [phone]  Primary Provider: Litzy Corona MD  Pre-op Performing Provider: Litzy Corona MD  Jun 6, 2024 6/6/2024   Surgical Information   What procedure is being done? Repair of hernia and esophagus    Repair of hernia and esophagus   Facility or Hospital where procedure/surgery [facility] be performed[facility]   Who is doing the procedure / surgery? orion jean   Date of surgery / procedure: 7/2/24   Time of surgery / procedure: 9:05   Where do you plan to recover after surgery? at home with family     Fax number for surgical facility: Note does not need to be faxed, will be available electronically in Epic.    Assessment & Plan     The proposed surgical procedure is considered LOW risk.    Preop general physical exam  Surgical risks include tobacco use, alcohol use, obesity, controlled asthma.  He is at risk of alcohol withdrawal, would monitor for this postoperatively.  He may proceed with surgery as scheduled without further clinical clarification or evaluation and may proceed with choice of anesthesia.  Will check CBC and comprehensive metabolic panel prior to surgery.  - CBC with platelets; Future  - Comprehensive metabolic panel; Future    Gastroesophageal reflux disease with esophagitis without hemorrhage  Hiatal hernia  Inadequate controlled with medications.  To proceed with surgery.    Class 3 severe obesity with serious comorbidity and body mass index (BMI) of 45.0 to 49.9 in adult, unspecified obesity type (H)  Encourage continued efforts at healthy lifestyle habits.  Referral made to comprehensive weight management team.  - Adult Comprehensive Weight Management  Referral; Future    Alcohol use disorder, moderate, dependence (H)  Assistance with alcohol cessation declined.  Advised cessation.  Discussed risks of impaired healing  and recurrence of symptoms with ongoing alcohol use.    Marijuana use  Advise discontinuation.    Tobacco use  Advised discontinuation, he plans to quit over the next week and declined assistance.    Moderate episode of recurrent major depressive disorder (H)  PTSD (post-traumatic stress disorder)  Anxiety  Inadequate control.  Continue escitalopram.  Advised scheduling follow-up visit with psychiatry.    Intermittent asthma  Albuterol as needed, controlled.      - No identified additional risk factors other than previously addressed    Antiplatelet or Anticoagulation Medication Instructions   - Patient is on no antiplatelet or anticoagulation medications.    Additional Medication Instructions  Take all scheduled medications on the day of surgery    Recommendation  Approval given to proceed with proposed procedure, without further diagnostic evaluation.        Subjective   Kem is a 23 year old, presenting for the following:  Pre-Op Exam (pre op - hernia repair - [facility] - 7/2/24)    HPI related to upcoming procedure: Persisting reflux symptoms despite maximum doses of PPI and sucralfate, known hiatal hernia, previous esophagitis and duodenitis, requiring further treatments.  History of depression, anxiety, and PTSD, resumed Lexapro in March, due to follow-up with psychiatry but needing to reschedule.  Ongoing struggles with nightmares have not improved with prazosin, needing alternative options.  Asthma stable.        6/6/2024   Pre-Op Questionnaire   Have you ever had a heart attack or stroke? No   Have you ever had surgery on your heart or blood vessels, such as a stent placement, a coronary artery bypass, or surgery on an artery in your head, neck, heart, or legs? No   Do you have chest pain with activity? No   Do you have a history of heart failure? No   Do you currently have a cold, bronchitis or symptoms of other infection? No   Do you have a cough, shortness of breath, or wheezing? (!) YES when needing albuterol,  overall well-controlled   Do you or anyone in your family have previous history of blood clots? No   Do you or does anyone in your family have a serious bleeding problem such as prolonged bleeding following surgeries or cuts? No   Have you ever had problems with anemia or been told to take iron pills? No   Have you had any abnormal blood loss such as black, tarry or bloody stools, or abnormal vaginal bleeding? No   Have you ever had a blood transfusion? No   Are you willing to have a blood transfusion if it is medically needed before, during, or after your surgery? Yes   Have you or any of your relatives ever had problems with anesthesia? No   Do you have sleep apnea, excessive snoring or daytime drowsiness? No   Do you have any artifical heart valves or other implanted medical devices like a pacemaker, defibrillator, or continuous glucose monitor? No   Do you have artificial joints? No   Are you allergic to latex? No       Preoperative Review of    reviewed - controlled substances reflected in medication list.          Patient Active Problem List    Diagnosis Date Noted    Hiatal hernia 06/06/2024     Priority: Medium    Mild intermittent asthma without complication 06/06/2024     Priority: Medium    Gastroesophageal reflux disease with esophagitis without hemorrhage 01/24/2024     Priority: Medium    Gender dysphoria in adult 06/01/2022     Priority: Medium     Formatting of this note might be different from the original.  Gender Services consult on 5/31/2022      History of alcoholic gastritis 01/27/2022     Priority: Medium    Morbid obesity (H) 09/15/2021     Priority: Medium    Alcohol use disorder, moderate, dependence (H) 04/06/2021     Priority: Medium    Moderate episode of recurrent major depressive disorder (H) 02/25/2021     Priority: Medium    Marijuana use 02/25/2021     Priority: Medium    PTSD (post-traumatic stress disorder) 02/07/2021     Priority: Medium    Anxiety 12/01/2020     Priority:  Medium    Tobacco use 05/16/2019     Priority: Medium    Chronic idiopathic constipation 05/16/2019     Priority: Medium    Vitamin D deficiency 02/24/2015     Priority: Medium      Past Medical History:   Diagnosis Date    Anxiety     therapist: [facility]    Chemical dependency (H)     quit Sept 2021, alcohol. some family hx as well.     Chronic constipation     Constipation     Depression     Esophageal reflux     controlled well on protonix currently, EGD 6/3/21 w/ some mild esophagitis when alcohol intake was higher (bottle vodka daily).    Heart rate problem     some palpitations in the past w/ anxiety attacks.    Hiatal hernia     Migraines     occ tylenol use.    Morbid obesity (H) 03/06/2020    Obese     Palpitations     Plantar warts     PTSD (post-traumatic stress disorder)     Seizures (H)     had when she was 15    Uncomplicated asthma      Past Surgical History:   Procedure Laterality Date    COLONOSCOPY N/A 06/03/2021    Procedure: COLONOSCOPY;  Surgeon: Guru Gerardo Prado MD;  Location: [facility]    ESOPHAGOSCOPY, GASTROSCOPY, DUODENOSCOPY (EGD), COMBINED N/A 06/03/2021    Procedure: ESOPHAGOGASTRODUODENOSCOPY (EGD);  Surgeon: Guru Gerardo Prado MD;  Location: [facility]    ESOPHAGOSCOPY, GASTROSCOPY, DUODENOSCOPY (EGD), COMBINED N/A 06/23/2022    Procedure: ESOPHAGOGASTRODUODENOSCOPY (EGD);  Surgeon: Amanda Linares MD;  Location: [facility]    ESOPHAGOSCOPY, GASTROSCOPY, DUODENOSCOPY (EGD), COMBINED N/A 02/16/2023    Procedure: ESOPHAGOGASTRODUODENOSCOPY, WITH BIOPSY;  Surgeon: Alma Velasquez MD;  Location: [facility]    ESOPHAGOSCOPY, GASTROSCOPY, DUODENOSCOPY (EGD), COMBINED N/A 04/09/2024    Procedure: ESOPHAGOGASTRODUODENOSCOPY, WITH BIOPSY;  Surgeon: Patrick Preciado DO;  Location: [facility]    MASTECTOMY, BILATERAL Bilateral 08/28/2023    wisdom teeth       Current Outpatient Medications   Medication Sig Dispense Refill    albuterol (PROAIR  "HFA/PROVENTIL HFA/VENTOLIN HFA) 108 (90 Base) MCG/ACT inhaler Inhale 2 puffs into the lungs every 6 hours as needed for shortness of breath / dyspnea or wheezing 18 g 0    B-D SYRINGE LUER-SANTA 1 ML MISC       BD DISP NEEDLES 25G X 5/8\" MISC       buPROPion (WELLBUTRIN XL) 150 MG 24 hr tablet Take 1 tablet (150 mg) by mouth every morning 30 tablet 3    escitalopram (LEXAPRO) 20 MG tablet Take one half tab daily for 1 week, then 1 full tab daily. 90 tablet 1    famotidine (PEPCID) 20 MG tablet Take 1 tablet (20 mg) by mouth At Bedtime 60 tablet 3    omeprazole (PRILOSEC) 40 MG DR capsule Take 1 capsule (40 mg) by mouth 2 times daily (before meals) For more refills,schedule an appointment at [phone] 180 capsule 0    ondansetron (ZOFRAN) 4 MG tablet 4mg every 6-8 hours if needed for nausea and vomiting. If needed, may increase to 8mg every 8-12 hours if needed for nausea. 30 tablet 0    prazosin (MINIPRESS) 1 MG capsule TAKE 1 CAPSULE BY MOUTH EVERY NIGHT AT BEDTIME. AFTER 2-3 DAYS, INCREASE TO 2 CAPSULE EVERY NIGHT AT BEDTIME 180 capsule 1    Testosterone 1.62 % GEL APPLY 3 PUMPS TOPICALLY TO THE AFFECTED AREA DAILY      testosterone cypionate (DEPOTESTOSTERONE) 200 MG/ML injection Inject 0.2 mg into the muscle once a week Friday      Vitamin D, Cholecalciferol, 25 MCG (1000 UT) TABS Take 1,000 Units by mouth every morning         Allergies   Allergen Reactions    Ibuprofen Hives     Throat gets itchy and sore    Hydrocodone-Acetaminophen Nausea    Penicillins         Social History     Tobacco Use    Smoking status: Some Days     Current packs/day: 1.00     Average packs/day: 1 pack/day for 5.0 years (5.0 ttl pk-yrs)     Types: Cigarettes    Smokeless tobacco: Never    Tobacco comments:     Pt does not smoke when it is cold outside   Substance Use Topics    Alcohol use: Yes     Alcohol/week: 6.0 - 8.0 standard drinks of alcohol     Types: 6 - 8 Standard drinks or equivalent per week     Comment: every day drink " "    Family History   Problem Relation Age of Onset    Heart Disease Mother     Substance Abuse Mother         in remission    Substance Abuse Father     Alcoholism Father     Bipolar Disorder Brother     Depression Brother     Anxiety Disorder Brother     Hypertension Maternal Grandmother     Arthritis Maternal Grandmother     Heart Disease Maternal Grandmother     Hodgkin's lymphoma Maternal Grandfather 26    Esophageal Cancer Maternal Grandfather          at 54.    Heart Disease Maternal Uncle     Heart Disease Maternal Uncle     Acute Myocardial Infarction No family hx of     Anesthesia Reaction No family hx of     Deep Vein Thrombosis (DVT) No family hx of      History   Drug Use    Types: Marijuana     Comment: Smoking PRN             Review of Systems  Constitutional, neuro, ENT, endocrine, pulmonary, cardiac, gastrointestinal, genitourinary, musculoskeletal, integument and psychiatric systems are negative, except as otherwise noted.    Objective    /73 (BP Location: Right arm, Patient Position: Sitting, Cuff Size: Adult Large)   Pulse 98   Temp 98.3  F (36.8  C) (Oral)   Resp 20   Ht 1.542 m (5' 0.71\")   Wt 109.1 kg (240 lb 8 oz)   SpO2 96%   BMI 45.88 kg/m     Estimated body mass index is 45.88 kg/m  as calculated from the following:    Height as of this encounter: 1.542 m (5' 0.71\").    Weight as of this encounter: 109.1 kg (240 lb 8 oz).  Physical Exam  GENERAL: alert and no distress  EYES: Eyes grossly normal to inspection, PERRL and conjunctivae and sclerae normal  HENT: ear canals and TM's normal, nose and mouth without ulcers or lesions  NECK: no adenopathy, no asymmetry, masses, or scars  RESP: lungs clear to auscultation - no rales, rhonchi or wheezes  CV: regular rate and rhythm, normal S1 S2, no S3 or S4, no murmur, click or rub, no peripheral edema  ABDOMEN: soft, nontender, no hepatosplenomegaly, no masses and bowel sounds normal  MS: no gross musculoskeletal defects noted, no " edema  SKIN: no suspicious lesions or rashes  NEURO: Normal strength and tone, mentation intact and speech normal  PSYCH: mentation appears normal, affect normal/bright    Recent Labs   Lab Test 03/04/24  1134 12/27/23  1440 12/27/23  1431   HGB 13.8 15.4  --     297  --      --  139   POTASSIUM 4.4  --  4.2   CR 0.84  --  0.92        Diagnostics  Labs pending at this time.  Results will be reviewed when available.   No EKG required, no history of coronary heart disease, significant arrhythmia, peripheral arterial disease or other structural heart disease.    Revised Cardiac Risk Index (RCRI)  The patient has the following serious cardiovascular risks for perioperative complications:   - No serious cardiac risks = 0 points     RCRI Interpretation: 0 points: Class I (very low risk - 0.4% complication rate)         Signed Electronically by: Litzy Corona MD  Copy of this evaluation report is provided to requesting physician.          [facility] provides services at a reduced cost to those who are determined to be eligible through [facility]’s financial assistance program. Information regarding [facility]’s financial assistance program can be found by going to [email or web address] or by calling [phone].

## 2025-04-20 ENCOUNTER — HEALTH MAINTENANCE LETTER (OUTPATIENT)
Age: 25
End: 2025-04-20

## (undated) DEVICE — GLOVE EXAM NITRILE LG PF LATEX FREE 5064

## (undated) DEVICE — PAD POS XL 1X20X40IN PINK PIGAZZI

## (undated) DEVICE — TUBING SMOKE EVAC PNEUMOCLEAR HIGH FLOW 0620050250

## (undated) DEVICE — GOWN XLG DISP 9545

## (undated) DEVICE — SOL WATER IRRIG 500ML BOTTLE 2F7113

## (undated) DEVICE — KIT ENDO TURNOVER/PROCEDURE CARRY-ON 101822

## (undated) DEVICE — GOWN IMPERVIOUS 2XL BLUE

## (undated) DEVICE — SUTURE VICRYL+ 2-0 27IN SH UND VCP417H

## (undated) DEVICE — SU ETHIBOND 0 CT-2 30" X412H

## (undated) DEVICE — TUBE PENROSE 3/8 X 12 STRL LTX 0912020

## (undated) DEVICE — SUTURE MONOCRYL+ 4-0 PS-2 27IN MCP426H

## (undated) DEVICE — SYR 10ML LL W/O NDL 302995

## (undated) DEVICE — SYR 30ML SLIP TIP W/O NDL 302833

## (undated) DEVICE — BLADE KNIFE SURG 11 371111

## (undated) DEVICE — SUCTION MANIFOLD NEPTUNE 2 SYS 1 PORT 702-025-000

## (undated) DEVICE — DEVICE CLOSURE V-LOC 0 GS-21 6IN VLOCN0306

## (undated) DEVICE — DRAPE SHEET REV FOLD 3/4 9349

## (undated) DEVICE — LUBRICANT INST ELECTROLUBE EL101

## (undated) DEVICE — DAVINCI XI DRAPE ARM 470015

## (undated) DEVICE — DAVINCI XI DRAPE COLUMN 470341

## (undated) DEVICE — TUBING SUCTION 12"X1/4" N612

## (undated) DEVICE — NDL INSUFFLATION 13GA 120MM C2201

## (undated) DEVICE — GLOVE BIOGEL PI ORTHOPRO SZ 7.5 47675

## (undated) DEVICE — SUCTION CATH AIRLIFE TRI-FLO W/CONTROL PORT 14FR  T60C

## (undated) DEVICE — ENDO BITE BLOCK ADULT OMNI-BLOC

## (undated) DEVICE — DRSG BANDAID 3/4X3" FABRIC CURITY LATEX FREE 44100

## (undated) DEVICE — ELECTRODE PATIENT RETURN ADULT L10 FT 2 PLATE CORD 0855C

## (undated) DEVICE — SOL WATER IRRIG 1000ML BOTTLE 2F7114

## (undated) DEVICE — PREP CHLORAPREP 26ML TINTED HI-LITE ORANGE 930815

## (undated) DEVICE — DAVINCI XI SEAL UNIVERSAL 5-12MM 470500

## (undated) DEVICE — DAVINCI XI OBTURATOR BLADELESS 8MM 470359

## (undated) DEVICE — DAVINCI XI HANDPIECE ESU VESSEL SEALER 8MM EXT 480422

## (undated) DEVICE — CUSTOM PACK LAP CHOLE SBA5BLCHEA

## (undated) DEVICE — NEEDLE HYPO MAGELLAN SAFETY 22GA 1 1/2IN 8881850215

## (undated) DEVICE — SYR 50ML SLIP TIP W/O NDL 309654

## (undated) DEVICE — DRAPE U SPLIT 74X120" 29440

## (undated) DEVICE — Device

## (undated) RX ORDER — PROPOFOL 10 MG/ML
INJECTION, EMULSION INTRAVENOUS
Status: DISPENSED
Start: 2023-02-16

## (undated) RX ORDER — FENTANYL CITRATE 50 UG/ML
INJECTION, SOLUTION INTRAMUSCULAR; INTRAVENOUS
Status: DISPENSED
Start: 2024-07-02

## (undated) RX ORDER — PROPOFOL 10 MG/ML
INJECTION, EMULSION INTRAVENOUS
Status: DISPENSED
Start: 2024-07-02

## (undated) RX ORDER — ONDANSETRON 2 MG/ML
INJECTION INTRAMUSCULAR; INTRAVENOUS
Status: DISPENSED
Start: 2023-02-16

## (undated) RX ORDER — LIDOCAINE HYDROCHLORIDE 10 MG/ML
INJECTION, SOLUTION EPIDURAL; INFILTRATION; INTRACAUDAL; PERINEURAL
Status: DISPENSED
Start: 2024-07-02

## (undated) RX ORDER — ONDANSETRON 2 MG/ML
INJECTION INTRAMUSCULAR; INTRAVENOUS
Status: DISPENSED
Start: 2024-07-02